# Patient Record
Sex: FEMALE | Race: WHITE | NOT HISPANIC OR LATINO | Employment: OTHER | URBAN - METROPOLITAN AREA
[De-identification: names, ages, dates, MRNs, and addresses within clinical notes are randomized per-mention and may not be internally consistent; named-entity substitution may affect disease eponyms.]

---

## 2017-01-20 ENCOUNTER — TELEPHONE (OUTPATIENT)
Dept: FAMILY MEDICINE | Facility: CLINIC | Age: 72
End: 2017-01-20

## 2017-01-20 NOTE — TELEPHONE ENCOUNTER
----- Message from Alma Swain sent at 1/20/2017  2:00 PM CST -----  Contact: Self  Pt requesting to speak to nurse regarding referral. Please call [phone].

## 2017-02-08 ENCOUNTER — TELEPHONE (OUTPATIENT)
Dept: FAMILY MEDICINE | Facility: CLINIC | Age: 72
End: 2017-02-08

## 2017-02-08 RX ORDER — NYSTATIN 100000 [USP'U]/ML
6 SUSPENSION ORAL 4 TIMES DAILY
Qty: 473 ML | Refills: 12 | Status: SHIPPED | OUTPATIENT
Start: 2017-02-08 | End: 2017-02-18

## 2017-02-08 NOTE — TELEPHONE ENCOUNTER
Spoke with pt she states she has developed thrush in her mouth due to her advair inhaler and would like for you to send her in a script for this to [facility]. Please advise

## 2017-02-08 NOTE — TELEPHONE ENCOUNTER
----- Message from Lorena Cook sent at 2/8/2017  8:36 AM CST -----  Contact: self  Pt would like to speak to the nurse regarding about her medication. Please contact the pt at [phone]. Thanks!

## 2017-02-15 DIAGNOSIS — M81.0 OSTEOPOROSIS: Primary | ICD-10-CM

## 2017-02-22 ENCOUNTER — INFUSION (OUTPATIENT)
Dept: INFUSION THERAPY | Facility: HOSPITAL | Age: 72
End: 2017-02-22
Attending: INTERNAL MEDICINE
Payer: MEDICARE

## 2017-02-22 DIAGNOSIS — M81.0 OSTEOPOROSIS: Primary | ICD-10-CM

## 2017-02-22 PROCEDURE — 63600175 PHARM REV CODE 636 W HCPCS: Performed by: INTERNAL MEDICINE

## 2017-02-22 PROCEDURE — 96401 CHEMO ANTI-NEOPL SQ/IM: CPT

## 2017-02-22 RX ADMIN — DENOSUMAB 60 MG: 60 INJECTION SUBCUTANEOUS at 12:02

## 2017-02-23 ENCOUNTER — TELEPHONE (OUTPATIENT)
Dept: RHEUMATOLOGY | Facility: CLINIC | Age: 72
End: 2017-02-23

## 2017-02-23 NOTE — TELEPHONE ENCOUNTER
Patient called and told she will need to make appointment to see Dr. Rush before her next Prolia injection in 8/2017.  Offered to schedule appointment, but patient declined stating she will call to make appointment at a later date.

## 2017-03-20 ENCOUNTER — TELEPHONE (OUTPATIENT)
Dept: FAMILY MEDICINE | Facility: CLINIC | Age: 72
End: 2017-03-20

## 2017-03-20 DIAGNOSIS — E55.9 VITAMIN D DEFICIENCY DISEASE: ICD-10-CM

## 2017-03-20 DIAGNOSIS — E87.1 CHRONIC HYPONATREMIA: ICD-10-CM

## 2017-03-20 DIAGNOSIS — R73.9 HYPERGLYCEMIA: ICD-10-CM

## 2017-03-20 DIAGNOSIS — E78.5 HYPERLIPIDEMIA, UNSPECIFIED HYPERLIPIDEMIA TYPE: ICD-10-CM

## 2017-03-20 DIAGNOSIS — D72.829 LEUKOCYTOSIS, UNSPECIFIED TYPE: Primary | ICD-10-CM

## 2017-03-20 NOTE — TELEPHONE ENCOUNTER
----- Message from Leonor Jarvis sent at 3/20/2017  3:58 PM CDT -----  Contact: self  Patient is requesting an order for labs to be done prior to appt in April . Please call prior to scheduling .   [phone]    LL

## 2017-03-28 RX ORDER — PRAVASTATIN SODIUM 20 MG/1
20 TABLET ORAL NIGHTLY
Qty: 90 TABLET | Refills: 1 | Status: SHIPPED | OUTPATIENT
Start: 2017-03-28 | End: 2017-08-18 | Stop reason: SDUPTHER

## 2017-03-29 DIAGNOSIS — J44.9 CHRONIC OBSTRUCTIVE PULMONARY DISEASE, UNSPECIFIED COPD TYPE: ICD-10-CM

## 2017-03-29 RX ORDER — TIOTROPIUM BROMIDE 18 UG/1
18 CAPSULE ORAL; RESPIRATORY (INHALATION) DAILY
Qty: 30 CAPSULE | Refills: 3 | OUTPATIENT
Start: 2017-03-29 | End: 2017-03-31 | Stop reason: SDUPTHER

## 2017-03-29 NOTE — TELEPHONE ENCOUNTER
----- Message from Mendez Franco sent at 3/27/2017 12:53 PM CDT -----  Contact: Patient  Patient need a Rx refill for Rx tiotropium (SPIRIVA WITH HANDIHALER) 18 mcg inhalation capsule With refills     Please send Rx to [facility] [phone] (Phone)  [phone] (Fax)    Please call patient at [phone]

## 2017-03-29 NOTE — TELEPHONE ENCOUNTER
----- Message from Debbie Mathew sent at 3/29/2017  2:38 PM CDT -----  Contact: pt  2nd request   OUT OF MED      SEVERAL REQUESTS      FROM [facility]   LAST WEEK      PT CALLED Monday        No response           30 DAY  SUPPLY     tiotropium (SPIRIVA WITH HANDIHALER) 18 mcg inhalation capsule ()    DIFFERENT PHARMACY           [facility]    Fide De La Cruz      Thanks

## 2017-03-31 ENCOUNTER — TELEPHONE (OUTPATIENT)
Dept: SLEEP MEDICINE | Facility: OTHER | Age: 72
End: 2017-03-31

## 2017-03-31 DIAGNOSIS — J44.9 CHRONIC OBSTRUCTIVE PULMONARY DISEASE, UNSPECIFIED COPD TYPE: ICD-10-CM

## 2017-03-31 RX ORDER — TIOTROPIUM BROMIDE 18 UG/1
18 CAPSULE ORAL; RESPIRATORY (INHALATION) DAILY
Qty: 30 CAPSULE | Refills: 6 | Status: SHIPPED | OUTPATIENT
Start: 2017-03-31 | End: 2017-03-31 | Stop reason: SDUPTHER

## 2017-03-31 RX ORDER — TIOTROPIUM BROMIDE 18 UG/1
18 CAPSULE ORAL; RESPIRATORY (INHALATION) DAILY
Qty: 30 CAPSULE | Refills: 6 | Status: SHIPPED | OUTPATIENT
Start: 2017-03-31 | End: 2017-04-30

## 2017-03-31 NOTE — TELEPHONE ENCOUNTER
----- Message from Gerri Modi sent at 3/31/2017  3:28 PM CDT -----  Medication for  Patient need a Rx refill for Rx tiotropium (SPIRIVA WITH HANDIHALER) 18 mcg inhalation capsule With refills      Please send Rx to [facility] [phone] (Phone)  [phone] (Fax)     Please call patient at [phone]  Medication was sent to the wrong Pharmacy

## 2017-04-03 ENCOUNTER — TELEPHONE (OUTPATIENT)
Dept: PULMONOLOGY | Facility: CLINIC | Age: 72
End: 2017-04-03

## 2017-04-03 NOTE — TELEPHONE ENCOUNTER
----- Message from Elena Mike sent at 4/3/2017 10:44 AM CDT -----  Contact: patient   [phone]  hall   -  Patient calling to speak with the nurse in regards to getting her medication refilled   Thanks,

## 2017-04-05 ENCOUNTER — LAB VISIT (OUTPATIENT)
Dept: LAB | Facility: HOSPITAL | Age: 72
End: 2017-04-05
Attending: INTERNAL MEDICINE
Payer: MEDICARE

## 2017-04-05 DIAGNOSIS — E55.9 VITAMIN D DEFICIENCY DISEASE: ICD-10-CM

## 2017-04-05 DIAGNOSIS — R73.9 HYPERGLYCEMIA: ICD-10-CM

## 2017-04-05 DIAGNOSIS — E78.5 HYPERLIPIDEMIA, UNSPECIFIED HYPERLIPIDEMIA TYPE: ICD-10-CM

## 2017-04-05 DIAGNOSIS — E87.1 CHRONIC HYPONATREMIA: ICD-10-CM

## 2017-04-05 DIAGNOSIS — D72.829 LEUKOCYTOSIS, UNSPECIFIED TYPE: ICD-10-CM

## 2017-04-05 LAB
25(OH)D3+25(OH)D2 SERPL-MCNC: 54 NG/ML
ALBUMIN SERPL BCP-MCNC: 3.9 G/DL
ALP SERPL-CCNC: 50 U/L
ALT SERPL W/O P-5'-P-CCNC: 13 U/L
ANION GAP SERPL CALC-SCNC: 8 MMOL/L
AST SERPL-CCNC: 26 U/L
BASOPHILS # BLD AUTO: 0.01 K/UL
BASOPHILS NFR BLD: 0.2 %
BILIRUB SERPL-MCNC: 0.6 MG/DL
BUN SERPL-MCNC: 10 MG/DL
CALCIUM SERPL-MCNC: 9.6 MG/DL
CHLORIDE SERPL-SCNC: 96 MMOL/L
CHOLEST/HDLC SERPL: 2.3 {RATIO}
CO2 SERPL-SCNC: 26 MMOL/L
CREAT SERPL-MCNC: 0.7 MG/DL
DIFFERENTIAL METHOD: ABNORMAL
EOSINOPHIL # BLD AUTO: 0.2 K/UL
EOSINOPHIL NFR BLD: 3.9 %
ERYTHROCYTE [DISTWIDTH] IN BLOOD BY AUTOMATED COUNT: 13 %
EST. GFR  (AFRICAN AMERICAN): >60 ML/MIN/1.73 M^2
EST. GFR  (NON AFRICAN AMERICAN): >60 ML/MIN/1.73 M^2
GLUCOSE SERPL-MCNC: 90 MG/DL
HCT VFR BLD AUTO: 38.8 %
HDL/CHOLESTEROL RATIO: 43.6 %
HDLC SERPL-MCNC: 165 MG/DL
HDLC SERPL-MCNC: 72 MG/DL
HGB BLD-MCNC: 13.9 G/DL
LDLC SERPL CALC-MCNC: 81.8 MG/DL
LYMPHOCYTES # BLD AUTO: 1.7 K/UL
LYMPHOCYTES NFR BLD: 27.4 %
MCH RBC QN AUTO: 32.4 PG
MCHC RBC AUTO-ENTMCNC: 35.8 %
MCV RBC AUTO: 90 FL
MONOCYTES # BLD AUTO: 0.7 K/UL
MONOCYTES NFR BLD: 10.9 %
NEUTROPHILS # BLD AUTO: 3.6 K/UL
NEUTROPHILS NFR BLD: 57.4 %
NONHDLC SERPL-MCNC: 93 MG/DL
PLATELET # BLD AUTO: 221 K/UL
PMV BLD AUTO: 9.5 FL
POTASSIUM SERPL-SCNC: 4.5 MMOL/L
PROT SERPL-MCNC: 7 G/DL
RBC # BLD AUTO: 4.29 M/UL
SODIUM SERPL-SCNC: 130 MMOL/L
TRIGL SERPL-MCNC: 56 MG/DL
TSH SERPL DL<=0.005 MIU/L-ACNC: 0.97 UIU/ML
WBC # BLD AUTO: 6.17 K/UL

## 2017-04-05 PROCEDURE — 83036 HEMOGLOBIN GLYCOSYLATED A1C: CPT

## 2017-04-05 PROCEDURE — 84443 ASSAY THYROID STIM HORMONE: CPT

## 2017-04-05 PROCEDURE — 85025 COMPLETE CBC W/AUTO DIFF WBC: CPT

## 2017-04-05 PROCEDURE — 80061 LIPID PANEL: CPT

## 2017-04-05 PROCEDURE — 82306 VITAMIN D 25 HYDROXY: CPT

## 2017-04-05 PROCEDURE — 80053 COMPREHEN METABOLIC PANEL: CPT

## 2017-04-05 PROCEDURE — 36415 COLL VENOUS BLD VENIPUNCTURE: CPT | Mod: PO

## 2017-04-06 LAB
ESTIMATED AVG GLUCOSE: 126 MG/DL
HBA1C MFR BLD HPLC: 6 %

## 2017-04-13 ENCOUNTER — OFFICE VISIT (OUTPATIENT)
Dept: FAMILY MEDICINE | Facility: CLINIC | Age: 72
End: 2017-04-13
Payer: MEDICARE

## 2017-04-13 VITALS
BODY MASS INDEX: 16.93 KG/M2 | WEIGHT: 101.63 LBS | TEMPERATURE: 98 F | HEIGHT: 65 IN | SYSTOLIC BLOOD PRESSURE: 134 MMHG | DIASTOLIC BLOOD PRESSURE: 60 MMHG | HEART RATE: 84 BPM

## 2017-04-13 DIAGNOSIS — M70.22 OLECRANON BURSITIS OF LEFT ELBOW: ICD-10-CM

## 2017-04-13 DIAGNOSIS — Z00.00 ROUTINE MEDICAL EXAM: Primary | ICD-10-CM

## 2017-04-13 DIAGNOSIS — I65.29 STENOSIS OF CAROTID ARTERY, UNSPECIFIED LATERALITY: ICD-10-CM

## 2017-04-13 DIAGNOSIS — I77.9 CAROTID DISEASE, BILATERAL: ICD-10-CM

## 2017-04-13 DIAGNOSIS — J43.9 PULMONARY EMPHYSEMA, UNSPECIFIED EMPHYSEMA TYPE: ICD-10-CM

## 2017-04-13 DIAGNOSIS — I10 ESSENTIAL HYPERTENSION: ICD-10-CM

## 2017-04-13 DIAGNOSIS — M81.0 OSTEOPOROSIS, UNSPECIFIED OSTEOPOROSIS TYPE, UNSPECIFIED PATHOLOGICAL FRACTURE PRESENCE: ICD-10-CM

## 2017-04-13 DIAGNOSIS — E78.5 HYPERLIPIDEMIA, UNSPECIFIED HYPERLIPIDEMIA TYPE: ICD-10-CM

## 2017-04-13 DIAGNOSIS — C44.90 SKIN CANCER: ICD-10-CM

## 2017-04-13 DIAGNOSIS — Z12.31 ENCOUNTER FOR SCREENING MAMMOGRAM FOR BREAST CANCER: ICD-10-CM

## 2017-04-13 DIAGNOSIS — E87.1 CHRONIC HYPONATREMIA: ICD-10-CM

## 2017-04-13 DIAGNOSIS — I77.71 CAROTID DISSECTION, BILATERAL: ICD-10-CM

## 2017-04-13 PROCEDURE — 3075F SYST BP GE 130 - 139MM HG: CPT | Mod: S$GLB,,, | Performed by: INTERNAL MEDICINE

## 2017-04-13 PROCEDURE — 99214 OFFICE O/P EST MOD 30 MIN: CPT | Mod: 25,S$GLB,, | Performed by: INTERNAL MEDICINE

## 2017-04-13 PROCEDURE — 99397 PER PM REEVAL EST PAT 65+ YR: CPT | Mod: 25,S$GLB,, | Performed by: INTERNAL MEDICINE

## 2017-04-13 PROCEDURE — 1159F MED LIST DOCD IN RCRD: CPT | Mod: S$GLB,,, | Performed by: INTERNAL MEDICINE

## 2017-04-13 PROCEDURE — 99999 PR PBB SHADOW E&M-EST. PATIENT-LVL III: CPT | Mod: PBBFAC,,, | Performed by: INTERNAL MEDICINE

## 2017-04-13 PROCEDURE — 3078F DIAST BP <80 MM HG: CPT | Mod: S$GLB,,, | Performed by: INTERNAL MEDICINE

## 2017-04-13 PROCEDURE — 99499 UNLISTED E&M SERVICE: CPT | Mod: S$GLB,,, | Performed by: INTERNAL MEDICINE

## 2017-04-13 PROCEDURE — 1160F RVW MEDS BY RX/DR IN RCRD: CPT | Mod: S$GLB,,, | Performed by: INTERNAL MEDICINE

## 2017-04-13 RX ORDER — CLINDAMYCIN HYDROCHLORIDE 150 MG/1
150 CAPSULE ORAL 3 TIMES DAILY
Qty: 30 CAPSULE | Refills: 0 | Status: SHIPPED | OUTPATIENT
Start: 2017-04-13 | End: 2017-12-04 | Stop reason: ALTCHOICE

## 2017-04-13 NOTE — PROGRESS NOTES
complaint: Physical exam    71-year-old white female still very active.  She is not yet ready for a  colonoscopy.  We discussed all the benefits and she will at least do stool cards which were given today.  Her mammogram will be due in May.  She is still active and exercising although less so given her orthopedic injury.  She was given stool cards before and still has them every discussed obtaining a new kit later this year    ROS:   CONST: weight stable. EYES: no vision change. ENT: no sore throat. CV: no chest pain w/ exertion. RESP: no shortness of breath. GI: no nausea, vomiting, diarrhea. No dysphagia. : no urinary issues. MUSCULOSKELETAL: no new myalgias or arthralgias. SKIN: no new changes. NEURO: no focal deficits. PSYCH: no new issues. ENDOCRINE: no polyuria. HEME: no lymph nodes. ALLERGY: no general pruritis.     MH:  1.  HTN.                                                                     2.  Osteoporosis.  Worsening BMD 4/12. On fosfamax [id number] and restarted 2012.   Now on Prolia per Endocrine                                                       3.  H/o smoking for 30 years and questionable emphysema on CXR.              4.  Cervical spondylosis, s/p surgery by Dr. Luciano.                      5.  mammogram yearly                                                  6.  Colon cancer screening, pt cancelled C-scope but plans to reschedule.                                                                  7.  FMH (mother) with colon polyps. Father unknown.                                         8.  H/o early menopause at 43 with no HRT.   9.  Bilateral carotid disease.  50-70% on left 2015                                                   10.  Chronic hyponatremia.   11. Left shoulder scope/impingement.    12. Migraines ?        13.   Hoarseness - saw Ricky in ENT- vocal cord reduced flexibility   14.  Allergies- SOB relieved w/ Zyrtec   15.  Tibia fracture surgery 2014    Social history: Former  smoker for 30 years,  lives with her , rare alcohol    Gen: no distress  EYES: conjunctiva clear, non-icteric, PERRL  ENT: nose clear, nasal mucosa normal, oropharynx clear, teeth good  NECK:supple, thyroid non-palpable  RESP: effort is good, lungs clear  CV: heart RRR w/o murmur, gallops or rubs; no carotid bruits, no edema  GI: abdomen soft, non-distended, non-tender, no hepatosplenomegaly  MS: gait normal, no clubbing or cyanosis of the digits, left knee with inward deviation but no pain, patient is a one by one slightly red and and her olecranon bursitis on the left and overlying it there is a small whitish collection but no break in the skin and no surrounding cellulitis  SKIN: no rashes, warm to touch     Latasha TALLEY was seen today for annual exam.    Diagnoses and all orders for this visit:    Routine medical exam, we'll update patient's mammogram and she will consider doing stool cards later this year.  Labs all reviewed with patient    Encounter for screening mammogram for breast cancer  -     Mammo Digital Screening Bilat with CAD; Future                                                              Additional evaluation and management issues:    Additionally, patient has numerous other medical issues to address.  She has her chronic hyponatremia which is essentially the same and she will increase her salt intake.  She is on Pravachol due to history of carotid disease which is due to update a yearly ultrasound which we reviewed.  Her lipids are acceptable and HDL is excellent on statin.  We'll long conversation regarding her osteoporosis.  She has been on injections for the past 2 years and would like to get a bone density done prior to deciding on continuing which I would recommend.  She has a history of emphysema but has no pulmonary symptoms referable to that at this time.  Her hypertension is under good control.  We discussed a lot of her problems getting referrals.  She apparently saw  "dermatology without a referral or something but may not need a referral but anyway she got a cleared up but she would like now to have referrals put in for any specialist she sees.  She's had some treated skin cancers on her arms.  Also for several weeks she had a swelling of the left elbow.  It was tender in the little bit more red but has improved.  She thought she might need to see dermatology but actually I would have her see orthopedics.  It does possibly have a pustule on its I will give her clindamycin in case it worsens or ruptures but not sure she actually needs it at this time.  All these issues reviewed and patient counseled an evaluation and management we based upon time counseling Total time over 25 minutes with over 50% counseling.          Assessment and plan:    Chronic hyponatremia, continue salt intake    Hyperlipidemia, unspecified hyperlipidemia type, continue statin    Osteoporosis, unspecified osteoporosis type, unspecified pathological fracture presence, reassess on therapy  -     DXA Bone Density Spine And Hip; Future    Pulmonary emphysema, unspecified emphysema type, chronic and stable    Carotid disease, bilateral, reassess    Essential hypertension, chronic and stable    Skin cancer  -     Ambulatory referral to Dermatology    Olecranon bursitis of left elbow, clindamycin in case he gets infected  -     Ambulatory referral to Orthopedics    Other orders  -     clindamycin (CLEOCIN) 150 MG capsule; Take 1 capsule (150 mg total) by mouth 3 (three) times daily.       Patient expresses significant anxiety regarding results and tests over scrutinizing get herself worked up.  Access to the note would not be therapeutic"This note will not be shared with the patient."   "

## 2017-05-10 RX ORDER — FLUTICASONE PROPIONATE AND SALMETEROL 250; 50 UG/1; UG/1
POWDER RESPIRATORY (INHALATION)
Qty: 3 EACH | Refills: 2 | Status: SHIPPED | OUTPATIENT
Start: 2017-05-10 | End: 2017-10-16 | Stop reason: SDUPTHER

## 2017-05-11 ENCOUNTER — HOSPITAL ENCOUNTER (OUTPATIENT)
Dept: RADIOLOGY | Facility: HOSPITAL | Age: 72
Discharge: HOME | End: 2017-05-11
Attending: INTERNAL MEDICINE
Payer: MEDICARE

## 2017-05-11 ENCOUNTER — HOSPITAL ENCOUNTER (OUTPATIENT)
Dept: RADIOLOGY | Facility: CLINIC | Age: 72
Discharge: HOME | End: 2017-05-11
Attending: INTERNAL MEDICINE
Payer: MEDICARE

## 2017-05-11 DIAGNOSIS — Z12.31 ENCOUNTER FOR SCREENING MAMMOGRAM FOR BREAST CANCER: ICD-10-CM

## 2017-05-11 DIAGNOSIS — M81.0 OSTEOPOROSIS, UNSPECIFIED OSTEOPOROSIS TYPE, UNSPECIFIED PATHOLOGICAL FRACTURE PRESENCE: ICD-10-CM

## 2017-05-11 PROCEDURE — 77080 DXA BONE DENSITY AXIAL: CPT | Mod: 26,,, | Performed by: INTERNAL MEDICINE

## 2017-05-11 PROCEDURE — 77067 SCR MAMMO BI INCL CAD: CPT | Mod: 26,,, | Performed by: RADIOLOGY

## 2017-05-11 PROCEDURE — 77063 BREAST TOMOSYNTHESIS BI: CPT | Mod: 26,,, | Performed by: RADIOLOGY

## 2017-05-11 PROCEDURE — 77067 SCR MAMMO BI INCL CAD: CPT | Mod: TC

## 2017-06-05 ENCOUNTER — HOSPITAL ENCOUNTER (OUTPATIENT)
Dept: RADIOLOGY | Facility: HOSPITAL | Age: 72
Discharge: HOME | End: 2017-06-05
Attending: INTERNAL MEDICINE
Payer: MEDICARE

## 2017-06-05 DIAGNOSIS — I77.9 CAROTID DISEASE, BILATERAL: ICD-10-CM

## 2017-06-05 DIAGNOSIS — I65.29 STENOSIS OF CAROTID ARTERY, UNSPECIFIED LATERALITY: ICD-10-CM

## 2017-06-05 DIAGNOSIS — I77.71 CAROTID DISSECTION, BILATERAL: ICD-10-CM

## 2017-06-05 PROCEDURE — 93880 EXTRACRANIAL BILAT STUDY: CPT | Mod: TC

## 2017-06-05 PROCEDURE — 93880 EXTRACRANIAL BILAT STUDY: CPT | Mod: 26,,, | Performed by: RADIOLOGY

## 2017-06-14 ENCOUNTER — TELEPHONE (OUTPATIENT)
Dept: FAMILY MEDICINE | Facility: CLINIC | Age: 72
End: 2017-06-14

## 2017-06-14 DIAGNOSIS — M79.673 PAIN OF FOOT, UNSPECIFIED LATERALITY: Primary | ICD-10-CM

## 2017-06-14 NOTE — TELEPHONE ENCOUNTER
----- Message from Shayy Granados sent at 6/14/2017  2:37 PM CDT -----  Contact: [phone]  Pt wants to get a referral for the podiatrist for upcoming appointment  Please call pt at your earliest convenience.  Thanks !

## 2017-06-16 RX ORDER — AMLODIPINE BESYLATE 5 MG/1
TABLET ORAL
Qty: 90 TABLET | Refills: 1 | Status: SHIPPED | OUTPATIENT
Start: 2017-06-16 | End: 2017-08-18 | Stop reason: SDUPTHER

## 2017-06-26 RX ORDER — ALBUTEROL SULFATE 90 UG/1
2 AEROSOL, METERED RESPIRATORY (INHALATION) EVERY 6 HOURS PRN
Qty: 1 INHALER | Refills: 6 | Status: SHIPPED | OUTPATIENT
Start: 2017-06-26 | End: 2017-10-16 | Stop reason: SDUPTHER

## 2017-06-26 NOTE — TELEPHONE ENCOUNTER
As per patient she states that the Advair is $1,000 and would like something cheaper. She has a inhaler (ventolin).

## 2017-06-27 ENCOUNTER — TELEPHONE (OUTPATIENT)
Dept: FAMILY MEDICINE | Facility: CLINIC | Age: 72
End: 2017-06-27

## 2017-06-27 NOTE — TELEPHONE ENCOUNTER
----- Message from Sabra Martinez sent at 6/27/2017 10:49 AM CDT -----  Contact: self  Pt returned call. Please call [phone].

## 2017-06-27 NOTE — TELEPHONE ENCOUNTER
----- Message from Sabra Martinez sent at 6/27/2017 10:49 AM CDT -----  Contact: self  Pt returned call. Please call [phone].

## 2017-06-28 ENCOUNTER — OFFICE VISIT (OUTPATIENT)
Dept: PODIATRY | Facility: CLINIC | Age: 72
End: 2017-06-28
Payer: MEDICARE

## 2017-06-28 VITALS
DIASTOLIC BLOOD PRESSURE: 80 MMHG | SYSTOLIC BLOOD PRESSURE: 140 MMHG | HEIGHT: 65 IN | WEIGHT: 101 LBS | BODY MASS INDEX: 16.83 KG/M2

## 2017-06-28 DIAGNOSIS — L60.0 INGROWN TOENAIL WITHOUT INFECTION: Primary | ICD-10-CM

## 2017-06-28 DIAGNOSIS — M79.674 PAIN AROUND TOENAIL, RIGHT FOOT: ICD-10-CM

## 2017-06-28 DIAGNOSIS — L60.0 INGROWN TOENAIL WITHOUT INFECTION: ICD-10-CM

## 2017-06-28 DIAGNOSIS — M79.675 PAIN AROUND TOENAIL, LEFT FOOT: ICD-10-CM

## 2017-06-28 PROCEDURE — 99999 PR PBB SHADOW E&M-EST. PATIENT-LVL III: CPT | Mod: PBBFAC,,, | Performed by: PODIATRIST

## 2017-06-28 PROCEDURE — 1159F MED LIST DOCD IN RCRD: CPT | Mod: S$GLB,,, | Performed by: PODIATRIST

## 2017-06-28 PROCEDURE — 99203 OFFICE O/P NEW LOW 30 MIN: CPT | Mod: S$GLB,,, | Performed by: PODIATRIST

## 2017-06-28 PROCEDURE — 1126F AMNT PAIN NOTED NONE PRSNT: CPT | Mod: S$GLB,,, | Performed by: PODIATRIST

## 2017-06-28 RX ORDER — TIOTROPIUM BROMIDE 18 UG/1
CAPSULE ORAL; RESPIRATORY (INHALATION)
Refills: 6 | COMMUNITY
Start: 2017-05-17 | End: 2017-12-04

## 2017-06-29 ENCOUNTER — TELEPHONE (OUTPATIENT)
Dept: FAMILY MEDICINE | Facility: CLINIC | Age: 72
End: 2017-06-29

## 2017-06-29 NOTE — PROGRESS NOTES
Subjective:      Patient ID: Latasha Perez is a 71 y.o. female.    Chief Complaint: Ingrown Toenail (pcp gold renee / 4-13-17/ )    Latasha TALLEY is a 71 y.o. female who presents to the clinic complaining of painful ingrown toenail on both feet great toes. Has previously tried to self trim but this is becoming more and more difficult for her to do so. She is here to inquire about treatment options as they are becoming increasingly painful daily. Not having any other major concerns with the feet.     Past Medical History:   Diagnosis Date    Carotid disease, bilateral     Chronic hyponatremia     HLD (hyperlipidemia)     HTN (hypertension)        Past Surgical History:   Procedure Laterality Date    APPENDECTOMY      CERVICAL SPINE SURGERY      DILATION AND CURETTAGE OF UTERUS      x 2    metatarsal repair      SHOULDER ARTHROSCOPY         Family History   Problem Relation Age of Onset    Heart disease Mother     Cancer Father     Heart disease Maternal Grandfather        Social History     Social History    Marital status:      Spouse name: N/A    Number of children: N/A    Years of education: N/A     Occupational History    teacher      Social History Main Topics    Smoking status: Former Smoker     Packs/day: 1.00     Years: 45.00     Types: Cigarettes     Quit date: 1/12/2002    Smokeless tobacco: None    Alcohol use No    Drug use: No    Sexual activity: Yes     Partners: Male     Other Topics Concern    None     Social History Narrative    None       Current Outpatient Prescriptions   Medication Sig Dispense Refill    albuterol 90 mcg/actuation inhaler Inhale 2 puffs into the lungs every 6 (six) hours as needed for Wheezing. Rescue 1 Inhaler 6    amlodipine (NORVASC) 5 MG tablet TAKE 1 TABLET (5 MG TOTAL) BY MOUTH ONCE DAILY. 90 tablet 1    aspirin (ECOTRIN) 81 MG EC tablet Take 81 mg by mouth once daily.      azelastine (ASTELIN) 137 mcg nasal spray 1 spray by Nasal route  daily as needed.       cetirizine (ZYRTEC) 10 MG tablet Take 10 mg by mouth every morning.        clindamycin (CLEOCIN) 150 MG capsule Take 1 capsule (150 mg total) by mouth 3 (three) times daily. 30 capsule 0    denosumab (PROLIA) 60 mg/mL Syrg Inject 60 mg into the skin every 6 (six) months.      fish oil-omega-3 fatty acids 300-1,000 mg capsule Take 1 g by mouth once daily.      fluticasone (FLONASE) 50 mcg/actuation nasal spray 1 spray by Each Nare route once daily. 1 Bottle 0    fluticasone (FLONASE) 50 mcg/actuation nasal spray 2 sprays by Each Nare route once daily. 16 g 0    fluticasone-salmeterol 250-50 mcg/dose (ADVAIR DISKUS) 250-50 mcg/dose diskus inhaler INHALE 1 PUFF TWICE DAILY 3 each 2    levocetirizine (XYZAL) 5 MG tablet Take 1 tablet (5 mg total) by mouth every evening. 30 tablet 1    losartan (COZAAR) 100 MG tablet TAKE 1 TABLET EVERY DAY 90 tablet 1    MULTIVITAMIN W-MINERALS/LUTEIN (CENTRUM SILVER ORAL) Take by mouth once daily.      pravastatin (PRAVACHOL) 20 MG tablet Take 1 tablet (20 mg total) by mouth every evening. 90 tablet 1    predniSONE (DELTASONE) 10 MG tablet 3 po bid x 3 d, then 2 po bid x 3 d, then 1 po bid x 3 d, then one daily. 40 tablet 0    PREVNAR 13, PF, 0.5 mL Syrg   0    SPIRIVA WITH HANDIHALER 18 mcg inhalation capsule INHALE 1 C USING THE HANDIHALER INTO LUNGS ONCE D  6     No current facility-administered medications for this visit.        Review of patient's allergies indicates:   Allergen Reactions    Pcn [penicillins] Other (See Comments)     Fever flushing skin swelling     Biaxin [clarithromycin] Rash    Codeine Rash    Doxycycline Rash    Levaquin [levofloxacin] Rash         Review of Systems   Constitution: Negative for chills and fever.   Cardiovascular: Positive for leg swelling. Negative for chest pain and claudication.   Respiratory: Negative for cough and shortness of breath.    Skin: Positive for dry skin and nail changes.    Musculoskeletal:        Toenail pain   Gastrointestinal: Negative for nausea and vomiting.   Neurological: Negative for numbness and paresthesias.   Psychiatric/Behavioral: Negative for altered mental status.           Objective:      Physical Exam   Constitutional: She is oriented to person, place, and time. She appears well-developed and well-nourished.   HENT:   Head: Normocephalic.   Cardiovascular: Intact distal pulses.    Pulses:       Dorsalis pedis pulses are 2+ on the right side, and 2+ on the left side.        Posterior tibial pulses are 1+ on the right side, and 1+ on the left side.   CRT < 3 sec to tips of toes. Mild edema noted to b/l LE. Moderate spider veins and vericosities noted to b/l LEs.      Pulmonary/Chest: No respiratory distress.   Musculoskeletal:   Gastrocnemius equinus noted to b/l ankles with decreased DF noted on exam. MMT 5/5 in DF/PF/Inv/Ev resistance with no reproduction of pain in any direction. Passive range of motion of ankle and pedal joints is painless. Adequate pedal joint ROM.     + pain with palpation of b/l hallux toenails, b/l borders.     Semi-reducible hammertoe contractures noted to toes 2-4 b/l-asymptomatic. HAV, mild, non trackbound noted b/l with mild medial bony prominence at 1st met head--asymptomatic.     Hypermobility noted to 1st ray b/l with near complete collapse of medial longitudinal arch b/l with loading.   Pes planus foot type b/l.    Neurological: She is alert and oriented to person, place, and time. She has normal strength. No sensory deficit.   Light touch, proprioception, and sharp/dull sensation are all intact bilaterally. Protective threshold with the [place]Wienstein monofilament is intact bilaterally.      Skin: Skin is warm, dry and intact. No erythema.   No open lesions, lacerations or wounds noted. Nails are thickened, elongated, discolored yellow/brown with subungual debris and brittleness to R 1-5 and L 1-5. Interdigital spaces clean, dry and  intact b/l. No erythema noted to b/l foot. Skin texture thin, atrophic, dry. Pedal hair absent. Toes are cool to touch b/l.     B/l hallux b/l borders moderately incurvated with mild erythema to b/l borders b/l hallux without drainage or open wound. No signs of infection at this time.      Psychiatric: She has a normal mood and affect. Her behavior is normal. Judgment and thought content normal.   Vitals reviewed.            Assessment:       Encounter Diagnoses   Name Primary?    Ingrown toenail without infection - Right Foot Yes    Ingrown toenail without infection - Left Foot     Pain around toenail, right foot - Right Foot     Pain around toenail, left foot - Left Foot          Plan:       Latasha TALLEY was seen today for ingrown toenail.    Diagnoses and all orders for this visit:    Ingrown toenail without infection - Right Foot    Ingrown toenail without infection - Left Foot    Pain around toenail, right foot - Right Foot    Pain around toenail, left foot - Left Foot      I counseled the patient on her conditions, their implications and medical management.        - Shoe inspection. Patient instructed on proper foot hygeine. We discussed wearing proper shoe gear, daily foot inspections, never walking without protective shoe gear, never putting sharp instruments to feet, routine podiatric nail visits every 2-3 months.      - With patient's permission, nails were aggressively reduced and debrided x 10 to their soft tissue attachment mechanically and with electric , removing all offending nail and debris. Patient relates relief following the procedure. She will continue to monitor the areas daily, inspect her feet, wear protective shoe gear when ambulatory, moisturizer to maintain skin integrity and follow in this office in approximately 2-3 months, sooner p.r.n.    - Patient is aware that routine trimming of nails/calluses will not be covered service per insurance and he/she will fall under Proc B if  this service is desired. Patient verbalized understanding of this. He will RTC as needed for Proc B or any other pedal complaint.     Continue routine trimming for now. Will monitor for worsening and consider more aggressive/permanent procedure if necessary in the future.     RTC every 2-3 months for Proc B or as needed.

## 2017-06-29 NOTE — TELEPHONE ENCOUNTER
----- Message from Sabra Martinez sent at 6/29/2017  9:11 AM CDT -----  Contact: self  Pt returned call. Please call [phone].

## 2017-06-30 NOTE — TELEPHONE ENCOUNTER
Patient cam by to sign cologuard order form. Wants to check with her insurance company to see if they will pay for it. If she does decide to do the Cologuard she will call us and let us know when she is coming by to sign form.

## 2017-07-20 ENCOUNTER — TELEPHONE (OUTPATIENT)
Dept: RHEUMATOLOGY | Facility: CLINIC | Age: 72
End: 2017-07-20

## 2017-07-20 NOTE — TELEPHONE ENCOUNTER
----- Message from Arianna Willingham sent at 7/20/2017 11:17 AM CDT -----  Contact: self@home, [phone]  Pt called in asking for a call back. Pt stated that she is not going to be able to take the Prolia in August because she has reached the [place]. Please call and advise what she can do alternatively.    Thank you

## 2017-07-21 ENCOUNTER — TELEPHONE (OUTPATIENT)
Dept: ADMINISTRATIVE | Facility: HOSPITAL | Age: 72
End: 2017-07-21

## 2017-07-21 NOTE — TELEPHONE ENCOUNTER
Received from [facility], [facility]tag Cologuard test results.  Updated health maintenance and sent to scanning.

## 2017-07-31 ENCOUNTER — TELEPHONE (OUTPATIENT)
Dept: RHEUMATOLOGY | Facility: CLINIC | Age: 72
End: 2017-07-31

## 2017-07-31 NOTE — TELEPHONE ENCOUNTER
----- Message from Griselda Hankins sent at 7/31/2017 11:30 AM CDT -----  Contact: self   Patient ask for a call in regards to cancelling polio shot and need to discuss her prescription Patient can be reached on home phone

## 2017-08-02 ENCOUNTER — TELEPHONE (OUTPATIENT)
Dept: RHEUMATOLOGY | Facility: CLINIC | Age: 72
End: 2017-08-02

## 2017-08-04 ENCOUNTER — TELEPHONE (OUTPATIENT)
Dept: FAMILY MEDICINE | Facility: CLINIC | Age: 72
End: 2017-08-04

## 2017-08-04 NOTE — TELEPHONE ENCOUNTER
----- Message from Joan Marrufo sent at 8/4/2017  1:04 PM CDT -----  Contact: self  Patient called regarding claim being denied per wrong diagnosis code. Patient states insurance company sent information to . Please contact her at [phone].    Thanks!

## 2017-08-07 NOTE — TELEPHONE ENCOUNTER
Informed that that form was filled out correctly by provider. Advised to contact insurance to see why procedure is not covered after being told by insurance it was indeed covered by them.

## 2017-08-15 ENCOUNTER — OFFICE VISIT (OUTPATIENT)
Dept: FAMILY MEDICINE | Facility: CLINIC | Age: 72
End: 2017-08-15
Payer: MEDICARE

## 2017-08-15 VITALS
BODY MASS INDEX: 16.79 KG/M2 | HEART RATE: 76 BPM | TEMPERATURE: 98 F | OXYGEN SATURATION: 98 % | HEIGHT: 65 IN | SYSTOLIC BLOOD PRESSURE: 135 MMHG | DIASTOLIC BLOOD PRESSURE: 88 MMHG | WEIGHT: 100.75 LBS

## 2017-08-15 DIAGNOSIS — M77.8 THUMB TENDONITIS: ICD-10-CM

## 2017-08-15 DIAGNOSIS — J43.9 PULMONARY EMPHYSEMA, UNSPECIFIED EMPHYSEMA TYPE: ICD-10-CM

## 2017-08-15 DIAGNOSIS — M81.0 OSTEOPOROSIS, UNSPECIFIED OSTEOPOROSIS TYPE, UNSPECIFIED PATHOLOGICAL FRACTURE PRESENCE: ICD-10-CM

## 2017-08-15 DIAGNOSIS — J44.9 CHRONIC OBSTRUCTIVE PULMONARY DISEASE, UNSPECIFIED COPD TYPE: Primary | ICD-10-CM

## 2017-08-15 DIAGNOSIS — F43.9 SITUATIONAL STRESS: ICD-10-CM

## 2017-08-15 DIAGNOSIS — I10 ESSENTIAL HYPERTENSION: ICD-10-CM

## 2017-08-15 DIAGNOSIS — L57.0 AK (ACTINIC KERATOSIS): ICD-10-CM

## 2017-08-15 PROCEDURE — 3079F DIAST BP 80-89 MM HG: CPT | Mod: S$GLB,,, | Performed by: INTERNAL MEDICINE

## 2017-08-15 PROCEDURE — 1126F AMNT PAIN NOTED NONE PRSNT: CPT | Mod: S$GLB,,, | Performed by: INTERNAL MEDICINE

## 2017-08-15 PROCEDURE — 3008F BODY MASS INDEX DOCD: CPT | Mod: S$GLB,,, | Performed by: INTERNAL MEDICINE

## 2017-08-15 PROCEDURE — 99499 UNLISTED E&M SERVICE: CPT | Mod: S$GLB,,, | Performed by: INTERNAL MEDICINE

## 2017-08-15 PROCEDURE — 3075F SYST BP GE 130 - 139MM HG: CPT | Mod: S$GLB,,, | Performed by: INTERNAL MEDICINE

## 2017-08-15 PROCEDURE — 1159F MED LIST DOCD IN RCRD: CPT | Mod: S$GLB,,, | Performed by: INTERNAL MEDICINE

## 2017-08-15 PROCEDURE — 99215 OFFICE O/P EST HI 40 MIN: CPT | Mod: S$GLB,,, | Performed by: INTERNAL MEDICINE

## 2017-08-15 PROCEDURE — 99999 PR PBB SHADOW E&M-EST. PATIENT-LVL III: CPT | Mod: PBBFAC,,, | Performed by: INTERNAL MEDICINE

## 2017-08-15 RX ORDER — ALENDRONATE SODIUM 70 MG/1
70 TABLET ORAL
Qty: 12 TABLET | Refills: 11 | Status: SHIPPED | OUTPATIENT
Start: 2017-08-15 | End: 2018-11-02 | Stop reason: SDUPTHER

## 2017-08-15 NOTE — PROGRESS NOTES
Chief complaint: Right hand pain, discussed COPD medications, spot on skin and discussed issues with     72-year-old white female still very active.  For 2 weeks he's had some pain at the base of the right thumb.  Worse when she extends the thumb.  No injury.  We also discussed a great length her coverage limitations which are new to her having had better coverage in the past.  She takes a Spiriva once a week and it does help.  She has albuterol.  She also has Advair but is concerned about not being able to afford them soon to be going into the coverage.  She does have an advertisement for another COPD medication which may offer a free one-month supply and then possibly benefits thereafter.  Is not on her insurance it does appear to be a long-acting beta agonist and anticholinergic combination.  I will printer prescription and she can assess if she gets it and if so this will be her maintenance medication and not the others    She does have a spot on her left arm which appears to be a quarter-sized actinic keratosis near where she had cancer removed.  Her longtime dermatologist is Dr. Napier and she would like a referral having had some financial problems not having a referral previously.  Next    Another issue we discussed a great length today is her osteoporosis.  She cannot afford the injections and so was to go back on the Fosamax.  We reviewed ways in which she get their medications off of her insurance to save money for her more expensive medications.  We discussed the good Rx plan and we also reviewed available discount formularies on the Internet together.    Another issue is her  who is 89 years old.    Description is very depressed and will not leave the house.  He sleeps all day.  We discussed messaging with his current medications as I believe his symptoms are very suspicious for severe depression and perhaps we can adjust treatment and eventually get him motivated enough to come to the  office.  We discussed all these issues at great length today.Total time over 45 minutes with over 50% counseling.        ROS:   CONST: weight stable.  Eating well, no depression or anxiety symptoms.  No other myalgias arthralgias or neurological symptoms        MH:  1.  HTN.                                                                     2.  Osteoporosis.  Worsening BMD 4/12. On fosfamax [id number] and restarted 2012.   Now on Prolia per Endocrine                                                       3.  H/o smoking for 30 years and questionable emphysema on CXR.              4.  Cervical spondylosis, s/p surgery by Dr. Luciano.                      5.  mammogram yearly                                                  6.  Colon cancer screening, pt cancelled C-scope but plans to reschedule.                                                                  7.  FMH (mother) with colon polyps. Father unknown.                                         8.  H/o early menopause at 43 with no HRT.   9.  Bilateral carotid disease.  50-70% on left 2015                                                   10.  Chronic hyponatremia.   11. Left shoulder scope/impingement.    12. Migraines ?        13.   Hoarseness - saw García in ENT- vocal cord reduced flexibility   14.  Allergies- SOB relieved w/ Zyrtec   15.  Tibia fracture surgery 2014    Social history: Former smoker for 30 years,  lives with her , rare alcohol    Gen: no distress musculoskeletal: She has a positive Finkelstein's test on the right and with movement of the wrist and other joints of the thumb and fingers she doesn't have reproducible pains I don't suspect this is arthritis.  Skin examined as above  Latasha TALLEY was seen today for arthritis.    Diagnoses and all orders for this visit:    Chronic obstructive pulmonary disease, unspecified COPD type, discussed medications as above    AK (actinic keratosis) discussed the premalignant status  -      "Ambulatory referral to Dermatology    Pulmonary emphysema, unspecified emphysema type, medication adjustments    Osteoporosis, unspecified osteoporosis type, unspecified pathological fracture presence, patient wishes to return back on Fosamax temporarily and I reviewed the rheumatology telephone notes recommending this as well    Thumb tendonitis, discussed a thumb brace and allowing it to resolve with rest    Essential hypertension, fair control    Situational stress, discussed at length issues with her  and how it is affecting her own stress levels and ways to manage.    Other orders  -     glycopyrrolate-formoterol (BEVESPI AEROSPHERE) 9-4.8 mcg HFAA; Inhale 2 Inhalers into the lungs 2 (two) times daily. Controller  -     alendronate (FOSAMAX) 70 MG tablet; Take 1 tablet (70 mg total) by mouth every 7 days.         Patient expresses significant anxiety regarding results and tests over scrutinizing get herself worked up.  Access to the note would not be therapeutic"This note will not be shared with the patient."  "

## 2017-08-17 DIAGNOSIS — I10 ESSENTIAL HYPERTENSION: Primary | ICD-10-CM

## 2017-08-17 DIAGNOSIS — E78.5 HYPERLIPIDEMIA, UNSPECIFIED HYPERLIPIDEMIA TYPE: ICD-10-CM

## 2017-08-17 NOTE — TELEPHONE ENCOUNTER
"----- Message from Amelie Seals sent at 8/17/2017 10:16 AM CDT -----  Patient is requesting a return call about "alot of things" please call at [phone] Thank you!  "

## 2017-08-18 ENCOUNTER — TELEPHONE (OUTPATIENT)
Dept: FAMILY MEDICINE | Facility: CLINIC | Age: 72
End: 2017-08-18

## 2017-08-18 RX ORDER — FLUTICASONE FUROATE AND VILANTEROL 200; 25 UG/1; UG/1
1 POWDER RESPIRATORY (INHALATION) DAILY
Qty: 1 EACH | Refills: 12 | Status: SHIPPED | OUTPATIENT
Start: 2017-08-18 | End: 2017-12-04

## 2017-08-18 RX ORDER — FLUTICASONE FUROATE AND VILANTEROL 200; 25 UG/1; UG/1
1 POWDER RESPIRATORY (INHALATION) DAILY
Qty: 1 EACH | Refills: 12 | Status: SHIPPED | OUTPATIENT
Start: 2017-08-18 | End: 2017-08-18 | Stop reason: SDUPTHER

## 2017-08-18 RX ORDER — AMLODIPINE BESYLATE 5 MG/1
TABLET ORAL
Qty: 90 TABLET | Refills: 1 | Status: SHIPPED | OUTPATIENT
Start: 2017-08-18 | End: 2018-02-05 | Stop reason: SDUPTHER

## 2017-08-18 RX ORDER — LOSARTAN POTASSIUM 100 MG/1
100 TABLET ORAL DAILY
Qty: 90 TABLET | Refills: 1 | Status: SHIPPED | OUTPATIENT
Start: 2017-08-18 | End: 2018-02-05 | Stop reason: SDUPTHER

## 2017-08-18 RX ORDER — PRAVASTATIN SODIUM 20 MG/1
20 TABLET ORAL NIGHTLY
Qty: 90 TABLET | Refills: 1 | Status: SHIPPED | OUTPATIENT
Start: 2017-08-18 | End: 2018-05-10 | Stop reason: SDUPTHER

## 2017-08-18 NOTE — TELEPHONE ENCOUNTER
----- Message from Joy Robin sent at 8/18/2017  1:25 PM CDT -----  Contact: Self/[phone]  Patient returned staff's call. Thank you.

## 2017-08-18 NOTE — TELEPHONE ENCOUNTER
----- Message from Joy Robin sent at 8/18/2017 12:54 PM CDT -----  Contact: Self/[phone]  Patient would like to speak to the staff regarding a personal matter. Thank you.

## 2017-08-18 NOTE — TELEPHONE ENCOUNTER
For the ColoGuard test, it is negative and will fax the results again, but the CLIA # is [id number].

## 2017-10-06 DIAGNOSIS — J44.1 COPD EXACERBATION: ICD-10-CM

## 2017-10-06 RX ORDER — PREDNISONE 10 MG/1
TABLET ORAL
Qty: 40 TABLET | Refills: 0 | Status: SHIPPED | OUTPATIENT
Start: 2017-10-06 | End: 2017-11-13 | Stop reason: SDUPTHER

## 2017-10-06 NOTE — TELEPHONE ENCOUNTER
----- Message from Ortega Germain sent at 10/6/2017 11:29 AM CDT -----  Contact: PT   Calling to get refill on isabel Haider /Jigna

## 2017-10-06 NOTE — TELEPHONE ENCOUNTER
----- Message from Leonor Jarvis sent at 10/6/2017  3:59 PM CDT -----  Contact: SELF  Refill request for . predniSONE (DELTASONE) 10 MG tablet   [facility] .    pts #  [phone]       LL

## 2017-10-15 NOTE — PROGRESS NOTES
Chief complaint: Right hand pain, discussed COPD medications,  discuss issues with     72-year-old white female still very active.  Continues with pain in the right wrist but actually it appears to be the thumb tendon.  She is very active caring for her .  She also has some pain and swelling in the MCP joints of both hands that is been ongoing for many many months.  She has less pain in the MCPs that she doesn't the right wrist.  She also did recently restart a prednisone taper initially starting with 3 days of 60 mg of prednisone for some increased respiratory symptoms cough and shortness of breath consistent with a COPD exacerbation.  She has responding.  The prednisone however did not noticeably improve the tendinitis of the thumb.  She is using a wrist brace but it is unclear and does not sound like it involves and isolates the thumb.  We discussed how this is important and how the tendinitis may well need a better brace and possibly an orthopedic evaluation and possible injection.  We discussed how her MCPs to appear to be somewhat inflamed and it would be important to assess for rheumatoid arthritis before damage occurs.      We also discussed her COPD medications.  She is currently having to purchase them herself.  Advair in the past did work well.  Currently she is using breo without problems although can't say it is working.  She has looked into Symbicort and I'll provide her with a prescription of Symbicort and Advair to pursue the best cost since they would be clinically equivalent    Another issue is her  who is 89 years old.  By her Description he is very depressed and will not leave the house.  He sleeps all day.  We discussed messaging with his current medications as I believe his symptoms are very suspicious for severe depression and perhaps we can adjust treatment and eventually get him motivated enough to come to the office.  We discussed all these issues at great length  today.Total time over 45 minutes with over 50% counseling.        ROS:   CONST: weight stable.  Eating well, no depression or anxiety symptoms.  No other myalgias arthralgias or neurological symptoms        MH:  1.  HTN.                                                                     2.  Osteoporosis.  Worsening BMD 4/12. On fosfamax [id number] and restarted 2012.   Now on Prolia per Endocrine                                                       3.  H/o smoking for 30 years and questionable emphysema on CXR.              4.  Cervical spondylosis, s/p surgery by Dr. Luciano.                      5.  mammogram yearly                                                  6.  Colon cancer screening, pt cancelled C-scope but plans to reschedule.                                                                  7.  FMH (mother) with colon polyps. Father unknown.                                         8.  H/o early menopause at 43 with no HRT.   9.  Bilateral carotid disease.  50-70% on left 2015                                                   10.  Chronic hyponatremia.   11. Left shoulder scope/impingement.    12. Migraines ?        13.   Hoarseness - saw García in ENT- vocal cord reduced flexibility   14.  Allergies- SOB relieved w/ Zyrtec   15.  Tibia fracture surgery 2014   16.  Right thumb tendinitis    Social history: Former smoker for 30 years,  lives with her , rare alcohol    Gen: no distress musculoskeletal: She has a positive Finkelstein's test on the right and with movement of the wrist and other joints of the thumb and fingers she doesn't have reproducible pains I don't suspect this is arthritis.  She does have visible swelling of the majority of the MCPs of both hands and they are somewhat red but not warm and they are somewhat boggy consistent with some synovitis.  Minimal pain on range of motion skin no other rashes, warm to touch.  Respiratory efforts good and lungs are clear today.  Heart  "is regular rate and rhythm without murmurs rubs.  Gait is normal.    Latasha TALLEY was seen today for discuss meds, wrist pain and sinus problem.    Diagnoses and all orders for this visit:    Hand arthritis, appears to have some possible synovitis in the MCPs, consider rheumatoid arthritis, send initial labs, does not appear to have significantly improved with recent prednisone but patient may not have been taking attention to that.  -     Sedimentation rate, manual; Future  -     Rheumatoid factor; Future  -     FERNANDO; Future  -     C-reactive protein; Future    Thumb tendonitis, again little response to recent prednisone taper, she may well need better bracing, orthopedic evaluation, cortisone injection    Essential hypertension, continue to monitor at home when pain is less    COPD exacerbation, appears to be responding to prednisone taper, patient does admit that she has smoked a few cigarettes when under stress and we discussed other means of stress management    Situational stress, discussed at length    Stress due to illness of family member, provided counseling and other ways in which we can attempt to get patient to come to the clinic    Other orders  -     fluticasone-salmeterol 250-50 mcg/dose (ADVAIR DISKUS) 250-50 mcg/dose diskus inhaler; INHALE 1 PUFF TWICE DAILY  -     budesonide-formoterol 160-4.5 mcg (SYMBICORT) 160-4.5 mcg/actuation HFAA; Inhale 2 puffs into the lungs every 12 (twelve) hours. Controller  -     albuterol 90 mcg/actuation inhaler; Inhale 2 puffs into the lungs every 6 (six) hours as needed for Wheezing. Rescue         Patient expresses significant anxiety regarding results and tests over scrutinizing get herself worked up.  Access to the note would not be therapeutic""This note will not be shared with the patient."  "

## 2017-10-16 ENCOUNTER — LAB VISIT (OUTPATIENT)
Dept: LAB | Facility: HOSPITAL | Age: 72
End: 2017-10-16
Attending: INTERNAL MEDICINE
Payer: MEDICARE

## 2017-10-16 ENCOUNTER — OFFICE VISIT (OUTPATIENT)
Dept: FAMILY MEDICINE | Facility: CLINIC | Age: 72
End: 2017-10-16
Payer: MEDICARE

## 2017-10-16 VITALS
WEIGHT: 101.63 LBS | DIASTOLIC BLOOD PRESSURE: 80 MMHG | HEIGHT: 65 IN | HEART RATE: 99 BPM | BODY MASS INDEX: 16.93 KG/M2 | SYSTOLIC BLOOD PRESSURE: 130 MMHG | OXYGEN SATURATION: 97 % | TEMPERATURE: 98 F

## 2017-10-16 DIAGNOSIS — F43.9 SITUATIONAL STRESS: ICD-10-CM

## 2017-10-16 DIAGNOSIS — M77.8 THUMB TENDONITIS: ICD-10-CM

## 2017-10-16 DIAGNOSIS — I10 ESSENTIAL HYPERTENSION: ICD-10-CM

## 2017-10-16 DIAGNOSIS — J44.1 COPD EXACERBATION: ICD-10-CM

## 2017-10-16 DIAGNOSIS — M19.049 HAND ARTHRITIS: Primary | ICD-10-CM

## 2017-10-16 DIAGNOSIS — M19.049 HAND ARTHRITIS: ICD-10-CM

## 2017-10-16 DIAGNOSIS — Z63.79 STRESS DUE TO ILLNESS OF FAMILY MEMBER: ICD-10-CM

## 2017-10-16 PROCEDURE — 36415 COLL VENOUS BLD VENIPUNCTURE: CPT | Mod: PO

## 2017-10-16 PROCEDURE — 99215 OFFICE O/P EST HI 40 MIN: CPT | Mod: S$GLB,,, | Performed by: INTERNAL MEDICINE

## 2017-10-16 PROCEDURE — 99499 UNLISTED E&M SERVICE: CPT | Mod: S$GLB,,, | Performed by: INTERNAL MEDICINE

## 2017-10-16 PROCEDURE — 99999 PR PBB SHADOW E&M-EST. PATIENT-LVL III: CPT | Mod: PBBFAC,,, | Performed by: INTERNAL MEDICINE

## 2017-10-16 PROCEDURE — 86140 C-REACTIVE PROTEIN: CPT

## 2017-10-16 PROCEDURE — 86431 RHEUMATOID FACTOR QUANT: CPT

## 2017-10-16 PROCEDURE — 86038 ANTINUCLEAR ANTIBODIES: CPT

## 2017-10-16 RX ORDER — FLUTICASONE PROPIONATE AND SALMETEROL 250; 50 UG/1; UG/1
POWDER RESPIRATORY (INHALATION)
Qty: 60 EACH | Refills: 12 | Status: SHIPPED | OUTPATIENT
Start: 2017-10-16 | End: 2017-12-04

## 2017-10-16 RX ORDER — BUDESONIDE AND FORMOTEROL FUMARATE DIHYDRATE 160; 4.5 UG/1; UG/1
2 AEROSOL RESPIRATORY (INHALATION) EVERY 12 HOURS
Qty: 1 INHALER | Refills: 12 | Status: SHIPPED | OUTPATIENT
Start: 2017-10-16 | End: 2018-10-11 | Stop reason: ALTCHOICE

## 2017-10-16 RX ORDER — ALBUTEROL SULFATE 90 UG/1
2 AEROSOL, METERED RESPIRATORY (INHALATION) EVERY 6 HOURS PRN
Qty: 1 INHALER | Refills: 6 | Status: SHIPPED | OUTPATIENT
Start: 2017-10-16 | End: 2018-09-11 | Stop reason: SDUPTHER

## 2017-10-17 LAB
ANA SER QL IF: NORMAL
CRP SERPL-MCNC: 0.3 MG/L

## 2017-10-18 LAB — RHEUMATOID FACT SERPL-ACNC: <10 IU/ML

## 2017-10-31 ENCOUNTER — TELEPHONE (OUTPATIENT)
Dept: FAMILY MEDICINE | Facility: CLINIC | Age: 72
End: 2017-10-31

## 2017-10-31 DIAGNOSIS — M25.539 PAIN IN WRIST, UNSPECIFIED LATERALITY: Primary | ICD-10-CM

## 2017-10-31 NOTE — TELEPHONE ENCOUNTER
----- Message from Alma Swain sent at 10/26/2017 11:46 AM CDT -----  Contact: Self  Pt requesting results. Please call [phone].

## 2017-11-01 NOTE — TELEPHONE ENCOUNTER
Referral put in the system.  Perhaps patient may want to call that office directly and schedule an appointment

## 2017-11-01 NOTE — TELEPHONE ENCOUNTER
Patient is having right wrist pain and would like to see ortho for an cortisone injection, Dr. Pereira.

## 2017-11-02 ENCOUNTER — TELEPHONE (OUTPATIENT)
Dept: FAMILY MEDICINE | Facility: CLINIC | Age: 72
End: 2017-11-02

## 2017-11-02 ENCOUNTER — PATIENT MESSAGE (OUTPATIENT)
Dept: FAMILY MEDICINE | Facility: CLINIC | Age: 72
End: 2017-11-02

## 2017-11-02 NOTE — TELEPHONE ENCOUNTER
----- Message from Sabra Martinez sent at 11/1/2017  9:25 AM CDT -----  Contact: self  Pt returned call. Please call [phone].

## 2017-11-13 DIAGNOSIS — J44.1 COPD EXACERBATION: ICD-10-CM

## 2017-11-13 NOTE — TELEPHONE ENCOUNTER
Spoke with patient and she states that she has the same bronchitis as before but that this time is it worse. She attempted to contact you for an appointment later this week but she stated that no one answered the phone.

## 2017-11-13 NOTE — TELEPHONE ENCOUNTER
----- Message from Leonor Jarvis sent at 11/13/2017  8:46 AM CST -----  Contact: SELF  Requesting a call from a nurse . She states she has a raspatory infection again & it is worse. An appt was offered ,she would like to speak to someone first .  [phone]     LL

## 2017-11-14 RX ORDER — PREDNISONE 10 MG/1
TABLET ORAL
Qty: 40 TABLET | Refills: 0 | Status: SHIPPED | OUTPATIENT
Start: 2017-11-14 | End: 2017-12-04 | Stop reason: ALTCHOICE

## 2017-11-14 NOTE — TELEPHONE ENCOUNTER
----- Message from Shayy Granados sent at 11/14/2017 10:12 AM CST -----  Contact: [phone]  Pt is following up on her request for refill for the predniSONE (DELTASONE) 10 MG tablet please send to [facility] on [facility] [address]

## 2017-12-04 ENCOUNTER — TELEPHONE (OUTPATIENT)
Dept: FAMILY MEDICINE | Facility: CLINIC | Age: 72
End: 2017-12-04

## 2017-12-04 ENCOUNTER — OFFICE VISIT (OUTPATIENT)
Dept: FAMILY MEDICINE | Facility: CLINIC | Age: 72
End: 2017-12-04
Payer: MEDICARE

## 2017-12-04 ENCOUNTER — HOSPITAL ENCOUNTER (EMERGENCY)
Facility: HOSPITAL | Age: 72
Discharge: HOME | End: 2017-12-04
Attending: EMERGENCY MEDICINE
Payer: MEDICARE

## 2017-12-04 VITALS
SYSTOLIC BLOOD PRESSURE: 146 MMHG | TEMPERATURE: 98 F | DIASTOLIC BLOOD PRESSURE: 80 MMHG | HEIGHT: 65 IN | OXYGEN SATURATION: 94 % | RESPIRATION RATE: 28 BRPM | HEART RATE: 103 BPM

## 2017-12-04 VITALS
TEMPERATURE: 98 F | RESPIRATION RATE: 18 BRPM | WEIGHT: 100 LBS | HEART RATE: 98 BPM | DIASTOLIC BLOOD PRESSURE: 65 MMHG | OXYGEN SATURATION: 88 % | HEIGHT: 65 IN | BODY MASS INDEX: 16.66 KG/M2 | SYSTOLIC BLOOD PRESSURE: 150 MMHG

## 2017-12-04 DIAGNOSIS — R09.02 HYPOXIA: ICD-10-CM

## 2017-12-04 DIAGNOSIS — J43.9 PULMONARY EMPHYSEMA, UNSPECIFIED EMPHYSEMA TYPE: ICD-10-CM

## 2017-12-04 DIAGNOSIS — J44.1 COPD EXACERBATION: Primary | ICD-10-CM

## 2017-12-04 DIAGNOSIS — J96.01 ACUTE RESPIRATORY FAILURE WITH HYPOXIA: ICD-10-CM

## 2017-12-04 DIAGNOSIS — E87.1 HYPONATREMIA: ICD-10-CM

## 2017-12-04 DIAGNOSIS — R06.2 WHEEZING: ICD-10-CM

## 2017-12-04 LAB
ALBUMIN SERPL BCP-MCNC: 3.3 G/DL
ALP SERPL-CCNC: 76 U/L
ALT SERPL W/O P-5'-P-CCNC: 23 U/L
ANION GAP SERPL CALC-SCNC: 10 MMOL/L
AST SERPL-CCNC: 27 U/L
BASOPHILS # BLD AUTO: 0.01 K/UL
BASOPHILS NFR BLD: 0.1 %
BILIRUB SERPL-MCNC: 0.6 MG/DL
BNP SERPL-MCNC: 44 PG/ML
BUN SERPL-MCNC: 8 MG/DL
CALCIUM SERPL-MCNC: 9.3 MG/DL
CHLORIDE SERPL-SCNC: 88 MMOL/L
CO2 SERPL-SCNC: 25 MMOL/L
CREAT SERPL-MCNC: 0.6 MG/DL
DIFFERENTIAL METHOD: ABNORMAL
EOSINOPHIL # BLD AUTO: 0 K/UL
EOSINOPHIL NFR BLD: 0.1 %
ERYTHROCYTE [DISTWIDTH] IN BLOOD BY AUTOMATED COUNT: 12.5 %
EST. GFR  (AFRICAN AMERICAN): >60 ML/MIN/1.73 M^2
EST. GFR  (NON AFRICAN AMERICAN): >60 ML/MIN/1.73 M^2
GLUCOSE SERPL-MCNC: 133 MG/DL
HCT VFR BLD AUTO: 36.2 %
HGB BLD-MCNC: 13.2 G/DL
INR PPP: 1
LYMPHOCYTES # BLD AUTO: 0.5 K/UL
LYMPHOCYTES NFR BLD: 3.4 %
MCH RBC QN AUTO: 32.1 PG
MCHC RBC AUTO-ENTMCNC: 36.5 G/DL
MCV RBC AUTO: 88 FL
MONOCYTES # BLD AUTO: 0.4 K/UL
MONOCYTES NFR BLD: 2.7 %
NEUTROPHILS # BLD AUTO: 12.7 K/UL
NEUTROPHILS NFR BLD: 93.5 %
PLATELET # BLD AUTO: 284 K/UL
PMV BLD AUTO: 8 FL
POTASSIUM SERPL-SCNC: 3.6 MMOL/L
PROT SERPL-MCNC: 7.1 G/DL
PROTHROMBIN TIME: 11 SEC
RBC # BLD AUTO: 4.11 M/UL
SODIUM SERPL-SCNC: 123 MMOL/L
TROPONIN I SERPL DL<=0.01 NG/ML-MCNC: <0.006 NG/ML
WBC # BLD AUTO: 13.59 K/UL

## 2017-12-04 PROCEDURE — 99215 OFFICE O/P EST HI 40 MIN: CPT | Mod: 25,S$GLB,, | Performed by: NURSE PRACTITIONER

## 2017-12-04 PROCEDURE — 99999 PR PBB SHADOW E&M-EST. PATIENT-LVL V: CPT | Mod: PBBFAC,,, | Performed by: NURSE PRACTITIONER

## 2017-12-04 PROCEDURE — 94761 N-INVAS EAR/PLS OXIMETRY MLT: CPT

## 2017-12-04 PROCEDURE — 94640 AIRWAY INHALATION TREATMENT: CPT | Mod: 59,S$GLB,, | Performed by: INTERNAL MEDICINE

## 2017-12-04 PROCEDURE — 25000242 PHARM REV CODE 250 ALT 637 W/ HCPCS: Performed by: EMERGENCY MEDICINE

## 2017-12-04 PROCEDURE — 94644 CONT INHLJ TX 1ST HOUR: CPT

## 2017-12-04 PROCEDURE — 85025 COMPLETE CBC W/AUTO DIFF WBC: CPT

## 2017-12-04 PROCEDURE — 96372 THER/PROPH/DIAG INJ SC/IM: CPT | Mod: S$GLB,,, | Performed by: INTERNAL MEDICINE

## 2017-12-04 PROCEDURE — 63600175 PHARM REV CODE 636 W HCPCS: Performed by: EMERGENCY MEDICINE

## 2017-12-04 PROCEDURE — 85610 PROTHROMBIN TIME: CPT

## 2017-12-04 PROCEDURE — 84484 ASSAY OF TROPONIN QUANT: CPT

## 2017-12-04 PROCEDURE — 99499 UNLISTED E&M SERVICE: CPT | Mod: S$GLB,,, | Performed by: NURSE PRACTITIONER

## 2017-12-04 PROCEDURE — 93005 ELECTROCARDIOGRAM TRACING: CPT

## 2017-12-04 PROCEDURE — 27000221 HC OXYGEN, UP TO 24 HOURS

## 2017-12-04 PROCEDURE — 80053 COMPREHEN METABOLIC PANEL: CPT

## 2017-12-04 PROCEDURE — 83880 ASSAY OF NATRIURETIC PEPTIDE: CPT

## 2017-12-04 PROCEDURE — 99285 EMERGENCY DEPT VISIT HI MDM: CPT | Mod: 25

## 2017-12-04 PROCEDURE — 93010 ELECTROCARDIOGRAM REPORT: CPT | Mod: ,,, | Performed by: INTERNAL MEDICINE

## 2017-12-04 RX ORDER — ALBUTEROL SULFATE 2.5 MG/.5ML
10 SOLUTION RESPIRATORY (INHALATION)
Status: COMPLETED | OUTPATIENT
Start: 2017-12-04 | End: 2017-12-04

## 2017-12-04 RX ORDER — PREDNISONE 50 MG/1
50 TABLET ORAL DAILY
Qty: 5 TABLET | Refills: 0 | Status: SHIPPED | OUTPATIENT
Start: 2017-12-04 | End: 2017-12-09

## 2017-12-04 RX ORDER — IPRATROPIUM BROMIDE AND ALBUTEROL SULFATE 2.5; .5 MG/3ML; MG/3ML
3 SOLUTION RESPIRATORY (INHALATION)
Status: DISCONTINUED | OUTPATIENT
Start: 2017-12-04 | End: 2018-10-11 | Stop reason: ALTCHOICE

## 2017-12-04 RX ORDER — IPRATROPIUM BROMIDE 0.5 MG/2.5ML
500 SOLUTION RESPIRATORY (INHALATION)
Status: COMPLETED | OUTPATIENT
Start: 2017-12-04 | End: 2017-12-04

## 2017-12-04 RX ORDER — PREDNISONE 20 MG/1
60 TABLET ORAL
Status: COMPLETED | OUTPATIENT
Start: 2017-12-04 | End: 2017-12-04

## 2017-12-04 RX ORDER — PREDNISONE 20 MG/1
TABLET ORAL
Qty: 30 TABLET | Refills: 0 | Status: SHIPPED | OUTPATIENT
Start: 2017-12-04 | End: 2017-12-04

## 2017-12-04 RX ORDER — IPRATROPIUM BROMIDE AND ALBUTEROL SULFATE 2.5; .5 MG/3ML; MG/3ML
3 SOLUTION RESPIRATORY (INHALATION)
Status: COMPLETED | OUTPATIENT
Start: 2017-12-04 | End: 2017-12-04

## 2017-12-04 RX ADMIN — ALBUTEROL SULFATE 10 MG: 2.5 SOLUTION RESPIRATORY (INHALATION) at 03:12

## 2017-12-04 RX ADMIN — IPRATROPIUM BROMIDE 500 MCG: 0.5 SOLUTION RESPIRATORY (INHALATION) at 03:12

## 2017-12-04 RX ADMIN — IPRATROPIUM BROMIDE AND ALBUTEROL SULFATE 3 ML: 2.5; .5 SOLUTION RESPIRATORY (INHALATION) at 01:12

## 2017-12-04 RX ADMIN — PREDNISONE 60 MG: 20 TABLET ORAL at 07:12

## 2017-12-04 NOTE — PROGRESS NOTES
(12:45 PM) - Pt came into office this afternoon with c/o SOB. O2 sats at this time was 65% and pt was very pale. She was in a w/c and brought to the exam room. NP - Fadumo Jean-Baptiste was notified.Oxygen was started at 2 LPM per N/C, and at 12:50 PM O2 sats were 88%. Pt had no other complaints at this time, and continued to talk to me and nurse practitioner . Pt denied using oxygen at home, and stated that she felt so much better after oxygen was started. Stated that she thought that she was going to die before the oxygen was started. Feels so much better with the oxygen on. Asking for oxygen for home use  .

## 2017-12-04 NOTE — TELEPHONE ENCOUNTER
Patient called stating that she is having difficulty breathing and short of breath. She is requesting an appointment to see any provider. I offered appointment with PCP but patient states that she does not have a ride to get to clinic for 10 am and that she does not feel comfortable driving in her condition. Offered ER to patient and she refused stating that she needs to take care of her  and cannot afford to be hospitalized at this time. Patient requested appointment at [place] location. Booked with ORLIN Jean-Baptiste NP for 12:40 pm in urgent slot. Patient aggreeable to this.

## 2017-12-04 NOTE — ED TRIAGE NOTES
Pt arrived to ED via EMS for c/o resp distress. RA sats at Dr. Banks office was 60%. Given Duo Neb and 125 of Solumedrol IM in clinic. EMS gave albuterol tx. Pt is sitting up in bed. RA sats 88%. Place on 3 liters NC, O2 sats 96% at this time. Denies chest pain. Reports coughing up clear/gray sputum for 3 weeks. Denies N/V/D/F. REU. AAO x 4. Will continue to monitor.

## 2017-12-04 NOTE — ED PROVIDER NOTES
Encounter Date: 12/4/2017    SCRIBE #1 NOTE: I, Alyssa Lobo, am scribing for, and in the presence of,  Ray Peraza III, MD . I have scribed the following portions of the note - Other sections scribed: HPI and ROS .       History     Chief Complaint   Patient presents with    Respiratory Distress     SOB from Dr. Jean-Baptiste's office. 60% RA at clinic. Crackles and Rhonchi heard. Given Duo Neb and 125 Solumedrol IM in clinic. EMS gave albuterol tx. Tripoding. Sats 88-90% on tx.      CC: Respiratory Distress.  History obtained from patient.  Pt arrived via EMS transportation.     HPI: This 72 y.o. Female, who has CAD, Chronic Hyponatremia, COPD, Hyperlipidemia, and HTN, presents to the ED for evaluation of SOB x 3 days with associated chest tightness and cough productive of grey and white sputum. The patient reports SOB is consistent with typical COPD exacerbation, but is more severe than she has ever experienced. The patient was evaluated this morning at PCP, Dr. Jean-Baptiste's office where she was found to have oxygen saturation of 60% on RA. She was treated by PCP with DuoNeb and 125 Mg Solumedrol IM. EMS administered Albuterol through Nebulizer en route to ED. The patient is now oxygenating at 96% on 3L O2 via NC. The patient has no home nebulizer, inhaler, or oxygen therapy. She denies fever, chills, nausea, vomiting, or diarrhea. She reports no further symptoms. She denies any cardiac history.       The history is provided by the patient. No  was used.     Review of patient's allergies indicates:   Allergen Reactions    Pcn [penicillins] Other (See Comments)     Fever flushing skin swelling     Biaxin [clarithromycin] Rash    Codeine Rash    Doxycycline Rash    Levaquin [levofloxacin] Rash     Past Medical History:   Diagnosis Date    Carotid disease, bilateral     Chronic hyponatremia     COPD (chronic obstructive pulmonary disease)     HLD (hyperlipidemia)     HTN (hypertension)      Past  Surgical History:   Procedure Laterality Date    APPENDECTOMY      CERVICAL SPINE SURGERY      DILATION AND CURETTAGE OF UTERUS      x 2    metatarsal repair      SHOULDER ARTHROSCOPY       Family History   Problem Relation Age of Onset    Heart disease Mother     Cancer Father     Heart disease Maternal Grandfather      Social History   Substance Use Topics    Smoking status: Former Smoker     Packs/day: 1.00     Years: 45.00     Types: Cigarettes     Quit date: 1/12/2002    Smokeless tobacco: Never Used    Alcohol use No     Review of Systems   Constitutional: Negative for chills and fever.   HENT: Negative for congestion, rhinorrhea and sore throat.    Eyes: Negative for redness.   Respiratory: Positive for cough, chest tightness and shortness of breath.    Cardiovascular: Negative for chest pain.   Gastrointestinal: Negative for abdominal pain, diarrhea, nausea and vomiting.   Genitourinary: Negative for difficulty urinating and dysuria.   Musculoskeletal: Negative for myalgias.   Skin: Negative for rash.   Neurological: Negative for headaches.       Physical Exam     Initial Vitals [12/04/17 1448]   BP Pulse Resp Temp SpO2   (!) 146/90 106 (!) 30 98.3 °F (36.8 °C) 99 %      MAP       108.67         Physical Exam    Nursing note and vitals reviewed.  Constitutional: She appears well-developed and well-nourished. She is not diaphoretic. No distress.   HENT:   Head: Normocephalic and atraumatic.   Nose: Nose normal.   Mouth/Throat: Oropharynx is clear and moist. No oropharyngeal exudate.   Eyes: Conjunctivae and EOM are normal. Pupils are equal, round, and reactive to light. No scleral icterus.   Neck: Normal range of motion. Neck supple. No thyromegaly present. No tracheal deviation present.   Cardiovascular: Regular rhythm and normal heart sounds. Exam reveals no gallop and no friction rub.    No murmur heard.  Tachycardia 110s to 120s   Pulmonary/Chest: She is in respiratory distress (moderate). She  has wheezes (exp). She has no rhonchi. She has no rales.   Abdominal: Soft. Bowel sounds are normal. She exhibits no distension and no mass. There is no tenderness. There is no rebound and no guarding.   Musculoskeletal: Normal range of motion. She exhibits no edema or tenderness.   Lymphadenopathy:     She has no cervical adenopathy.   Neurological: She is alert and oriented to person, place, and time. She has normal strength. No cranial nerve deficit or sensory deficit.   Skin: Skin is warm and dry. No rash noted. No erythema. No pallor.   Psychiatric: She has a normal mood and affect. Her behavior is normal. Thought content normal.         ED Course   Critical Care  Date/Time: 12/4/2017 9:30 PM  Performed by: SHAUNA VELÁSQUEZ III  Authorized by: SHAUNA VELÁSQUEZ III   Direct patient critical care time: 25 minutes  Additional history critical care time: 3 minutes  Ordering / reviewing critical care time: 5 minutes  Documentation critical care time: 8 minutes  Total critical care time (exclusive of procedural time) : 41 minutes  Critical care time was exclusive of teaching time and separately billable procedures and treating other patients.  Critical care was necessary to treat or prevent imminent or life-threatening deterioration of the following conditions: respiratory failure.        Labs Reviewed   CBC W/ AUTO DIFFERENTIAL - Abnormal; Notable for the following:        Result Value    WBC 13.59 (*)     Hematocrit 36.2 (*)     MCH 32.1 (*)     MCHC 36.5 (*)     MPV 8.0 (*)     Gran # 12.7 (*)     Lymph # 0.5 (*)     Gran% 93.5 (*)     Lymph% 3.4 (*)     Mono% 2.7 (*)     All other components within normal limits   COMPREHENSIVE METABOLIC PANEL - Abnormal; Notable for the following:     Sodium 123 (*)     Chloride 88 (*)     Glucose 133 (*)     Albumin 3.3 (*)     All other components within normal limits   TROPONIN I   PROTIME-INR   B-TYPE NATRIURETIC PEPTIDE             Medical Decision Making:   Initial  Assessment:   72-year-old female with a history of COPD presents for severe hypoxia and respiratory distress from the physician's office.  On exam she has findings consistent with COPD exacerbation, as above.  Differential Diagnosis:   COPD exacerbation, CHF exacerbation, noncardiogenic pulmonary edema, pneumonia, pleural effusions, pneumothorax, anemia, metabolic acidosis  Independently Interpreted Test(s):   I have ordered and independently interpreted X-rays - see summary below.       <> Summary of X-Ray Reading(s): Chest x-ray: No acute abnormality  ED Management:  Patient's workup reveals hyponatremia and hypochloremia.  There is leukocytosis.  Patient received intramuscular steroid prior to arrival.  No other significant abnormalities.  Patient's oxygen saturation has improved only to 86-89% on room air and she remains tachypneic, though is significantly improved work of breathing.  Patient is insistent that she cannot be admitted to the hospital because she must go home to take care of her .  I have counseled her at length regarding the danger inherent in this decision, with her neighbor at the bedside.  Patient understands these risks and states that she will call an ambulance if she feels worse in any way.  I have provided detailed return precautions and recommendations for supportive care at home, including recommendation regular albuterol use and compliance with steroid regimen.            Scribe Attestation:   Scribe #1: I performed the above scribed service and the documentation accurately describes the services I performed. I attest to the accuracy of the note.    Attending Attestation:           Physician Attestation for Scribe:  Physician Attestation Statement for Scribe #1: I, Ray Peraza III, MD, reviewed documentation, as scribed by Alyssa Lobo  in my presence, and it is both accurate and complete.                 ED Course      Clinical Impression:   The primary encounter diagnosis was  COPD exacerbation. Diagnoses of Acute respiratory failure with hypoxia and Hyponatremia were also pertinent to this visit.                           Ray Peraza III, MD  12/04/17 [id number]

## 2017-12-04 NOTE — PROGRESS NOTES
Subjective:       Patient ID: Latasha Perez is a 72 y.o. female.    Chief Complaint: Shortness of Breath    HPI Ms Perez is here for a 4 day h/o increasing SOB.  O2 sat on arrival was 65%, O2 placed, duoneb given, 125 solumedrol given.  Pt RA sat down to 89%.  Looks distressed and pale.  Review of Systems   Constitutional: Negative for fever.   Respiratory: Positive for chest tightness and shortness of breath.    Cardiovascular: Negative.  Negative for leg swelling.       Objective:      Physical Exam   Constitutional: She is oriented to person, place, and time. She appears well-developed and well-nourished. She does not appear ill. No distress.   HENT:   Head: Normocephalic and atraumatic.   Cardiovascular: Regular rhythm and normal heart sounds.    No murmur heard.  Pulmonary/Chest: She is in respiratory distress. She has no decreased breath sounds. She has wheezes in the right upper field and the right lower field. She has no rhonchi. She has rales in the right upper field and the right lower field.   Musculoskeletal: Normal range of motion.   Neurological: She is alert and oriented to person, place, and time.   Skin: Skin is warm and dry.   Vitals reviewed.      Assessment:       1. COPD exacerbation    2. Hypoxia    3. Wheezing    4. Pulmonary emphysema, unspecified emphysema type        Plan:       COPD exacerbation  -     albuterol-ipratropium 2.5mg-0.5mg/3mL nebulizer solution 3 mL; Take 3 mLs by nebulization one time.  -     methylPREDNISolone sod suc(PF) injection 125 mg; Inject 125 mg into the muscle once.  -     X-Ray Chest PA And Lateral; Future; Expected date: 12/04/2017  -     OXYGEN FOR HOME USE  -     NEBULIZER FOR HOME USE  -     NEBULIZER KIT (SUPPLIES) FOR HOME USE  -     albuterol-ipratropium 2.5mg-0.5mg/3mL nebulizer solution 3 mL; Take 3 mLs by nebulization one time.  -     predniSONE (DELTASONE) 20 MG tablet; Take 3 tablets by mouth for 3 days, then 2 tablets by mouth for 3 days then 1  tablet by mouth for 3 days  Dispense: 30 tablet; Refill: 0    Hypoxia  -     albuterol-ipratropium 2.5mg-0.5mg/3mL nebulizer solution 3 mL; Take 3 mLs by nebulization one time.  -     methylPREDNISolone sod suc(PF) injection 125 mg; Inject 125 mg into the muscle once.  -     X-Ray Chest PA And Lateral; Future; Expected date: 12/04/2017  -     OXYGEN FOR HOME USE    Wheezing  -     albuterol-ipratropium 2.5mg-0.5mg/3mL nebulizer solution 3 mL; Take 3 mLs by nebulization one time.  -     methylPREDNISolone sod suc(PF) injection 125 mg; Inject 125 mg into the muscle once.  -     OXYGEN FOR HOME USE  -     NEBULIZER FOR HOME USE  -     NEBULIZER KIT (SUPPLIES) FOR HOME USE  -     albuterol-ipratropium 2.5mg-0.5mg/3mL nebulizer solution 3 mL; Take 3 mLs by nebulization one time.  -     predniSONE (DELTASONE) 20 MG tablet; Take 3 tablets by mouth for 3 days, then 2 tablets by mouth for 3 days then 1 tablet by mouth for 3 days  Dispense: 30 tablet; Refill: 0    Pulmonary emphysema, unspecified emphysema type  -     OXYGEN FOR HOME USE  -     NEBULIZER FOR HOME USE  -     NEBULIZER KIT (SUPPLIES) FOR HOME USE    After much discussion, it was decided to send pt to ER via EMS d/t hypoxia.  She is concerned that she takes care of her  and he will worry.  Her neighbor who brought her here has agreed to keep an eye on him.  She was transported via EMS with O2 and monitor.  Report called to [facility]

## 2017-12-04 NOTE — PATIENT INSTRUCTIONS
Take spiriva and Symbicort every day  Follow up with Dr Oneal as needed    COPD Flare    You have had a flare-up of your COPD.  COPD, or chronic obstructive pulmonary disease, is a common lung disease. It causes your airways to become irritated and narrower. This makes it harder for you to breathe. Emphysema and chronic bronchitis are both types of COPD. This is a chronic condition, which means you always have it. Sometimes it gets worse. When this happens, it is called a flare-up.  Symptoms of COPD  People with COPD may have symptoms most of the time. In a flare-up, your symptoms get worse. These symptoms may mean you are having a flare-up:  · Shortness of breath, shallow or rapid breathing, or wheezing that gets worse  · Lung infection  · Cough that gets worse  · More mucus, thicker mucus or mucus of a different color  · Tiredness, decreased energy, or trouble doing your usual activities  · Fever  · Chest tightness  · Your symptoms dont get better even when you use your usual medicines, inhalers, and nebulizer  · Trouble talking  · You feel confused  Causes of flare-ups  Unfortunately, a flare-up can happen even though you did everything right, and you followed your doctors instructions. Some causes of flare-ups are:  · Smoking or secondhand smoke  · Colds, the flu, or respiratory infections  · Air pollution  · Sudden change in the weather  · Dust, irritating chemicals, or strong fumes  · Not taking your medicines as prescribed  Home care  Here are some things you can do at home to treat a flare-up:  · Try not to panic. This makes it harder to breathe, and keeps you from doing the right things.  · Dont smoke or be around others who are smoking.  · Try to drink more fluids than usual during a flare-up, unless your doctor has told you not to because of heart and kidney problems. More fluids can help loosen the mucus.  · Use your inhalers and nebulizer, if you have one, as you have been told to.  · If you were  given antibiotics, take them until they are used up or your doctor tells you to stop. Its important to finish the antibiotics, even though you feel better. This will make sure the infection has cleared.  · If you were given prednisone or another steroid, finish it even if you feel better.  Preventing a flare-up  Even though flare-ups happen, the best way to treat one is to prevent it before it starts. Here are some pointers:  · Dont smoke or be around others who are smoking.  · Take your medicines as you have been told.  · Talk with your doctor about getting a flu shot every year. Also find out if you need a pneumonia shot.  · If there is a weather advisory warning to stay indoors, try to stay inside when possible.  · Try to eat healthy and get plenty of sleep.  · Try to avoid things that usually set you off, like dust, chemical fumes, hairsprays, or strong perfumes.  Follow-up care  Follow up with your healthcare provider, or as advised.  If a culture was done, you will be told if your treatment needs to be changed. You can call as directed for the results.  If X-rays were done, you will be notified of any new findings that may affect your care.  Call 911  Call 911 if any of these occur:  · You have trouble breathing  · You feel confused or its difficult to wake you up  · You faint or lose consciousness  · You have a rapid heart rate  · You have new pain in your chest, arm, shoulder, neck or upper back  When to seek medical advice  Call your healthcare provider right away if any of these occur:  · Wheezing or shortness of breath gets worse  · You need to use your inhalers more often than usual without relief  · Fever of 100.4°F (38ºC) or higher, or as directed by your healthcare provider  · Coughing up lots of dark-colored or bloody mucus (sputum)  · Chest pain with each breath  · You do not start to get better within 24 hours  · Swelling of your ankles gets worse  · Dizziness or weakness  Date Last Reviewed:  9/1/2016  © 8144-4852 [employer], [employer]. [address] [place] [address], [place] [address]. All rights reserved. This information is not intended as a substitute for professional medical care. Always follow your healthcare professional's instructions.

## 2017-12-05 ENCOUNTER — TELEPHONE (OUTPATIENT)
Dept: FAMILY MEDICINE | Facility: CLINIC | Age: 72
End: 2017-12-05

## 2017-12-05 DIAGNOSIS — J44.1 COPD EXACERBATION: Primary | ICD-10-CM

## 2017-12-05 DIAGNOSIS — R09.02 HYPOXIA: ICD-10-CM

## 2017-12-05 NOTE — ED NOTES
Report received from SALMA Cannon; pt resting in bed awake; friend at bedside; will continue to monitor.

## 2017-12-05 NOTE — TELEPHONE ENCOUNTER
Pt stated that she is still having SOB and does not want to go back to the ER pt needs order for Oxygen sent to [facility]

## 2017-12-05 NOTE — ED NOTES
Discharge instructions explained to pt by 2 RNs and MD; pt verbalized understanding; pt again encouraged to stay and being admitted but pt still refused; pt verbalized understanding to pick and take prescriptions, follow up with PCP as soon as possible, or return to the ED or call 911 if symptoms worsen; pt being discharged with friend/neighbor as ride home.

## 2017-12-05 NOTE — DISCHARGE INSTRUCTIONS
Return to the emergency department if you develop worsening shortness of breath, lightheadedness, fainting, chest pain, confusion, difficulty concentrating, severe weakness, or for any new or worsening medical concerns.

## 2017-12-05 NOTE — TELEPHONE ENCOUNTER
Reassure patient that the drop in sodium is clearly due to the breathing problems and it should take in more salt, avoid drinking plain water    Sports drinks might be helpful

## 2017-12-05 NOTE — TELEPHONE ENCOUNTER
Patient called did come through at after 4 PM.  She did sign out of the emergency room and so therefore her oxygen was not likely set up last night through the emergency room.  She does not have a pulse oximeter but will go get one.  We will attempt to set her up with oxygen ASAP tonight.  I put a prescription in, signed and printed the last clinic note and emergency room visit documenting her significant hypoxia and we will attempt to set up oxygen tonight although logistically limited.  We will call the DME company and ask if they can get things moving ASAP

## 2017-12-05 NOTE — TELEPHONE ENCOUNTER
----- Message from Danette Davila sent at 12/5/2017 12:39 PM CST -----  Contact: self  Pt is asking for a call back in regards to orders for oxygen. She states she went to the E.R. yesterday and really needs some oxygen. Please call pt  [phone].

## 2017-12-05 NOTE — TELEPHONE ENCOUNTER
----- Message from Danette Davila sent at 12/5/2017  4:32 PM CST -----  Contact: self  Pt states she forgot to tell Ana that her Sodium dropped 10 points. Pt call back [phone].

## 2017-12-07 ENCOUNTER — TELEPHONE (OUTPATIENT)
Dept: FAMILY MEDICINE | Facility: CLINIC | Age: 72
End: 2017-12-07

## 2017-12-07 NOTE — TELEPHONE ENCOUNTER
Patient niece return call and requested that the patient get in sooner than next week for ER follow-up because she she's in from [place] to help out. Appt scheduled 12/08/2017 with AYESHA Diggs.

## 2017-12-07 NOTE — TELEPHONE ENCOUNTER
Patient advised, patient verbalized understanding. Patient was advised that an ER follow-up is needed as well since she was d/c on oxygen therapy. Patient states that she will give the office a call on Monday because she not ready to be w/o the oxygen just yet.

## 2017-12-07 NOTE — TELEPHONE ENCOUNTER
Okay to take anything over-the-counter as needed for her cold.    Delsym is an excellent 12 hour cough suppressant next    Tylenol is okay

## 2017-12-07 NOTE — TELEPHONE ENCOUNTER
----- Message from Shayy Granados sent at 12/6/2017  3:41 PM CST -----  Contact: [phone]  Pt wants to know if she can take the tylenol or cough syrup for the cold Please call pt at your earliest convenience.  Thanks !

## 2017-12-14 ENCOUNTER — TELEPHONE (OUTPATIENT)
Dept: FAMILY MEDICINE | Facility: CLINIC | Age: 72
End: 2017-12-14

## 2017-12-14 NOTE — TELEPHONE ENCOUNTER
Returned rosio's phone call. Patient states she was just feeling tired with no energy and asked was there something she could take. I explained to the patient that with her O2 being low it would make her tired, her 02 was 90 on r/a. Patient did not voice any other complaints stating she has an appointment on Wed. 12/20 and will voice any other concerns at that time. I instructed the patient to call the clinic if she started feeling bad or had any other questions or concerns.

## 2017-12-14 NOTE — TELEPHONE ENCOUNTER
----- Message from Joy Robin sent at 12/14/2017 11:14 AM CST -----  Contact: Self/[phone]  Patient states that she's not feeling any better. She would like to speak to the staff regarding matter. Thank you.

## 2017-12-20 ENCOUNTER — OFFICE VISIT (OUTPATIENT)
Dept: FAMILY MEDICINE | Facility: CLINIC | Age: 72
End: 2017-12-20
Payer: MEDICARE

## 2017-12-20 ENCOUNTER — TELEPHONE (OUTPATIENT)
Dept: ADMINISTRATIVE | Facility: HOSPITAL | Age: 72
End: 2017-12-20

## 2017-12-20 VITALS
BODY MASS INDEX: 15.92 KG/M2 | DIASTOLIC BLOOD PRESSURE: 90 MMHG | WEIGHT: 95.56 LBS | OXYGEN SATURATION: 97 % | HEART RATE: 108 BPM | TEMPERATURE: 98 F | SYSTOLIC BLOOD PRESSURE: 170 MMHG | HEIGHT: 65 IN

## 2017-12-20 DIAGNOSIS — J44.9 CHRONIC OBSTRUCTIVE PULMONARY DISEASE, UNSPECIFIED COPD TYPE: ICD-10-CM

## 2017-12-20 DIAGNOSIS — E87.1 HYPONATREMIA: Primary | ICD-10-CM

## 2017-12-20 DIAGNOSIS — E86.0 DEHYDRATION: ICD-10-CM

## 2017-12-20 PROCEDURE — 99214 OFFICE O/P EST MOD 30 MIN: CPT | Mod: S$GLB,,, | Performed by: FAMILY MEDICINE

## 2017-12-20 PROCEDURE — 99499 UNLISTED E&M SERVICE: CPT | Mod: S$GLB,,, | Performed by: FAMILY MEDICINE

## 2017-12-20 PROCEDURE — 99999 PR PBB SHADOW E&M-EST. PATIENT-LVL III: CPT | Mod: PBBFAC,,, | Performed by: FAMILY MEDICINE

## 2017-12-20 NOTE — PROGRESS NOTES
"[facility]  Progress Note    SUBJECTIVE:     Chief Complaint   Patient presents with    Hospital Follow Up       HPI   Latasha Perez  is a 72 y.o. female here for hospital follow-up. She says feels weak for over the past week. She admits doesn't drink much water, mainly diet coke and tea. She did not receive fluids in the ER as she left AMA because she had "responsibilities to take care of her ". No chest pain, SOB.     Review of patient's allergies indicates:   Allergen Reactions    Pcn [penicillins] Other (See Comments)     Fever flushing skin swelling     Biaxin [clarithromycin] Rash    Codeine Rash    Doxycycline Rash    Levaquin [levofloxacin] Rash       Past Medical History:   Diagnosis Date    Carotid disease, bilateral     Chronic hyponatremia     COPD (chronic obstructive pulmonary disease)     HLD (hyperlipidemia)     HTN (hypertension)      Past Surgical History:   Procedure Laterality Date    APPENDECTOMY      CERVICAL SPINE SURGERY      DILATION AND CURETTAGE OF UTERUS      x 2    metatarsal repair      SHOULDER ARTHROSCOPY       Family History   Problem Relation Age of Onset    Heart disease Mother     Cancer Father     Heart disease Maternal Grandfather      Social History   Substance Use Topics    Smoking status: Former Smoker     Packs/day: 1.00     Years: 45.00     Types: Cigarettes     Quit date: 1/12/2002    Smokeless tobacco: Never Used    Alcohol use No        Review of Systems   Constitutional: Positive for malaise/fatigue. Negative for chills and fever.   HENT: Negative.    Respiratory: Negative.  Negative for cough and shortness of breath.    Cardiovascular: Negative.  Negative for chest pain.   Gastrointestinal: Negative.  Negative for abdominal pain, nausea and vomiting.   Genitourinary: Negative.    Neurological: Positive for weakness. Negative for headaches.   All other systems reviewed and are negative.    OBJECTIVE:     Vitals:    12/20/17 " 1346   BP: (!) 170/90   Pulse: 108   Temp: 98.1 °F (36.7 °C)     Body mass index is 15.9 kg/m².    Physical Exam   Constitutional: She is oriented to person, place, and time and well-developed, well-nourished, and in no distress. No distress.   HENT:   Head: Normocephalic and atraumatic.   Nose: Nose normal.   Eyes: Conjunctivae and EOM are normal.   Cardiovascular: Regular rhythm and normal heart sounds.  Tachycardia present.  Exam reveals no gallop and no friction rub.    No murmur heard.  Pulmonary/Chest: Effort normal and breath sounds normal. No respiratory distress. She has no wheezes. She has no rales. She exhibits no tenderness.   Abdominal: Soft. Bowel sounds are normal. She exhibits no distension. There is no tenderness. There is no rebound.   Neurological: She is alert and oriented to person, place, and time.   Skin: Skin is warm. She is not diaphoretic.   + skin turgor       Old records were reviewed. Labs and/or images were independently reviewed.    ASSESSMENT     1. Hyponatremia    2. Dehydration    3. Chronic obstructive pulmonary disease, unspecified COPD type        PLAN:     DehydrationHyponatremia  -     Comprehensive metabolic panel; Future  -     Patient would like to hold off on IVF right now, and doesn't want to go to ER. She promises she will go home and drink at least 2-3 bottles of poweraid/gatorade.   -     We will recheck electrolytes tomorrow, and depending on results, will make treatment changes.    Chronic obstructive pulmonary disease, unspecified COPD type   -     Stable. Continue current regimen.      RTC PRN    Stanley Olea MD  12/20/2017 2:13 PM

## 2017-12-20 NOTE — TELEPHONE ENCOUNTER
Patient does not want to schedule Orthopedics referral at this time, will call back when she is ready.

## 2017-12-21 ENCOUNTER — LAB VISIT (OUTPATIENT)
Dept: LAB | Facility: HOSPITAL | Age: 72
End: 2017-12-21
Attending: FAMILY MEDICINE
Payer: MEDICARE

## 2017-12-21 ENCOUNTER — TELEPHONE (OUTPATIENT)
Dept: FAMILY MEDICINE | Facility: CLINIC | Age: 72
End: 2017-12-21

## 2017-12-21 DIAGNOSIS — E87.1 HYPONATREMIA: ICD-10-CM

## 2017-12-21 LAB
ALBUMIN SERPL BCP-MCNC: 3 G/DL
ALP SERPL-CCNC: 57 U/L
ALT SERPL W/O P-5'-P-CCNC: 16 U/L
ANION GAP SERPL CALC-SCNC: 8 MMOL/L
AST SERPL-CCNC: 23 U/L
BILIRUB SERPL-MCNC: 0.3 MG/DL
BUN SERPL-MCNC: 10 MG/DL
CALCIUM SERPL-MCNC: 10.2 MG/DL
CHLORIDE SERPL-SCNC: 96 MMOL/L
CO2 SERPL-SCNC: 31 MMOL/L
CREAT SERPL-MCNC: 0.8 MG/DL
EST. GFR  (AFRICAN AMERICAN): >60 ML/MIN/1.73 M^2
EST. GFR  (NON AFRICAN AMERICAN): >60 ML/MIN/1.73 M^2
GLUCOSE SERPL-MCNC: 105 MG/DL
POTASSIUM SERPL-SCNC: 3.3 MMOL/L
PROT SERPL-MCNC: 6.6 G/DL
SODIUM SERPL-SCNC: 135 MMOL/L

## 2017-12-21 PROCEDURE — 36415 COLL VENOUS BLD VENIPUNCTURE: CPT | Mod: PO

## 2017-12-21 PROCEDURE — 80053 COMPREHEN METABOLIC PANEL: CPT

## 2017-12-21 NOTE — TELEPHONE ENCOUNTER
She was supposed to have some labs drawn today, I am waiting on the results to determine next course of action. In the meantime, since no SOB, and no leg pain. I recommend conservative measures, elevating legs for them to drain.

## 2017-12-21 NOTE — TELEPHONE ENCOUNTER
Spoke w/patient, states she is having right ankle/foot swelling x3/days. States she is not having any shortness of breath but want to know if  wants her to come in for an OV since she was dehydrated at 12/21 OV. Please advise.

## 2017-12-21 NOTE — TELEPHONE ENCOUNTER
----- Message from Daniel Walter sent at 12/20/2017  4:47 PM CST -----  Contact: Latasha [phone]  Pt wants a call back. She was seen today. She wants a nurse or doctor to call her back regarding swelling in her right foot when she was dehydrated. Swelling continued for 3 days. Please call at your earliest convenience.

## 2018-01-02 ENCOUNTER — TELEPHONE (OUTPATIENT)
Dept: FAMILY MEDICINE | Facility: CLINIC | Age: 73
End: 2018-01-02

## 2018-01-02 NOTE — TELEPHONE ENCOUNTER
Patient states she needs a referral for her ambulance ride on  12/4/17. Patient's ins company will not pay for the ride without a referral. Please advise.

## 2018-01-02 NOTE — TELEPHONE ENCOUNTER
----- Message from Renea Benavides sent at 1/2/2018  1:36 PM CST -----  Contact: self  Patient would like a referral for an ambulance. Patient can be reached at [phone].        Thanks,

## 2018-01-03 NOTE — TELEPHONE ENCOUNTER
Write on script that patient was advised to take ambulance on .....    That is all we can do but ask referrals if there is such a referral etc

## 2018-01-04 ENCOUNTER — TELEPHONE (OUTPATIENT)
Dept: FAMILY MEDICINE | Facility: CLINIC | Age: 73
End: 2018-01-04

## 2018-01-10 NOTE — TELEPHONE ENCOUNTER
Spoke w/patient, informed of response and lab result. Improved sodium levels, continue with plenty of fluids, ok with poweraid.  Patient states she is feeling much better and has appt with  on 1/17/18.

## 2018-01-11 ENCOUNTER — TELEPHONE (OUTPATIENT)
Dept: FAMILY MEDICINE | Facility: CLINIC | Age: 73
End: 2018-01-11

## 2018-01-11 NOTE — TELEPHONE ENCOUNTER
Spoke with patient. She states that she was seen at walk in 12-4-17 and was taken to ED via ambulance by provider.     She states that PCP needs to submit referral to [facility]  for ambulance ride. Cost $1200. copay $200    She also needs order sent to [facility] to discontinue oxygen to have them pick it up      Patient  Has follow up 1-17-18    Please advise

## 2018-01-11 NOTE — TELEPHONE ENCOUNTER
----- Message from Joan Marrufo sent at 1/8/2018 10:50 AM CST -----  Contact: self  Patient requesting a call back regarding Oxygen and referral to [facility] for Ambulance. Please contact her at [phone].    Thanks!

## 2018-01-12 NOTE — TELEPHONE ENCOUNTER
Discuss with referrals how a referral can be put in for an ambulance ride.  I'm not sure about that but I'm sure it's been done in the past.    At a minimum, we can write on a prescription the patient was referred by ambulance to the emergency room on that date    Send her written prescription to discontinue oxygen to whoever needed

## 2018-01-18 ENCOUNTER — OFFICE VISIT (OUTPATIENT)
Dept: FAMILY MEDICINE | Facility: CLINIC | Age: 73
End: 2018-01-18
Payer: MEDICARE

## 2018-01-18 ENCOUNTER — TELEPHONE (OUTPATIENT)
Dept: FAMILY MEDICINE | Facility: CLINIC | Age: 73
End: 2018-01-18

## 2018-01-18 VITALS
BODY MASS INDEX: 15.83 KG/M2 | HEART RATE: 79 BPM | TEMPERATURE: 98 F | SYSTOLIC BLOOD PRESSURE: 138 MMHG | HEIGHT: 65 IN | OXYGEN SATURATION: 88 % | WEIGHT: 95 LBS | DIASTOLIC BLOOD PRESSURE: 78 MMHG

## 2018-01-18 DIAGNOSIS — J43.9 PULMONARY EMPHYSEMA, UNSPECIFIED EMPHYSEMA TYPE: Primary | ICD-10-CM

## 2018-01-18 DIAGNOSIS — I10 ESSENTIAL HYPERTENSION: ICD-10-CM

## 2018-01-18 DIAGNOSIS — E87.1 CHRONIC HYPONATREMIA: ICD-10-CM

## 2018-01-18 DIAGNOSIS — I77.9 CAROTID DISEASE, BILATERAL: ICD-10-CM

## 2018-01-18 DIAGNOSIS — R09.02 HYPOXIA: ICD-10-CM

## 2018-01-18 DIAGNOSIS — G56.22 ULNAR NEUROPATHY OF LEFT UPPER EXTREMITY: ICD-10-CM

## 2018-01-18 PROCEDURE — 99999 PR PBB SHADOW E&M-EST. PATIENT-LVL III: CPT | Mod: PBBFAC,,, | Performed by: INTERNAL MEDICINE

## 2018-01-18 PROCEDURE — 99499 UNLISTED E&M SERVICE: CPT | Mod: S$GLB,,, | Performed by: INTERNAL MEDICINE

## 2018-01-18 PROCEDURE — 99215 OFFICE O/P EST HI 40 MIN: CPT | Mod: S$GLB,,, | Performed by: INTERNAL MEDICINE

## 2018-01-18 RX ORDER — FLUTICASONE FUROATE AND VILANTEROL 200; 25 UG/1; UG/1
1 POWDER RESPIRATORY (INHALATION) DAILY
Qty: 30 EACH | Refills: 12 | Status: SHIPPED | OUTPATIENT
Start: 2018-01-18 | End: 2018-10-11 | Stop reason: ALTCHOICE

## 2018-01-18 NOTE — TELEPHONE ENCOUNTER
----- Message from Renea Benavides sent at 1/17/2018  1:55 PM CST -----  Contact: deshawn  Patient would like a refill on Breo. Also  Patient has a question about ambulances service? Patient can be reached at [phone].      Thanks,

## 2018-01-18 NOTE — TELEPHONE ENCOUNTER
Spoke with referrals dept again and referrals states there is no such referral for ambulance rides, patient will have to get a form from ins to submit to PCP. Patient has office visit scheduled for today and will further discuss this matter at this time. No further questions or concerns.

## 2018-01-18 NOTE — PROGRESS NOTES
Chief complaint: Follow-up on illness, ambulance ride, discuss oxygen, numbness in the left fingers    72-year-old white female with COPD.  She presented with a respiratory illness and hypoxia and was referred from the clinic to the emergency room.  She was treated appropriately but was not able to be admitted as she had to go home and care for her elderly .  She did have oxygen ordered and they delivered it.  She now does have a pulse oximeter has not been watching her a closely but does not feel she needs the oxygen.  Today in the office she was 88% but then improved.  She apparently may have used an ambulance that was out of network and is now getting a bill for $1200 due to the diagnoses codes admitted.  The paperwork she has does not specify those codes but we will call and get some kind of form which apparently we can use to put in a referral.  She clearly was recommended to go to the emergency room for a very emergent problem being early respiratory failure, COPD exacerbation and hypoxia.  It clearly was unsafe for her to travel by her own means without oxygen to the emergency room.    Patient found with worsening of her hyponatremia.  She is increasing salt and sports drinks and did have some improvement in her salt levels.  Otherwise currently asymptomatic regarding the hyponatremia    Also having some numbness in the fourth and fifth fingers of the left hand with some irritation at the left elbow and she feels the numbness when she touches the left elbow over the ulnar nerve.  He discussed a referral to orthopedics.    Another issue is her  who is 89 years old.  By her Description he is very depressed and will not leave the house.  He sleeps all day.  We discussed messaging with his current medications as I believe his symptoms are very suspicious for severe depression and perhaps we can adjust treatment and eventually get him motivated enough to come to the office.  We discussed all these  issues at great length today.Total time over 45 minutes with over 50% counseling.        ROS:   CONST: weight stable.  Eating well, no depression or anxiety symptoms.  No other myalgias arthralgias or neurological symptoms        MH:  1.  HTN.                                                                     2.  Osteoporosis.  Worsening BMD 4/12. On fosfamax [id number] and restarted 2012.   Now on Prolia per Endocrine                                                       3.  H/o smoking for 30 years and questionable emphysema on CXR.              4.  Cervical spondylosis, s/p surgery by Dr. Luciano.                      5.  mammogram yearly                                                  6.  Colon cancer screening, pt cancelled C-scope but plans to reschedule.                                                                  7.  FMH (mother) with colon polyps. Father unknown.                                         8.  H/o early menopause at 43 with no HRT.   9.  Bilateral carotid disease.  50-70% on left 2015                                                   10.  Chronic hyponatremia.   11. Left shoulder scope/impingement.    12. Migraines ?        13.   Hoarseness - saw García in ENT- vocal cord reduced flexibility   14.  Allergies- SOB relieved w/ Zyrtec   15.  Tibia fracture surgery 2014   16.  Right thumb tendinitis    Social history: Former smoker for 30 years,  lives with her , rare alcohol    Gen: no distress  EYES: conjunctiva clear, non-icteric, PERRL  ENT: nose clear, nasal mucosa normal, oropharynx clear and moist, teeth good  NECK:supple, thyroid non-palpable  RESP: effort is good, lungs clear  CV: heart RRR w/o murmur, gallops or rubs; no carotid bruits, no edema  GI: abdomen soft, non-distended, non-tender, no hepatosplenomegaly  MS: gait normal, no clubbing or cyanosis of the digits  SKIN: no rashes, warm to touch    Latasha TALLEY was seen today for sinus problem, advice only and chest  "congestion.    Diagnoses and all orders for this visit:    Pulmonary emphysema, unspecified emphysema type, recent COPD exacerbation resolving.  As above, patient sent emergently from the office via ambulance for appropriate reasons    Hypoxia, given her significant lung disease and her tendency towards hypoxemia with any illness, do recommend that she keep her oxygen at home and although she initially wanted to return it.  I gave her a prescription stating to continue oxygen    Chronic hyponatremia, continue treatment    Essential hypertension, usually good at home, restart monitoring    Ulnar neuropathy of left upper extremity, new problem, refer to orthopedics as she may have ulnar entrapment at the elbow by exam  -     Ambulatory consult to Orthopedics    Carotid disease, bilateral, stable by review of ultrasound 6/17    Other orders  -     Cancel: Influenza - High Dose (65+) (PF) (IM)---patient prefers to get her flu shot at the pharmacy for cost reasons  -     fluticasone-vilanterol (BREO ELLIPTA) 200-25 mcg/dose DsDv diskus inhaler; Inhale 1 puff into the lungs once daily. Controller         Patient expresses significant anxiety regarding results and tests over scrutinizing get herself worked up.  Access to the note would not be therapeutic""This note will not be shared with the patient."  "

## 2018-02-05 DIAGNOSIS — I10 ESSENTIAL HYPERTENSION: ICD-10-CM

## 2018-02-05 RX ORDER — LOSARTAN POTASSIUM 100 MG/1
100 TABLET ORAL DAILY
Qty: 90 TABLET | Refills: 1 | Status: SHIPPED | OUTPATIENT
Start: 2018-02-05 | End: 2018-08-16 | Stop reason: SDUPTHER

## 2018-02-05 RX ORDER — AMLODIPINE BESYLATE 5 MG/1
TABLET ORAL
Qty: 90 TABLET | Refills: 1 | Status: SHIPPED | OUTPATIENT
Start: 2018-02-05 | End: 2018-08-16 | Stop reason: SDUPTHER

## 2018-02-05 NOTE — TELEPHONE ENCOUNTER
----- Message from Gentry Ann sent at 2/5/2018  9:21 AM CST -----  Contact: self  Refill: losartan (COZAAR) 100 MG tablet            amlodipine (NORVASC) 5 MG tablet    Send to [place] [address]  [facility] [address] [phone]    Contact her at [phone].    Thanks-

## 2018-02-21 ENCOUNTER — TELEPHONE (OUTPATIENT)
Dept: FAMILY MEDICINE | Facility: CLINIC | Age: 73
End: 2018-02-21

## 2018-02-21 DIAGNOSIS — R73.9 HYPERGLYCEMIA: ICD-10-CM

## 2018-02-21 DIAGNOSIS — E87.1 HYPONATREMIA: ICD-10-CM

## 2018-02-21 DIAGNOSIS — D72.829 LEUKOCYTOSIS, UNSPECIFIED TYPE: ICD-10-CM

## 2018-02-21 DIAGNOSIS — I10 ESSENTIAL HYPERTENSION: Primary | ICD-10-CM

## 2018-02-21 RX ORDER — FLUTICASONE PROPIONATE AND SALMETEROL 250; 50 UG/1; UG/1
1 POWDER RESPIRATORY (INHALATION) 2 TIMES DAILY
Qty: 180 EACH | Refills: 12 | Status: SHIPPED | OUTPATIENT
Start: 2018-02-21 | End: 2019-01-31 | Stop reason: SDUPTHER

## 2018-02-21 NOTE — TELEPHONE ENCOUNTER
Patient would like to switch back to Advair, although its costly its the only med that works. She has a few days left of the Breo which hasn't been as effective as the Advair. Please send Advair script to [facility] pharmacy, script is pended

## 2018-02-21 NOTE — TELEPHONE ENCOUNTER
Patient says she takes her pulse ox regularly. Typically she places the sensor on her right index finger and it runs in the mid to high 90s, but when she puts the sensor on her left index finger its in the high 80s to low 90s.  She wants to know if the nerve damage she has in her left arm is causing the variation and if not is there reason for concern. Please advise  In addition, patient has an annual lab appt on 4/18, orders needed.

## 2018-02-21 NOTE — TELEPHONE ENCOUNTER
Reassure that she can go with the best reading.  The low readings on the other finger is very common and has to deal with the thickness of the nails and so forth.  Not vascular      Labs ordered

## 2018-02-21 NOTE — TELEPHONE ENCOUNTER
----- Message from Sabra Martinez sent at 2/20/2018 10:53 AM CST -----  Contact: self  Pt calling to see about changing to Advair again. Please call pt to discuss to [phone].

## 2018-03-19 DIAGNOSIS — G56.20 LESION OF ULNAR NERVE, UNSPECIFIED LATERALITY: Primary | ICD-10-CM

## 2018-03-20 DIAGNOSIS — J44.1 COPD EXACERBATION: ICD-10-CM

## 2018-03-20 RX ORDER — PREDNISONE 20 MG/1
40 TABLET ORAL DAILY
Qty: 10 TABLET | Refills: 0 | Status: SHIPPED | OUTPATIENT
Start: 2018-03-20 | End: 2018-05-05 | Stop reason: SDUPTHER

## 2018-03-20 NOTE — TELEPHONE ENCOUNTER
Stated, she is wheezing a little. O2 (93). Taking inhaler. Have medication Prednisone 50 mg left from last visit. Requesting a lower dose. Declined appointment with PCP. Will go to walk-in if instructed to do so. No other symptoms reported. Not sure if it is her emphysema or COPD exacerbation. Medication attached if approved.

## 2018-03-20 NOTE — TELEPHONE ENCOUNTER
----- Message from Gentry Ann sent at 3/20/2018 10:41 AM CDT -----  Contact: self  Pt is requesting predniSONE tablet 60 mg. States upper respiratory symptoms  has returned. Send to [facility] [id number]     Contact pt at [phone].    Thanks-

## 2018-03-27 ENCOUNTER — PES CALL (OUTPATIENT)
Dept: ADMINISTRATIVE | Facility: CLINIC | Age: 73
End: 2018-03-27

## 2018-04-06 ENCOUNTER — PROCEDURE VISIT (OUTPATIENT)
Dept: NEUROLOGY | Facility: CLINIC | Age: 73
End: 2018-04-06
Payer: MEDICARE

## 2018-04-06 DIAGNOSIS — G56.20 LESION OF ULNAR NERVE, UNSPECIFIED LATERALITY: ICD-10-CM

## 2018-04-06 PROCEDURE — 95909 NRV CNDJ TST 5-6 STUDIES: CPT | Mod: S$GLB,,, | Performed by: NEUROLOGICAL SURGERY

## 2018-04-06 PROCEDURE — 95886 MUSC TEST DONE W/N TEST COMP: CPT | Mod: S$GLB,,, | Performed by: NEUROLOGICAL SURGERY

## 2018-04-06 PROCEDURE — 99204 OFFICE O/P NEW MOD 45 MIN: CPT | Mod: 25,S$GLB,, | Performed by: NEUROLOGICAL SURGERY

## 2018-04-18 ENCOUNTER — LAB VISIT (OUTPATIENT)
Dept: LAB | Facility: HOSPITAL | Age: 73
End: 2018-04-18
Attending: INTERNAL MEDICINE
Payer: MEDICARE

## 2018-04-18 DIAGNOSIS — I10 ESSENTIAL HYPERTENSION: ICD-10-CM

## 2018-04-18 DIAGNOSIS — E87.1 HYPONATREMIA: ICD-10-CM

## 2018-04-18 DIAGNOSIS — R73.9 HYPERGLYCEMIA: ICD-10-CM

## 2018-04-18 DIAGNOSIS — D72.829 LEUKOCYTOSIS, UNSPECIFIED TYPE: ICD-10-CM

## 2018-04-18 LAB
ALBUMIN SERPL BCP-MCNC: 3.9 G/DL
ALP SERPL-CCNC: 56 U/L
ALT SERPL W/O P-5'-P-CCNC: 16 U/L
ANION GAP SERPL CALC-SCNC: 10 MMOL/L
AST SERPL-CCNC: 22 U/L
BASOPHILS # BLD AUTO: 0.04 K/UL
BASOPHILS NFR BLD: 0.4 %
BILIRUB SERPL-MCNC: 0.6 MG/DL
BUN SERPL-MCNC: 12 MG/DL
CALCIUM SERPL-MCNC: 9.9 MG/DL
CHLORIDE SERPL-SCNC: 96 MMOL/L
CHOLEST SERPL-MCNC: 198 MG/DL
CHOLEST/HDLC SERPL: 2.2 {RATIO}
CO2 SERPL-SCNC: 27 MMOL/L
CREAT SERPL-MCNC: 0.8 MG/DL
DIFFERENTIAL METHOD: ABNORMAL
EOSINOPHIL # BLD AUTO: 0.4 K/UL
EOSINOPHIL NFR BLD: 3.9 %
ERYTHROCYTE [DISTWIDTH] IN BLOOD BY AUTOMATED COUNT: 12.5 %
EST. GFR  (AFRICAN AMERICAN): >60 ML/MIN/1.73 M^2
EST. GFR  (NON AFRICAN AMERICAN): >60 ML/MIN/1.73 M^2
ESTIMATED AVG GLUCOSE: 111 MG/DL
GLUCOSE SERPL-MCNC: 92 MG/DL
HBA1C MFR BLD HPLC: 5.5 %
HCT VFR BLD AUTO: 41.3 %
HDLC SERPL-MCNC: 91 MG/DL
HDLC SERPL: 46 %
HGB BLD-MCNC: 14.1 G/DL
IMM GRANULOCYTES # BLD AUTO: 0.03 K/UL
IMM GRANULOCYTES NFR BLD AUTO: 0.3 %
LDLC SERPL CALC-MCNC: 90 MG/DL
LYMPHOCYTES # BLD AUTO: 3 K/UL
LYMPHOCYTES NFR BLD: 30.9 %
MCH RBC QN AUTO: 31.8 PG
MCHC RBC AUTO-ENTMCNC: 34.1 G/DL
MCV RBC AUTO: 93 FL
MONOCYTES # BLD AUTO: 1 K/UL
MONOCYTES NFR BLD: 10.2 %
NEUTROPHILS # BLD AUTO: 5.3 K/UL
NEUTROPHILS NFR BLD: 54.3 %
NONHDLC SERPL-MCNC: 107 MG/DL
NRBC BLD-RTO: 0 /100 WBC
PLATELET # BLD AUTO: 309 K/UL
PMV BLD AUTO: 8.9 FL
POTASSIUM SERPL-SCNC: 4.1 MMOL/L
PROT SERPL-MCNC: 7 G/DL
RBC # BLD AUTO: 4.44 M/UL
SODIUM SERPL-SCNC: 133 MMOL/L
TRIGL SERPL-MCNC: 85 MG/DL
TSH SERPL DL<=0.005 MIU/L-ACNC: 1.15 UIU/ML
WBC # BLD AUTO: 9.79 K/UL

## 2018-04-18 PROCEDURE — 80061 LIPID PANEL: CPT

## 2018-04-18 PROCEDURE — 36415 COLL VENOUS BLD VENIPUNCTURE: CPT | Mod: PO

## 2018-04-18 PROCEDURE — 80053 COMPREHEN METABOLIC PANEL: CPT

## 2018-04-18 PROCEDURE — 83036 HEMOGLOBIN GLYCOSYLATED A1C: CPT

## 2018-04-18 PROCEDURE — 84443 ASSAY THYROID STIM HORMONE: CPT

## 2018-04-18 PROCEDURE — 85025 COMPLETE CBC W/AUTO DIFF WBC: CPT

## 2018-04-23 VITALS — WEIGHT: 95 LBS | BODY MASS INDEX: 15.83 KG/M2 | HEIGHT: 65 IN | TEMPERATURE: 87 F

## 2018-04-24 NOTE — PROCEDURES
Chief Complaint   Patient presents with    other     EMG        Latasha Perez is a 72 y.o. female with a history of multiple medical diagnoses as listed below that presents for evaluation of numbness and tingling in the hands and arms.  She was previously seen by orthopedics who recommended she come today for EMG/nerve conduction studies to further evaluate her symptoms.  She has numbness and tingling along the fourth and fifth digits on the left hand.  She said that the symptoms extend from the elbow down to the tip of the fingers at times.  She feels that she has had a subsequent persistently throughout the day and there is nothing that seems to make them any better or any worse.  She has had a slightly decreased  strength overall since she noticed onset of her symptoms.     PAST MEDICAL HISTORY:  Past Medical History:   Diagnosis Date    Carotid disease, bilateral     Chronic hyponatremia     COPD (chronic obstructive pulmonary disease)     HLD (hyperlipidemia)     HTN (hypertension)        PAST SURGICAL HISTORY:  Past Surgical History:   Procedure Laterality Date    APPENDECTOMY      CERVICAL SPINE SURGERY      DILATION AND CURETTAGE OF UTERUS      x 2    metatarsal repair      SHOULDER ARTHROSCOPY         SOCIAL HISTORY:  Social History     Social History    Marital status:      Spouse name: N/A    Number of children: N/A    Years of education: N/A     Occupational History    teacher      Social History Main Topics    Smoking status: Former Smoker     Packs/day: 1.00     Years: 45.00     Types: Cigarettes     Quit date: 1/12/2002    Smokeless tobacco: Never Used    Alcohol use No    Drug use: No    Sexual activity: Yes     Partners: Male     Other Topics Concern    Not on file     Social History Narrative    No narrative on file       FAMILY HISTORY:  Family History   Problem Relation Age of Onset    Heart disease Mother     Cancer Father     Heart disease Maternal  Grandfather        ALLERGIES AND MEDICATIONS: updated and reviewed.  Review of patient's allergies indicates:   Allergen Reactions    Pcn [penicillins] Other (See Comments)     Fever flushing skin swelling     Biaxin [clarithromycin] Rash    Codeine Rash    Doxycycline Rash    Levaquin [levofloxacin] Rash     Current Outpatient Prescriptions   Medication Sig Dispense Refill    albuterol 90 mcg/actuation inhaler Inhale 2 puffs into the lungs every 6 (six) hours as needed for Wheezing. Rescue 1 Inhaler 6    alendronate (FOSAMAX) 70 MG tablet Take 1 tablet (70 mg total) by mouth every 7 days. 12 tablet 11    amLODIPine (NORVASC) 5 MG tablet TAKE 1 TABLET (5 MG TOTAL) BY MOUTH ONCE DAILY. 90 tablet 1    aspirin (ECOTRIN) 81 MG EC tablet Take 81 mg by mouth once daily.      budesonide-formoterol 160-4.5 mcg (SYMBICORT) 160-4.5 mcg/actuation HFAA Inhale 2 puffs into the lungs every 12 (twelve) hours. Controller 1 Inhaler 12    fluticasone-salmeterol 250-50 mcg/dose (ADVAIR) 250-50 mcg/dose diskus inhaler Inhale 1 puff into the lungs 2 (two) times daily. Controller 180 each 12    fluticasone-vilanterol (BREO ELLIPTA) 200-25 mcg/dose DsDv diskus inhaler Inhale 1 puff into the lungs once daily. Controller 30 each 12    levocetirizine (XYZAL) 5 MG tablet Take 1 tablet (5 mg total) by mouth every evening. 30 tablet 1    losartan (COZAAR) 100 MG tablet Take 1 tablet (100 mg total) by mouth once daily. 90 tablet 1    MULTIVITAMIN W-MINERALS/LUTEIN (CENTRUM SILVER ORAL) Take by mouth once daily.      pravastatin (PRAVACHOL) 20 MG tablet Take 1 tablet (20 mg total) by mouth every evening. 90 tablet 1     Current Facility-Administered Medications   Medication Dose Route Frequency Provider Last Rate Last Dose    albuterol-ipratropium 2.5mg-0.5mg/3mL nebulizer solution 3 mL  3 mL Nebulization 1 time in Clinic/[facility] Fadumo Jean-Baptiste, NP-C           Review of Systems   Constitutional: Negative for activity change,  appetite change, fever and unexpected weight change.   HENT: Negative for trouble swallowing and voice change.    Eyes: Negative for photophobia and visual disturbance.   Respiratory: Negative for apnea and shortness of breath.    Cardiovascular: Negative for chest pain and leg swelling.   Gastrointestinal: Negative for constipation and nausea.   Genitourinary: Negative for difficulty urinating.   Musculoskeletal: Positive for arthralgias. Negative for back pain, gait problem and neck pain.   Skin: Negative for color change and pallor.   Neurological: Positive for weakness and numbness. Negative for dizziness, seizures and syncope.   Hematological: Negative for adenopathy.   Psychiatric/Behavioral: Negative for agitation, confusion and decreased concentration.       Neurologic Exam     Mental Status   Oriented to person, place, and time.   Registration: recalls 3 of 3 objects.   Attention: normal. Concentration: normal.   Speech: speech is normal   Level of consciousness: alert  Knowledge: good.     Cranial Nerves     CN II   Visual fields full to confrontation.   Right visual field deficit: none  Left visual field deficit: none     CN III, IV, VI   Pupils are equal, round, and reactive to light.  Extraocular motions are normal.   Right pupil: Size: 3 mm. Shape: regular. Accommodation: intact.   Left pupil: Size: 3 mm. Shape: regular. Accommodation: intact.   CN III: no CN III palsy  CN VI: no CN VI palsy  Nystagmus: none   Diplopia: none  Ophthalmoparesis: none  Upgaze: normal  Downgaze: normal  Conjugate gaze: present    CN V   Facial sensation intact.   Right facial sensation deficit: none  Left facial sensation deficit: none    CN VII   Facial expression full, symmetric.   Right facial weakness: none  Left facial weakness: none    CN VIII   CN VIII normal.     CN IX, X   CN IX normal.   CN X normal.   Palate: symmetric    CN XI   CN XI normal.   Right sternocleidomastoid strength: normal  Left  sternocleidomastoid strength: normal  Right trapezius strength: normal  Left trapezius strength: normal    CN XII   CN XII normal.   Tongue deviation: none    Motor Exam   Muscle bulk: normal  Overall muscle tone: normal  Right arm tone: normal  Left arm tone: normal  Right leg tone: normal  Left leg tone: normal    Strength   Strength 5/5 throughout.     Sensory Exam   Right arm light touch: normal  Left arm light touch: decreased from elbow  Right leg light touch: normal  Left leg light touch: normal  Right arm vibration: normal  Left arm vibration: normal  Right leg vibration: normal  Left leg vibration: normal  Right arm proprioception: normal  Left arm proprioception: normal  Right leg proprioception: normal  Left leg proprioception: normal  Right arm pinprick: normal  Left arm pinprick: decreased from elbow  Right leg pinprick: normal  Left leg pinprick: normal  Sensory deficit distribution on left: ulnar    Gait, Coordination, and Reflexes     Gait  Gait: normal    Coordination   Romberg: negative  Finger to nose coordination: normal  Heel to shin coordination: normal  Tandem walking coordination: normal    Tremor   Resting tremor: absent    Reflexes   Right brachioradialis: 2+  Left brachioradialis: 2+  Right biceps: 2+  Left biceps: 2+  Right triceps: 2+  Left triceps: 2+  Right patellar: 2+  Left patellar: 2+  Right achilles: 2+  Left achilles: 2+  Right plantar: normal  Left plantar: normal      Physical Exam   Constitutional: She is oriented to person, place, and time. She appears well-developed and well-nourished.   HENT:   Head: Normocephalic and atraumatic.   Eyes: EOM are normal. Pupils are equal, round, and reactive to light.   Neck: Normal range of motion.   Cardiovascular: Normal rate and intact distal pulses.    Pulmonary/Chest: Effort normal. No apnea. No respiratory distress.   Musculoskeletal: Normal range of motion.   Neurological: She is alert and oriented to person, place, and time. She has  "normal strength. She has a normal Finger-Nose-Finger Test, a normal Heel to Shin Test, a normal Romberg Test and a normal Tandem Gait Test. Gait normal.   Reflex Scores:       Tricep reflexes are 2+ on the right side and 2+ on the left side.       Bicep reflexes are 2+ on the right side and 2+ on the left side.       Brachioradialis reflexes are 2+ on the right side and 2+ on the left side.       Patellar reflexes are 2+ on the right side and 2+ on the left side.       Achilles reflexes are 2+ on the right side and 2+ on the left side.  Skin: Skin is warm and dry.   Psychiatric: She has a normal mood and affect. Her speech is normal and behavior is normal. Thought content normal.   Vitals reviewed.      Vitals:    04/06/18 1115   Temp: (!) 87.4 °F (30.8 °C)   Weight: 43.1 kg (95 lb 0.3 oz)   Height: 5' 5" (1.651 m)       Assessment & Plan:    Problem List Items Addressed This Visit     None      Visit Diagnoses     Lesion of ulnar nerve, unspecified laterality              Follow-up: Follow-up if symptoms worsen or fail to improve.      "

## 2018-04-25 ENCOUNTER — OFFICE VISIT (OUTPATIENT)
Dept: FAMILY MEDICINE | Facility: CLINIC | Age: 73
End: 2018-04-25
Payer: MEDICARE

## 2018-04-25 VITALS
TEMPERATURE: 98 F | DIASTOLIC BLOOD PRESSURE: 80 MMHG | SYSTOLIC BLOOD PRESSURE: 132 MMHG | OXYGEN SATURATION: 93 % | HEIGHT: 65 IN | WEIGHT: 96.13 LBS | BODY MASS INDEX: 16.01 KG/M2 | HEART RATE: 96 BPM

## 2018-04-25 DIAGNOSIS — I10 ESSENTIAL HYPERTENSION: ICD-10-CM

## 2018-04-25 DIAGNOSIS — J44.9 CHRONIC OBSTRUCTIVE PULMONARY DISEASE, UNSPECIFIED COPD TYPE: ICD-10-CM

## 2018-04-25 DIAGNOSIS — E87.1 HYPONATREMIA: ICD-10-CM

## 2018-04-25 DIAGNOSIS — I77.71 CAROTID DISSECTION, BILATERAL: ICD-10-CM

## 2018-04-25 DIAGNOSIS — M81.0 OSTEOPOROSIS, UNSPECIFIED OSTEOPOROSIS TYPE, UNSPECIFIED PATHOLOGICAL FRACTURE PRESENCE: ICD-10-CM

## 2018-04-25 DIAGNOSIS — F43.9 SITUATIONAL STRESS: ICD-10-CM

## 2018-04-25 DIAGNOSIS — E78.5 HYPERLIPIDEMIA, UNSPECIFIED HYPERLIPIDEMIA TYPE: ICD-10-CM

## 2018-04-25 DIAGNOSIS — Z00.00 ROUTINE MEDICAL EXAM: Primary | ICD-10-CM

## 2018-04-25 DIAGNOSIS — J43.9 PULMONARY EMPHYSEMA, UNSPECIFIED EMPHYSEMA TYPE: ICD-10-CM

## 2018-04-25 DIAGNOSIS — Z12.31 ENCOUNTER FOR SCREENING MAMMOGRAM FOR BREAST CANCER: ICD-10-CM

## 2018-04-25 DIAGNOSIS — I77.9 CAROTID DISEASE, BILATERAL: ICD-10-CM

## 2018-04-25 PROCEDURE — 99499 UNLISTED E&M SERVICE: CPT | Mod: S$GLB,,, | Performed by: INTERNAL MEDICINE

## 2018-04-25 PROCEDURE — 99999 PR PBB SHADOW E&M-EST. PATIENT-LVL III: CPT | Mod: PBBFAC,,, | Performed by: INTERNAL MEDICINE

## 2018-04-25 PROCEDURE — 3079F DIAST BP 80-89 MM HG: CPT | Mod: CPTII,S$GLB,, | Performed by: INTERNAL MEDICINE

## 2018-04-25 PROCEDURE — 99397 PER PM REEVAL EST PAT 65+ YR: CPT | Mod: 25,S$GLB,, | Performed by: INTERNAL MEDICINE

## 2018-04-25 PROCEDURE — 3075F SYST BP GE 130 - 139MM HG: CPT | Mod: CPTII,S$GLB,, | Performed by: INTERNAL MEDICINE

## 2018-04-25 PROCEDURE — 99214 OFFICE O/P EST MOD 30 MIN: CPT | Mod: 25,S$GLB,, | Performed by: INTERNAL MEDICINE

## 2018-04-25 NOTE — PROGRESS NOTES
complaint: Physical exam    72-year-old white female still very active.  She is not yet ready for a  colonoscopy.  cologuard done 2017.  Her mammogram will be due in May.  She is still active and exercising although less so given her orthopedic injury.     ROS:   CONST: weight stable. EYES: no vision change. ENT: no sore throat. CV: no chest pain w/ exertion. RESP: no shortness of breath. GI: no nausea, vomiting, diarrhea. No dysphagia. : no urinary issues. MUSCULOSKELETAL: no new myalgias or arthralgias. SKIN: no new changes. NEURO: no focal deficits. PSYCH: no new issues. ENDOCRINE: no polyuria. HEME: no lymph nodes. ALLERGY: no general pruritis.     MH:  1.  HTN.                                                                     2.  Osteoporosis.  Worsening BMD 4/12. On fosfamax 2806-0035 and restarted 2012.   Now on Prolia per Endocrine                                                       3.  H/o smoking for 30 years and questionable emphysema on CXR.              4.  Cervical spondylosis, s/p surgery by Dr. Luciano.                      5.  mammogram yearly                                                  6.  Colon cancer screening, pt cancelled C-scope but plans to reschedule.                                                                  7.  [facility] (mother) with colon polyps. Father unknown.                                         8.  H/o early menopause at 43 with no HRT.   9.  Bilateral carotid disease.  50-70% on left 2015                                                   10.  Chronic hyponatremia.   11. Left shoulder scope/impingement.    12. Migraines ?        13.   Hoarseness - saw García in ENT- vocal cord reduced flexibility   14.  Allergies- SOB relieved w/ Zyrtec   15.  Tibia fracture surgery 2014    Social history: Former smoker for 30 years,  lives with her , rare alcohol    Gen: no distress  EYES: conjunctiva clear, non-icteric, PERRL  ENT: nose clear, nasal mucosa normal,  oropharynx clear, teeth good  NECK:supple, thyroid non-palpable  RESP: effort is good, lungs clear  CV: heart RRR w/o murmur, gallops or rubs; no carotid bruits, no edema  GI: abdomen soft, non-distended, non-tender, no hepatosplenomegaly  MS: gait normal, no clubbing or cyanosis of the digits, left knee with inward deviation but no pain, patient is a one by one slightly red and and her olecranon bursitis on the left   SKIN: no rashes, warm to touch     Latasha TALLEY was seen today for annual exam.    Diagnoses and all orders for this visit:    Routine medical exam,  Mammogram In May 5/11 Labs all reviewed with patient                                                                   Additional evaluation and management issues:    Additionally, patient has numerous other medical issues to address.  She has her chronic hyponatremia which is essentially the same and she will increase her salt intake.  She is on Pravachol due to history of carotid disease which is due to update a yearly ultrasound which we reviewed from last june.  Her lipids are acceptable and HDL is excellent on statin.  We'll long conversation regarding her osteoporosis.  She has been on injections for the past 2 years and her bone density -Looking stable in the spine might be getting better.  Continue the injections  .  She has a history of emphysema with stable shortness of breath and dyspnea.  She does report that when under stress she will go smoke a cigarette.  We discussed that that can clearly worsen her shortness of breath and we discussed alternatives.  She will try using candy instead.  Her hypertension is under good control..  She's had some treated skin cancers on her arms.   .  Also had a long conversation regarding her  is a patient of mine but sounds like his very depressed and does not like to leave the house and has not come here for 2 years.  We have attempted to set up home health for various reasons but also to trying get  "some blood work done on him.  We discussed that if indeed he has severe depression with worsening physical and functional status related to it, she may well need to bring up to the emergency room for a psychiatric evaluation, discussed possible ECT if appropriate etc.  All these issues reviewed and patient counseled an evaluation and management we based upon time counseling Total time over 25 minutes with over 50% counseling.    She does also want to reassess her carotid disease on an annual basis      Assessment and plan:        Hyponatremia, chronic and stable and continues to use sports drinks    Essential hypertension, chronic and stable    Pulmonary emphysema, unspecified emphysema type, discussed that it may worsen over time especially with smoking, continue Advair    Carotid disease, bilateral, arrange ultrasound    Chronic obstructive pulmonary disease, unspecified COPD type    Situational stress, discussed at great length    Osteoporosis, unspecified osteoporosis type, unspecified pathological fracture presence, continue injections    Hyperlipidemia, unspecified hyperlipidemia type, chronic and stable, excellent control           Patient expresses significant anxiety regarding results and tests over scrutinizing get herself worked up.  Access to the note would not be therapeutic"..."This note will not be shared with the patient."  "

## 2018-05-04 ENCOUNTER — TELEPHONE (OUTPATIENT)
Dept: FAMILY MEDICINE | Facility: CLINIC | Age: 73
End: 2018-05-04

## 2018-05-04 NOTE — TELEPHONE ENCOUNTER
----- Message from Sabra Martinez sent at 5/4/2018 12:55 PM CDT -----  Contact: self  Please call pt to discuss getting prednisone. Please call [phone].

## 2018-05-05 DIAGNOSIS — J44.1 COPD EXACERBATION: ICD-10-CM

## 2018-05-05 RX ORDER — PREDNISONE 20 MG/1
TABLET ORAL
Qty: 10 TABLET | Refills: 0 | Status: SHIPPED | OUTPATIENT
Start: 2018-05-05 | End: 2018-05-29 | Stop reason: SDUPTHER

## 2018-05-10 DIAGNOSIS — E78.5 HYPERLIPIDEMIA, UNSPECIFIED HYPERLIPIDEMIA TYPE: ICD-10-CM

## 2018-05-14 RX ORDER — PRAVASTATIN SODIUM 20 MG/1
20 TABLET ORAL NIGHTLY
Qty: 90 TABLET | Refills: 1 | Status: SHIPPED | OUTPATIENT
Start: 2018-05-14 | End: 2018-10-18 | Stop reason: SDUPTHER

## 2018-05-24 ENCOUNTER — TELEPHONE (OUTPATIENT)
Dept: FAMILY MEDICINE | Facility: CLINIC | Age: 73
End: 2018-05-24

## 2018-05-24 DIAGNOSIS — J44.1 COPD EXACERBATION: ICD-10-CM

## 2018-05-24 NOTE — TELEPHONE ENCOUNTER
----- Message from Julianna Howell sent at 5/24/2018  1:16 PM CDT -----  Contact: Self  Pt states she's been taking predniSONE (DELTASONE) 20 MG tablet and is now experincing being SOB again. Pt wants to know if she can take more of the prednisone or how she should handle. Pt can be reached  [phone].

## 2018-05-24 NOTE — TELEPHONE ENCOUNTER
Patient states  She has  been sob for 4 days and wants to know if she should start prednisone again.please advise

## 2018-05-25 NOTE — TELEPHONE ENCOUNTER
Stated, she completed medication Prednisone about 5 days ago. Still bring up phlegm. Can she take something else. Advise that she needs time give medication time to work. Instructed not to start old medication she has at home and do not take any medication OTC. Hx of COPD. Sat 96% with out O2. No other symptoms reported. Please advise.

## 2018-05-28 NOTE — TELEPHONE ENCOUNTER
Patient states that she has been using O2 off and on stating she is fine when seated but SOB with exertion.  She reports exacerbated symptoms with increased heat and humidity.  Inhalers temporarily relieve her symptoms.  Patient states her O2 sat is usually in the 90s without oxygen but she puts it on anyway to help with chest tightness.  Please advise.

## 2018-05-28 NOTE — TELEPHONE ENCOUNTER
Returned patient call.  Informed that her previous message has been sent to Dr. Oneal.  Verbalized understanding and states to please also ask him if a change in her inhaler or other medications would help her breathing or if she should wait for advice from pulmonary.  Patient instructed to go to the ER if her condition worsens.  Verbalized understanding.

## 2018-05-28 NOTE — TELEPHONE ENCOUNTER
Patient notified of information below and verbalized understanding but states that it is difficult to do anything without feeling SOB.  She states that she will attempt to schedule an OV with pulmonary but would like to know if Dr. Oneal will refill her prednisone prescription to help relieve her symptoms until her appointment date.  Patient adds that she would   Patient states that her most recent prescription for prednisone was not quite as effective as it usually is.  Please advise.

## 2018-05-28 NOTE — TELEPHONE ENCOUNTER
Reassure patient she sounds okay.  Intermittently needing the inhalers and intermittently needing the oxygen is okay as long she monitors her oxygen levels

## 2018-05-29 RX ORDER — PREDNISONE 20 MG/1
TABLET ORAL
Qty: 10 TABLET | Refills: 3 | Status: SHIPPED | OUTPATIENT
Start: 2018-05-29 | End: 2018-08-21 | Stop reason: SDUPTHER

## 2018-05-29 NOTE — TELEPHONE ENCOUNTER
Called patient advised RX has been sent to your pharmacy. Called patient advised of physician's recommendation. Patient verbalized an understanding.

## 2018-05-29 NOTE — TELEPHONE ENCOUNTER
Patient states she is using Advair & Ventolin. She is requesting for you to order Prednisone for her. I offered her an appointment which she declined.I also re-instructed her on going to the ED if symptoms worsens. Patient verbalized an understanding of instructions.

## 2018-05-29 NOTE — TELEPHONE ENCOUNTER
Sent -let know and ask if she can use the portal which may be more direct communication with provider

## 2018-06-13 ENCOUNTER — HOSPITAL ENCOUNTER (OUTPATIENT)
Dept: RADIOLOGY | Facility: HOSPITAL | Age: 73
Discharge: HOME | End: 2018-06-13
Attending: INTERNAL MEDICINE
Payer: MEDICARE

## 2018-06-13 DIAGNOSIS — I77.71 CAROTID DISSECTION, BILATERAL: ICD-10-CM

## 2018-06-13 DIAGNOSIS — I10 ESSENTIAL HYPERTENSION: ICD-10-CM

## 2018-06-13 DIAGNOSIS — I77.9 CAROTID DISEASE, BILATERAL: ICD-10-CM

## 2018-06-13 PROCEDURE — 93880 EXTRACRANIAL BILAT STUDY: CPT | Mod: 26,,, | Performed by: RADIOLOGY

## 2018-06-13 PROCEDURE — 93880 EXTRACRANIAL BILAT STUDY: CPT | Mod: TC

## 2018-06-14 DIAGNOSIS — Z11.59 NEED FOR HEPATITIS C SCREENING TEST: ICD-10-CM

## 2018-06-20 ENCOUNTER — PATIENT MESSAGE (OUTPATIENT)
Dept: FAMILY MEDICINE | Facility: CLINIC | Age: 73
End: 2018-06-20

## 2018-06-21 ENCOUNTER — PATIENT MESSAGE (OUTPATIENT)
Dept: FAMILY MEDICINE | Facility: CLINIC | Age: 73
End: 2018-06-21

## 2018-07-03 ENCOUNTER — OFFICE VISIT (OUTPATIENT)
Dept: SLEEP MEDICINE | Facility: CLINIC | Age: 73
End: 2018-07-03
Payer: MEDICARE

## 2018-07-03 ENCOUNTER — TELEPHONE (OUTPATIENT)
Dept: PULMONOLOGY | Facility: CLINIC | Age: 73
End: 2018-07-03

## 2018-07-03 VITALS
BODY MASS INDEX: 15.75 KG/M2 | HEART RATE: 77 BPM | OXYGEN SATURATION: 92 % | SYSTOLIC BLOOD PRESSURE: 140 MMHG | HEIGHT: 65 IN | WEIGHT: 94.56 LBS | DIASTOLIC BLOOD PRESSURE: 86 MMHG

## 2018-07-03 DIAGNOSIS — J44.9 CHRONIC OBSTRUCTIVE PULMONARY DISEASE, UNSPECIFIED COPD TYPE: Primary | ICD-10-CM

## 2018-07-03 DIAGNOSIS — R09.81 NASAL CONGESTION: ICD-10-CM

## 2018-07-03 DIAGNOSIS — B37.0 ORAL THRUSH: Primary | ICD-10-CM

## 2018-07-03 PROCEDURE — 99499 UNLISTED E&M SERVICE: CPT | Mod: S$GLB,,, | Performed by: INTERNAL MEDICINE

## 2018-07-03 PROCEDURE — 99999 PR PBB SHADOW E&M-EST. PATIENT-LVL III: CPT | Mod: PBBFAC,,, | Performed by: INTERNAL MEDICINE

## 2018-07-03 PROCEDURE — 99214 OFFICE O/P EST MOD 30 MIN: CPT | Mod: S$GLB,,, | Performed by: INTERNAL MEDICINE

## 2018-07-03 PROCEDURE — 3079F DIAST BP 80-89 MM HG: CPT | Mod: CPTII,S$GLB,, | Performed by: INTERNAL MEDICINE

## 2018-07-03 PROCEDURE — 3077F SYST BP >= 140 MM HG: CPT | Mod: CPTII,S$GLB,, | Performed by: INTERNAL MEDICINE

## 2018-07-03 RX ORDER — TIOTROPIUM BROMIDE 18 UG/1
18 CAPSULE ORAL; RESPIRATORY (INHALATION) DAILY
Qty: 30 CAPSULE | Refills: 3 | Status: SHIPPED | OUTPATIENT
Start: 2018-07-03 | End: 2018-08-02

## 2018-07-03 RX ORDER — FLUCONAZOLE 100 MG/1
100 TABLET ORAL DAILY
Qty: 3 TABLET | Refills: 0 | Status: SHIPPED | OUTPATIENT
Start: 2018-07-03 | End: 2018-07-06

## 2018-07-03 NOTE — TELEPHONE ENCOUNTER
----- Message from Mikel Etienne sent at 7/3/2018 11:18 AM CDT -----  Contact: MANSI CASTRO [[id number]]      Name of Who is Calling: MANSI CASTRO [[id number]]      What is the request in detail: Patient would like to speak with staff in regards to a prescription for trush called into the pharmacy. Please advise      Can the clinic reply by [facility]: no      What Number to Call Back if not in [facility]: [phone]

## 2018-07-03 NOTE — PROGRESS NOTES
Latasha Perez  was seen as a follow up.    CHIEF COMPLAINT:  Shortness of Breath      HISTORY OF PRESENT ILLNESS: Latasha Perez is a 72 y.o. female with pmh of tobacco abuse (quit 2002), htn, dyslipidemia, chronic hyponatremia, copd, osteoarthritis is here pulmonary evaluation.  Our first encounter was 5/9/14.  +40 plus year of smoking.  Currently on advair and prn albuterol.  No sultana.  Still garden, cook and clean without issue.  No recent hospitalization for copd.      Last appointment was 10/15.  No new issue since last visit.  Currently on advair and albuterol.  Patient restarted smoking at 3-5 cigarette per day since last visit.  No fever/chills.  No chest pain.  Still very active.  Stop aerobic after fall in 2014.  Still garden, clean.      PAST MEDICAL HISTORY:    Active Ambulatory Problems     Diagnosis Date Noted    HTN (hypertension)     HLD (hyperlipidemia)     Carotid disease, bilateral     Chronic hyponatremia     Osteoporosis     Impingement syndrome of shoulder 12/04/2012    Hyperlipidemia 04/17/2013    Leukocytosis 04/25/2013    COPD (chronic obstructive pulmonary disease) 05/04/2013    Low weight 12/04/2015    Essential hypertension 04/13/2017    Pulmonary emphysema 04/13/2017     Resolved Ambulatory Problems     Diagnosis Date Noted    Elbow pain 12/04/2012     Past Medical History:   Diagnosis Date    Carotid disease, bilateral     Chronic hyponatremia     COPD (chronic obstructive pulmonary disease)     HLD (hyperlipidemia)     HTN (hypertension)                 PAST SURGICAL HISTORY:    Past Surgical History:   Procedure Laterality Date    APPENDECTOMY      CERVICAL SPINE SURGERY      DILATION AND CURETTAGE OF UTERUS      x 2    metatarsal repair      SHOULDER ARTHROSCOPY           FAMILY HISTORY:                Family History   Problem Relation Age of Onset    Heart disease Mother     Cancer Father     Heart disease Maternal Grandfather        SOCIAL HISTORY:           Tobacco:   History   Smoking Status    Former Smoker    Packs/day: 1.00    Years: 45.00    Types: Cigarettes    Quit date: 1/12/2002   Smokeless Tobacco    Never Used       alcohol use:    History   Alcohol Use No                 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 Rescue 1 Inhaler 6    alendronate (FOSAMAX) 70 MG tablet Take 1 tablet (70 mg total) by mouth every 7 days. 12 tablet 11    amLODIPine (NORVASC) 5 MG tablet TAKE 1 TABLET (5 MG TOTAL) BY MOUTH ONCE DAILY. 90 tablet 1    aspirin (ECOTRIN) 81 MG EC tablet Take 81 mg by mouth once daily.      budesonide-formoterol 160-4.5 mcg (SYMBICORT) 160-4.5 mcg/actuation HFAA Inhale 2 puffs into the lungs every 12 (twelve) hours. Controller 1 Inhaler 12    fluticasone-salmeterol 250-50 mcg/dose (ADVAIR) 250-50 mcg/dose diskus inhaler Inhale 1 puff into the lungs 2 (two) times daily. Controller 180 each 12    fluticasone-vilanterol (BREO ELLIPTA) 200-25 mcg/dose DsDv diskus inhaler Inhale 1 puff into the lungs once daily. Controller 30 each 12    losartan (COZAAR) 100 MG tablet Take 1 tablet (100 mg total) by mouth once daily. 90 tablet 1    MULTIVITAMIN W-MINERALS/LUTEIN (CENTRUM SILVER ORAL) Take by mouth once daily.      pravastatin (PRAVACHOL) 20 MG tablet TAKE 1 TABLET (20 MG TOTAL) BY MOUTH EVERY EVENING. 90 tablet 1    predniSONE (DELTASONE) 20 MG tablet TAKE 2 TABLETS(40 MG) BY MOUTH EVERY DAY x 5 days 10 tablet 3    levocetirizine (XYZAL) 5 MG tablet Take 1 tablet (5 mg  "total) by mouth every evening. 30 tablet 1    tiotropium (SPIRIVA WITH HANDIHALER) 18 mcg inhalation capsule Inhale 1 capsule (18 mcg total) into the lungs once daily. 30 capsule 3     Current Facility-Administered Medications   Medication Dose Route Frequency Provider Last Rate Last Dose    albuterol-ipratropium 2.5mg-0.5mg/3mL nebulizer solution 3 mL  3 mL Nebulization 1 time in Clinic/HOD Fadumo Jean-Baptiste, NP-C                      REVIEW OF SYSTEMS:     Pulmonary related symptoms as per HPI.  Gen:  no weight loss, no fever, no night sweat  HEENT:  no visual disturbance, no sore throat, normal hearing acuity; nasal congestion  CV:  No chest pain, no orthopnea, no PND  GI:  no melena, no hematochezia, no diarhea, no constipation.  :  no dysuria, no hematuria, no hesistancy, no dribbling  Neuro:  no syncope, no vertigo, no tinitus  Psych:  No homocide or suicide ideation; no depression.  Endocrine:  No heat or cold intolerance.  Sleep:  No snoring; no witnessed apnea.  Otherwise, a balance of systems reviewed is negative.          PHYSICAL EXAM:  Vitals:    07/03/18 0957   BP: (!) 140/86   Pulse: 77   SpO2: (!) 92%   Weight: 42.9 kg (94 lb 9.2 oz)   Height: 5' 5" (1.651 m)   PainSc: 0-No pain     Body mass index is 15.74 kg/m².     GENERAL:  well develop; no apparent distress  HEENT:  no nasal congestion; no discharge noted; class 4 modified mallampatti.   NECK:  supple; no palpable masses.  CARDIO: regular rate and rhythm  PULM:  clear to auscultation bilaterally; no intercostals retractions; no accessory muscle usage   ABDOMEN:  soft nontender/nondistended.  +bowel sound  EXTREMITIES no cce  NEURO:  CN II-XII intact.  5/5 motor in all extremities.  sensation grossly intact   to light touch.  PSYCH:  normal affect.  Alert and oriented x 4    LABS  Pulmonary Functions Testing Results: 10/28/15 ratio 51%; fev1 51%; fvc 70%   ABG none  CXR:  4/18/14 no effusion or consolidation.  +hyperinflation  12/4/17 no acute " changes.    CT CHEST:  none  PPD none        Echo 4/24/15  CONCLUSIONS     1 - Normal left ventricular systolic function (EF 55-60%).     2 - Eccentric hypertrophy.     3 - Mild left atrial enlargement.     4 - Pulmonary hypertension. pasp of 47 mm Hg.      5 - Mild mitral regurgitation.     6 - Moderate tricuspid regurgitation.     ASSESSMENT  1. Chronic obstructive pulmonary disease, unspecified COPD type  tiotropium (SPIRIVA WITH HANDIHALER) 18 mcg inhalation capsule   2. Nasal congestion         PLAN:  Copd -  Will set up for pft.  Cont with advair and albuterol.  Did not get spiriva due to cost.  Willing to try again.  Will renew.  Quit smoking ~3 years.  Patient declined ldct screening.  Patient has home oxygen but have not been using it.  If cost is an issue, would recommend spiriva in place of advair.      Nasal congestion - resume sinus irrigation.  H/o nasal trauma.  Declined ent referral.      Anxiety -  is ill and demanding    Patient will Follow-up in about 6 months (around 1/3/2019).      This is 25 minutes visit, over 50% of time spent in direct consultation with patient.

## 2018-08-16 DIAGNOSIS — I10 ESSENTIAL HYPERTENSION: ICD-10-CM

## 2018-08-16 RX ORDER — AMLODIPINE BESYLATE 5 MG/1
TABLET ORAL
Qty: 90 TABLET | Refills: 1 | Status: SHIPPED | OUTPATIENT
Start: 2018-08-16 | End: 2018-12-18 | Stop reason: SDUPTHER

## 2018-08-16 RX ORDER — LOSARTAN POTASSIUM 100 MG/1
TABLET ORAL
Qty: 90 TABLET | Refills: 1 | Status: SHIPPED | OUTPATIENT
Start: 2018-08-16 | End: 2018-08-17 | Stop reason: SDUPTHER

## 2018-08-17 DIAGNOSIS — I10 ESSENTIAL HYPERTENSION: ICD-10-CM

## 2018-08-17 NOTE — TELEPHONE ENCOUNTER
----- Message from Tamara Flores sent at 8/17/2018 11:34 AM CDT -----  Contact: Self  Pt is calling to ask if she can be called in a 7 day supply that will hol dher over till her refills come from [facility] mail order. She needs Losartan. Please call pt at [phone].

## 2018-08-18 DIAGNOSIS — I10 ESSENTIAL HYPERTENSION: ICD-10-CM

## 2018-08-19 RX ORDER — LOSARTAN POTASSIUM 100 MG/1
TABLET ORAL
Qty: 90 TABLET | Refills: 0 | Status: SHIPPED | OUTPATIENT
Start: 2018-08-19 | End: 2018-10-11 | Stop reason: SDUPTHER

## 2018-08-19 RX ORDER — LOSARTAN POTASSIUM 100 MG/1
TABLET ORAL
Qty: 15 TABLET | Refills: 0 | Status: ON HOLD | OUTPATIENT
Start: 2018-08-19 | End: 2019-05-03 | Stop reason: SDUPTHER

## 2018-08-21 DIAGNOSIS — J44.1 COPD EXACERBATION: ICD-10-CM

## 2018-08-21 RX ORDER — PREDNISONE 20 MG/1
TABLET ORAL
Qty: 10 TABLET | Refills: 3 | Status: SHIPPED | OUTPATIENT
Start: 2018-08-21 | End: 2018-11-08 | Stop reason: SDUPTHER

## 2018-08-23 ENCOUNTER — PES CALL (OUTPATIENT)
Dept: ADMINISTRATIVE | Facility: CLINIC | Age: 73
End: 2018-08-23

## 2018-08-27 ENCOUNTER — TELEPHONE (OUTPATIENT)
Dept: FAMILY MEDICINE | Facility: CLINIC | Age: 73
End: 2018-08-27

## 2018-08-27 NOTE — TELEPHONE ENCOUNTER
----- Message from Joy Patellidiasam sent at 8/27/2018 12:06 PM CDT -----  Contact: Self/[phone]  Patient would like to know if she need to continue to take the medication:  predniSONE (DELTASONE) 20 MG tablet. She stated that she's feeling much better but she's still experiencing a little tightness. Thank you.

## 2018-08-27 NOTE — TELEPHONE ENCOUNTER
Stated, she feels a hold lot better. Just completed 5 day therapy. Instructed to hold starting another 5 day regiment to see if the first round of medication helped. Verbalized understanding.

## 2018-09-11 RX ORDER — ALBUTEROL SULFATE 90 UG/1
AEROSOL, METERED RESPIRATORY (INHALATION)
Qty: 18 EACH | Refills: 6 | Status: ON HOLD | OUTPATIENT
Start: 2018-09-11 | End: 2019-05-19 | Stop reason: HOSPADM

## 2018-10-11 ENCOUNTER — OFFICE VISIT (OUTPATIENT)
Dept: OPHTHALMOLOGY | Facility: CLINIC | Age: 73
End: 2018-10-11
Payer: MEDICARE

## 2018-10-11 DIAGNOSIS — H25.13 NUCLEAR SCLEROSIS OF BOTH EYES: Primary | ICD-10-CM

## 2018-10-11 DIAGNOSIS — I10 ESSENTIAL HYPERTENSION: ICD-10-CM

## 2018-10-11 DIAGNOSIS — H04.123 DRY EYE SYNDROME OF BOTH EYES: ICD-10-CM

## 2018-10-11 DIAGNOSIS — H52.7 REFRACTIVE ERROR: ICD-10-CM

## 2018-10-11 PROCEDURE — 92136 OPHTHALMIC BIOMETRY: CPT | Mod: PBBFAC,PO | Performed by: OPHTHALMOLOGY

## 2018-10-11 PROCEDURE — 92004 COMPRE OPH EXAM NEW PT 1/>: CPT | Mod: S$PBB,,, | Performed by: OPHTHALMOLOGY

## 2018-10-11 PROCEDURE — 99212 OFFICE O/P EST SF 10 MIN: CPT | Mod: PBBFAC,PO,25 | Performed by: OPHTHALMOLOGY

## 2018-10-11 PROCEDURE — 99999 PR PBB SHADOW E&M-EST. PATIENT-LVL II: CPT | Mod: PBBFAC,,, | Performed by: OPHTHALMOLOGY

## 2018-10-11 RX ORDER — DIFLUPREDNATE OPHTHALMIC 0.5 MG/ML
1 EMULSION OPHTHALMIC 4 TIMES DAILY
Qty: 5 ML | Refills: 1 | Status: SHIPPED | OUTPATIENT
Start: 2018-12-06 | End: 2019-01-05

## 2018-10-11 RX ORDER — OFLOXACIN 3 MG/ML
1 SOLUTION/ DROPS OPHTHALMIC 4 TIMES DAILY
Qty: 5 ML | Refills: 1 | Status: SHIPPED | OUTPATIENT
Start: 2018-12-03 | End: 2018-12-13

## 2018-10-11 RX ORDER — CETIRIZINE HYDROCHLORIDE 10 MG/1
10 TABLET ORAL DAILY
Status: ON HOLD | COMMUNITY
End: 2019-04-10 | Stop reason: HOSPADM

## 2018-10-11 RX ORDER — NEPAFENAC 3 MG/ML
1 SUSPENSION/ DROPS OPHTHALMIC DAILY
Qty: 3 ML | Refills: 1 | Status: SHIPPED | OUTPATIENT
Start: 2018-12-03 | End: 2019-01-02

## 2018-10-11 RX ORDER — TIOTROPIUM BROMIDE 18 UG/1
18 CAPSULE ORAL; RESPIRATORY (INHALATION) DAILY
Refills: 3 | COMMUNITY
Start: 2018-09-28 | End: 2019-05-24 | Stop reason: SDUPTHER

## 2018-10-11 NOTE — PROGRESS NOTES
Subjective:       Patient ID: Latasha Perez is a 73 y.o. female.    Chief Complaint: Cataract    HPI     73 y.o. Female is here for Cataract Eval self referred. Denies eye pain   and f/f. No itching, burning or tearing. Dry Eyes bilateral.Blurred vision   at distance with and w/o correction. Do have trouble with glare. Notice   star burst around lights and halos.     Eye Meds: Refresh Optive prn OU     Last edited by ALEKSANDR Rdz on 10/11/2018  1:39 PM. (History)             Assessment:       1. Nuclear sclerosis of both eyes    2. Dry eye syndrome of both eyes    3. Essential hypertension    4. Refractive error        Plan:       Visually significant cataract OU -Pt. Wants Sx.     ANNA-Needs more AT's.  HTN-No retinopathy OU.  RE-Pt does not want Toric IOL's.        Cataract Surgery Consent: Patient with a visually significant cataract with difficulties of ADLs, reading, driving, night vision, glare (any and all).  Discussed with Patient/Family/Caregiver: options, risks and benefits, expectations of cataract surgery, utilized an eye model with questions and answers to facilitate discussion.  Discussed lens options and patient understands that glasses may be required for optimal vision for distance and/or near vision after cataract surgery.  The Patient/Family/Caregiver  voice good understanding and patient wishes to proceed with surgery.  The patient will likely benefit from surgery and patient signed consent for Right Eye.  CE OD 12/6/18 SN60WF 20.0,        OS 12/20/18 SN60WF 20.5.  AT's.  Control HTN.

## 2018-10-18 DIAGNOSIS — E78.5 HYPERLIPIDEMIA, UNSPECIFIED HYPERLIPIDEMIA TYPE: ICD-10-CM

## 2018-10-20 RX ORDER — PRAVASTATIN SODIUM 20 MG/1
TABLET ORAL
Qty: 90 TABLET | Refills: 1 | Status: SHIPPED | OUTPATIENT
Start: 2018-10-20 | End: 2019-02-21 | Stop reason: SDUPTHER

## 2018-10-25 ENCOUNTER — TELEPHONE (OUTPATIENT)
Dept: OPHTHALMOLOGY | Facility: CLINIC | Age: 73
End: 2018-10-25

## 2018-10-25 DIAGNOSIS — H25.11 NS (NUCLEAR SCLEROSIS), RIGHT: Primary | ICD-10-CM

## 2018-10-30 DIAGNOSIS — J44.9 CHRONIC OBSTRUCTIVE PULMONARY DISEASE, UNSPECIFIED COPD TYPE: ICD-10-CM

## 2018-10-30 NOTE — TELEPHONE ENCOUNTER
----- Message from Alma Swain sent at 10/29/2018 11:46 AM CDT -----  Contact: self  Pt calling to speak to a nurse regarding health. [phone]

## 2018-10-31 RX ORDER — TIOTROPIUM BROMIDE 18 UG/1
18 CAPSULE ORAL; RESPIRATORY (INHALATION) DAILY
Qty: 30 CAPSULE | Refills: 6 | Status: SHIPPED | OUTPATIENT
Start: 2018-10-31 | End: 2018-11-30

## 2018-10-31 NOTE — TELEPHONE ENCOUNTER
----- Message from Rosalinda Seals sent at 10/31/2018 11:04 AM CDT -----  Contact: Self   Patient called to check the status of her refill request for Spiriva. Please call at [phone]

## 2018-11-02 RX ORDER — ALENDRONATE SODIUM 70 MG/1
TABLET ORAL
Qty: 12 TABLET | Refills: 0 | Status: SHIPPED | OUTPATIENT
Start: 2018-11-02

## 2018-11-06 ENCOUNTER — TELEPHONE (OUTPATIENT)
Dept: OPTOMETRY | Facility: CLINIC | Age: 73
End: 2018-11-06

## 2018-11-06 DIAGNOSIS — H25.12 NS (NUCLEAR SCLEROSIS), LEFT: Primary | ICD-10-CM

## 2018-11-08 DIAGNOSIS — J44.1 COPD EXACERBATION: ICD-10-CM

## 2018-11-09 RX ORDER — PREDNISONE 20 MG/1
TABLET ORAL
Qty: 10 TABLET | Refills: 0 | Status: SHIPPED | OUTPATIENT
Start: 2018-11-09 | End: 2018-12-09 | Stop reason: SDUPTHER

## 2018-11-14 ENCOUNTER — TELEPHONE (OUTPATIENT)
Dept: OPHTHALMOLOGY | Facility: CLINIC | Age: 73
End: 2018-11-14

## 2018-11-14 ENCOUNTER — PATIENT MESSAGE (OUTPATIENT)
Dept: SURGERY | Facility: HOSPITAL | Age: 73
End: 2018-11-14

## 2018-11-22 NOTE — H&P
[facility]  History & Physical    Subjective:      Chief Complaint/Reason for Admission:     Latasha Perez is a 73 y.o. female.    Past Medical History:   Diagnosis Date    Carotid disease, bilateral     Cataract     Chronic hyponatremia     COPD (chronic obstructive pulmonary disease)     HLD (hyperlipidemia)     HTN (hypertension)      Past Surgical History:   Procedure Laterality Date    APPENDECTOMY      CERVICAL SPINE SURGERY      DILATION AND CURETTAGE OF UTERUS      x 2    metatarsal repair      OPEN REDUCTION INTERNAL FIXATION-TIBIAL PLATEAU Left 2014    [facility]formed [facility] OR    SHOULDER ARTHROSCOPY       Family History   Problem Relation Age of Onset    Heart disease Mother     Cancer Father     Heart disease Maternal Grandfather     Cataracts Sister     Amblyopia Neg Hx     Blindness Neg Hx     Glaucoma Neg Hx     Macular degeneration Neg Hx     Retinal detachment Neg Hx     Strabismus Neg Hx      Social History     Tobacco Use    Smoking status: Current Some Day Smoker     Packs/day: 1.00     Years: 45.00     Pack years: 45.00     Types: Cigarettes     Last attempt to quit: 2002     Years since quittin.8    Smokeless tobacco: Never Used   Substance Use Topics    Alcohol use: No    Drug use: No       No medications prior to admission.     Review of patient's allergies indicates:   Allergen Reactions    Pcn [penicillins] Other (See Comments)     Fever flushing skin swelling     Biaxin [clarithromycin] Rash    Codeine Rash    Doxycycline Rash    Levaquin [levofloxacin] Rash        Review of Systems   Eyes: Positive for blurred vision.   All other systems reviewed and are negative.      Objective:      Vital Signs (Most Recent)       Vital Signs Range (Last 24H):  BP: ()/()   Arterial Line BP: ()/()     Physical Exam   Constitutional: She is oriented to person, place, and time. She appears well-developed and  well-nourished.   HENT:   Head: Normocephalic.   Eyes: Conjunctivae and EOM are normal. Pupils are equal, round, and reactive to light.   Neck: Normal range of motion. Neck supple.   Cardiovascular: Normal rate, regular rhythm and normal heart sounds.   Pulmonary/Chest: Effort normal and breath sounds normal.   Abdominal: Soft. Bowel sounds are normal.   Musculoskeletal: Normal range of motion.   Neurological: She is alert and oriented to person, place, and time.   Skin: Skin is warm.   Psychiatric: She has a normal mood and affect.       Data Review:    ECG:     Assessment:      Cataract OD.    Plan:    CE OD.

## 2018-11-28 NOTE — PRE ADMISSION SCREENING
RN Phone Pre op.  Instructed to remain NPO after midnight prior to surgery, except to take Amlodipine, Losartan and allergy med along with inhalers.  Expressed understanding of instructions.  Arrival 08:00 am

## 2018-12-03 ENCOUNTER — TELEPHONE (OUTPATIENT)
Dept: OPHTHALMOLOGY | Facility: CLINIC | Age: 73
End: 2018-12-03

## 2018-12-06 ENCOUNTER — HOSPITAL ENCOUNTER (OUTPATIENT)
Facility: HOSPITAL | Age: 73
Discharge: HOME | End: 2018-12-06
Attending: OPHTHALMOLOGY | Admitting: OPHTHALMOLOGY
Payer: MEDICARE

## 2018-12-06 ENCOUNTER — ANESTHESIA EVENT (OUTPATIENT)
Dept: SURGERY | Facility: HOSPITAL | Age: 73
End: 2018-12-06
Payer: MEDICARE

## 2018-12-06 ENCOUNTER — ANESTHESIA (OUTPATIENT)
Dept: SURGERY | Facility: HOSPITAL | Age: 73
End: 2018-12-06
Payer: MEDICARE

## 2018-12-06 VITALS
SYSTOLIC BLOOD PRESSURE: 142 MMHG | BODY MASS INDEX: 15.66 KG/M2 | DIASTOLIC BLOOD PRESSURE: 94 MMHG | OXYGEN SATURATION: 96 % | HEIGHT: 65 IN | HEART RATE: 94 BPM | TEMPERATURE: 98 F | RESPIRATION RATE: 16 BRPM | WEIGHT: 94 LBS

## 2018-12-06 DIAGNOSIS — H25.10 SENILE NUCLEAR SCLEROSIS: ICD-10-CM

## 2018-12-06 PROCEDURE — V2632 POST CHMBR INTRAOCULAR LENS: HCPCS | Performed by: OPHTHALMOLOGY

## 2018-12-06 PROCEDURE — D9220A PRA ANESTHESIA: Mod: ,,, | Performed by: ANESTHESIOLOGY

## 2018-12-06 PROCEDURE — 36000707: Performed by: OPHTHALMOLOGY

## 2018-12-06 PROCEDURE — 71000015 HC POSTOP RECOV 1ST HR: Performed by: OPHTHALMOLOGY

## 2018-12-06 PROCEDURE — 66984 XCAPSL CTRC RMVL W/O ECP: CPT | Mod: LT,,, | Performed by: OPHTHALMOLOGY

## 2018-12-06 PROCEDURE — 37000008 HC ANESTHESIA 1ST 15 MINUTES: Performed by: OPHTHALMOLOGY

## 2018-12-06 PROCEDURE — 63600175 PHARM REV CODE 636 W HCPCS: Performed by: NURSE ANESTHETIST, CERTIFIED REGISTERED

## 2018-12-06 PROCEDURE — 25000003 PHARM REV CODE 250: Performed by: OPHTHALMOLOGY

## 2018-12-06 PROCEDURE — C9447 INJ, PHENYLEPHRINE KETOROLAC: HCPCS | Performed by: OPHTHALMOLOGY

## 2018-12-06 PROCEDURE — 36000706: Performed by: OPHTHALMOLOGY

## 2018-12-06 PROCEDURE — 37000009 HC ANESTHESIA EA ADD 15 MINS: Performed by: OPHTHALMOLOGY

## 2018-12-06 PROCEDURE — 63600175 PHARM REV CODE 636 W HCPCS: Performed by: OPHTHALMOLOGY

## 2018-12-06 DEVICE — LENS 20.0: Type: IMPLANTABLE DEVICE | Site: EYE | Status: FUNCTIONAL

## 2018-12-06 RX ORDER — PREDNISOLONE ACETATE 10 MG/ML
SUSPENSION/ DROPS OPHTHALMIC
Status: DISCONTINUED | OUTPATIENT
Start: 2018-12-06 | End: 2018-12-06 | Stop reason: HOSPADM

## 2018-12-06 RX ORDER — ACETAMINOPHEN 325 MG/1
650 TABLET ORAL EVERY 4 HOURS PRN
Status: CANCELLED | OUTPATIENT
Start: 2018-12-06

## 2018-12-06 RX ORDER — PHENYLEPHRINE HYDROCHLORIDE 25 MG/ML
1 SOLUTION/ DROPS OPHTHALMIC
Status: DISCONTINUED | OUTPATIENT
Start: 2018-12-06 | End: 2019-04-26

## 2018-12-06 RX ORDER — LIDOCAINE HYDROCHLORIDE 20 MG/ML
INJECTION, SOLUTION INFILTRATION; PERINEURAL
Status: DISCONTINUED | OUTPATIENT
Start: 2018-12-06 | End: 2018-12-06 | Stop reason: HOSPADM

## 2018-12-06 RX ORDER — SODIUM CHLORIDE 9 MG/ML
INJECTION, SOLUTION INTRAVENOUS CONTINUOUS
Status: DISCONTINUED | OUTPATIENT
Start: 2018-12-06 | End: 2019-04-26

## 2018-12-06 RX ORDER — POVIDONE-IODINE 5 %
SOLUTION, NON-ORAL OPHTHALMIC (EYE)
Status: DISCONTINUED | OUTPATIENT
Start: 2018-12-06 | End: 2018-12-06 | Stop reason: HOSPADM

## 2018-12-06 RX ORDER — HYDROCODONE BITARTRATE AND ACETAMINOPHEN 5; 325 MG/1; MG/1
1 TABLET ORAL EVERY 4 HOURS PRN
Status: CANCELLED | OUTPATIENT
Start: 2018-12-06

## 2018-12-06 RX ORDER — FENTANYL CITRATE 50 UG/ML
INJECTION, SOLUTION INTRAMUSCULAR; INTRAVENOUS
Status: DISCONTINUED | OUTPATIENT
Start: 2018-12-06 | End: 2018-12-06

## 2018-12-06 RX ORDER — CYCLOPENTOLATE HYDROCHLORIDE 10 MG/ML
1 SOLUTION/ DROPS OPHTHALMIC
Status: COMPLETED | OUTPATIENT
Start: 2018-12-06 | End: 2018-12-06

## 2018-12-06 RX ORDER — PROPARACAINE HYDROCHLORIDE 5 MG/ML
1 SOLUTION/ DROPS OPHTHALMIC
Status: DISCONTINUED | OUTPATIENT
Start: 2018-12-06 | End: 2019-04-26

## 2018-12-06 RX ORDER — OFLOXACIN 3 MG/ML
1 SOLUTION/ DROPS OPHTHALMIC
Status: COMPLETED | OUTPATIENT
Start: 2018-12-06 | End: 2018-12-06

## 2018-12-06 RX ORDER — TROPICAMIDE 10 MG/ML
1 SOLUTION/ DROPS OPHTHALMIC
Status: DISCONTINUED | OUTPATIENT
Start: 2018-12-06 | End: 2019-04-26

## 2018-12-06 RX ADMIN — PROPARACAINE HYDROCHLORIDE 1 DROP: 5 SOLUTION/ DROPS OPHTHALMIC at 07:12

## 2018-12-06 RX ADMIN — PHENYLEPHRINE HYDROCHLORIDE 1 DROP: 25 SOLUTION/ DROPS OPHTHALMIC at 08:12

## 2018-12-06 RX ADMIN — TROPICAMIDE 1 DROP: 10 SOLUTION/ DROPS OPHTHALMIC at 07:12

## 2018-12-06 RX ADMIN — OFLOXACIN 1 DROP: 3 SOLUTION OPHTHALMIC at 07:12

## 2018-12-06 RX ADMIN — TROPICAMIDE 1 DROP: 10 SOLUTION/ DROPS OPHTHALMIC at 08:12

## 2018-12-06 RX ADMIN — CYCLOPENTOLATE HYDROCHLORIDE 1 DROP: 10 SOLUTION/ DROPS OPHTHALMIC at 08:12

## 2018-12-06 RX ADMIN — OFLOXACIN 1 DROP: 3 SOLUTION OPHTHALMIC at 08:12

## 2018-12-06 RX ADMIN — SODIUM CHLORIDE 500 ML: 0.9 INJECTION, SOLUTION INTRAVENOUS at 08:12

## 2018-12-06 RX ADMIN — FENTANYL CITRATE 50 MCG: 50 INJECTION INTRAMUSCULAR; INTRAVENOUS at 09:12

## 2018-12-06 RX ADMIN — PHENYLEPHRINE HYDROCHLORIDE 1 DROP: 25 SOLUTION/ DROPS OPHTHALMIC at 07:12

## 2018-12-06 RX ADMIN — CYCLOPENTOLATE HYDROCHLORIDE 1 DROP: 10 SOLUTION/ DROPS OPHTHALMIC at 07:12

## 2018-12-06 NOTE — DISCHARGE INSTRUCTIONS
ACTIVITY LEVEL:  If you received sedation or an anesthetic, you may feel sleepy for several hours. Rest until you are more awake. Gradually resume your normal activities. Normal activity will cause no undue risk to your eye. The white part of your eye might be red - this is nothing to worry about. Wear your old glasses or sunglasses that were given to you for protection during the day.    RESTRICTIONS - for the next 7 days  · Do not lift anything over 10 pounds.  · When bending, bend at the knees not the waist.  · Do not rub the eye.  · Do not get water in the eye.  · Do not sleep on the side that had surgery.  · Protect your eye at bedtime with the shield provided.    DIET: At home, continue with liquids. If there is no nausea, you may eat a soft diet and gradually resume your normal diet. Limit alcohol intake for 24 hours.    BATHING: You may shower or bathe as normal. You may take a warm wash cloth, which has been wrung out to remove excess water, and gently remove crusting on the lashes.    MEDICATIONS: You may take Extra Strength Tylenol every 4 hours for pain/headache.     Use your drops     Today: Pink-1, 5, 9     Beige -1, 5, 9     Grey-1    Tomorrow:  Resume pink and beige cap drops four times a day.  Resume grey cap drops once a day.    Place one drop in the eye that had surgery from the first bottle. Wait 5 minutes and then use the second bottle. (It does not matter which bottle is used first.) CONTINUE all glaucoma drops.   No driving, alcoholic beverages or signing legal documents for next 24 hours or while taking pain medication      WHEN TO CALL THE DOCTOR:  · Redness that increases significantly  · Pain not relieved by Tylenol  RETURN APPOINTMENT:  You will need to see Dr. Escobar on Friday at the [facility] at 09:30. Bring your eye kit with you on your follow-up visit. Your kit contains sunglasses, eye shield, tape and your eye drops.  FOR EMERGENCIES:  If any unusual problems or  difficulties occur, contact Dr. Escobar at the Clinic office at [phone] during normal business hours. If after hours, call [phone].      Fall Prevention  Millions of people fall every year and injure themselves. You may have had anesthesia or sedation which may increase your risk of falling. You may have health issues that put you at an increased risk of falling.     Here are ways to reduce your risk of falling.  ·   · Make your home safe by keeping walkways clear of objects you may trip over.  · Use non-slip pads under rugs. Do not use area rugs or small throw rugs.  · Use non-slip mats in bathtubs and showers.  · Install handrails and lights on staircases.  · Do not walk in poorly lit areas.  · Do not stand on chairs or wobbly ladders.  · Use caution when reaching overhead or looking upward. This position can cause a loss of balance.  · Be sure your shoes fit properly, have non-slip bottoms and are in good condition.   · Wear shoes both inside and out. Avoid going barefoot or wearing slippers.  · Be cautious when going up and down stairs, curbs, and when walking on uneven sidewalks.  · If your balance is poor, consider using a cane or walker.  · If your fall was related to alcohol use, stop or limit alcohol intake.   · If your fall was related to use of sleeping medicines, talk to your doctor about this. You may need to reduce your dosage at bedtime if you awaken during the night to go to the bathroom.    · To reduce the need for nighttime bathroom trips:  ¨ Avoid drinking fluids for several hours before going to bed  ¨ Empty your bladder before going to bed  ¨ Men can keep a urinal at the bedside  · Stay as active as you can. Balance, flexibility, strength, and endurance all come from exercise. They all play a role in preventing falls. Ask your healthcare provider which types of activity are right for you.  · Get your vision checked on a regular basis.  · If you have pets, know where they are  before you stand up or walk so you don't trip over them.  · Use night lights.

## 2018-12-06 NOTE — BRIEF OP NOTE
Operative Note     SUMMARY     Surgery Date: 12/6/2018    Surgeon(s) and Role:      Broderick Escobar MD - Primary    Pre-op Diagnosis:  Nuclear sclerosis     Post-op/ Diagnosis:  Same    Final Diagnosis: Cataract    Procedure(s) (LRB):  PHACOEMULSIFICATION-ASPIRATION-CATARACT   INSERTION-INTRAOCULAR LENS (IOL)     Anesthesia: Monitored Anesthesia Care    Findings/Key Components:  Cataract    Outcome: Tolerated procedure well    Estimated Blood Loss: None         Specimens     None          Follow-up:  Tomorrow in clinic      Discharge Note      SUMMARY     Admit Date: 12/6/2018    Attending Physician: Broderick Escobar MD    Discharge Physician: Broderick Escobar MD    Discharge Date: 12/6/2018    Final Diagnosis: Cataract    Outcome: Tolerated procedure well    Disposition: Discharge to Home.    Medications:       Perez Loyda   [place] Medication Instructions [place]AST[place]:[id number]    Printed on:12/06/18 1019   Medication Information                      acetaminophen (TYLENOL EXTRA STRENGTH ORAL)  Take by mouth as needed.             alendronate (FOSAMAX) 70 MG tablet  TAKE 1 TABLET BY MOUTH ONCE A WEEK EVERY 7 DAYS, AS DIRECTED             amLODIPine (NORVASC) 5 MG tablet  TAKE 1 TABLET ONE TIME DAILY             aspirin (ECOTRIN) 81 MG EC tablet  Take 81 mg by mouth once daily.             calcium carbonate (CALCIUM 500 ORAL)  Take by mouth 2 (two) times daily.              cetirizine (ZYRTEC) 10 MG tablet  Take 10 mg by mouth once daily.             difluprednate (DUREZOL) 0.05 % Drop ophthalmic solution  Place 1 drop into the right eye 4 (four) times daily. For 30 days             fluticasone-salmeterol 250-50 mcg/dose (ADVAIR) 250-50 mcg/dose diskus inhaler  Inhale 1 puff into the lungs 2 (two) times daily. Controller             ILEVRO 0.3 % DrpS  Place 1 drop into both eyes once daily. For 30 days             losartan (COZAAR) 100 MG tablet  TAKE 1 TABLET ONE TIME DAILY             MULTIVITAMIN  W-MINERALS/LUTEIN (CENTRUM SILVER ORAL)  Take by mouth once daily.             ofloxacin (OCUFLOX) 0.3 % ophthalmic solution  Place 1 drop into the right eye 4 (four) times daily. for 10 days             pravastatin (PRAVACHOL) 20 MG tablet  TAKE 1 TABLET EVERY EVENING             predniSONE (DELTASONE) 20 MG tablet  TAKE 2 TABLETS(40 MG) BY MOUTH EVERY DAY FOR 5 DAYS             SPIRIVA WITH HANDIHALER 18 mcg inhalation capsule               VENTOLIN HFA 90 mcg/actuation inhaler  INHALE TWO PUFFS BY MOUTH EVERY 6 HOURS AS NEEDED FOR WHEEZING                   Patient Discharge Instructions:     Keep [place] Shield over operated eye when not using drops.     DIET:  Regular    Activity: No heavy lifting or bending X 1 week.    Follow-up:  Tomorrow in clinic

## 2018-12-06 NOTE — ANESTHESIA PREPROCEDURE EVALUATION
12/06/2018  Latasha Perez is a 73 y.o., female.    Pre-op Assessment    I have reviewed the Patient Summary Reports.      I have reviewed the Medications.     Review of Systems  Anesthesia Hx:  No problems with previous Anesthesia  History of prior surgery of interest to airway management or planning: Denies Family Hx of Anesthesia complications.   Denies Personal Hx of Anesthesia complications.   Social:  Smoker, No Alcohol Use    Cardiovascular:   Hypertension hyperlipidemia 10/2017  Ef 55%  Mod MR   Pulmonary:   COPD, severe    Neurological:   Headaches        Physical Exam  General:  Cachexia    Airway/Jaw/Neck:  Airway Findings: Mouth Opening: Normal Tongue: Normal  General Airway Assessment: Adult  Mallampati: II  Improves to II with phonation.  TM Distance: Normal, at least 6 cm  Jaw/Neck Findings:  Neck ROM: Normal ROM      Dental:  Dental Findings: In tact   Chest/Lungs:  Chest/Lungs Findings: Normal Respiratory Rate     Heart/Vascular:  Heart Findings: Rate: Normal        Mental Status:  Mental Status Findings:  Cooperative         Anesthesia Plan  Type of Anesthesia, risks & benefits discussed:  Anesthesia Type:  MAC  Patient's Preference:   Intra-op Monitoring Plan: standard ASA monitors  Intra-op Monitoring Plan Comments:   Post Op Pain Control Plan: per primary service following discharge from PACU, IV/PO Opioids PRN and multimodal analgesia  Post Op Pain Control Plan Comments:   Induction:   IV  Beta Blocker:  Patient is not currently on a Beta-Blocker (No further documentation required).       Informed Consent: Patient understands risks and agrees with Anesthesia plan.  Questions answered. Anesthesia consent signed with patient.  ASA Score: 3     Day of Surgery Review of History & Physical:    H&P update referred to the provider.         Ready For Surgery From Anesthesia Perspective.      Lab Results   Component Value Date    WBC 9.79 04/18/2018    HGB 14.1 04/18/2018    HCT 41.3 04/18/2018    MCV 93 04/18/2018     04/18/2018

## 2018-12-06 NOTE — TRANSFER OF CARE
"Anesthesia Transfer of Care Note    Patient: Latasha Perez    Procedure(s) Performed: Procedure(s) (LRB):  PHACOEMULSIFICATION, CATARACT (Right)  INSERTION, IOL PROSTHESIS (Right)    Patient location: [facility]    Anesthesia Type: MAC    Transport from OR: Transported from OR on room air with adequate spontaneous ventilation    Post pain: adequate analgesia    Post assessment: no apparent anesthetic complications and tolerated procedure well    Post vital signs: stable    Level of consciousness: awake, alert and oriented    Nausea/Vomiting: no nausea/vomiting    Complications: none    Transfer of care protocol was followed      Last vitals:   Visit Vitals  BP (!) 142/94   Pulse 94   Temp 36.8 °C (98.2 °F) (Oral)   Resp 16   Ht 5' 5" (1.651 m)   Wt 42.6 kg (94 lb)   SpO2 96%   Breastfeeding? No   BMI 15.64 kg/m²     "

## 2018-12-07 ENCOUNTER — OFFICE VISIT (OUTPATIENT)
Dept: OPHTHALMOLOGY | Facility: CLINIC | Age: 73
End: 2018-12-07
Payer: MEDICARE

## 2018-12-07 VITALS — DIASTOLIC BLOOD PRESSURE: 78 MMHG | HEART RATE: 87 BPM | SYSTOLIC BLOOD PRESSURE: 152 MMHG

## 2018-12-07 DIAGNOSIS — Z98.890 POST-OPERATIVE STATE: Primary | ICD-10-CM

## 2018-12-07 PROCEDURE — 99024 POSTOP FOLLOW-UP VISIT: CPT | Mod: S$GLB,,, | Performed by: OPHTHALMOLOGY

## 2018-12-07 PROCEDURE — 99999 PR PBB SHADOW E&M-EST. PATIENT-LVL II: CPT | Mod: PBBFAC,,, | Performed by: OPHTHALMOLOGY

## 2018-12-07 NOTE — PROGRESS NOTES
Subjective:       Patient ID: Latasha Perez is a 73 y.o. female.    Chief Complaint: Post-op Evaluation    HPI     Po 1 Day Phaco with IOL OD.     Pt states od doing well no pain no flashes no floaters.     Eye meds  Ofloxacin od last dose this am  durezol od last dose this am       Last edited by Gala Jarvis on 12/7/2018  9:20 AM. (History)             Assessment:       1. Post-operative state        Plan:       S/p CE OD- Doing well.           CPM OD.  RTC 1 wk.

## 2018-12-07 NOTE — ANESTHESIA POSTPROCEDURE EVALUATION
"Anesthesia Post Evaluation    Patient: Latasha Perez    Procedure(s) Performed: Procedure(s) (LRB):  PHACOEMULSIFICATION, CATARACT (Right)  INSERTION, IOL PROSTHESIS (Right)    Final Anesthesia Type: MAC  Patient location during evaluation: PACU  Patient participation: Yes- Able to Participate  Level of consciousness: awake and alert and oriented  Post-procedure vital signs: reviewed and stable  Pain management: adequate  Airway patency: patent  PONV status at discharge: No PONV  Anesthetic complications: no      Cardiovascular status: blood pressure returned to baseline and hemodynamically stable  Respiratory status: unassisted, spontaneous ventilation and room air  Hydration status: euvolemic  Follow-up not needed.        Visit Vitals  BP (!) 142/94   Pulse 94   Temp 36.8 °C (98.2 °F) (Oral)   Resp 16   Ht 5' 5" (1.651 m)   Wt 42.6 kg (94 lb)   SpO2 96%   Breastfeeding? No   BMI 15.64 kg/m²       Pain/Arron Score: Pain Assessment Performed: Yes (12/6/2018 10:20 AM)  Presence of Pain: complains of pain/discomfort (12/6/2018 10:20 AM)        "

## 2018-12-07 NOTE — OP NOTE
DATE OF PROCEDURE:  12/06/2018    SURGEON:  Broderick Escobar M.D.    PREOPERATIVE DIAGNOSIS:  Nuclear sclerotic cataract, right eye.    POSTOPERATIVE DIAGNOSIS:  Nuclear sclerotic cataract, right eye.    ANESTHESIA:  Monitored anesthesia care with 4% lidocaine topically.    PROCEDURE:  Clear corneal phacoemulsification with posterior chamber intraocular   lens implant, right eye.      PROCEDURE IN DETAIL:  After the appropriate preoperative consent was obtained,   the patient had the 2% Xylocaine jelly applied to the  cornea.  The patient was   then brought to the operating room and placed into the supine position.  The   right periorbit was then prepped and draped in the usual sterile ophthalmic   fashion.  A lid speculum was then inserted into the right eye.  Several drops of   the 4% lidocaine were placed onto the right cornea.  The 4% lidocaine was also   applied to the perilimbal region with the Weck-ranjana sponge.  Using the 0.12-mm   forceps and the Super Sharp blade, a paracentesis site was made at the 12   o'clock position.  Approximately 0.5 mL of the 4% lidocaine was injected into   the anterior chamber.  Next, Viscoat was injected into the anterior chamber   through the paracentesis site.  The 2.75-mm keratome and the cyclodialysis   spatula were used to create a 2.75-mm clear corneal temporal groove.  The   cystitomy needle and Utrata forceps were then used to create a continuous tear   360-degree capsulorrhexis.  BSS in a cannula was then used for hydrodissection.    Phacoemulsification then proceeded in a stop-and-chop fashion without any   complications.  Another 0.5 mL of the 4% lidocaine was injected into the   anterior chamber.  The curved tip irrigation aspiration handpiece was then used   to remove the residual cortical material from the capsular bag.  Again, the 4%   lidocaine was applied to the perilimbal region with the Weck-ranjana sponge.  Healon   GV was then injected into the anterior chamber  and capsular bag.  An [employer] intraocular lens model SN60WF, 20.0 diopters in power, and serial   #[id number] was injected into the capsular bag with the lens injector.  The   Sinskey hook was used to position the lens into its proper place.  The residual   viscoelastic material was removed from the anterior chamber and capsular bag   with the curved tip irrigation aspiration handpiece.  Both wounds were hydrated   with BSS on a cannula.  Both wounds were checked and found to be watertight.    The lid speculum was then removed from the right eye.  The patient then had 1   drop of Vigamox ophthalmic drop and 1 drop of Econopred +1% ophthalmic drop   instilled onto the right cornea.  The eye was then shielded.  The patient   tolerated the procedure well and was then brought to the recovery room in good   condition.      LORE  dd: 12/06/2018 17:29:47 (CST)  td: 12/06/2018 23:36:54 (CST)  Doc ID   #[id number]  Job ID #[id number]    CC:

## 2018-12-09 DIAGNOSIS — J44.1 COPD EXACERBATION: ICD-10-CM

## 2018-12-10 ENCOUNTER — TELEPHONE (OUTPATIENT)
Dept: OPHTHALMOLOGY | Facility: CLINIC | Age: 73
End: 2018-12-10

## 2018-12-10 ENCOUNTER — TELEPHONE (OUTPATIENT)
Dept: FAMILY MEDICINE | Facility: CLINIC | Age: 73
End: 2018-12-10

## 2018-12-10 RX ORDER — PREDNISONE 20 MG/1
TABLET ORAL
Qty: 10 TABLET | Refills: 0 | Status: SHIPPED | OUTPATIENT
Start: 2018-12-10 | End: 2018-12-16 | Stop reason: SDUPTHER

## 2018-12-10 NOTE — TELEPHONE ENCOUNTER
----- Message from Kassandra Moseley sent at 12/10/2018 10:14 AM CST -----  Contact: Self/ [phone]  Pt requesting call back from staff in regards to refilling prednisone. Says she has some questions. Thank you.  .  [facility] [id number]  [place] [address] AT [address]  [place] [address]  Phone: [phone] Fax: [phone]

## 2018-12-10 NOTE — TELEPHONE ENCOUNTER
----- Message from Dannielle Sheppard sent at 12/10/2018  8:51 AM CST -----  Contact: Latasha  Needs Advice    Reason for call:Pt called to discuss Rx usage.         Communication Preference:[phone]    Additional Information:

## 2018-12-13 ENCOUNTER — OFFICE VISIT (OUTPATIENT)
Dept: OPHTHALMOLOGY | Facility: CLINIC | Age: 73
End: 2018-12-13
Payer: MEDICARE

## 2018-12-13 DIAGNOSIS — H25.12 NUCLEAR SCLEROSIS OF LEFT EYE: ICD-10-CM

## 2018-12-13 DIAGNOSIS — Z98.890 POST-OPERATIVE STATE: Primary | ICD-10-CM

## 2018-12-13 PROCEDURE — 99999 PR PBB SHADOW E&M-EST. PATIENT-LVL II: CPT | Mod: PBBFAC,,, | Performed by: OPHTHALMOLOGY

## 2018-12-13 PROCEDURE — 92136 OPHTHALMIC BIOMETRY: CPT | Mod: 26,LT,S$GLB, | Performed by: OPHTHALMOLOGY

## 2018-12-13 PROCEDURE — 99024 POSTOP FOLLOW-UP VISIT: CPT | Mod: S$GLB,,, | Performed by: OPHTHALMOLOGY

## 2018-12-13 NOTE — PROGRESS NOTES
Subjective:       Patient ID: Latasha Perez is a 73 y.o. female.    Chief Complaint: Post-op Evaluation (1 week po od)    HPI     Post-op Evaluation      Additional comments: 1 week po od              Comments     1 week po od    Pt states Va OD is well. Pt denies pain and discomfort.     Eyemeds  Durezol BID OD  Ilevro QD OD          Last edited by Annalise Huerta on 12/13/2018 11:03 AM. (History)             Assessment:       1. Post-operative state    2. Nuclear sclerosis of left eye        Plan:       S/p CE OD- Doing well.     Visually significant cataract OS -Pt. Wants Sx.        Taper gtts OD.  Cataract Surgery Consent: Patient with a visually significant cataract with difficulties of ADLs, reading, driving, night vision, glare (any and all).  Discussed with Patient/Family/Caregiver: options, risks and benefits, expectations of cataract surgery, utilized an eye model with questions and answers to facilitate discussion.  Discussed lens options and patient understands that glasses may be required for optimal vision for distance and/or near vision after cataract surgery.  The Patient/Family/Caregiver  voice good understanding and patient wishes to proceed with surgery.  The patient will likely benefit from surgery and patient signed consent for Left Eye.  CE OS 12/20/18.

## 2018-12-16 DIAGNOSIS — J44.1 COPD EXACERBATION: ICD-10-CM

## 2018-12-16 NOTE — H&P
[facility]  History & Physical    Subjective:      Chief Complaint/Reason for Admission:     Latasha Perez is a 73 y.o. female.    Past Medical History:   Diagnosis Date    Arthritis     Carotid disease, bilateral     Cataract     Chronic hyponatremia     COPD (chronic obstructive pulmonary disease)     HLD (hyperlipidemia)     HTN (hypertension)      Past Surgical History:   Procedure Laterality Date    APPENDECTOMY      CATARACT EXTRACTION Right 12/06/2018    Dr. Escobar    CERVICAL SPINE SURGERY      DILATION AND CURETTAGE OF UTERUS      x 2    FRACTURE SURGERY      Lt leg fracture with hardware    INSERTION, IOL PROSTHESIS Right 12/6/2018    Performed by Broderick Escobar MD at [facility] OR    INTRAOCULAR PROSTHESES INSERTION Right 12/6/2018    Procedure: INSERTION, IOL PROSTHESIS;  Surgeon: Broderick Escobar MD;  Location: [facility] OR;  Service: Ophthalmology;  Laterality: Right;    metatarsal repair      OPEN REDUCTION INTERNAL FIXATION-TIBIAL PLATEAU Left 6/27/2014    Performed by Isak Pereira MD at [facility] OR    PHACOEMULSIFICATION OF CATARACT Right 12/6/2018    Procedure: PHACOEMULSIFICATION, CATARACT;  Surgeon: Broderick Escobar MD;  Location: [facility] OR;  Service: Ophthalmology;  Laterality: Right;  RN PHONE PRE OP 11-28-18.  Arrival 08:00 AM    PHACOEMULSIFICATION, CATARACT Right 12/6/2018    Performed by Broderick Escobar MD at [facility] OR    SHOULDER ARTHROSCOPY       Family History   Problem Relation Age of Onset    Heart disease Mother     Cancer Father     Heart disease Maternal Grandfather     Cataracts Sister     No Known Problems Brother     No Known Problems Maternal Aunt     No Known Problems Maternal Uncle     No Known Problems Paternal Aunt     No Known Problems Paternal Uncle     No Known Problems Maternal Grandmother     No Known Problems Paternal Grandmother     No Known Problems Paternal Grandfather     Amblyopia Neg Hx      Blindness Neg Hx     Glaucoma Neg Hx     Macular degeneration Neg Hx     Retinal detachment Neg Hx     Strabismus Neg Hx     Diabetes Neg Hx     Hypertension Neg Hx     Stroke Neg Hx     Thyroid disease Neg Hx      Social History     Tobacco Use    Smoking status: Current Some Day Smoker     Packs/day: 1.00     Years: 45.00     Pack years: 45.00     Types: Cigarettes     Last attempt to quit: 2002     Years since quittin.9    Smokeless tobacco: Never Used   Substance Use Topics    Alcohol use: No    Drug use: No       No medications prior to admission.     Review of patient's allergies indicates:   Allergen Reactions    Pcn [penicillins] Other (See Comments)     Fever flushing skin swelling     Biaxin [clarithromycin] Rash    Codeine Rash    Doxycycline Rash    Levaquin [levofloxacin] Rash        Review of Systems   Eyes: Positive for blurred vision.   All other systems reviewed and are negative.      Objective:      Vital Signs (Most Recent)       Vital Signs Range (Last 24H):  BP: ()/()   Arterial Line BP: ()/()     Physical Exam   Constitutional: She is oriented to person, place, and time. She appears well-developed and well-nourished.   HENT:   Head: Normocephalic.   Eyes: Conjunctivae and EOM are normal. Pupils are equal, round, and reactive to light.   Neck: Normal range of motion. Neck supple.   Cardiovascular: Normal rate, regular rhythm and normal heart sounds.   Pulmonary/Chest: Effort normal and breath sounds normal.   Abdominal: Soft. Bowel sounds are normal.   Musculoskeletal: Normal range of motion.   Neurological: She is alert and oriented to person, place, and time.   Skin: Skin is warm.   Psychiatric: She has a normal mood and affect.       Data Review:   ECG:     Assessment:      Cataract OS.    Plan:    CE OS.

## 2018-12-17 RX ORDER — PREDNISONE 20 MG/1
TABLET ORAL
Qty: 10 TABLET | Refills: 12 | Status: ON HOLD | OUTPATIENT
Start: 2018-12-17 | End: 2019-04-10 | Stop reason: HOSPADM

## 2018-12-17 NOTE — PRE ADMISSION SCREENING
RN Phone Pre op.  Patient instructed to remain NPO after midnight prior to surgery, except to taken Amlodipine as directed.  Expressed understanding of instructions.  Arrival 09:30 AM.

## 2018-12-18 DIAGNOSIS — I10 ESSENTIAL HYPERTENSION: ICD-10-CM

## 2018-12-19 ENCOUNTER — ANESTHESIA EVENT (OUTPATIENT)
Dept: SURGERY | Facility: HOSPITAL | Age: 73
End: 2018-12-19
Payer: MEDICARE

## 2018-12-19 ENCOUNTER — TELEPHONE (OUTPATIENT)
Dept: OPHTHALMOLOGY | Facility: CLINIC | Age: 73
End: 2018-12-19

## 2018-12-19 RX ORDER — SODIUM CHLORIDE, SODIUM LACTATE, POTASSIUM CHLORIDE, CALCIUM CHLORIDE 600; 310; 30; 20 MG/100ML; MG/100ML; MG/100ML; MG/100ML
1000 INJECTION, SOLUTION INTRAVENOUS ONCE
Status: CANCELLED | OUTPATIENT
Start: 2018-12-19 | End: 2018-12-19

## 2018-12-19 RX ORDER — AMLODIPINE BESYLATE 5 MG/1
TABLET ORAL
Qty: 90 TABLET | Refills: 0 | Status: SHIPPED | OUTPATIENT
Start: 2018-12-19 | End: 2019-02-21 | Stop reason: SDUPTHER

## 2018-12-19 NOTE — TELEPHONE ENCOUNTER
----- Message from Dannielle Sheppard sent at 12/19/2018 12:30 PM CST -----  Contact: Latasha  Needs Advice    Reason for call:Pt called to get further instructions on her Rx.        Communication Preference:[phone]    Additional Information:

## 2018-12-20 ENCOUNTER — HOSPITAL ENCOUNTER (OUTPATIENT)
Facility: HOSPITAL | Age: 73
Discharge: HOME | End: 2018-12-20
Attending: OPHTHALMOLOGY | Admitting: OPHTHALMOLOGY
Payer: MEDICARE

## 2018-12-20 ENCOUNTER — ANESTHESIA (OUTPATIENT)
Dept: SURGERY | Facility: HOSPITAL | Age: 73
End: 2018-12-20
Payer: MEDICARE

## 2018-12-20 VITALS
OXYGEN SATURATION: 98 % | TEMPERATURE: 98 F | WEIGHT: 92.38 LBS | HEART RATE: 100 BPM | DIASTOLIC BLOOD PRESSURE: 76 MMHG | SYSTOLIC BLOOD PRESSURE: 136 MMHG | HEIGHT: 65 IN | RESPIRATION RATE: 16 BRPM | BODY MASS INDEX: 15.39 KG/M2

## 2018-12-20 DIAGNOSIS — H25.10 SENILE NUCLEAR SCLEROSIS: ICD-10-CM

## 2018-12-20 PROCEDURE — 71000015 HC POSTOP RECOV 1ST HR: Performed by: OPHTHALMOLOGY

## 2018-12-20 PROCEDURE — 63600175 PHARM REV CODE 636 W HCPCS: Performed by: NURSE ANESTHETIST, CERTIFIED REGISTERED

## 2018-12-20 PROCEDURE — 36000706: Performed by: OPHTHALMOLOGY

## 2018-12-20 PROCEDURE — 36000707: Performed by: OPHTHALMOLOGY

## 2018-12-20 PROCEDURE — 25000003 PHARM REV CODE 250: Performed by: ANESTHESIOLOGY

## 2018-12-20 PROCEDURE — V2632 POST CHMBR INTRAOCULAR LENS: HCPCS | Performed by: OPHTHALMOLOGY

## 2018-12-20 PROCEDURE — 37000009 HC ANESTHESIA EA ADD 15 MINS: Performed by: OPHTHALMOLOGY

## 2018-12-20 PROCEDURE — D9220A PRA ANESTHESIA: Mod: ,,, | Performed by: ANESTHESIOLOGY

## 2018-12-20 PROCEDURE — 63600175 PHARM REV CODE 636 W HCPCS: Performed by: OPHTHALMOLOGY

## 2018-12-20 PROCEDURE — 66984 XCAPSL CTRC RMVL W/O ECP: CPT | Mod: 79,LT,, | Performed by: OPHTHALMOLOGY

## 2018-12-20 PROCEDURE — C9447 INJ, PHENYLEPHRINE KETOROLAC: HCPCS | Performed by: OPHTHALMOLOGY

## 2018-12-20 PROCEDURE — 37000008 HC ANESTHESIA 1ST 15 MINUTES: Performed by: OPHTHALMOLOGY

## 2018-12-20 PROCEDURE — 25000003 PHARM REV CODE 250: Performed by: OPHTHALMOLOGY

## 2018-12-20 DEVICE — LENS 20.5 ACRYSOF: Type: IMPLANTABLE DEVICE | Site: EYE | Status: FUNCTIONAL

## 2018-12-20 RX ORDER — FENTANYL CITRATE 50 UG/ML
INJECTION, SOLUTION INTRAMUSCULAR; INTRAVENOUS
Status: DISCONTINUED | OUTPATIENT
Start: 2018-12-20 | End: 2018-12-20

## 2018-12-20 RX ORDER — TROPICAMIDE 10 MG/ML
1 SOLUTION/ DROPS OPHTHALMIC
Status: DISCONTINUED | OUTPATIENT
Start: 2018-12-20 | End: 2019-04-26

## 2018-12-20 RX ORDER — SODIUM CHLORIDE 9 MG/ML
INJECTION, SOLUTION INTRAVENOUS CONTINUOUS
Status: DISCONTINUED | OUTPATIENT
Start: 2018-12-20 | End: 2019-04-26

## 2018-12-20 RX ORDER — ACETAMINOPHEN 325 MG/1
650 TABLET ORAL EVERY 4 HOURS PRN
Status: DISCONTINUED | OUTPATIENT
Start: 2018-12-20 | End: 2018-12-20 | Stop reason: HOSPADM

## 2018-12-20 RX ORDER — POVIDONE-IODINE 5 %
SOLUTION, NON-ORAL OPHTHALMIC (EYE)
Status: DISCONTINUED | OUTPATIENT
Start: 2018-12-20 | End: 2018-12-20 | Stop reason: HOSPADM

## 2018-12-20 RX ORDER — PHENYLEPHRINE HYDROCHLORIDE 25 MG/ML
1 SOLUTION/ DROPS OPHTHALMIC
Status: DISCONTINUED | OUTPATIENT
Start: 2018-12-20 | End: 2019-04-26

## 2018-12-20 RX ORDER — PREDNISOLONE ACETATE 10 MG/ML
SUSPENSION/ DROPS OPHTHALMIC
Status: DISCONTINUED | OUTPATIENT
Start: 2018-12-20 | End: 2018-12-20 | Stop reason: HOSPADM

## 2018-12-20 RX ORDER — PROPARACAINE HYDROCHLORIDE 5 MG/ML
1 SOLUTION/ DROPS OPHTHALMIC
Status: DISCONTINUED | OUTPATIENT
Start: 2018-12-20 | End: 2019-04-26

## 2018-12-20 RX ORDER — CYCLOPENTOLATE HYDROCHLORIDE 10 MG/ML
1 SOLUTION/ DROPS OPHTHALMIC
Status: DISCONTINUED | OUTPATIENT
Start: 2018-12-20 | End: 2019-04-26

## 2018-12-20 RX ORDER — LIDOCAINE HYDROCHLORIDE 20 MG/ML
INJECTION, SOLUTION INFILTRATION; PERINEURAL
Status: DISCONTINUED | OUTPATIENT
Start: 2018-12-20 | End: 2018-12-20 | Stop reason: HOSPADM

## 2018-12-20 RX ORDER — SODIUM CHLORIDE, SODIUM LACTATE, POTASSIUM CHLORIDE, CALCIUM CHLORIDE 600; 310; 30; 20 MG/100ML; MG/100ML; MG/100ML; MG/100ML
INJECTION, SOLUTION INTRAVENOUS CONTINUOUS
Status: DISCONTINUED | OUTPATIENT
Start: 2018-12-20 | End: 2018-12-20 | Stop reason: HOSPADM

## 2018-12-20 RX ORDER — OFLOXACIN 3 MG/ML
1 SOLUTION/ DROPS OPHTHALMIC
Status: COMPLETED | OUTPATIENT
Start: 2018-12-20 | End: 2018-12-20

## 2018-12-20 RX ORDER — HYDROCODONE BITARTRATE AND ACETAMINOPHEN 5; 325 MG/1; MG/1
1 TABLET ORAL EVERY 4 HOURS PRN
Status: DISCONTINUED | OUTPATIENT
Start: 2018-12-20 | End: 2018-12-20 | Stop reason: HOSPADM

## 2018-12-20 RX ORDER — LIDOCAINE HYDROCHLORIDE 10 MG/ML
1 INJECTION, SOLUTION EPIDURAL; INFILTRATION; INTRACAUDAL; PERINEURAL ONCE
Status: DISCONTINUED | OUTPATIENT
Start: 2018-12-20 | End: 2018-12-20 | Stop reason: HOSPADM

## 2018-12-20 RX ADMIN — TROPICAMIDE 1 DROP: 10 SOLUTION/ DROPS OPHTHALMIC at 09:12

## 2018-12-20 RX ADMIN — SODIUM CHLORIDE, SODIUM LACTATE, POTASSIUM CHLORIDE, AND CALCIUM CHLORIDE: .6; .31; .03; .02 INJECTION, SOLUTION INTRAVENOUS at 10:12

## 2018-12-20 RX ADMIN — CYCLOPENTOLATE HYDROCHLORIDE 1 DROP: 10 SOLUTION/ DROPS OPHTHALMIC at 09:12

## 2018-12-20 RX ADMIN — OFLOXACIN 1 DROP: 3 SOLUTION OPHTHALMIC at 09:12

## 2018-12-20 RX ADMIN — FENTANYL CITRATE 50 MCG: 50 INJECTION INTRAMUSCULAR; INTRAVENOUS at 11:12

## 2018-12-20 RX ADMIN — PHENYLEPHRINE HYDROCHLORIDE 1 DROP: 25 SOLUTION/ DROPS OPHTHALMIC at 09:12

## 2018-12-20 RX ADMIN — PROPARACAINE HYDROCHLORIDE 1 DROP: 5 SOLUTION/ DROPS OPHTHALMIC at 09:12

## 2018-12-20 RX ADMIN — ACETAMINOPHEN 650 MG: 325 TABLET ORAL at 12:12

## 2018-12-20 NOTE — TRANSFER OF CARE
"Anesthesia Transfer of Care Note    Patient: Latasha Perez    Procedure(s) Performed: Procedure(s) (LRB):  PHACOEMULSIFICATION, CATARACT (Left)  INSERTION, IOL PROSTHESIS (Left)    Patient location: [facility]    Anesthesia Type: MAC    Transport from OR: Transported from OR on room air with adequate spontaneous ventilation    Post pain: adequate analgesia    Post assessment: no apparent anesthetic complications and tolerated procedure well    Post vital signs: stable    Level of consciousness: awake, alert and oriented    Nausea/Vomiting: no nausea/vomiting    Complications: none    Transfer of care protocol was followed      Last vitals:   Visit Vitals  /76   Pulse 100   Temp 36.9 °C (98.4 °F) (Oral)   Resp 16   Ht 5' 5" (1.651 m)   Wt 41.9 kg (92 lb 6.4 oz)   SpO2 98%   Breastfeeding? No   BMI 15.38 kg/m²     "

## 2018-12-20 NOTE — DISCHARGE INSTRUCTIONS
ACTIVITY LEVEL:  If you received sedation or an anesthetic, you may feel sleepy for several hours. Rest until you are more awake. Gradually resume your normal activities. Normal activity will cause no undue risk to your eye. The white part of your eye might be red - this is nothing to worry about. Wear your old glasses or sunglasses that were given to you for protection during the day.    RESTRICTIONS - for the next 7 days  · Do not lift anything over 10 pounds.  · When bending, bend at the knees not the waist.  · Do not rub the eye.  · Do not get water in the eye.  · Do not sleep on the side that had surgery.  · Protect your eye at bedtime with the shield provided.    DIET: At home, continue with liquids. If there is no nausea, you may eat a soft diet and gradually resume your normal diet. Limit alcohol intake for 24 hours.    BATHING: You may shower or bathe as normal. You may take a warm wash cloth, which has been wrung out to remove excess water, and gently remove crusting on the lashes.    MEDICATIONS: You may take Extra Strength Tylenol every 4 hours for pain/headache.     Use your drops     Today: Pink-    3p    6p     9p     Beige -     3p     6p     9p     Grey-     3p    Tomorrow:  Resume pink and beige cap drops four times a day.  Resume grey cap drops once a day.    Place one drop in the eye that had surgery from the first bottle. Wait 5 minutes and then use the second bottle. (It does not matter which bottle is used first.) CONTINUE all glaucoma drops.   No driving, alcoholic beverages or signing legal documents for next 24 hours or while taking pain medication      WHEN TO CALL THE DOCTOR:  · Redness that increases significantly  · Pain not relieved by Tylenol  RETURN APPOINTMENT:  You will need to see Dr. Escobar on Friday at the [facility] at___10:30__. Bring your eye kit with you on your follow-up visit. Your kit contains sunglasses, eye shield, tape and your eye drops.  FOR EMERGENCIES:  If  any unusual problems or difficulties occur, contact Dr. Escobar at the Clinic office at [phone] during normal business hours. If after hours, call [phone].      Fall Prevention  Millions of people fall every year and injure themselves. You may have had anesthesia or sedation which may increase your risk of falling. You may have health issues that put you at an increased risk of falling.     Here are ways to reduce your risk of falling.  ·   · Make your home safe by keeping walkways clear of objects you may trip over.  · Use non-slip pads under rugs. Do not use area rugs or small throw rugs.  · Use non-slip mats in bathtubs and showers.  · Install handrails and lights on staircases.  · Do not walk in poorly lit areas.  · Do not stand on chairs or wobbly ladders.  · Use caution when reaching overhead or looking upward. This position can cause a loss of balance.  · Be sure your shoes fit properly, have non-slip bottoms and are in good condition.   · Wear shoes both inside and out. Avoid going barefoot or wearing slippers.  · Be cautious when going up and down stairs, curbs, and when walking on uneven sidewalks.  · If your balance is poor, consider using a cane or walker.  · If your fall was related to alcohol use, stop or limit alcohol intake.   · If your fall was related to use of sleeping medicines, talk to your doctor about this. You may need to reduce your dosage at bedtime if you awaken during the night to go to the bathroom.    · To reduce the need for nighttime bathroom trips:  ¨ Avoid drinking fluids for several hours before going to bed  ¨ Empty your bladder before going to bed  ¨ Men can keep a urinal at the bedside  · Stay as active as you can. Balance, flexibility, strength, and endurance all come from exercise. They all play a role in preventing falls. Ask your healthcare provider which types of activity are right for you.  · Get your vision checked on a regular basis.  · If you have pets,  know where they are before you stand up or walk so you don't trip over them.  · Use night lights.

## 2018-12-20 NOTE — OP NOTE
DATE OF PROCEDURE:  12/20/2018.    SURGEON:  Broderick Escobar M.D.    PREOPERATIVE DIAGNOSIS:  Nuclear sclerotic cataract, left eye.    POSTOPERATIVE DIAGNOSIS:  Nuclear sclerotic cataract, left eye.    PROCEDURE:  Clear corneal phacoemulsification with posterior chamber intraocular   lens implant, left eye.    ANESTHESIA:  Monitored anesthesia care with 4% lidocaine topically.    PROCEDURE IN DETAIL:  After the appropriate preoperative consent was obtained,   the patient had the 2% Xylocaine jelly applied to the cornea.  The patient was   then brought to the operating room and placed into the supine position.  The   left eye periorbit was then prepped and draped in the usual sterile ophthalmic   fashion.  A lid speculum was then inserted into the left eye.  Several drops of   the 1% lidocaine were placed onto the left eye cornea.  The 1% lidocaine was   also applied to the perilimbal region with the Weck-ranjana sponge.  Using the   0.12-mm forceps and the Super Sharp blade, a paracentesis site was made at the 6   o'clock position.  Approximately 0.5 mL of the 1% lidocaine was injected into   the anterior chamber.  Next, Viscoat was injected into the anterior chamber   through the paracentesis site.  The 2.75-mm keratome and the cyclodialysis   spatula were used to create a 2.75-mm clear corneal temporal groove.  The   cystitomy needle and Utrata forceps were then used to create a continuous tear   360-degree capsulorrhexis.  BSS in a cannula was then used for hydrodissection.    Phacoemulsification then proceeded in a stop-and-chop fashion without any   complications.  Another 0.5 mL of the 1% lidocaine was injected into the   anterior chamber.  The curved tip irrigation aspiration handpiece was then used   to remove the residual cortical material from the capsular bag.  Again, the 1%   lidocaine was applied to the perilimbal region with the Weck-ranjana sponge.  Healon   GV was then injected into the anterior chamber  and capsular bag.  An [employer] intraocular lens model SN60WF, 20.5 diopters in power and serial #   [id number] was injected into the capsular bag with the lens injector.  The   Sinskey hook was used to position the lens into its proper place.  The residual   viscoelastic material was removed from the anterior chamber and capsular bag   with the curved tip irrigation aspiration handpiece.  Both wounds were hydrated   with BSS on a cannula.  Both wounds were checked and found to be watertight.    The lid speculum was then removed from the left eye.  The patient then had 1   drop of Vigamox ophthalmic drop and 1 drop of Econopred +1% ophthalmic drop   instilled onto the left eye cornea.  The eye was then shielded.  The patient   tolerated the procedure well and was then brought to the recovery room in good   condition.      LORE  dd: 12/20/2018 12:48:22 (CST)  td: 12/20/2018 13:22:12 (CST)  Doc ID   #[id number]  Job ID #[id number]    CC:

## 2018-12-20 NOTE — BRIEF OP NOTE
Operative Note     SUMMARY     Surgery Date: 12/20/2018    Surgeon(s) and Role:      Broderick Escobar MD - Primary    Pre-op Diagnosis:  Nuclear sclerosis     Post-op/ Diagnosis:  Same    Final Diagnosis: Cataract    Procedure(s) (LRB):  PHACOEMULSIFICATION-ASPIRATION-CATARACT   INSERTION-INTRAOCULAR LENS (IOL)     Anesthesia: Monitored Anesthesia Care    Findings/Key Components:  Cataract    Outcome: Tolerated procedure well    Estimated Blood Loss: None         Specimens     None          Follow-up:  Tomorrow in clinic      Discharge Note      SUMMARY     Admit Date: 12/20/2018    Attending Physician: Broderick Escobar MD    Discharge Physician: Broderick Escobar MD    Discharge Date: 12/20/2018    Final Diagnosis: Cataract    Outcome: Tolerated procedure well    Disposition: Discharge to Home.    Medications:       Perez Loyda   Home Medication Instructions EASTON:[id number]    Printed on:12/20/18 1150   Medication Information                      acetaminophen (TYLENOL EXTRA STRENGTH ORAL)  Take by mouth as needed.             alendronate (FOSAMAX) 70 MG tablet  TAKE 1 TABLET BY MOUTH ONCE A WEEK EVERY 7 DAYS, AS DIRECTED             amLODIPine (NORVASC) 5 MG tablet  TAKE 1 TABLET EVERY DAY             aspirin (ECOTRIN) 81 MG EC tablet  Take 81 mg by mouth once daily.             calcium carbonate (CALCIUM 500 ORAL)  Take by mouth 2 (two) times daily.              cetirizine (ZYRTEC) 10 MG tablet  Take 10 mg by mouth once daily.             difluprednate (DUREZOL) 0.05 % Drop ophthalmic solution  Place 1 drop into the right eye 4 (four) times daily. For 30 days             fluticasone-salmeterol 250-50 mcg/dose (ADVAIR) 250-50 mcg/dose diskus inhaler  Inhale 1 puff into the lungs 2 (two) times daily. Controller             ILEVRO 0.3 % DrpS  Place 1 drop into both eyes once daily. For 30 days             losartan (COZAAR) 100 MG tablet  TAKE 1 TABLET ONE TIME DAILY             MULTIVITAMIN  W-MINERALS/LUTEIN (CENTRUM SILVER ORAL)  Take by mouth once daily.             pravastatin (PRAVACHOL) 20 MG tablet  TAKE 1 TABLET EVERY EVENING             predniSONE (DELTASONE) 20 MG tablet  TAKE 2 TABLETS(40 MG) BY MOUTH EVERY DAY FOR 5 DAYS             SPIRIVA WITH HANDIHALER 18 mcg inhalation capsule               VENTOLIN HFA 90 mcg/actuation inhaler  INHALE TWO PUFFS BY MOUTH EVERY 6 HOURS AS NEEDED FOR WHEEZING                   Patient Discharge Instructions:     Keep [place] Shield over operated eye when not using drops.     DIET:  Regular    Activity: No heavy lifting or bending X 1 week.    Follow-up:  Tomorrow in clinic

## 2018-12-21 ENCOUNTER — OFFICE VISIT (OUTPATIENT)
Dept: OPHTHALMOLOGY | Facility: CLINIC | Age: 73
End: 2018-12-21
Payer: MEDICARE

## 2018-12-21 ENCOUNTER — TELEPHONE (OUTPATIENT)
Dept: OPHTHALMOLOGY | Facility: CLINIC | Age: 73
End: 2018-12-21

## 2018-12-21 DIAGNOSIS — H04.123 DRY EYE SYNDROME OF BOTH EYES: ICD-10-CM

## 2018-12-21 DIAGNOSIS — Z98.890 POST-OPERATIVE STATE: Primary | ICD-10-CM

## 2018-12-21 PROCEDURE — 99024 POSTOP FOLLOW-UP VISIT: CPT | Mod: S$GLB,,, | Performed by: OPHTHALMOLOGY

## 2018-12-21 PROCEDURE — 99999 PR PBB SHADOW E&M-EST. PATIENT-LVL II: CPT | Mod: PBBFAC,,, | Performed by: OPHTHALMOLOGY

## 2018-12-21 NOTE — TELEPHONE ENCOUNTER
----- Message from Heather Soliman sent at 12/21/2018  1:52 PM CST -----  Contact: Latasha Perez   Pt would like to speak with  nurse about the eye drops, prescription,she can be reached at [phone] please thank you.

## 2018-12-21 NOTE — PROGRESS NOTES
Subjective:       Patient ID: Latasha Perez is a 73 y.o. female.    Chief Complaint: Post-op Evaluation (1 day po os)    HPI     Post-op Evaluation      Additional comments: 1 day po os              Comments     S/p Phaco w/IOL OS- 12/20/18    1 day po os    Pt states sx went well. Pt denies pain and discomfort.     Eyemeds  Ofloxacin QID OS  Ilevro QD OS  Durezol QID OS           Last edited by Annalise Huerta on 12/21/2018 10:56 AM. (History)             Assessment:       1. Post-operative state    2. Dry eye syndrome of both eyes        Plan:       S/p CE OU- Doing well.  ANNA-Needs AT's.        CP OS.  Taper gtts OD.  AT's.  RTC 1 wk.

## 2018-12-23 NOTE — ANESTHESIA PREPROCEDURE EVALUATION
12/23/2018  Latasha Perez is a 73 y.o., female.    Anesthesia Evaluation     I have reviewed the Nursing Notes.      Review of Systems  Anesthesia Hx:  No problems with previous Anesthesia   Social:  Smoker    Cardiovascular:   Exercise tolerance: good Denies Pacemaker. Hypertension  Denies Valvular problems/Murmurs.  Denies MI.  Denies CAD.    Denies CABG/stent.  Denies Dysrhythmias.   Denies Angina.             denies PVD hyperlipidemia        Pulmonary:   COPD Denies Asthma.  Denies Shortness of breath.  Denies Recent URI.  Denies Sleep Apnea.    Renal/:  Renal/ Normal     Hepatic/GI:  Hepatic/GI Normal    Neurological:  Neurology Normal    Endocrine:  Endocrine Normal        Physical Exam  General:  Cachexia    Airway/Jaw/Neck:  AIRWAY FINDINGS: Normal      Chest/Lungs:  Chest/Lungs Clear    Heart/Vascular:  Heart Findings: Normal       Mental Status:  Mental Status Findings: Normal        Anesthesia Plan  Type of Anesthesia, risks & benefits discussed:  Anesthesia Type:  MAC  Patient's Preference:   Intra-op Monitoring Plan: standard ASA monitors  Intra-op Monitoring Plan Comments:   Post Op Pain Control Plan:   Post Op Pain Control Plan Comments:   Induction:   IV  Beta Blocker:  Patient is not currently on a Beta-Blocker (No further documentation required).       Informed Consent: Patient understands risks and agrees with Anesthesia plan.  Questions answered. Anesthesia consent signed with patient.  ASA Score: 2     Day of Surgery Review of History & Physical:  There are no significant changes.  H&P update referred to the provider.         Ready For Surgery From Anesthesia Perspective.

## 2018-12-23 NOTE — ANESTHESIA POSTPROCEDURE EVALUATION
"Anesthesia Post Evaluation    Patient: Latasha Perez    Procedure(s) Performed: Procedure(s) (LRB):  PHACOEMULSIFICATION, CATARACT (Left)  INSERTION, IOL PROSTHESIS (Left)    Final Anesthesia Type: MAC  Patient location during evaluation: [facility]  Patient participation: Yes- Able to Participate  Level of consciousness: awake and alert  Post-procedure vital signs: reviewed and stable  Pain management: adequate  Airway patency: patent  PONV status at discharge: No PONV  Anesthetic complications: no      Cardiovascular status: hemodynamically stable and blood pressure returned to baseline  Respiratory status: unassisted and spontaneous ventilation  Hydration status: euvolemic  Follow-up not needed.        Visit Vitals  /76   Pulse 100   Temp 36.9 °C (98.4 °F) (Oral)   Resp 16   Ht 5' 5" (1.651 m)   Wt 41.9 kg (92 lb 6.4 oz)   SpO2 98%   Breastfeeding? No   BMI 15.38 kg/m²       Pain/Arron Score: No Data Recorded      "

## 2018-12-27 ENCOUNTER — OFFICE VISIT (OUTPATIENT)
Dept: OPTOMETRY | Facility: CLINIC | Age: 73
End: 2018-12-27
Payer: MEDICARE

## 2018-12-27 DIAGNOSIS — Z98.890 POST-OPERATIVE STATE: Primary | ICD-10-CM

## 2018-12-27 PROBLEM — H25.12 NUCLEAR SCLEROSIS OF LEFT EYE: Status: RESOLVED | Noted: 2018-10-11 | Resolved: 2018-12-27

## 2018-12-27 PROBLEM — H25.10 SENILE NUCLEAR SCLEROSIS: Status: RESOLVED | Noted: 2018-12-06 | Resolved: 2018-12-27

## 2018-12-27 PROCEDURE — 99024 POSTOP FOLLOW-UP VISIT: CPT | Mod: S$GLB,,, | Performed by: OPTOMETRIST

## 2018-12-27 NOTE — PROGRESS NOTES
Subjective:       Patient ID: Latasha Perez is a 73 y.o. female      Chief Complaint   Patient presents with    Post-op Evaluation     1 week po os     History of Present Illness   1 week po os    Pt states doing well. No pain or discomfort. Just some dryness. Pt using   systane    Eyemeds  Durezol BID OS  Ilevro QD OS       Assessment/Plan:     1. Post-operative state  S/p CE OU - Doing well.     IOP 27 OS today but pt was 24 pre-op. Monitor. Pt advised to call if any pain or discomfort OS.     CPM OD. Taper gtts OS.  RTC 3 weeks        Follow-up in about 3 weeks (around 1/18/2019) for post op OU with Dr. Escobar.

## 2019-01-18 ENCOUNTER — OFFICE VISIT (OUTPATIENT)
Dept: OPHTHALMOLOGY | Facility: CLINIC | Age: 74
End: 2019-01-18
Payer: MEDICARE

## 2019-01-18 DIAGNOSIS — H04.123 DRY EYE SYNDROME OF BOTH EYES: ICD-10-CM

## 2019-01-18 DIAGNOSIS — H52.7 REFRACTIVE ERROR: ICD-10-CM

## 2019-01-18 DIAGNOSIS — Z98.890 POST-OPERATIVE STATE: Primary | ICD-10-CM

## 2019-01-18 PROCEDURE — 99024 PR POST-OP FOLLOW-UP VISIT: ICD-10-PCS | Mod: S$GLB,,, | Performed by: OPHTHALMOLOGY

## 2019-01-18 PROCEDURE — 99999 PR PBB SHADOW E&M-EST. PATIENT-LVL II: CPT | Mod: PBBFAC,,, | Performed by: OPHTHALMOLOGY

## 2019-01-18 PROCEDURE — 99024 POSTOP FOLLOW-UP VISIT: CPT | Mod: S$GLB,,, | Performed by: OPHTHALMOLOGY

## 2019-01-18 PROCEDURE — 99999 PR PBB SHADOW E&M-EST. PATIENT-LVL II: ICD-10-PCS | Mod: PBBFAC,,, | Performed by: OPHTHALMOLOGY

## 2019-01-18 NOTE — PROGRESS NOTES
Subjective:       Patient ID: Latasha Perez is a 73 y.o. female.    Chief Complaint: Post-op Evaluation (3 week PO OU )    HPI     Post-op Evaluation      Additional comments: 3 week PO OU               Comments     73 y.o. Female is here for 3 week PO OU. Denies eye pain and f/f. No   Blurred vision or light sensitivity.    Eye Meds: no gtt           Last edited by ALEKSANDR Rdz on 1/18/2019 10:05 AM. (History)             Assessment:       1. Post-operative state    2. Dry eye syndrome of both eyes    3. Refractive error        Plan:       S/p CE OU- Doing well but with elevated IOP OS. IOP was mildly elevated OS prior to CE OS.      ANNA-Stable OU.  RE-Will hold off on MRx until IOP comes down.        Start Xalatan qhs OS.  RTC 4-5 wks for IOP's & repeat MRx.

## 2019-01-25 ENCOUNTER — TELEPHONE (OUTPATIENT)
Dept: OPHTHALMOLOGY | Facility: CLINIC | Age: 74
End: 2019-01-25

## 2019-01-25 NOTE — TELEPHONE ENCOUNTER
----- Message from Celestina Mike sent at 1/25/2019  4:15 PM CST -----  Contact: Latasha Weaver states she feels like something is in her eye. She states that it usually happens when she uses her night drops but today it was during the day.  She would just like to discuss it with someone.  She can be reached at [phone]

## 2019-01-31 RX ORDER — FLUTICASONE PROPIONATE AND SALMETEROL 250; 50 UG/1; UG/1
1 POWDER RESPIRATORY (INHALATION) 2 TIMES DAILY
Qty: 180 EACH | Refills: 12 | Status: SHIPPED | OUTPATIENT
Start: 2019-01-31 | End: 2019-05-22 | Stop reason: SDUPTHER

## 2019-02-13 ENCOUNTER — TELEPHONE (OUTPATIENT)
Dept: OPHTHALMOLOGY | Facility: CLINIC | Age: 74
End: 2019-02-13

## 2019-02-13 RX ORDER — LATANOPROST 50 UG/ML
1 SOLUTION/ DROPS OPHTHALMIC NIGHTLY
Qty: 2.5 ML | Refills: 6 | Status: SHIPPED | OUTPATIENT
Start: 2019-02-13 | End: 2019-05-15

## 2019-02-13 NOTE — TELEPHONE ENCOUNTER
----- Message from Celestina Mike sent at 2/13/2019 10:39 AM CST -----  Contact: Latasha Weaver calling states she was supposed to be on Latanoprost until Feb 20th and she thinks that sh will run out before then.  She would like to know what to do.  Does she just need to stop it when she is finished or will she need a new prescription.  She can be reached at [phone]

## 2019-02-21 DIAGNOSIS — I10 ESSENTIAL HYPERTENSION: ICD-10-CM

## 2019-02-21 DIAGNOSIS — E78.5 HYPERLIPIDEMIA, UNSPECIFIED HYPERLIPIDEMIA TYPE: ICD-10-CM

## 2019-02-21 RX ORDER — LOSARTAN POTASSIUM 100 MG/1
TABLET ORAL
Qty: 90 TABLET | Refills: 0 | Status: ON HOLD | OUTPATIENT
Start: 2019-02-21 | End: 2019-04-10 | Stop reason: HOSPADM

## 2019-02-21 RX ORDER — AMLODIPINE BESYLATE 5 MG/1
TABLET ORAL
Qty: 90 TABLET | Refills: 0 | Status: ON HOLD | OUTPATIENT
Start: 2019-02-21 | End: 2019-04-10 | Stop reason: HOSPADM

## 2019-02-21 RX ORDER — PRAVASTATIN SODIUM 20 MG/1
TABLET ORAL
Qty: 90 TABLET | Refills: 12 | Status: SHIPPED | OUTPATIENT
Start: 2019-02-21

## 2019-02-22 ENCOUNTER — OFFICE VISIT (OUTPATIENT)
Dept: OPHTHALMOLOGY | Facility: CLINIC | Age: 74
End: 2019-02-22
Payer: MEDICARE

## 2019-02-22 DIAGNOSIS — H52.7 REFRACTIVE ERROR: ICD-10-CM

## 2019-02-22 DIAGNOSIS — Z98.890 POST-OPERATIVE STATE: Primary | ICD-10-CM

## 2019-02-22 DIAGNOSIS — H04.123 DRY EYE SYNDROME OF BOTH EYES: ICD-10-CM

## 2019-02-22 PROCEDURE — 99024 PR POST-OP FOLLOW-UP VISIT: ICD-10-PCS | Mod: HCNC,S$GLB,, | Performed by: OPHTHALMOLOGY

## 2019-02-22 PROCEDURE — 99999 PR PBB SHADOW E&M-EST. PATIENT-LVL II: ICD-10-PCS | Mod: PBBFAC,HCNC,, | Performed by: OPHTHALMOLOGY

## 2019-02-22 PROCEDURE — 99024 POSTOP FOLLOW-UP VISIT: CPT | Mod: HCNC,S$GLB,, | Performed by: OPHTHALMOLOGY

## 2019-02-22 PROCEDURE — 99999 PR PBB SHADOW E&M-EST. PATIENT-LVL II: CPT | Mod: PBBFAC,HCNC,, | Performed by: OPHTHALMOLOGY

## 2019-02-22 NOTE — PROGRESS NOTES
Subjective:       Patient ID: Latasha Perez is a 73 y.o. female.    Chief Complaint: IOP    HPI     DSL- 1/18/19     72 y/o female is here for IOP check. Pt states she has been doing   Latanoprost as directed but did not put any dosage last night.     Eyemeds  Latanoprost QHS OS    Last edited by Annalise Huerta on 2/22/2019  9:36 AM. (History)             Assessment:       1. Post-operative state    2. Dry eye syndrome of both eyes    3. Refractive error        Plan:       S/p CE OU- Doing well with nl IOP OS off Xalatan for 1 day.     ANNA-Doing well OU.  RE-Pt wants MRx.      Stay off Xalatan OS.  AT's.  Give MRx.  RTC 3-4 wks for IOP's.

## 2019-02-25 ENCOUNTER — OFFICE VISIT (OUTPATIENT)
Dept: PODIATRY | Facility: CLINIC | Age: 74
End: 2019-02-25
Payer: MEDICARE

## 2019-02-25 VITALS — HEIGHT: 65 IN | WEIGHT: 92 LBS | BODY MASS INDEX: 15.33 KG/M2

## 2019-02-25 DIAGNOSIS — M20.40 HAMMER TOE, UNSPECIFIED LATERALITY: ICD-10-CM

## 2019-02-25 DIAGNOSIS — L60.0 INGROWN NAIL: ICD-10-CM

## 2019-02-25 DIAGNOSIS — M79.671 RIGHT FOOT PAIN: Primary | ICD-10-CM

## 2019-02-25 PROCEDURE — 1101F PR PT FALLS ASSESS DOC 0-1 FALLS W/OUT INJ PAST YR: ICD-10-PCS | Mod: HCNC,CPTII,S$GLB, | Performed by: PODIATRIST

## 2019-02-25 PROCEDURE — 99999 PR PBB SHADOW E&M-EST. PATIENT-LVL III: CPT | Mod: PBBFAC,HCNC,, | Performed by: PODIATRIST

## 2019-02-25 PROCEDURE — 1101F PT FALLS ASSESS-DOCD LE1/YR: CPT | Mod: HCNC,CPTII,S$GLB, | Performed by: PODIATRIST

## 2019-02-25 PROCEDURE — 99999 PR PBB SHADOW E&M-EST. PATIENT-LVL III: ICD-10-PCS | Mod: PBBFAC,HCNC,, | Performed by: PODIATRIST

## 2019-02-25 PROCEDURE — 99213 PR OFFICE/OUTPT VISIT, EST, LEVL III, 20-29 MIN: ICD-10-PCS | Mod: HCNC,S$GLB,, | Performed by: PODIATRIST

## 2019-02-25 PROCEDURE — 99213 OFFICE O/P EST LOW 20 MIN: CPT | Mod: HCNC,S$GLB,, | Performed by: PODIATRIST

## 2019-02-25 NOTE — PROGRESS NOTES
Subjective:      Patient ID: Latasha Perez is a 73 y.o. female.    Chief Complaint: Foot Pain (right foot 2nd toe and right great toenail pain (PCP Dr Oneal)); Foot Problem; and Nail Problem    Latasha TALLEY is a 73 y.o. female who presents to the clinic complaining of painful ingrown toenail on both feet great toes. Has previously tried to self trim but this is becoming more and more difficult for her to do so. She is here to inquire about treatment options as they are becoming increasingly painful daily. Not having any other major concerns with the feet.     Past Medical History:   Diagnosis Date    Arthritis     Carotid disease, bilateral     Cataract     Chronic hyponatremia     COPD (chronic obstructive pulmonary disease)     HLD (hyperlipidemia)     HTN (hypertension)        Past Surgical History:   Procedure Laterality Date    APPENDECTOMY      CATARACT EXTRACTION W/  INTRAOCULAR LENS IMPLANT Right 12/06/2018    Dr. Escobar    CATARACT EXTRACTION W/  INTRAOCULAR LENS IMPLANT Left 12/20/2018    DR. Escobar    CERVICAL SPINE SURGERY      DILATION AND CURETTAGE OF UTERUS      x 2    EYE SURGERY      cataract Rt    FRACTURE SURGERY      Lt leg fracture with hardware    INSERTION, IOL PROSTHESIS Left 12/20/2018    [facility]    INSERTION, IOL PROSTHESIS Right 12/6/2018    [facility]    metatarsal repair      OPEN REDUCTION INTERNAL FIXATION-TIBIAL PLATEAU Left 6/27/2014    [facility]    PHACOEMULSIFICATION, CATARACT Left 12/20/2018    [facility]    PHACOEMULSIFICATION, CATARACT Right 12/6/2018    [facility]    SHOULDER ARTHROSCOPY         Family History   Problem Relation Age of Onset    Heart disease Mother     Cancer Father     Heart disease Maternal Grandfather     Cataracts Sister      No Known Problems Brother     No Known Problems Maternal Aunt     No Known Problems Maternal Uncle     No Known Problems Paternal Aunt     No Known Problems Paternal Uncle     No Known Problems Maternal Grandmother     No Known Problems Paternal Grandmother     No Known Problems Paternal Grandfather     Amblyopia Neg Hx     Blindness Neg Hx     Glaucoma Neg Hx     Macular degeneration Neg Hx     Retinal detachment Neg Hx     Strabismus Neg Hx     Diabetes Neg Hx     Hypertension Neg Hx     Stroke Neg Hx     Thyroid disease Neg Hx        Social History     Socioeconomic History    Marital status:      Spouse name: None    Number of children: None    Years of education: None    Highest education level: None   Social Needs    Financial resource strain: None    Food insecurity - worry: None    Food insecurity - inability: None    Transportation needs - medical: None    Transportation needs - non-medical: None   Occupational History    Occupation: teacher   Tobacco Use    Smoking status: Current Some Day Smoker     Packs/day: 1.00     Years: 45.00     Pack years: 45.00     Types: Cigarettes     Last attempt to quit: 2002     Years since quittin.1    Smokeless tobacco: Never Used   Substance and Sexual Activity    Alcohol use: No    Drug use: No    Sexual activity: Yes     Partners: Male   Other Topics Concern    None   Social History Narrative    None       Current Outpatient Medications   Medication Sig Dispense Refill    acetaminophen (TYLENOL EXTRA STRENGTH ORAL) Take by mouth as needed.      alendronate (FOSAMAX) 70 MG tablet TAKE 1 TABLET BY MOUTH ONCE A WEEK EVERY 7 DAYS, AS DIRECTED 12 tablet 0    amLODIPine (NORVASC) 5 MG tablet TAKE 1 TABLET EVERY DAY 90 tablet 0    aspirin (ECOTRIN) 81 MG EC tablet Take 81 mg by mouth once daily.      calcium carbonate (CALCIUM 500 ORAL) Take by mouth 2 (two) times daily.       cetirizine (ZYRTEC) 10 MG tablet Take 10  mg by mouth once daily.      fluticasone-salmeterol 250-50 mcg/dose (ADVAIR) 250-50 mcg/dose diskus inhaler Inhale 1 puff into the lungs 2 (two) times daily. Controller 180 each 12    latanoprost 0.005 % ophthalmic solution Place 1 drop into the left eye nightly. 2.5 mL 6    losartan (COZAAR) 100 MG tablet TAKE 1 TABLET ONE TIME DAILY 15 tablet 0    losartan (COZAAR) 100 MG tablet TAKE 1 TABLET EVERY DAY 90 tablet 0    MULTIVITAMIN W-MINERALS/LUTEIN (CENTRUM SILVER ORAL) Take by mouth once daily.      pravastatin (PRAVACHOL) 20 MG tablet TAKE 1 TABLET EVERY EVENING 90 tablet 12    predniSONE (DELTASONE) 20 MG tablet TAKE 2 TABLETS(40 MG) BY MOUTH EVERY DAY FOR 5 DAYS 10 tablet 12    SPIRIVA WITH HANDIHALER 18 mcg inhalation capsule   3    VENTOLIN HFA 90 mcg/actuation inhaler INHALE TWO PUFFS BY MOUTH EVERY 6 HOURS AS NEEDED FOR WHEEZING 18 each 6     No current facility-administered medications for this visit.      Facility-Administered Medications Ordered in Other Visits   Medication Dose Route Frequency Provider Last Rate Last Dose    0.9%  NaCl infusion   Intravenous Continuous Broderick Escobar MD   500 mL at 12/06/18 0805    0.9%  NaCl infusion   Intravenous Continuous Broderick Escobar MD        cyclopentolate 1% ophthalmic solution 1 drop  1 drop Left Eye On Call Procedure Broderick Escobar MD   1 drop at 12/20/18 0934    phenylephrine HCL 2.5% ophthalmic solution 1 drop  1 drop Right Eye On Call Procedure Broderick Escobar MD   1 drop at 12/06/18 0805    phenylephrine HCL 2.5% ophthalmic solution 1 drop  1 drop Left Eye On Call Procedure Broderick Escobar MD   1 drop at 12/20/18 0932    proparacaine 0.5 % ophthalmic solution 1 drop  1 drop Right Eye On Call Procedure Broderick Escobar MD   1 drop at 12/06/18 0755    proparacaine 0.5 % ophthalmic solution 1 drop  1 drop Left Eye On Call Procedure Broderick Escobar MD   1 drop at 12/20/18 0921    tropicamide  1% ophthalmic solution 1 drop  1 drop Right Eye On Call Procedure Broderick Escobar MD   1 drop at 12/06/18 0805    tropicamide 1% ophthalmic solution 1 drop  1 drop Left Eye On Call Procedure Broderick Escobar MD   1 drop at 12/20/18 0933       Review of patient's allergies indicates:   Allergen Reactions    Pcn [penicillins] Other (See Comments)     Fever flushing skin swelling     Biaxin [clarithromycin] Rash    Codeine Rash    Doxycycline Rash    Levaquin [levofloxacin] Rash         Review of Systems   Constitution: Negative for chills and fever.   Cardiovascular: Positive for leg swelling. Negative for chest pain and claudication.   Respiratory: Negative for cough and shortness of breath.    Skin: Positive for dry skin and nail changes.   Musculoskeletal:        Toenail pain   Gastrointestinal: Negative for nausea and vomiting.   Neurological: Negative for numbness and paresthesias.   Psychiatric/Behavioral: Negative for altered mental status.           Objective:      Physical Exam   Constitutional: She is oriented to person, place, and time. She appears well-developed and well-nourished.   HENT:   Head: Normocephalic.   Cardiovascular: Intact distal pulses.   Pulses:       Dorsalis pedis pulses are 2+ on the right side, and 2+ on the left side.        Posterior tibial pulses are 1+ on the right side, and 1+ on the left side.   CRT < 3 sec to tips of toes. Mild edema noted to b/l LE. Moderate spider veins and vericosities noted to b/l LEs.      Pulmonary/Chest: No respiratory distress.   Musculoskeletal:   Gastrocnemius equinus noted to b/l ankles with decreased DF noted on exam. MMT 5/5 in DF/PF/Inv/Ev resistance with no reproduction of pain in any direction. Passive range of motion of ankle and pedal joints is painless. Adequate pedal joint ROM.     + pain with palpation of b/l hallux toenails, b/l borders.     Semi-reducible hammertoe contractures noted to toes 2-4 b/l-asymptomatic. HAV,  mild, non trackbound noted b/l with mild medial bony prominence at 1st met head--asymptomatic.     Hypermobility noted to 1st ray b/l with near complete collapse of medial longitudinal arch b/l with loading.   Pes planus foot type b/l.    Neurological: She is alert and oriented to person, place, and time. She has normal strength. No sensory deficit.   Light touch, proprioception, and sharp/dull sensation are all intact bilaterally. Protective threshold with the [place]-Wienstein monofilament is intact bilaterally.      Skin: Skin is warm, dry and intact. No erythema.   No open lesions, lacerations or wounds noted. Nails are thickened, elongated, discolored yellow/brown with subungual debris and brittleness to R 1-5 and L 1-5. Interdigital spaces clean, dry and intact b/l. No erythema noted to b/l foot. Skin texture thin, atrophic, dry. Pedal hair absent. Toes are cool to touch b/l.     B/l hallux b/l borders moderately incurvated with mild erythema to b/l borders b/l hallux without drainage or open wound. No signs of infection at this time.      Psychiatric: She has a normal mood and affect. Her behavior is normal. Judgment and thought content normal.   Vitals reviewed.            Assessment:       Encounter Diagnoses   Name Primary?    Right foot pain Yes    Hammer toe, unspecified laterality     Ingrown nail          Plan:       Latasha TALLEY was seen today for foot pain, foot problem and nail problem.    Diagnoses and all orders for this visit:    Right foot pain  -     Ankle Brachial Indices W Stress W Toe Tracings (Cupid Only); Future  -     X-Ray Foot Complete Right; Future    Hammer toe, unspecified laterality    Ingrown nail      I counseled the patient on her conditions, their implications and medical management.        - Shoe inspection. Patient instructed on proper foot hygeine. We discussed wearing proper shoe gear, daily foot inspections, never walking without protective shoe gear, never putting sharp  instruments to feet, routine podiatric nail visits every 2-3 months.      Utilizing sterile toenail clippers I aggressively trimmed  the offending right great toenail lateral border approximately 3 mm from its edge and carried the nail plate incision down at an angle in order to wedge out the offending cryptotic portion of the nail plate. The offending border was then removed in total.    No blood was drawn. Patient tolerated the procedure well and related significant relief.    Callus trimmed to right second hammertoe.     Weightbearing right foot xray to assess underlying deformity and for underlying osseous pathology.       Diminished pulses PETRONA ordered.     F/u prn      Danielle Soriano DPM

## 2019-03-13 ENCOUNTER — TELEPHONE (OUTPATIENT)
Dept: FAMILY MEDICINE | Facility: CLINIC | Age: 74
End: 2019-03-13

## 2019-03-13 DIAGNOSIS — E87.1 HYPONATREMIA: Primary | ICD-10-CM

## 2019-03-13 DIAGNOSIS — I10 ESSENTIAL HYPERTENSION: ICD-10-CM

## 2019-03-13 DIAGNOSIS — R73.9 HYPERGLYCEMIA: ICD-10-CM

## 2019-03-13 NOTE — TELEPHONE ENCOUNTER
----- Message from Lor Card sent at 3/13/2019 12:47 PM CDT -----  Contact: self [phone]  .Type: Lab    Caller is requesting to schedule their Lab appointment prior to annual appointment.    Order is not listed in EPIC.  Please enter order and contact patient to schedule.    Name of Caller self    Preferred Date of Labs:    Date of EPP Appointment: 04/26    Where would they like the lab performed? [facility]    Would the patient rather a call back or a response via [facility]? [phone]

## 2019-03-15 ENCOUNTER — OFFICE VISIT (OUTPATIENT)
Dept: OPHTHALMOLOGY | Facility: CLINIC | Age: 74
End: 2019-03-15
Payer: MEDICARE

## 2019-03-15 DIAGNOSIS — H04.123 DRY EYE SYNDROME OF BOTH EYES: ICD-10-CM

## 2019-03-15 DIAGNOSIS — Z98.890 POST-OPERATIVE STATE: Primary | ICD-10-CM

## 2019-03-15 PROCEDURE — 99024 PR POST-OP FOLLOW-UP VISIT: ICD-10-PCS | Mod: S$GLB,,, | Performed by: OPHTHALMOLOGY

## 2019-03-15 PROCEDURE — 99999 PR PBB SHADOW E&M-EST. PATIENT-LVL II: ICD-10-PCS | Mod: PBBFAC,HCNC,, | Performed by: OPHTHALMOLOGY

## 2019-03-15 PROCEDURE — 99999 PR PBB SHADOW E&M-EST. PATIENT-LVL II: CPT | Mod: PBBFAC,HCNC,, | Performed by: OPHTHALMOLOGY

## 2019-03-15 PROCEDURE — 99024 POSTOP FOLLOW-UP VISIT: CPT | Mod: S$GLB,,, | Performed by: OPHTHALMOLOGY

## 2019-03-15 NOTE — PROGRESS NOTES
Subjective:       Patient ID: Latasha Perez is a 73 y.o. female.    Chief Complaint: Post-op Evaluation    HPI     Dls: 2/22/19 Dr. Escobar    74 y/o female presents today for iop ck.   Pt states ou doing well c/o red ou worse in am fbs ou off/on      Eye meds  systane ou prn     Last edited by Gala Jarvis on 3/15/2019  8:08 AM. (History)             Assessment:       1. Post-operative state    2. Dry eye syndrome of both eyes        Plan:       S/p CE OU- Doing well with nl IOP's off Xalatan OU.     ANNA-Needs more AT's.      AT's.  RTC 6 mos.

## 2019-03-25 ENCOUNTER — TELEPHONE (OUTPATIENT)
Dept: OTOLARYNGOLOGY | Facility: CLINIC | Age: 74
End: 2019-03-25

## 2019-03-25 RX ORDER — AZITHROMYCIN 250 MG/1
TABLET, FILM COATED ORAL
Qty: 6 TABLET | Refills: 0 | Status: ON HOLD | OUTPATIENT
Start: 2019-03-25 | End: 2019-04-10 | Stop reason: HOSPADM

## 2019-03-25 NOTE — TELEPHONE ENCOUNTER
Patient is active on my [facility] so double check that her system is active and so forth and she might be able to communicate in more detail that way if possible next prior otherwise, call patient back.  If indeed she has a lot of chest congestion she can try getting some over-the-counter Mucinex to loosen the mucus and use Delsym 12 hr cough medicine.      In the event it might be bronchitis, Dr. FARRAR will call in a Z-Isaak antibiotic to cover you

## 2019-03-25 NOTE — TELEPHONE ENCOUNTER
Patients calling to see if she can get some medication called in. Her  just got released from the hospital and she cant leave him alone. She feels like its an upper respiratory infection.    Symptoms are: chest congestion with cough, coughing up a little whitish color mucous, nasal congestion.    Please advise

## 2019-03-25 NOTE — TELEPHONE ENCOUNTER
----- Message from Fadumo Greenfield sent at 3/25/2019 10:13 AM CDT -----  Contact: Self/ [phone]  Patient would like staff to give her a call.  Thank you.

## 2019-03-26 ENCOUNTER — PATIENT MESSAGE (OUTPATIENT)
Dept: OTOLARYNGOLOGY | Facility: CLINIC | Age: 74
End: 2019-03-26

## 2019-03-28 ENCOUNTER — HOSPITAL ENCOUNTER (OUTPATIENT)
Facility: HOSPITAL | Age: 74
Discharge: HOME | End: 2019-03-29
Attending: EMERGENCY MEDICINE
Payer: MEDICARE

## 2019-03-28 DIAGNOSIS — J44.1 COPD EXACERBATION: Primary | ICD-10-CM

## 2019-03-28 DIAGNOSIS — I10 ESSENTIAL HYPERTENSION: ICD-10-CM

## 2019-03-28 DIAGNOSIS — R07.9 CHEST PAIN: ICD-10-CM

## 2019-03-28 DIAGNOSIS — E78.5 HYPERLIPIDEMIA, UNSPECIFIED HYPERLIPIDEMIA TYPE: ICD-10-CM

## 2019-03-28 DIAGNOSIS — J43.9 PULMONARY EMPHYSEMA, UNSPECIFIED EMPHYSEMA TYPE: ICD-10-CM

## 2019-03-28 LAB — TROPONIN I SERPL DL<=0.01 NG/ML-MCNC: <0.006 NG/ML (ref 0–0.03)

## 2019-03-28 PROCEDURE — 93005 ELECTROCARDIOGRAM TRACING: CPT | Mod: HCNC

## 2019-03-28 PROCEDURE — 25000242 PHARM REV CODE 250 ALT 637 W/ HCPCS: Mod: HCNC | Performed by: EMERGENCY MEDICINE

## 2019-03-28 PROCEDURE — 63600175 PHARM REV CODE 636 W HCPCS: Mod: HCNC | Performed by: EMERGENCY MEDICINE

## 2019-03-28 PROCEDURE — 27000221 HC OXYGEN, UP TO 24 HOURS: Mod: HCNC

## 2019-03-28 PROCEDURE — 93010 ELECTROCARDIOGRAM REPORT: CPT | Mod: HCNC,,, | Performed by: INTERNAL MEDICINE

## 2019-03-28 PROCEDURE — 94761 N-INVAS EAR/PLS OXIMETRY MLT: CPT | Mod: HCNC

## 2019-03-28 PROCEDURE — 84484 ASSAY OF TROPONIN QUANT: CPT | Mod: HCNC

## 2019-03-28 PROCEDURE — 93010 EKG 12-LEAD: ICD-10-PCS | Mod: HCNC,,, | Performed by: INTERNAL MEDICINE

## 2019-03-28 PROCEDURE — 25000003 PHARM REV CODE 250: Mod: HCNC | Performed by: HOSPITALIST

## 2019-03-28 PROCEDURE — 96374 THER/PROPH/DIAG INJ IV PUSH: CPT | Mod: HCNC

## 2019-03-28 PROCEDURE — G0378 HOSPITAL OBSERVATION PER HR: HCPCS | Mod: HCNC

## 2019-03-28 PROCEDURE — 99285 EMERGENCY DEPT VISIT HI MDM: CPT | Mod: 25,HCNC

## 2019-03-28 PROCEDURE — 94640 AIRWAY INHALATION TREATMENT: CPT | Mod: HCNC

## 2019-03-28 PROCEDURE — 96375 TX/PRO/DX INJ NEW DRUG ADDON: CPT

## 2019-03-28 PROCEDURE — 25000003 PHARM REV CODE 250: Mod: HCNC | Performed by: EMERGENCY MEDICINE

## 2019-03-28 RX ORDER — DEXAMETHASONE SODIUM PHOSPHATE 4 MG/ML
6 INJECTION, SOLUTION INTRA-ARTICULAR; INTRALESIONAL; INTRAMUSCULAR; INTRAVENOUS; SOFT TISSUE
Status: COMPLETED | OUTPATIENT
Start: 2019-03-28 | End: 2019-03-28

## 2019-03-28 RX ORDER — ALBUTEROL SULFATE 2.5 MG/.5ML
2.5 SOLUTION RESPIRATORY (INHALATION) EVERY 4 HOURS PRN
Qty: 1 EACH | Refills: 0 | Status: ON HOLD | OUTPATIENT
Start: 2019-03-28 | End: 2019-04-10 | Stop reason: SDUPTHER

## 2019-03-28 RX ORDER — SODIUM CHLORIDE 9 MG/ML
INJECTION, SOLUTION INTRAVENOUS CONTINUOUS
Status: CANCELLED | OUTPATIENT
Start: 2019-03-28

## 2019-03-28 RX ORDER — METHYLPREDNISOLONE SOD SUCC 125 MG
125 VIAL (EA) INJECTION EVERY 8 HOURS
Status: DISCONTINUED | OUTPATIENT
Start: 2019-03-28 | End: 2019-03-29

## 2019-03-28 RX ORDER — IPRATROPIUM BROMIDE AND ALBUTEROL SULFATE 2.5; .5 MG/3ML; MG/3ML
3 SOLUTION RESPIRATORY (INHALATION) EVERY 4 HOURS
Status: DISCONTINUED | OUTPATIENT
Start: 2019-03-29 | End: 2019-03-29

## 2019-03-28 RX ORDER — IPRATROPIUM BROMIDE AND ALBUTEROL SULFATE 2.5; .5 MG/3ML; MG/3ML
3 SOLUTION RESPIRATORY (INHALATION)
Status: COMPLETED | OUTPATIENT
Start: 2019-03-28 | End: 2019-03-28

## 2019-03-28 RX ORDER — AMLODIPINE BESYLATE 5 MG/1
5 TABLET ORAL DAILY
Status: DISCONTINUED | OUTPATIENT
Start: 2019-03-29 | End: 2019-03-29 | Stop reason: HOSPADM

## 2019-03-28 RX ORDER — LOSARTAN POTASSIUM 25 MG/1
100 TABLET ORAL DAILY
Status: DISCONTINUED | OUTPATIENT
Start: 2019-03-29 | End: 2019-03-29 | Stop reason: HOSPADM

## 2019-03-28 RX ORDER — PREDNISONE 20 MG/1
40 TABLET ORAL DAILY
Qty: 10 TABLET | Refills: 0 | Status: ON HOLD | OUTPATIENT
Start: 2019-03-28 | End: 2019-04-10 | Stop reason: HOSPADM

## 2019-03-28 RX ORDER — FLUTICASONE FUROATE AND VILANTEROL 100; 25 UG/1; UG/1
1 POWDER RESPIRATORY (INHALATION) DAILY
Status: CANCELLED | OUTPATIENT
Start: 2019-03-29

## 2019-03-28 RX ORDER — SODIUM CHLORIDE 0.9 % (FLUSH) 0.9 %
10 SYRINGE (ML) INJECTION
Status: DISCONTINUED | OUTPATIENT
Start: 2019-03-28 | End: 2019-03-29 | Stop reason: HOSPADM

## 2019-03-28 RX ORDER — ACETAMINOPHEN 325 MG/1
650 TABLET ORAL EVERY 6 HOURS PRN
Status: DISCONTINUED | OUTPATIENT
Start: 2019-03-28 | End: 2019-03-29 | Stop reason: HOSPADM

## 2019-03-28 RX ORDER — RAMELTEON 8 MG/1
8 TABLET ORAL NIGHTLY PRN
Status: DISCONTINUED | OUTPATIENT
Start: 2019-03-28 | End: 2019-03-29 | Stop reason: HOSPADM

## 2019-03-28 RX ORDER — PRAVASTATIN SODIUM 10 MG/1
20 TABLET ORAL NIGHTLY
Status: DISCONTINUED | OUTPATIENT
Start: 2019-03-28 | End: 2019-03-29 | Stop reason: HOSPADM

## 2019-03-28 RX ORDER — ASPIRIN 81 MG/1
81 TABLET ORAL DAILY
Status: DISCONTINUED | OUTPATIENT
Start: 2019-03-29 | End: 2019-03-29 | Stop reason: HOSPADM

## 2019-03-28 RX ORDER — HYDROXYZINE PAMOATE 25 MG/1
25 CAPSULE ORAL EVERY 8 HOURS PRN
Status: DISCONTINUED | OUTPATIENT
Start: 2019-03-28 | End: 2019-03-29 | Stop reason: HOSPADM

## 2019-03-28 RX ORDER — LOSARTAN POTASSIUM 25 MG/1
100 TABLET ORAL DAILY
Status: DISCONTINUED | OUTPATIENT
Start: 2019-03-29 | End: 2019-03-28 | Stop reason: SDUPTHER

## 2019-03-28 RX ORDER — GUAIFENESIN/DEXTROMETHORPHAN 100-10MG/5
10 SYRUP ORAL EVERY 4 HOURS PRN
Status: DISCONTINUED | OUTPATIENT
Start: 2019-03-28 | End: 2019-03-29 | Stop reason: HOSPADM

## 2019-03-28 RX ADMIN — IPRATROPIUM BROMIDE AND ALBUTEROL SULFATE 3 ML: .5; 3 SOLUTION RESPIRATORY (INHALATION) at 01:03

## 2019-03-28 RX ADMIN — IPRATROPIUM BROMIDE AND ALBUTEROL SULFATE 3 ML: .5; 3 SOLUTION RESPIRATORY (INHALATION) at 07:03

## 2019-03-28 RX ADMIN — IPRATROPIUM BROMIDE AND ALBUTEROL SULFATE 3 ML: .5; 3 SOLUTION RESPIRATORY (INHALATION) at 08:03

## 2019-03-28 RX ADMIN — SODIUM CHLORIDE 1000 ML: 0.9 INJECTION, SOLUTION INTRAVENOUS at 02:03

## 2019-03-28 RX ADMIN — PRAVASTATIN SODIUM 20 MG: 10 TABLET ORAL at 11:03

## 2019-03-28 RX ADMIN — DEXAMETHASONE SODIUM PHOSPHATE 6 MG: 4 INJECTION, SOLUTION INTRAMUSCULAR; INTRAVENOUS at 02:03

## 2019-03-28 RX ADMIN — METHYLPREDNISOLONE SODIUM SUCCINATE 125 MG: 125 INJECTION, POWDER, FOR SOLUTION INTRAMUSCULAR; INTRAVENOUS at 11:03

## 2019-03-28 RX ADMIN — IPRATROPIUM BROMIDE AND ALBUTEROL SULFATE 3 ML: .5; 3 SOLUTION RESPIRATORY (INHALATION) at 02:03

## 2019-03-28 NOTE — ED NOTES
[facility] contacted for transport home. Choco with [facility] informed that pt here with  and will send transport for them at the same time

## 2019-03-28 NOTE — PLAN OF CARE
[facility]    HOME HEALTH ORDERS  FACE TO FACE ENCOUNTER    Patient Name: Latasha Perez  YOB: 1945    PCP: Pollo Oneal MD   PCP Address: [address] / [id number]  PCP Phone Number: [phone]  PCP Fax: [phone]    Encounter Date: 03/28/2019    Admit to Home Health    Diagnoses:  There are no hospital problems to display for this patient.      Future Appointments   Date Time Provider Department Center   4/22/2019  8:00 AM LAB, [facility] [place]   4/26/2019 10:00 AM Pollo Oneal MD [facility] [place]     Follow-up Information     Pollo Oneal MD. Schedule an appointment as soon as possible for a visit in 1 week.    Specialty:  Internal Medicine  Why:  For Follow-up and Recheck  Contact information:  [address] [id number]  [phone]                     I have seen and examined this patient face to face today. My clinical findings that support the need for the home health skilled services and home bound status are the following:  Weakness/numbness causing balance and gait disturbance due to Weakness/Debility making it taxing to leave home.  Medical restrictions requiring assistance of another human to leave home due to  WEAKNESS.    Allergies:  Review of patient's allergies indicates:   Allergen Reactions    Pcn [penicillins] Other (See Comments)     Fever flushing skin swelling     Biaxin [clarithromycin] Rash    Codeine Rash    Doxycycline Rash    Levaquin [levofloxacin] Rash       Diet:     Activities: activity as tolerated    Nursing:   SN to complete comprehensive assessment including routine vital signs. Instruct on disease process and s/s of complications to report to MD. Review/verify medication list sent home with the patient at time of discharge  and instruct patient/caregiver as needed. Frequency may be adjusted depending on start of care date.    Notify MD if SBP > 160 or < 90; DBP > 90 or < 50; HR >  120 or < 50; Temp > 101; Other:        CONSULTS:        MISCELLANEOUS CARE:  N/A    WOUND CARE ORDERS  n/a      Medications: Review discharge medications with patient and family and provide education.      Current Discharge Medication List      START taking these medications    Details   albuterol sulfate 2.5 mg/0.5 mL Nebu Take 2.5 mg by nebulization every 4 (four) hours as needed. One Box  Qty: 1 each, Refills: 0      !! predniSONE (DELTASONE) 20 MG tablet Take 2 tablets (40 mg total) by mouth once daily. for 5 days  Qty: 10 tablet, Refills: 0       !! - Potential duplicate medications found. Please discuss with provider.      CONTINUE these medications which have NOT CHANGED    Details   alendronate (FOSAMAX) 70 MG tablet TAKE 1 TABLET BY MOUTH ONCE A WEEK EVERY 7 DAYS, AS DIRECTED  Qty: 12 tablet, Refills: 0      amLODIPine (NORVASC) 5 MG tablet TAKE 1 TABLET EVERY DAY  Qty: 90 tablet, Refills: 0    Associated Diagnoses: Essential hypertension      aspirin (ECOTRIN) 81 MG EC tablet Take 81 mg by mouth once daily.      calcium carbonate (CALCIUM 500 ORAL) Take by mouth 2 (two) times daily.       cetirizine (ZYRTEC) 10 MG tablet Take 10 mg by mouth once daily.      fluticasone-salmeterol 250-50 mcg/dose (ADVAIR) 250-50 mcg/dose diskus inhaler Inhale 1 puff into the lungs 2 (two) times daily. Controller  Qty: 180 each, Refills: 12      !! losartan (COZAAR) 100 MG tablet TAKE 1 TABLET ONE TIME DAILY  Qty: 15 tablet, Refills: 0    Associated Diagnoses: Essential hypertension      pravastatin (PRAVACHOL) 20 MG tablet TAKE 1 TABLET EVERY EVENING  Qty: 90 tablet, Refills: 12    Associated Diagnoses: Hyperlipidemia, unspecified hyperlipidemia type      !! predniSONE (DELTASONE) 20 MG tablet TAKE 2 TABLETS(40 MG) BY MOUTH EVERY DAY FOR 5 DAYS  Qty: 10 tablet, Refills: 12    Associated Diagnoses: COPD exacerbation      SPIRIVA WITH HANDIHALER 18 mcg inhalation capsule Refills: 3      VENTOLIN HFA 90 mcg/actuation inhaler  INHALE TWO PUFFS BY MOUTH EVERY 6 HOURS AS NEEDED FOR WHEEZING  Qty: 18 each, Refills: 6    Comments: Please consider 90 day supplies to promote better adherence      acetaminophen (TYLENOL EXTRA STRENGTH ORAL) Take by mouth as needed.      azithromycin (ZITHROMAX Z-IMELDA) 250 MG tablet 2 pills on day 1, then 1 pill a day  Qty: 6 tablet, Refills: 0      latanoprost 0.005 % ophthalmic solution Place 1 drop into the left eye nightly.  Qty: 2.5 mL, Refills: 6      !! losartan (COZAAR) 100 MG tablet TAKE 1 TABLET EVERY DAY  Qty: 90 tablet, Refills: 0    Associated Diagnoses: Essential hypertension      MULTIVITAMIN W-MINERALS/LUTEIN (CENTRUM SILVER ORAL) Take by mouth once daily.       !! - Potential duplicate medications found. Please discuss with provider.          I certify that this patient is confined to her home and needs intermittent skilled nursing care.    _______________________  Cathie Taveras MD  3/28/2019

## 2019-03-28 NOTE — ED PROVIDER NOTES
Encounter Date: 3/28/2019    SCRIBE #1 NOTE: I, Iliana Raymundo, am scribing for, and in the presence of,  Cathie Taveras MD. I have scribed the following portions of the note - Other sections scribed: ROS, HPI, and PE.       History     Chief Complaint   Patient presents with    Shortness of Breath     Increased SOB that began today.  O2 sats 92% on RA.  Increased to 96% after tx.  PMx COPD.       CC: Shortness of Breath    HPI: This 73 y.o. female with a past medical history of Arthritis, Carotid disease, bilateral, Cataract, Chronic hyponatremia, COPD, HLD, and HTN, presents to the ED complaining of increased SOB along with chest tightness for last 2 days. She felt like she was having a heart attack.  EMS noted initial O2 sats of 92% on RA which was improved to 96% after given breathing tx. Current occasional smoker. Denies nausea, emesis, fever, new leg pain or any other sx. Not on home oxygen. Uses Advair inhaler. She lives with and take care of her 94 yrs old .    The history is provided by the patient. No  was used.     Review of patient's allergies indicates:   Allergen Reactions    Pcn [penicillins] Other (See Comments)     Fever flushing skin swelling     Biaxin [clarithromycin] Rash    Codeine Rash    Doxycycline Rash    Levaquin [levofloxacin] Rash     Past Medical History:   Diagnosis Date    Arthritis     Carotid disease, bilateral     Cataract     Chronic hyponatremia     COPD (chronic obstructive pulmonary disease)     HLD (hyperlipidemia)     HTN (hypertension)      Past Surgical History:   Procedure Laterality Date    APPENDECTOMY      CATARACT EXTRACTION W/  INTRAOCULAR LENS IMPLANT Right 12/06/2018    Dr. Escobar    CATARACT EXTRACTION W/  INTRAOCULAR LENS IMPLANT Left 12/20/2018    DR. Escobar    CERVICAL SPINE SURGERY      DILATION AND CURETTAGE OF UTERUS      x 2    EYE SURGERY      cataract Rt    FRACTURE SURGERY      Lt leg fracture with  hardware    INSERTION, IOL PROSTHESIS Left 2018    [facility]    INSERTION, IOL PROSTHESIS Right 2018    [facility] OR    metatarsal repair      OPEN REDUCTION INTERNAL FIXATION-TIBIAL PLATEAU Left 2014    [facility] at [facility] OR    PHACOEMULSIFICATION, CATARACT Left 2018    [facility] OR    PHACOEMULSIFICATION, CATARACT Right 2018    [facility] OR    SHOULDER ARTHROSCOPY       Family History   Problem Relation Age of Onset    Heart disease Mother     Cancer Father     Heart disease Maternal Grandfather     Cataracts Sister     No Known Problems Brother     No Known Problems Maternal Aunt     No Known Problems Maternal Uncle     No Known Problems Paternal Aunt     No Known Problems Paternal Uncle     No Known Problems Maternal Grandmother     No Known Problems Paternal Grandmother     No Known Problems Paternal Grandfather     Amblyopia Neg Hx     Blindness Neg Hx     Glaucoma Neg Hx     Macular degeneration Neg Hx     Retinal detachment Neg Hx     Strabismus Neg Hx     Diabetes Neg Hx     Hypertension Neg Hx     Stroke Neg Hx     Thyroid disease Neg Hx      Social History     Tobacco Use    Smoking status: Former Smoker     Packs/day: 1.00     Years: 45.00     Pack years: 45.00     Types: Cigarettes     Last attempt to quit: 2002     Years since quittin.2    Smokeless tobacco: Never Used   Substance Use Topics    Alcohol use: No    Drug use: No     Review of Systems   Constitutional: Negative for chills and fever.   HENT: Negative for congestion, ear pain, rhinorrhea and sore throat.    Eyes: Negative for pain and visual disturbance.   Respiratory: Positive for shortness of breath. Negative for cough.    Cardiovascular: Positive for chest pain (tightness). Negative for leg  swelling.   Gastrointestinal: Negative for abdominal pain, diarrhea, nausea and vomiting.   Genitourinary: Negative for dysuria.   Musculoskeletal: Negative for back pain and neck pain.   Skin: Negative for rash.   Neurological: Negative for headaches.       Physical Exam     Initial Vitals [03/28/19 1310]   BP Pulse Resp Temp SpO2   128/82 72 (!) 28 98.9 °F (37.2 °C) 96 %      MAP       --         Physical Exam    Nursing note and vitals reviewed.  Constitutional: She appears well-developed and well-nourished.  Non-toxic appearance. She does not appear ill.   HENT:   Head: Normocephalic and atraumatic.   Eyes: EOM are normal.   Neck: Neck supple.   Cardiovascular: Normal rate and regular rhythm.   Pulmonary/Chest: No respiratory distress.   The patient has prolonged respiratory phase. Distant breath sounds.   Abdominal: Soft. Normal appearance and bowel sounds are normal. She exhibits no distension. There is no tenderness.   Musculoskeletal: Normal range of motion.   No peripheral edema.   Neurological: She is alert.   Skin: Skin is warm and dry.   Psychiatric: She has a normal mood and affect.         ED Course   Procedures  Labs Reviewed   TROPONIN I          Imaging Results          X-Ray Chest AP Portable (Final result)  Result time 03/28/19 14:49:23    Final result by Hadley Beasley MD (03/28/19 14:49:23)                 Impression:      Hyperexpansion of the lungs suggestive of underlying emphysematous changes.    No acute chest disease identified.      Electronically signed by: Hadley Beasley MD  Date:    03/28/2019  Time:    14:49             Narrative:    EXAMINATION:  XR CHEST AP PORTABLE    CLINICAL HISTORY:  Asthma;    TECHNIQUE:  Single frontal view of the chest was performed.    COMPARISON:  12/04/2017.    FINDINGS:  The heart is not enlarged.  Atherosclerotic calcification is present within the aortic arch.  Superior mediastinal structures are unremarkable.  Pulmonary vasculature is within normal  limits.  The lungs appear hyperexpanded suggestive of underlying emphysematous changes.  There is mild apical parenchymal scarring on the right.  The lungs are free of focal consolidations.  Bony structures are grossly intact.                                            Scribe Attestation:   Scribe #1: I performed the above scribed service and the documentation accurately describes the services I performed. I attest to the accuracy of the note.    Attending Attestation:           Physician Attestation for Scribe:  Physician Attestation Statement for Scribe #1: I, Cathie Taveras MD, reviewed documentation, as scribed by Iliana Raymundo in my presence, and it is both accurate and complete.                 ED Course as of Mar 28 1948   Thu Mar 28, 2019   1432 Some improvement after nebs.    [MH]   1519 Feeling better.    [MH]   1912 Patient had an episode of coughing followed by increased bronchospasm.  Provided her with an additional DuoNeb and will reassess    [MH]      ED Course User Index  [MH] Cathie Taveras MD     Clinical Impression:       ICD-10-CM ICD-9-CM   1. COPD exacerbation J44.1 491.21   2. Chest pain R07.9 786.50                    Cathie Taveras MD  03/28/19 1628       Cathie Taveras MD  03/28/19 1949      8:05 PM  Patient continues to be dyspneic and require supplementary oxygen.  Will discussed with the hospitalist, placed in observation for continued nebs.       Cathie Taveras MD  03/28/19 2005

## 2019-03-28 NOTE — PROGRESS NOTES
[facility]    WRITTEN HEALTHCARE AND DISCHARGE INFORMATION                        Help at Home           [phone]  After discharge for assistance [facility] On Call Nurse Care Line 24/7  Assistance    Things You are responsible For To Manage Your Care At Home:  1.    Getting your prescriptions filled   2.    Taking your medications as directed, DO NOT MISS ANY DOSES!  3.    Going to your follow-up doctor appointment. This is important because it  allow the doctor to monitor your progress and determine if  any changes need to made to your treatment plan.     Thank you for choosing [facility] for your care.  Please answer any calls you may receive from [facility] we want to continue to support you as you manage your healthcare needs. [facility] is happy to have the opportunity to serve you.     Sincerely,  Your [facility] Team,  ELICIA Balderrama    I  [phone]      Follow-up Information     Pollo Oneal MD On 4/18/2019.    Specialty:  Internal Medicine  Why:  For Follow-up and Recheck , Outpatient Services@1:40pm  Office will call pt if earlier appointment is available  Contact information:  [address] [place] [address]  [phone]             [facility] [place].    Specialty:  Home Health Services  Why:  Home Health  Contact information:  [address]  [address]  [place] [id number] [phone]

## 2019-03-28 NOTE — NURSING
@1857 - RRT notified of breathing tx orders.     @1952 - Pt says that she is willing to be admitted, but is concerned about her  going home alone with no

## 2019-03-28 NOTE — ED PROVIDER NOTES
Encounter Date: 3/28/2019       History     Chief Complaint   Patient presents with    Shortness of Breath     Increased SOB that began today.  O2 sats 92% on RA.  Increased to 96% after tx.  PMx COPD.       HPI  Review of patient's allergies indicates:   Allergen Reactions    Pcn [penicillins] Other (See Comments)     Fever flushing skin swelling     Biaxin [clarithromycin] Rash    Codeine Rash    Doxycycline Rash    Levaquin [levofloxacin] Rash     Past Medical History:   Diagnosis Date    Arthritis     Carotid disease, bilateral     Cataract     Chronic hyponatremia     COPD (chronic obstructive pulmonary disease)     HLD (hyperlipidemia)     HTN (hypertension)      Past Surgical History:   Procedure Laterality Date    APPENDECTOMY      CATARACT EXTRACTION W/  INTRAOCULAR LENS IMPLANT Right 12/06/2018    Dr. Escobar    CATARACT EXTRACTION W/  INTRAOCULAR LENS IMPLANT Left 12/20/2018    DR. Escobar    CERVICAL SPINE SURGERY      DILATION AND CURETTAGE OF UTERUS      x 2    EYE SURGERY      cataract Rt    FRACTURE SURGERY      Lt leg fracture with hardware    INSERTION, IOL PROSTHESIS Left 12/20/2018    [facility]    INSERTION, IOL PROSTHESIS Right 12/6/2018    [facility]    metatarsal repair      OPEN REDUCTION INTERNAL FIXATION-TIBIAL PLATEAU Left 6/27/2014    [facility]    PHACOEMULSIFICATION, CATARACT Left 12/20/2018    [facility]    PHACOEMULSIFICATION, CATARACT Right 12/6/2018    [facility]    SHOULDER ARTHROSCOPY       Family History   Problem Relation Age of Onset    Heart disease Mother     Cancer Father     Heart disease Maternal Grandfather     Cataracts Sister     No Known Problems Brother     No Known Problems Maternal Aunt     No Known Problems Maternal Uncle     No  Known Problems Paternal Aunt     No Known Problems Paternal Uncle     No Known Problems Maternal Grandmother     No Known Problems Paternal Grandmother     No Known Problems Paternal Grandfather     Amblyopia Neg Hx     Blindness Neg Hx     Glaucoma Neg Hx     Macular degeneration Neg Hx     Retinal detachment Neg Hx     Strabismus Neg Hx     Diabetes Neg Hx     Hypertension Neg Hx     Stroke Neg Hx     Thyroid disease Neg Hx      Social History     Tobacco Use    Smoking status: Former Smoker     Packs/day: 1.00     Years: 45.00     Pack years: 45.00     Types: Cigarettes     Last attempt to quit: 2002     Years since quittin.2    Smokeless tobacco: Never Used   Substance Use Topics    Alcohol use: No    Drug use: No     Review of Systems    Physical Exam     Initial Vitals [19 1310]   BP Pulse Resp Temp SpO2   128/82 72 (!) 28 98.9 °F (37.2 °C) 96 %      MAP       --         Physical Exam    ED Course   Procedures  Labs Reviewed   TROPONIN I     EKG Readings: (Independently Interpreted)   Rhythm: Sinus Tachycardia.   With LVH and occasional PACs.       Imaging Results          X-Ray Chest AP Portable (Final result)  Result time 19 14:49:23    Final result by Hadley Beasley MD (19 14:49:23)                 Impression:      Hyperexpansion of the lungs suggestive of underlying emphysematous changes.    No acute chest disease identified.      Electronically signed by: Hadley Beasley MD  Date:    2019  Time:    14:49             Narrative:    EXAMINATION:  XR CHEST AP PORTABLE    CLINICAL HISTORY:  Asthma;    TECHNIQUE:  Single frontal view of the chest was performed.    COMPARISON:  2017.    FINDINGS:  The heart is not enlarged.  Atherosclerotic calcification is present within the aortic arch.  Superior mediastinal structures are unremarkable.  Pulmonary vasculature is within normal limits.  The lungs appear hyperexpanded suggestive of underlying emphysematous  changes.  There is mild apical parenchymal scarring on the right.  The lungs are free of focal consolidations.  Bony structures are grossly intact.                                                   ED Course as of Mar 28 1634   Thu Mar 28, 2019   1432 Some improvement after nebs.    [MH]   1519 Feeling better.    []      ED Course User Index  [MH] Cathie Taveras MD     Clinical Impression:   {Add your Clinical Impression here. If you haven't documented one yet, please pend the note, finalize a Clinical Impression, and refresh your note before signing.:[id number]}

## 2019-03-29 VITALS
SYSTOLIC BLOOD PRESSURE: 138 MMHG | HEART RATE: 110 BPM | RESPIRATION RATE: 18 BRPM | WEIGHT: 88.38 LBS | TEMPERATURE: 98 F | DIASTOLIC BLOOD PRESSURE: 73 MMHG | OXYGEN SATURATION: 95 % | HEIGHT: 65 IN | BODY MASS INDEX: 14.73 KG/M2

## 2019-03-29 PROCEDURE — 25000003 PHARM REV CODE 250: Mod: HCNC | Performed by: NURSE PRACTITIONER

## 2019-03-29 PROCEDURE — 63600175 PHARM REV CODE 636 W HCPCS: Mod: HCNC | Performed by: EMERGENCY MEDICINE

## 2019-03-29 PROCEDURE — 27000221 HC OXYGEN, UP TO 24 HOURS: Mod: HCNC

## 2019-03-29 PROCEDURE — 96376 TX/PRO/DX INJ SAME DRUG ADON: CPT

## 2019-03-29 PROCEDURE — 25000242 PHARM REV CODE 250 ALT 637 W/ HCPCS: Mod: HCNC | Performed by: NURSE PRACTITIONER

## 2019-03-29 PROCEDURE — G0378 HOSPITAL OBSERVATION PER HR: HCPCS | Mod: HCNC

## 2019-03-29 PROCEDURE — 94761 N-INVAS EAR/PLS OXIMETRY MLT: CPT | Mod: HCNC

## 2019-03-29 PROCEDURE — 25000003 PHARM REV CODE 250: Mod: HCNC | Performed by: EMERGENCY MEDICINE

## 2019-03-29 PROCEDURE — 25000003 PHARM REV CODE 250: Mod: HCNC | Performed by: HOSPITALIST

## 2019-03-29 PROCEDURE — 25000242 PHARM REV CODE 250 ALT 637 W/ HCPCS: Mod: HCNC | Performed by: EMERGENCY MEDICINE

## 2019-03-29 PROCEDURE — 94640 AIRWAY INHALATION TREATMENT: CPT | Mod: HCNC

## 2019-03-29 RX ORDER — IPRATROPIUM BROMIDE 0.5 MG/2.5ML
0.5 SOLUTION RESPIRATORY (INHALATION) EVERY 4 HOURS
Status: DISCONTINUED | OUTPATIENT
Start: 2019-03-29 | End: 2019-03-29 | Stop reason: HOSPADM

## 2019-03-29 RX ORDER — LEVALBUTEROL INHALATION SOLUTION 0.63 MG/3ML
0.63 SOLUTION RESPIRATORY (INHALATION) EVERY 8 HOURS
Status: DISCONTINUED | OUTPATIENT
Start: 2019-03-29 | End: 2019-03-29 | Stop reason: HOSPADM

## 2019-03-29 RX ORDER — CETIRIZINE HYDROCHLORIDE 10 MG/1
10 TABLET ORAL DAILY
Status: DISCONTINUED | OUTPATIENT
Start: 2019-03-29 | End: 2019-03-29 | Stop reason: HOSPADM

## 2019-03-29 RX ADMIN — IPRATROPIUM BROMIDE AND ALBUTEROL SULFATE 3 ML: .5; 3 SOLUTION RESPIRATORY (INHALATION) at 12:03

## 2019-03-29 RX ADMIN — IPRATROPIUM BROMIDE 0.5 MG: 0.5 SOLUTION RESPIRATORY (INHALATION) at 12:03

## 2019-03-29 RX ADMIN — HYDROXYZINE PAMOATE 25 MG: 25 CAPSULE ORAL at 09:03

## 2019-03-29 RX ADMIN — ASPIRIN 81 MG: 81 TABLET, COATED ORAL at 09:03

## 2019-03-29 RX ADMIN — IPRATROPIUM BROMIDE AND ALBUTEROL SULFATE 3 ML: .5; 3 SOLUTION RESPIRATORY (INHALATION) at 08:03

## 2019-03-29 RX ADMIN — GUAIFENESIN AND DEXTROMETHORPHAN 10 ML: 100; 10 SYRUP ORAL at 09:03

## 2019-03-29 RX ADMIN — AMLODIPINE BESYLATE 5 MG: 5 TABLET ORAL at 09:03

## 2019-03-29 RX ADMIN — LOSARTAN POTASSIUM 100 MG: 25 TABLET, FILM COATED ORAL at 09:03

## 2019-03-29 RX ADMIN — CETIRIZINE HYDROCHLORIDE 10 MG: 10 TABLET, FILM COATED ORAL at 09:03

## 2019-03-29 RX ADMIN — IPRATROPIUM BROMIDE 0.5 MG: 0.5 SOLUTION RESPIRATORY (INHALATION) at 04:03

## 2019-03-29 RX ADMIN — LEVALBUTEROL HYDROCHLORIDE 0.63 MG: 0.63 SOLUTION RESPIRATORY (INHALATION) at 04:03

## 2019-03-29 RX ADMIN — METHYLPREDNISOLONE SODIUM SUCCINATE 125 MG: 125 INJECTION, POWDER, FOR SOLUTION INTRAMUSCULAR; INTRAVENOUS at 05:03

## 2019-03-29 RX ADMIN — IPRATROPIUM BROMIDE AND ALBUTEROL SULFATE 3 ML: .5; 3 SOLUTION RESPIRATORY (INHALATION) at 04:03

## 2019-03-29 NOTE — ED NOTES
Entered pts room to find pt coughing. She reports return of SOB, and work of breathing increased. Pt O2 sat 88% on room air, pt breathing 30 x per min. Dr. Taveras called to bedside to examine pt

## 2019-03-29 NOTE — SUBJECTIVE & OBJECTIVE
Past Medical History:   Diagnosis Date    Arthritis     Carotid disease, bilateral     Cataract     Chronic hyponatremia     COPD (chronic obstructive pulmonary disease)     HLD (hyperlipidemia)     HTN (hypertension)        Past Surgical History:   Procedure Laterality Date    APPENDECTOMY      CATARACT EXTRACTION W/  INTRAOCULAR LENS IMPLANT Right 12/06/2018    Dr. Escobar    CATARACT EXTRACTION W/  INTRAOCULAR LENS IMPLANT Left 12/20/2018    DR. Escobar    CERVICAL SPINE SURGERY      DILATION AND CURETTAGE OF UTERUS      x 2    EYE SURGERY      cataract Rt    FRACTURE SURGERY      Lt leg fracture with hardware    INSERTION, IOL PROSTHESIS Left 12/20/2018    [facility]    INSERTION, IOL PROSTHESIS Right 12/6/2018    [facility]    metatarsal repair      OPEN REDUCTION INTERNAL FIXATION-TIBIAL PLATEAU Left 6/27/2014    [facility]    PHACOEMULSIFICATION, CATARACT Left 12/20/2018    [facility]    PHACOEMULSIFICATION, CATARACT Right 12/6/2018    [facility]    SHOULDER ARTHROSCOPY         Review of patient's allergies indicates:   Allergen Reactions    Pcn [penicillins] Other (See Comments)     Fever flushing skin swelling     Biaxin [clarithromycin] Rash    Codeine Rash    Doxycycline Rash    Levaquin [levofloxacin] Rash       Current Facility-Administered Medications on File Prior to Encounter   Medication    0.9%  NaCl infusion    0.9%  NaCl infusion    cyclopentolate 1% ophthalmic solution 1 drop    phenylephrine HCL 2.5% ophthalmic solution 1 drop    phenylephrine HCL 2.5% ophthalmic solution 1 drop    proparacaine 0.5 % ophthalmic solution 1 drop    proparacaine 0.5 % ophthalmic solution 1 drop    tropicamide 1% ophthalmic solution 1 drop    tropicamide 1% ophthalmic solution 1 drop      Current Outpatient Medications on File Prior to Encounter   Medication Sig    alendronate (FOSAMAX) 70 MG tablet TAKE 1 TABLET BY MOUTH ONCE A WEEK EVERY 7 DAYS, AS DIRECTED    amLODIPine (NORVASC) 5 MG tablet TAKE 1 TABLET EVERY DAY    aspirin (ECOTRIN) 81 MG EC tablet Take 81 mg by mouth once daily.    calcium carbonate (CALCIUM 500 ORAL) Take by mouth 2 (two) times daily.     cetirizine (ZYRTEC) 10 MG tablet Take 10 mg by mouth once daily.    fluticasone-salmeterol 250-50 mcg/dose (ADVAIR) 250-50 mcg/dose diskus inhaler Inhale 1 puff into the lungs 2 (two) times daily. Controller    losartan (COZAAR) 100 MG tablet TAKE 1 TABLET ONE TIME DAILY    pravastatin (PRAVACHOL) 20 MG tablet TAKE 1 TABLET EVERY EVENING    predniSONE (DELTASONE) 20 MG tablet TAKE 2 TABLETS(40 MG) BY MOUTH EVERY DAY FOR 5 DAYS    SPIRIVA WITH HANDIHALER 18 mcg inhalation capsule     VENTOLIN HFA 90 mcg/actuation inhaler INHALE TWO PUFFS BY MOUTH EVERY 6 HOURS AS NEEDED FOR WHEEZING    acetaminophen (TYLENOL EXTRA STRENGTH ORAL) Take by mouth as needed.    azithromycin (ZITHROMAX Z-IMELDA) 250 MG tablet 2 pills on day 1, then 1 pill a day    latanoprost 0.005 % ophthalmic solution Place 1 drop into the left eye nightly.    losartan (COZAAR) 100 MG tablet TAKE 1 TABLET EVERY DAY    MULTIVITAMIN W-MINERALS/LUTEIN (CENTRUM SILVER ORAL) Take by mouth once daily.     Family History     Problem Relation (Age of Onset)    Cancer Father    Cataracts Sister    Heart disease Mother, Maternal Grandfather    No Known Problems Brother, Maternal Aunt, Maternal Uncle, Paternal Aunt, Paternal Uncle, Maternal Grandmother, Paternal Grandmother, Paternal Grandfather        Tobacco Use    Smoking status: Former Smoker     Packs/day: 1.00     Years: 45.00     Pack years: 45.00     Types: Cigarettes     Last attempt to quit: 2002     Years since quittin.2    Smokeless tobacco: Never Used   Substance and Sexual Activity    Alcohol use:  No    Drug use: No    Sexual activity: Yes     Partners: Male     Review of Systems   Constitutional: Negative for chills, fatigue and fever.   Eyes: Negative for photophobia and visual disturbance.   Respiratory: Positive for cough and shortness of breath.    Cardiovascular: Negative for chest pain, palpitations and leg swelling.   Gastrointestinal: Negative for abdominal pain, diarrhea, nausea and vomiting.   Genitourinary: Negative for dysuria, frequency and urgency.   Skin: Negative for pallor, rash and wound.   Neurological: Negative for light-headedness and headaches.   Psychiatric/Behavioral: Negative for confusion and decreased concentration.     Objective:     Vital Signs (Most Recent):  Temp: 97.7 °F (36.5 °C) (03/28/19 1935)  Pulse: (!) 128 (03/28/19 2103)  Resp: (!) 24 (03/28/19 2008)  BP: 133/62 (03/28/19 2103)  SpO2: 98 % (03/28/19 2103) Vital Signs (24h Range):  Temp:  [97.7 °F (36.5 °C)-98.9 °F (37.2 °C)] 97.7 °F (36.5 °C)  Pulse:  [] 128  Resp:  [21-38] 24  SpO2:  [88 %-100 %] 98 %  BP: (128-171)/() 133/62     Weight: 40.8 kg (90 lb)  Body mass index is 14.98 kg/m².    Physical Exam   Constitutional: She is oriented to person, place, and time. She appears well-developed. She has a sickly appearance. No distress.   HENT:   Head: Normocephalic and atraumatic.   Right Ear: External ear normal.   Left Ear: External ear normal.   Nose: Nose normal.   Mouth/Throat: Oropharynx is clear and moist.   Eyes: Pupils are equal, round, and reactive to light. Conjunctivae and EOM are normal.   Neck: Normal range of motion. Neck supple.   Cardiovascular: Regular rhythm and intact distal pulses. Tachycardia present.   Pulmonary/Chest: Accessory muscle usage present. Tachypnea noted. No respiratory distress. She has decreased breath sounds. She has wheezes.   Abdominal: Soft. Bowel sounds are normal. She exhibits no distension. There is no tenderness.   No palpable hepatomegaly or splenomegaly     Musculoskeletal: Normal range of motion. She exhibits no edema or tenderness.   Neurological: She is alert and oriented to person, place, and time.   Skin: Skin is warm and dry. There is pallor.   Psychiatric: Thought content normal. Her mood appears anxious.   Nursing note and vitals reviewed.        CRANIAL NERVES     CN III, IV, VI   Pupils are equal, round, and reactive to light.  Extraocular motions are normal.        Significant Labs: All pertinent labs within the past 24 hours have been reviewed.    Significant Imaging: I have reviewed all pertinent imaging results/findings within the past 24 hours.

## 2019-03-29 NOTE — PROGRESS NOTES
TN taught Symptoms and Problems for COPD home care with pt and with teach back:  1. INCREASED WHEEZING, 2. INCREASE USE OF FAST ACTING INHALER. TN placed education sheet in [employer]..     Help at home will be from , assisting in pt's recovery.     TN taught patient about things SHE is responsible for when discharged TO HELP WITH HER RECOVERY:  Particularly on how to Manage HER Care At Home:  1. Getting his prescriptions filled.  2. Taking his medications as directed. DO NOT MISS ANY DOSES!  3. Going to his follow-up doctor appointments.   .  Danette Ramirez RN, BSN, STN CCM

## 2019-03-29 NOTE — HOSPITAL COURSE
Treated with Iv steroids and oxygen supplementations with duo nebs with improvement. The patient reported not wearing her oxygen at home and was encouraged to do so. She has multiple allergies therefore most abx contraindicated. Will defer abx at discharge and continue oxygen contiguous at home as well as duo nebs. Vitals stable and oxygen 93% on 2 L. Discharge to home. Patient instructed to return to ed if problems arise. 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

## 2019-03-29 NOTE — ASSESSMENT & PLAN NOTE
Complaints of dyspnea, airway tightness, and cough.  Wheezes and markedly decreased air movement on exam.  COPD exacerbation: severity = severe  is not on home oxygen therapy.  -continue supplemental oxygen  -IV corticosteroids  -scheduled nebs  -no antibiotics for now due to allergies to most classes used in COPD exacerbation

## 2019-03-29 NOTE — ED TRIAGE NOTES
Pt arrived to ED via personal transport with c/o SOB, cough and weakness beginning yesterday. Pt denies fever or chills. She is AAO x 4

## 2019-03-29 NOTE — ED NOTES
Report by Kimberley ALEMAN RN. AAOx4. Patient currently doing her breathing treatments - -130s. Pt appears to still be working hard to breathe while on treatment and tachypneic (27). Pt does report that she still feels SOB and still feels some chest tightness. Faint wheezes noted to posterior RLL/LLL and diminished to upper lobes. MD made aware of patient's current condition.

## 2019-03-29 NOTE — PLAN OF CARE
"   03/29/19 0949   Discharge Assessment   Assessment Type Discharge Planning Assessment   Confirmed/corrected address and phone number on facesheet? Yes   Assessment information obtained from? Patient   Communicated expected length of stay with patient/caregiver no   Prior to hospitilization cognitive status: Alert/Oriented   Prior to hospitalization functional status: Independent   Current cognitive status: Alert/Oriented   Current Functional Status: Independent   Lives With spouse   Able to Return to Prior Arrangements yes   Is patient able to care for self after discharge? Yes   Patient's perception of discharge disposition home or selfcare   Readmission Within the Last 30 Days no previous admission in last 30 days   Patient currently being followed by outpatient case management? No   Patient currently receives any other outside agency services? No   Equipment Currently Used at Home none   Do you have any problems affording any of your prescribed medications? No   Is the patient taking medications as prescribed? yes   Does the patient have transportation home? Yes   Transportation Anticipated   (Pt states she may need to take a cab )   Does the patient receive services at the [facility]? No   Discharge Plan A Home with family   Discharge Plan B Home Health   DME Needed Upon Discharge  none   Patient/Family in Agreement with Plan yes     TN explained  role, "Help Manage Care at Home", blue folder at bedside "My Health Packet", pink and green stickers on folder reviewed with patient, voiced understanding    Prefers late morning appointments, preferably 10am      [facility] - [place]  [address] [place] [address] AT [address]  [place] [address]  Phone: [phone] Fax: [phone]    [facility] - [facility] [address]  [address]  [facility] [address]  Phone: [phone] Fax: [phone]    [facility] - [place] [address]  [address] [facility] [place] [address]  Phone: [phone] Fax: [phone]

## 2019-03-29 NOTE — HPI
73 y.o. female with hypertension, hyperlipidemia, carotid artery disease, COPD presents with a complaint of shortness of breath for the past 2 days.  Associated with cough, occasionally productive of sputum.  Uses Advair daily, but continues to smoke.  Denies fever, chills chest pain, palpitations, orthopnea, PND, lower extremity edema, dizziness, syncope, nausea, vomiting, diarrhea, abdominal pain, dysuria, frequency, or urgency.  In the ED, she was found to be wheezing with poor air movement without much improvement after breathing treatments and steroids.  Chest x-ray shows hyperexpansion of the lungs suggestive of underlying emphysematous changes but no acute abnormality.  Placed in observation for further evaluation treatment.

## 2019-03-29 NOTE — NURSING
Discharge instructions explained and given to patient.  Questions and concerns addressed.  Emotional support provided.    Patient waiting on friend to pick her up and bring her home.

## 2019-03-29 NOTE — NURSING
Discharge orders received.   IV discontinued without complaint, catheter intact.  Telemetry monitoring discontinued.      Patient AAO x 4.  Anxiety noted (chronic).  Currently denies pain or nausea.  She states she has chronic SOB d/t COPD.  Home oxygen 2L PRN (prior to admit).  No distress noted.    No falls or harm noted during stay.

## 2019-03-29 NOTE — PROGRESS NOTES
WRITTEN HEALTHCARE DISCHARGE INFORMATION      Things that YOU are responsible for to Manage Your Care At Home:  1. Getting your prescriptions filled.  2. Taking you medications as directed. DO NOT MISS ANY DOSES!  3. Going to your follow-up doctor appointments. This is important because it allows the doctor to monitor your progress and to determine if any changes need to be made to your treatment plan.     If you are unable to make your follow up appointments, please call the number listed and reschedule this appointment.      After discharge, if you need assistance, you can call [facility] On Call Nurse Care Line for 24/7 assistance at [phone]     If you are experience any signs or symptoms, Call your doctor or Call 911 and come to your nearest Emergency Room.     Thank you for choosing [facility] and allowing us to care for you.        You should receive a call from [facility] Discharge Department within 48-72 hours to help manage your care after discharge. Please try to make sure that you answer your phone for this important phone call.     Follow-up Information     Pollo Oneal MD On 4/18/2019.    Specialty:  Internal Medicine  Why:  For Follow-up and Recheck , Outpatient Services@1:40pm  Office will call pt if earlier appointment is available  Contact information:  [address] [facility]  [place] [id number] [phone]             [facility] [place].    Specialty:  Home Health Services  Why:  Home Health  Contact information:  [address] [facility].  [address]  [place] [address]  [phone]

## 2019-03-29 NOTE — DISCHARGE SUMMARY
[facility] Hospital Medicine  Discharge Summary      Patient Name: Latasha Perez  MRN: [id number]  Admission Date: 3/28/2019  Hospital Length of Stay: 0 days  Discharge Date and Time:  03/29/2019 2:59 PM  Attending Physician: Abbey Jeong MD   Discharging Provider: ARACELIS Quan  Primary Care Provider: Pollo Oneal MD      HPI:   73 y.o. female with hypertension, hyperlipidemia, carotid artery disease, COPD presents with a complaint of shortness of breath for the past 2 days.  Associated with cough, occasionally productive of sputum.  Uses Advair daily, but continues to smoke.  Denies fever, chills chest pain, palpitations, orthopnea, PND, lower extremity edema, dizziness, syncope, nausea, vomiting, diarrhea, abdominal pain, dysuria, frequency, or urgency.  In the ED, she was found to be wheezing with poor air movement without much improvement after breathing treatments and steroids.  Chest x-ray shows hyperexpansion of the lungs suggestive of underlying emphysematous changes but no acute abnormality.  Placed in observation for further evaluation treatment.    * No surgery found *      Hospital Course:   Treated with Iv steroids and oxygen supplementations with duo nebs with improvement. The patient reported not wearing her oxygen at home and was encouraged to do so. She has multiple allergies therefore most abx contraindicated. Will defer abx at discharge and continue oxygen contiguous at home as well as duo nebs. Vitals stable and oxygen 93% on 2 L. Discharge to home. Patient instructed to return to ed if problems arise. 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     Consults:   Consults (From admission, onward)        Status Ordering Provider     Inpatient consult to Registered Dietitian/Nutritionist  Once     Provider:  (Not yet assigned)    Acknowledged ALDO SOLANO     Inpatient consult to Social Work  Once    "  Provider:  (Not yet assigned)    Acknowledged BERNARD LANDRY            Final Active Diagnoses:    Diagnosis Date Noted POA    PRINCIPAL PROBLEM:  COPD exacerbation [J44.1] 03/28/2019 Yes    Essential hypertension [I10] 04/13/2017 Yes    Low weight [R63.6] 12/04/2015 Yes    HLD (hyperlipidemia) [E78.5]  Yes    Carotid disease, bilateral [I77.9]  Yes      Problems Resolved During this Admission:       Discharged Condition: stable    Disposition: Home or Self Care    Follow Up:  Follow-up Information     Pollo Oneal MD On 4/18/2019.    Specialty:  Internal Medicine  Why:  For Follow-up and Recheck , Outpatient Services@1:40pm  Office will call pt if earlier appointment is available  Contact information:  [address] [facility] [address] [phone]             [facility] [place].    Specialty:  Home Health Services  Why:  Home Health  Contact information:  [address] [facility].  [address]  [place] [id number] [phone]                 Patient Instructions:      NEBULIZER FOR HOME USE     Order Specific Question Answer Comments   Height: 5' 5" (1.651 m)    Weight: 40.8 kg (90 lb)    Does patient have medical equipment at home? none    Length of need (1-99 months): 12      NEBULIZER KIT (SUPPLIES) FOR HOME USE     Order Specific Question Answer Comments   Height: 5' 5" (1.651 m)    Weight: 40.8 kg (90 lb)    Does patient have medical equipment at home? none    Length of need (1-99 months): 12    Mask or Mouthpiece? Mouthpiece      Diet Cardiac     Activity as tolerated       Significant Diagnostic Studies: Labs:   CMP No results for input(s): NA, K, CL, CO2, GLU, BUN, CREATININE, CALCIUM, PROT, ALBUMIN, BILITOT, ALKPHOS, AST, ALT, ANIONGAP, ESTGFRAFRICA, EGFRNONAA in the last 48 hours., CBC No results for input(s): WBC, HGB, HCT, PLT in the last 48 hours., INR   Lab Results   Component Value Date    INR 1.0 12/04/2017   , Lipid Panel   Lab Results   Component Value Date    CHOL 198 " 04/18/2018    HDL 91 (H) 04/18/2018    LDLCALC 90.0 04/18/2018    TRIG 85 04/18/2018    CHOLHDL 46.0 04/18/2018   , Troponin   Recent Labs   Lab 03/28/19  1400   TROPONINI <0.006    and A1C: No results for input(s): HGBA1C in the last 4320 hours.    Pending Diagnostic Studies:     None         Medications:  Reconciled Home Medications:      Medication List      CHANGE how you take these medications    * predniSONE 20 MG tablet  Commonly known as:  DELTASONE  TAKE 2 TABLETS(40 MG) BY MOUTH EVERY DAY FOR 5 DAYS  What changed:  Another medication with the same name was added. Make sure you understand how and when to take each.     * predniSONE 20 MG tablet  Commonly known as:  DELTASONE  Take 2 tablets (40 mg total) by mouth once daily. for 5 days  What changed:  You were already taking a medication with the same name, and this prescription was added. Make sure you understand how and when to take each.     * VENTOLIN HFA 90 mcg/actuation inhaler  Generic drug:  albuterol  INHALE TWO PUFFS BY MOUTH EVERY 6 HOURS AS NEEDED FOR WHEEZING  What changed:  Another medication with the same name was added. Make sure you understand how and when to take each.     * albuterol sulfate 2.5 mg/0.5 mL Nebu  Take 2.5 mg by nebulization every 4 (four) hours as needed. One Box  What changed:  You were already taking a medication with the same name, and this prescription was added. Make sure you understand how and when to take each.         * This list has 4 medication(s) that are the same as other medications prescribed for you. Read the directions carefully, and ask your doctor or other care provider to review them with you.            CONTINUE taking these medications    alendronate 70 MG tablet  Commonly known as:  FOSAMAX  TAKE 1 TABLET BY MOUTH ONCE A WEEK EVERY 7 DAYS, AS DIRECTED     amLODIPine 5 MG tablet  Commonly known as:  NORVASC  TAKE 1 TABLET EVERY DAY     aspirin 81 MG EC tablet  Commonly known as:  ECOTRIN  Take 81 mg by  mouth once daily.     azithromycin 250 MG tablet  Commonly known as:  ZITHROMAX Z-IMELDA  2 pills on day 1, then 1 pill a day     CALCIUM 500 ORAL  Take by mouth 2 (two) times daily.     CENTRUM SILVER ORAL  Take by mouth once daily.     cetirizine 10 MG tablet  Commonly known as:  ZYRTEC  Take 10 mg by mouth once daily.     fluticasone-salmeterol 250-50 mcg/dose 250-50 mcg/dose diskus inhaler  Commonly known as:  ADVAIR  Inhale 1 puff into the lungs 2 (two) times daily. Controller     latanoprost 0.005 % ophthalmic solution  Place 1 drop into the left eye nightly.     * losartan 100 MG tablet  Commonly known as:  COZAAR  TAKE 1 TABLET ONE TIME DAILY     * losartan 100 MG tablet  Commonly known as:  COZAAR  TAKE 1 TABLET EVERY DAY     pravastatin 20 MG tablet  Commonly known as:  PRAVACHOL  TAKE 1 TABLET EVERY EVENING     SPIRIVA WITH HANDIHALER 18 mcg inhalation capsule  Generic drug:  tiotropium     TYLENOL EXTRA STRENGTH ORAL  Take by mouth as needed.         * This list has 2 medication(s) that are the same as other medications prescribed for you. Read the directions carefully, and ask your doctor or other care provider to review them with you.                Indwelling Lines/Drains at time of discharge:   Lines/Drains/Airways          None          Time spent on the discharge of patient: 35 minutes  Patient was seen and examined on the date of discharge and determined to be suitable for discharge.         THELMA Kim, FNP-C  Hospitalist - Department of Hospital Medicine  [address] [place] [address]  Office [phone]; Pager [phone]

## 2019-03-29 NOTE — PLAN OF CARE
"   03/29/19 1532   Final Note   Assessment Type Final Discharge Note   Anticipated Discharge Disposition [facility]   Hospital Follow Up  Appt(s) scheduled? Yes   Discharge plans and expectations educations in teach back method with documentation complete? Yes   Right Care Referral Info   Post Acute Recommendation Home-care   Referral Type home health    Facility Name [facility]    pts nurse Lubna advised that all CM arrangements have been completed and can d/c from CM standpoint    EDUCATION:  provided with educational information on COPD.  Information reviewed and placed in :My Healthcare Packet" to be brought home  to use as resource after discharge.  Information included:  signs and symptoms to look for and call the doctor if experiencing, and symptoms that may indicate a medical emergency: CALL 911.      All questions answered.  Teach back method used.    Patient stated, "will take medications as prescribed and go to follow up appointments as scheduled  ".        "

## 2019-03-29 NOTE — H&P
[facility] Hospital Medicine  History & Physical    Patient Name: Latasha Perez  MRN: [id number]  Admission Date: 3/28/2019  Attending Physician: No att. providers found   Primary Care Provider: Pollo Oneal MD         Patient information was obtained from patient, past medical records and ER records.     Subjective:     Principal Problem:COPD exacerbation    Chief Complaint:   Chief Complaint   Patient presents with    Shortness of Breath     Increased SOB that began today.  O2 sats 92% on RA.  Increased to 96% after tx.  PMx COPD.          HPI: 73 y.o. female with hypertension, hyperlipidemia, carotid artery disease, COPD presents with a complaint of shortness of breath for the past 2 days.  Associated with cough, occasionally productive of sputum.  Uses Advair daily, but continues to smoke.  Denies fever, chills chest pain, palpitations, orthopnea, PND, lower extremity edema, dizziness, syncope, nausea, vomiting, diarrhea, abdominal pain, dysuria, frequency, or urgency.  In the ED, she was found to be wheezing with poor air movement without much improvement after breathing treatments and steroids.  Chest x-ray shows hyperexpansion of the lungs suggestive of underlying emphysematous changes but no acute abnormality.  Placed in observation for further evaluation treatment.    Past Medical History:   Diagnosis Date    Arthritis     Carotid disease, bilateral     Cataract     Chronic hyponatremia     COPD (chronic obstructive pulmonary disease)     HLD (hyperlipidemia)     HTN (hypertension)        Past Surgical History:   Procedure Laterality Date    APPENDECTOMY      CATARACT EXTRACTION W/  INTRAOCULAR LENS IMPLANT Right 12/06/2018    Dr. Escobar    CATARACT EXTRACTION W/  INTRAOCULAR LENS IMPLANT Left 12/20/2018    DR. Escobar    CERVICAL SPINE SURGERY      DILATION AND CURETTAGE OF UTERUS      x 2    EYE SURGERY      cataract Rt    FRACTURE SURGERY      Lt leg  fracture with hardware    INSERTION, IOL PROSTHESIS Left 12/20/2018    [facility]    INSERTION, IOL PROSTHESIS Right 12/6/2018    [facility]    metatarsal repair      OPEN REDUCTION INTERNAL FIXATION-TIBIAL PLATEAU Left 6/27/2014    [facility]    PHACOEMULSIFICATION, CATARACT Left 12/20/2018    [facility]    PHACOEMULSIFICATION, CATARACT Right 12/6/2018    [facility]    SHOULDER ARTHROSCOPY         Review of patient's allergies indicates:   Allergen Reactions    Pcn [penicillins] Other (See Comments)     Fever flushing skin swelling     Biaxin [clarithromycin] Rash    Codeine Rash    Doxycycline Rash    Levaquin [levofloxacin] Rash       Current Facility-Administered Medications on File Prior to Encounter   Medication    0.9%  NaCl infusion    0.9%  NaCl infusion    cyclopentolate 1% ophthalmic solution 1 drop    phenylephrine HCL 2.5% ophthalmic solution 1 drop    phenylephrine HCL 2.5% ophthalmic solution 1 drop    proparacaine 0.5 % ophthalmic solution 1 drop    proparacaine 0.5 % ophthalmic solution 1 drop    tropicamide 1% ophthalmic solution 1 drop    tropicamide 1% ophthalmic solution 1 drop     Current Outpatient Medications on File Prior to Encounter   Medication Sig    alendronate (FOSAMAX) 70 MG tablet TAKE 1 TABLET BY MOUTH ONCE A WEEK EVERY 7 DAYS, AS DIRECTED    amLODIPine (NORVASC) 5 MG tablet TAKE 1 TABLET EVERY DAY    aspirin (ECOTRIN) 81 MG EC tablet Take 81 mg by mouth once daily.    calcium carbonate (CALCIUM 500 ORAL) Take by mouth 2 (two) times daily.     cetirizine (ZYRTEC) 10 MG tablet Take 10 mg by mouth once daily.    fluticasone-salmeterol 250-50 mcg/dose (ADVAIR) 250-50 mcg/dose diskus inhaler Inhale 1 puff into the lungs 2 (two) times daily. Controller    losartan  (COZAAR) 100 MG tablet TAKE 1 TABLET ONE TIME DAILY    pravastatin (PRAVACHOL) 20 MG tablet TAKE 1 TABLET EVERY EVENING    predniSONE (DELTASONE) 20 MG tablet TAKE 2 TABLETS(40 MG) BY MOUTH EVERY DAY FOR 5 DAYS    SPIRIVA WITH HANDIHALER 18 mcg inhalation capsule     VENTOLIN HFA 90 mcg/actuation inhaler INHALE TWO PUFFS BY MOUTH EVERY 6 HOURS AS NEEDED FOR WHEEZING    acetaminophen (TYLENOL EXTRA STRENGTH ORAL) Take by mouth as needed.    azithromycin (ZITHROMAX Z-IMELDA) 250 MG tablet 2 pills on day 1, then 1 pill a day    latanoprost 0.005 % ophthalmic solution Place 1 drop into the left eye nightly.    losartan (COZAAR) 100 MG tablet TAKE 1 TABLET EVERY DAY    MULTIVITAMIN W-MINERALS/LUTEIN (CENTRUM SILVER ORAL) Take by mouth once daily.     Family History     Problem Relation (Age of Onset)    Cancer Father    Cataracts Sister    Heart disease Mother, Maternal Grandfather    No Known Problems Brother, Maternal Aunt, Maternal Uncle, Paternal Aunt, Paternal Uncle, Maternal Grandmother, Paternal Grandmother, Paternal Grandfather        Tobacco Use    Smoking status: Former Smoker     Packs/day: 1.00     Years: 45.00     Pack years: 45.00     Types: Cigarettes     Last attempt to quit: 2002     Years since quittin.2    Smokeless tobacco: Never Used   Substance and Sexual Activity    Alcohol use: No    Drug use: No    Sexual activity: Yes     Partners: Male     Review of Systems   Constitutional: Negative for chills, fatigue and fever.   Eyes: Negative for photophobia and visual disturbance.   Respiratory: Positive for cough and shortness of breath.    Cardiovascular: Negative for chest pain, palpitations and leg swelling.   Gastrointestinal: Negative for abdominal pain, diarrhea, nausea and vomiting.   Genitourinary: Negative for dysuria, frequency and urgency.   Skin: Negative for pallor, rash and wound.   Neurological: Negative for light-headedness and headaches.   Psychiatric/Behavioral:  Negative for confusion and decreased concentration.     Objective:     Vital Signs (Most Recent):  Temp: 97.7 °F (36.5 °C) (03/28/19 1935)  Pulse: (!) 128 (03/28/19 2103)  Resp: (!) 24 (03/28/19 2008)  BP: 133/62 (03/28/19 2103)  SpO2: 98 % (03/28/19 2103) Vital Signs (24h Range):  Temp:  [97.7 °F (36.5 °C)-98.9 °F (37.2 °C)] 97.7 °F (36.5 °C)  Pulse:  [] 128  Resp:  [21-38] 24  SpO2:  [88 %-100 %] 98 %  BP: (128-171)/() 133/62     Weight: 40.8 kg (90 lb)  Body mass index is 14.98 kg/m².    Physical Exam   Constitutional: She is oriented to person, place, and time. She appears well-developed. She has a sickly appearance. No distress.   HENT:   Head: Normocephalic and atraumatic.   Right Ear: External ear normal.   Left Ear: External ear normal.   Nose: Nose normal.   Mouth/Throat: Oropharynx is clear and moist.   Eyes: Pupils are equal, round, and reactive to light. Conjunctivae and EOM are normal.   Neck: Normal range of motion. Neck supple.   Cardiovascular: Regular rhythm and intact distal pulses. Tachycardia present.   Pulmonary/Chest: Accessory muscle usage present. Tachypnea noted. No respiratory distress. She has decreased breath sounds. She has wheezes.   Abdominal: Soft. Bowel sounds are normal. She exhibits no distension. There is no tenderness.   No palpable hepatomegaly or splenomegaly    Musculoskeletal: Normal range of motion. She exhibits no edema or tenderness.   Neurological: She is alert and oriented to person, place, and time.   Skin: Skin is warm and dry. There is pallor.   Psychiatric: Thought content normal. Her mood appears anxious.   Nursing note and vitals reviewed.        CRANIAL NERVES     CN III, IV, VI   Pupils are equal, round, and reactive to light.  Extraocular motions are normal.        Significant Labs: All pertinent labs within the past 24 hours have been reviewed.    Significant Imaging: I have reviewed all pertinent imaging results/findings within the past 24  hours.    Assessment/Plan:     * COPD exacerbation  Complaints of dyspnea, airway tightness, and cough.  Wheezes and markedly decreased air movement on exam.  COPD exacerbation: severity = severe  is not on home oxygen therapy.  -continue supplemental oxygen  -IV corticosteroids  -scheduled nebs  -no antibiotics for now due to allergies to most classes used in COPD exacerbation    Essential hypertension  Well controlled, continue home medications and monitor blood pressure, adjust as needed.     Low weight  Encourage proper nutrition and p.o. Intake, Body mass index is 14.98 kg/m².     Carotid disease, bilateral  Continue ASA/statin    HLD (hyperlipidemia)  Continue statin      VTE Risk Mitigation (From admission, onward)    None        Remy Rosario Jr., APRN, AGACNP-BC  Hospitalist - Department of Hospital Medicine  [facility] [address] [place] [address]  Office #: [phone]; Pager #: [phone]

## 2019-04-01 ENCOUNTER — TELEPHONE (OUTPATIENT)
Dept: FAMILY MEDICINE | Facility: CLINIC | Age: 74
End: 2019-04-01

## 2019-04-01 NOTE — TELEPHONE ENCOUNTER
----- Message from Rosalinda Seals sent at 4/1/2019 11:49 AM CDT -----  Contact: Natalia Sevilla    Patient's niece is requesting she  ( weaker and barely able to get around ) and her  be placed in a skills or nursing rehab facility because they don't have family to take care of them and [place] of [place]. Please call Natalia at [phone].

## 2019-04-01 NOTE — TELEPHONE ENCOUNTER
Informed patient's HHN and family member of information below. Verbalized understanding. A  is scheduled to see patient 4/2/19.

## 2019-04-01 NOTE — TELEPHONE ENCOUNTER
please explain to them it is difficult if not impossible for us to place them in a skilled nursing facility from the outpatient setting.  We can try to get outpatient case management to evaluate.  The process of getting in a skilled unit can take quite sometime and typically patients need to go research a facility, choose a facility they want and so forth      Referral for case management put in as well      Looks like home health called, have them get a  to come out ASAP and so forth

## 2019-04-02 ENCOUNTER — HOSPITAL ENCOUNTER (INPATIENT)
Facility: HOSPITAL | Age: 74
LOS: 9 days | Discharge: NURSING, REHAB OR LONG-TERM CARE | DRG: 189 | End: 2019-04-11
Attending: EMERGENCY MEDICINE | Admitting: HOSPITALIST
Payer: MEDICARE

## 2019-04-02 DIAGNOSIS — Z51.5 PALLIATIVE CARE ENCOUNTER: ICD-10-CM

## 2019-04-02 DIAGNOSIS — I48.91 A-FIB: ICD-10-CM

## 2019-04-02 DIAGNOSIS — I48.91 ATRIAL FIBRILLATION WITH RVR: Primary | ICD-10-CM

## 2019-04-02 DIAGNOSIS — J44.1 COPD EXACERBATION: ICD-10-CM

## 2019-04-02 DIAGNOSIS — R07.9 CHEST PAIN: ICD-10-CM

## 2019-04-02 DIAGNOSIS — R06.02 SHORTNESS OF BREATH: ICD-10-CM

## 2019-04-02 PROBLEM — J96.22 ACUTE ON CHRONIC RESPIRATORY FAILURE WITH HYPERCAPNIA: Status: ACTIVE | Noted: 2019-04-02

## 2019-04-02 PROBLEM — J18.9 PNEUMONIA: Status: ACTIVE | Noted: 2019-04-02

## 2019-04-02 PROBLEM — R64 CACHEXIA: Status: ACTIVE | Noted: 2019-04-02

## 2019-04-02 LAB
ALBUMIN SERPL BCP-MCNC: 2.6 G/DL (ref 3.5–5.2)
ALLENS TEST: ABNORMAL
ALP SERPL-CCNC: 86 U/L (ref 55–135)
ALT SERPL W/O P-5'-P-CCNC: 29 U/L (ref 10–44)
ANION GAP SERPL CALC-SCNC: 8 MMOL/L (ref 8–16)
AORTIC ROOT ANNULUS: 1.28 CM
AORTIC VALVE CUSP SEPERATION: 1.17 CM
AST SERPL-CCNC: 18 U/L (ref 10–40)
AV INDEX (PROSTH): 1.26
AV MEAN GRADIENT: 3.85 MMHG
AV PEAK GRADIENT: 8.41 MMHG
AV VALVE AREA: 2.63 CM2
AV VELOCITY RATIO: 0.85
BASOPHILS # BLD AUTO: 0.05 K/UL (ref 0–0.2)
BASOPHILS NFR BLD: 0.1 % (ref 0–1.9)
BILIRUB SERPL-MCNC: 0.5 MG/DL (ref 0.1–1)
BNP SERPL-MCNC: 371 PG/ML (ref 0–99)
BUN SERPL-MCNC: 34 MG/DL (ref 8–23)
CALCIUM SERPL-MCNC: 10.1 MG/DL (ref 8.7–10.5)
CHLORIDE SERPL-SCNC: 95 MMOL/L (ref 95–110)
CO2 SERPL-SCNC: 34 MMOL/L (ref 23–29)
CREAT SERPL-MCNC: 0.6 MG/DL (ref 0.5–1.4)
CV ECHO LV RWT: 1.11 CM
DELSYS: ABNORMAL
DIFFERENTIAL METHOD: ABNORMAL
DOP CALC AO PEAK VEL: 1.45 M/S
DOP CALC AO VTI: 11.96 CM
DOP CALC LVOT AREA: 2.09 CM2
DOP CALC LVOT DIAMETER: 1.63 CM
DOP CALC LVOT PEAK VEL: 1.24 M/S
DOP CALC LVOT STROKE VOLUME: 31.51 CM3
DOP CALCLVOT PEAK VEL VTI: 15.11 CM
ECHO LV POSTERIOR WALL: 1.38 CM (ref 0.6–1.1)
EOSINOPHIL # BLD AUTO: 0 K/UL (ref 0–0.5)
EOSINOPHIL NFR BLD: 0 % (ref 0–8)
ERYTHROCYTE [DISTWIDTH] IN BLOOD BY AUTOMATED COUNT: 13.1 % (ref 11.5–14.5)
EST. GFR  (AFRICAN AMERICAN): >60 ML/MIN/1.73 M^2
EST. GFR  (NON AFRICAN AMERICAN): >60 ML/MIN/1.73 M^2
FLOW: 6
FRACTIONAL SHORTENING: 25 % (ref 28–44)
GLUCOSE SERPL-MCNC: 129 MG/DL (ref 70–110)
HCO3 UR-SCNC: 36.3 MMOL/L (ref 24–28)
HCT VFR BLD AUTO: 40.6 % (ref 37–48.5)
HGB BLD-MCNC: 13.6 G/DL (ref 12–16)
INR PPP: 1.1 (ref 0.8–1.2)
INTERVENTRICULAR SEPTUM: 1.23 CM (ref 0.6–1.1)
IVRT: 0.05 MSEC
LA MAJOR: 4 CM
LA MINOR: 2.84 CM
LA WIDTH: 2.64 CM
LEFT ATRIUM SIZE: 2.97 CM
LEFT ATRIUM VOLUME INDEX: 16.2 ML/M2
LEFT ATRIUM VOLUME: 22.14 CM3
LEFT INTERNAL DIMENSION IN SYSTOLE: 1.86 CM (ref 2.1–4)
LEFT VENTRICLE DIASTOLIC VOLUME INDEX: 16.21 ML/M2
LEFT VENTRICLE DIASTOLIC VOLUME: 22.18 ML
LEFT VENTRICLE MASS INDEX: 71.7 G/M2
LEFT VENTRICLE SYSTOLIC VOLUME INDEX: 7.7 ML/M2
LEFT VENTRICLE SYSTOLIC VOLUME: 10.53 ML
LEFT VENTRICULAR INTERNAL DIMENSION IN DIASTOLE: 2.49 CM (ref 3.5–6)
LEFT VENTRICULAR MASS: 98.12 G
LYMPHOCYTES # BLD AUTO: 1.8 K/UL (ref 1–4.8)
LYMPHOCYTES NFR BLD: 5.2 % (ref 18–48)
MAGNESIUM SERPL-MCNC: 1.5 MG/DL (ref 1.6–2.6)
MCH RBC QN AUTO: 31.4 PG (ref 27–31)
MCHC RBC AUTO-ENTMCNC: 33.5 G/DL (ref 32–36)
MCV RBC AUTO: 94 FL (ref 82–98)
MODE: ABNORMAL
MONOCYTES # BLD AUTO: 1.8 K/UL (ref 0.3–1)
MONOCYTES NFR BLD: 5.3 % (ref 4–15)
MV PEAK E VEL: 1.24 M/S
NEUTROPHILS # BLD AUTO: 30.5 K/UL (ref 1.8–7.7)
NEUTROPHILS NFR BLD: 90 % (ref 38–73)
PCO2 BLDA: 61.6 MMHG (ref 35–45)
PH SMN: 7.38 [PH] (ref 7.35–7.45)
PISA TR MAX VEL: 3.47 M/S
PLATELET # BLD AUTO: 313 K/UL (ref 150–350)
PLATELET BLD QL SMEAR: ABNORMAL
PMV BLD AUTO: 8.8 FL (ref 9.2–12.9)
PO2 BLDA: 133 MMHG (ref 80–100)
POC BE: 9 MMOL/L
POC SATURATED O2: 99 % (ref 95–100)
POC TCO2: 38 MMOL/L (ref 23–27)
POTASSIUM SERPL-SCNC: 4.2 MMOL/L (ref 3.5–5.1)
PROT SERPL-MCNC: 6.5 G/DL (ref 6–8.4)
PROTHROMBIN TIME: 11.2 SEC (ref 9–12.5)
PV PEAK VELOCITY: 2.03 CM/S
RA MAJOR: 3.41 CM
RA PRESSURE: 3 MMHG
RA WIDTH: 2.1 CM
RBC # BLD AUTO: 4.33 M/UL (ref 4–5.4)
RIGHT VENTRICULAR END-DIASTOLIC DIMENSION: 2.41 CM
RV TISSUE DOPPLER FREE WALL SYSTOLIC VELOCITY 1 (APICAL 4 CHAMBER VIEW): 10.11 M/S
SAMPLE: ABNORMAL
SINUS: 2.88 CM
SITE: ABNORMAL
SODIUM SERPL-SCNC: 137 MMOL/L (ref 136–145)
SP02: 100
STJ: 1.91 CM
TR MAX PG: 48.16 MMHG
TRICUSPID ANNULAR PLANE SYSTOLIC EXCURSION: 1.27 CM
TROPONIN I SERPL DL<=0.01 NG/ML-MCNC: <0.006 NG/ML (ref 0–0.03)
TV REST PULMONARY ARTERY PRESSURE: 51 MMHG
WBC # BLD AUTO: 34.15 K/UL (ref 3.9–12.7)

## 2019-04-02 PROCEDURE — 99900035 HC TECH TIME PER 15 MIN (STAT): Mod: HCNC

## 2019-04-02 PROCEDURE — 94644 CONT INHLJ TX 1ST HOUR: CPT | Mod: HCNC

## 2019-04-02 PROCEDURE — 96375 TX/PRO/DX INJ NEW DRUG ADDON: CPT | Mod: HCNC

## 2019-04-02 PROCEDURE — 25000003 PHARM REV CODE 250: Mod: HCNC | Performed by: EMERGENCY MEDICINE

## 2019-04-02 PROCEDURE — 87077 CULTURE AEROBIC IDENTIFY: CPT | Mod: HCNC

## 2019-04-02 PROCEDURE — 99292 CRITICAL CARE ADDL 30 MIN: CPT | Mod: HCNC

## 2019-04-02 PROCEDURE — 25000003 PHARM REV CODE 250: Mod: HCNC

## 2019-04-02 PROCEDURE — 87186 SC STD MICRODIL/AGAR DIL: CPT | Mod: HCNC

## 2019-04-02 PROCEDURE — 83880 ASSAY OF NATRIURETIC PEPTIDE: CPT | Mod: HCNC

## 2019-04-02 PROCEDURE — 25000242 PHARM REV CODE 250 ALT 637 W/ HCPCS: Mod: HCNC | Performed by: EMERGENCY MEDICINE

## 2019-04-02 PROCEDURE — 94640 AIRWAY INHALATION TREATMENT: CPT | Mod: HCNC

## 2019-04-02 PROCEDURE — 99291 PR CRITICAL CARE, E/M 30-74 MINUTES: ICD-10-PCS | Mod: HCNC,,, | Performed by: NURSE PRACTITIONER

## 2019-04-02 PROCEDURE — 25000003 PHARM REV CODE 250: Mod: HCNC | Performed by: HOSPITALIST

## 2019-04-02 PROCEDURE — 27000221 HC OXYGEN, UP TO 24 HOURS: Mod: HCNC

## 2019-04-02 PROCEDURE — 99291 CRITICAL CARE FIRST HOUR: CPT | Mod: HCNC,,, | Performed by: NURSE PRACTITIONER

## 2019-04-02 PROCEDURE — 36600 WITHDRAWAL OF ARTERIAL BLOOD: CPT | Mod: HCNC

## 2019-04-02 PROCEDURE — 63600175 PHARM REV CODE 636 W HCPCS: Mod: HCNC | Performed by: HOSPITALIST

## 2019-04-02 PROCEDURE — 20000000 HC ICU ROOM: Mod: HCNC

## 2019-04-02 PROCEDURE — C9113 INJ PANTOPRAZOLE SODIUM, VIA: HCPCS | Mod: HCNC | Performed by: EMERGENCY MEDICINE

## 2019-04-02 PROCEDURE — 25000003 PHARM REV CODE 250: Mod: HCNC | Performed by: INTERNAL MEDICINE

## 2019-04-02 PROCEDURE — 27000190 HC CPAP FULL FACE MASK W/VALVE: Mod: HCNC

## 2019-04-02 PROCEDURE — 82803 BLOOD GASES ANY COMBINATION: CPT | Mod: HCNC

## 2019-04-02 PROCEDURE — 93005 ELECTROCARDIOGRAM TRACING: CPT | Mod: HCNC

## 2019-04-02 PROCEDURE — 96368 THER/DIAG CONCURRENT INF: CPT | Mod: HCNC

## 2019-04-02 PROCEDURE — 85025 COMPLETE CBC W/AUTO DIFF WBC: CPT | Mod: HCNC

## 2019-04-02 PROCEDURE — 99291 CRITICAL CARE FIRST HOUR: CPT | Mod: 25,HCNC

## 2019-04-02 PROCEDURE — 93010 ELECTROCARDIOGRAM REPORT: CPT | Mod: HCNC,,, | Performed by: INTERNAL MEDICINE

## 2019-04-02 PROCEDURE — 96376 TX/PRO/DX INJ SAME DRUG ADON: CPT | Mod: HCNC

## 2019-04-02 PROCEDURE — 93010 EKG 12-LEAD: ICD-10-PCS | Mod: HCNC,,, | Performed by: INTERNAL MEDICINE

## 2019-04-02 PROCEDURE — 80053 COMPREHEN METABOLIC PANEL: CPT | Mod: HCNC

## 2019-04-02 PROCEDURE — 99291 CRITICAL CARE FIRST HOUR: CPT | Mod: HCNC,25,, | Performed by: INTERNAL MEDICINE

## 2019-04-02 PROCEDURE — 94761 N-INVAS EAR/PLS OXIMETRY MLT: CPT | Mod: HCNC

## 2019-04-02 PROCEDURE — 99291 PR CRITICAL CARE, E/M 30-74 MINUTES: ICD-10-PCS | Mod: HCNC,25,, | Performed by: INTERNAL MEDICINE

## 2019-04-02 PROCEDURE — 83735 ASSAY OF MAGNESIUM: CPT | Mod: HCNC

## 2019-04-02 PROCEDURE — 63600175 PHARM REV CODE 636 W HCPCS: Mod: HCNC | Performed by: EMERGENCY MEDICINE

## 2019-04-02 PROCEDURE — 96365 THER/PROPH/DIAG IV INF INIT: CPT | Mod: HCNC

## 2019-04-02 PROCEDURE — 84484 ASSAY OF TROPONIN QUANT: CPT | Mod: HCNC

## 2019-04-02 PROCEDURE — 25000242 PHARM REV CODE 250 ALT 637 W/ HCPCS: Mod: HCNC | Performed by: NURSE PRACTITIONER

## 2019-04-02 PROCEDURE — 85610 PROTHROMBIN TIME: CPT | Mod: HCNC

## 2019-04-02 PROCEDURE — 87040 BLOOD CULTURE FOR BACTERIA: CPT | Mod: HCNC

## 2019-04-02 RX ORDER — PREDNISONE 20 MG/1
40 TABLET ORAL DAILY
Status: DISCONTINUED | OUTPATIENT
Start: 2019-04-03 | End: 2019-04-04

## 2019-04-02 RX ORDER — ACETAMINOPHEN 325 MG/1
650 TABLET ORAL EVERY 4 HOURS PRN
Status: DISCONTINUED | OUTPATIENT
Start: 2019-04-02 | End: 2019-04-02

## 2019-04-02 RX ORDER — DILTIAZEM HCL 1 MG/ML
5 INJECTION, SOLUTION INTRAVENOUS
Status: COMPLETED | OUTPATIENT
Start: 2019-04-02 | End: 2019-04-02

## 2019-04-02 RX ORDER — PRAVASTATIN SODIUM 10 MG/1
20 TABLET ORAL NIGHTLY
Status: DISCONTINUED | OUTPATIENT
Start: 2019-04-02 | End: 2019-04-11 | Stop reason: HOSPADM

## 2019-04-02 RX ORDER — ALBUTEROL SULFATE 2.5 MG/.5ML
15 SOLUTION RESPIRATORY (INHALATION)
Status: COMPLETED | OUTPATIENT
Start: 2019-04-02 | End: 2019-04-02

## 2019-04-02 RX ORDER — DILTIAZEM HCL/D5W 125 MG/125
5 PLASTIC BAG, INJECTION (ML) INTRAVENOUS CONTINUOUS
Status: DISCONTINUED | OUTPATIENT
Start: 2019-04-02 | End: 2019-04-03

## 2019-04-02 RX ORDER — PANTOPRAZOLE SODIUM 40 MG/10ML
40 INJECTION, POWDER, LYOPHILIZED, FOR SOLUTION INTRAVENOUS DAILY
Status: DISCONTINUED | OUTPATIENT
Start: 2019-04-02 | End: 2019-04-05

## 2019-04-02 RX ORDER — DILTIAZEM HCL/D5W 125 MG/125
5 PLASTIC BAG, INJECTION (ML) INTRAVENOUS CONTINUOUS
Status: DISCONTINUED | OUTPATIENT
Start: 2019-04-02 | End: 2019-04-02

## 2019-04-02 RX ORDER — MAGNESIUM SULFATE 1 G/100ML
1 INJECTION INTRAVENOUS ONCE
Status: DISCONTINUED | OUTPATIENT
Start: 2019-04-02 | End: 2019-04-02

## 2019-04-02 RX ORDER — AZITHROMYCIN 250 MG/1
250 TABLET, FILM COATED ORAL DAILY
Status: DISCONTINUED | OUTPATIENT
Start: 2019-04-02 | End: 2019-04-05

## 2019-04-02 RX ORDER — DILTIAZEM HYDROCHLORIDE 5 MG/ML
15 INJECTION INTRAVENOUS
Status: COMPLETED | OUTPATIENT
Start: 2019-04-02 | End: 2019-04-02

## 2019-04-02 RX ORDER — MAGNESIUM SULFATE HEPTAHYDRATE 40 MG/ML
2 INJECTION, SOLUTION INTRAVENOUS ONCE
Status: COMPLETED | OUTPATIENT
Start: 2019-04-02 | End: 2019-04-02

## 2019-04-02 RX ORDER — DILTIAZEM HYDROCHLORIDE 5 MG/ML
INJECTION INTRAVENOUS
Status: COMPLETED
Start: 2019-04-02 | End: 2019-04-02

## 2019-04-02 RX ORDER — IPRATROPIUM BROMIDE AND ALBUTEROL SULFATE 2.5; .5 MG/3ML; MG/3ML
3 SOLUTION RESPIRATORY (INHALATION) EVERY 4 HOURS
Status: DISCONTINUED | OUTPATIENT
Start: 2019-04-02 | End: 2019-04-02

## 2019-04-02 RX ORDER — DILTIAZEM HCL 1 MG/ML
INJECTION, SOLUTION INTRAVENOUS
Status: COMPLETED
Start: 2019-04-02 | End: 2019-04-02

## 2019-04-02 RX ORDER — METHYLPREDNISOLONE SOD SUCC 125 MG
125 VIAL (EA) INJECTION
Status: COMPLETED | OUTPATIENT
Start: 2019-04-02 | End: 2019-04-02

## 2019-04-02 RX ORDER — IPRATROPIUM BROMIDE 0.5 MG/2.5ML
1 SOLUTION RESPIRATORY (INHALATION)
Status: COMPLETED | OUTPATIENT
Start: 2019-04-02 | End: 2019-04-02

## 2019-04-02 RX ORDER — ASPIRIN 81 MG/1
81 TABLET ORAL DAILY
Status: DISCONTINUED | OUTPATIENT
Start: 2019-04-03 | End: 2019-04-11 | Stop reason: HOSPADM

## 2019-04-02 RX ORDER — SODIUM CHLORIDE 0.9 % (FLUSH) 0.9 %
10 SYRINGE (ML) INJECTION
Status: DISCONTINUED | OUTPATIENT
Start: 2019-04-02 | End: 2019-04-02

## 2019-04-02 RX ORDER — ACETAMINOPHEN 500 MG
500 TABLET ORAL EVERY 4 HOURS PRN
Status: DISCONTINUED | OUTPATIENT
Start: 2019-04-02 | End: 2019-04-11 | Stop reason: HOSPADM

## 2019-04-02 RX ORDER — SODIUM CHLORIDE 9 MG/ML
INJECTION, SOLUTION INTRAVENOUS CONTINUOUS
Status: DISCONTINUED | OUTPATIENT
Start: 2019-04-02 | End: 2019-04-02

## 2019-04-02 RX ORDER — CEFTRIAXONE 1 G/50ML
1 INJECTION, SOLUTION INTRAVENOUS
Status: DISCONTINUED | OUTPATIENT
Start: 2019-04-02 | End: 2019-04-04

## 2019-04-02 RX ORDER — SODIUM CHLORIDE 9 MG/ML
500 INJECTION, SOLUTION INTRAVENOUS
Status: COMPLETED | OUTPATIENT
Start: 2019-04-02 | End: 2019-04-02

## 2019-04-02 RX ORDER — ENOXAPARIN SODIUM 100 MG/ML
1 INJECTION SUBCUTANEOUS EVERY 12 HOURS
Status: DISCONTINUED | OUTPATIENT
Start: 2019-04-02 | End: 2019-04-04

## 2019-04-02 RX ORDER — LEVALBUTEROL 1.25 MG/.5ML
1.25 SOLUTION, CONCENTRATE RESPIRATORY (INHALATION) EVERY 8 HOURS
Status: DISCONTINUED | OUTPATIENT
Start: 2019-04-02 | End: 2019-04-11 | Stop reason: HOSPADM

## 2019-04-02 RX ORDER — DILTIAZEM HYDROCHLORIDE 5 MG/ML
10 INJECTION INTRAVENOUS
Status: COMPLETED | OUTPATIENT
Start: 2019-04-02 | End: 2019-04-02

## 2019-04-02 RX ADMIN — PANTOPRAZOLE SODIUM 40 MG: 40 INJECTION, POWDER, FOR SOLUTION INTRAVENOUS at 12:04

## 2019-04-02 RX ADMIN — IPRATROPIUM BROMIDE AND ALBUTEROL SULFATE 3 ML: .5; 3 SOLUTION RESPIRATORY (INHALATION) at 12:04

## 2019-04-02 RX ADMIN — SODIUM CHLORIDE 500 ML: 0.9 INJECTION, SOLUTION INTRAVENOUS at 09:04

## 2019-04-02 RX ADMIN — DILTIAZEM HYDROCHLORIDE 10 MG: 5 INJECTION INTRAVENOUS at 09:04

## 2019-04-02 RX ADMIN — AMIODARONE HYDROCHLORIDE 0.5 MG/MIN: 1.8 INJECTION, SOLUTION INTRAVENOUS at 07:04

## 2019-04-02 RX ADMIN — SODIUM CHLORIDE: 0.9 INJECTION, SOLUTION INTRAVENOUS at 12:04

## 2019-04-02 RX ADMIN — DILTIAZEM HCL 5 MG/HR: 1 INJECTION, SOLUTION INTRAVENOUS at 10:04

## 2019-04-02 RX ADMIN — DILTIAZEM HYDROCHLORIDE 15 MG: 5 INJECTION INTRAVENOUS at 10:04

## 2019-04-02 RX ADMIN — DILTIAZEM HYDROCHLORIDE 5 MG/HR: 5 INJECTION INTRAVENOUS at 07:04

## 2019-04-02 RX ADMIN — ENOXAPARIN SODIUM 40 MG: 100 INJECTION SUBCUTANEOUS at 12:04

## 2019-04-02 RX ADMIN — AZITHROMYCIN MONOHYDRATE 250 MG: 250 TABLET ORAL at 12:04

## 2019-04-02 RX ADMIN — LEVALBUTEROL HYDROCHLORIDE 1.25 MG: 1.25 SOLUTION, CONCENTRATE RESPIRATORY (INHALATION) at 04:04

## 2019-04-02 RX ADMIN — LEVALBUTEROL HYDROCHLORIDE 1.25 MG: 1.25 SOLUTION, CONCENTRATE RESPIRATORY (INHALATION) at 11:04

## 2019-04-02 RX ADMIN — CEFTRIAXONE 1 G: 1 INJECTION, SOLUTION INTRAVENOUS at 02:04

## 2019-04-02 RX ADMIN — METHYLPREDNISOLONE SODIUM SUCCINATE 125 MG: 125 INJECTION, POWDER, FOR SOLUTION INTRAMUSCULAR; INTRAVENOUS at 09:04

## 2019-04-02 RX ADMIN — MAGNESIUM SULFATE HEPTAHYDRATE 2 G: 40 INJECTION, SOLUTION INTRAVENOUS at 10:04

## 2019-04-02 RX ADMIN — ENOXAPARIN SODIUM 40 MG: 100 INJECTION SUBCUTANEOUS at 08:04

## 2019-04-02 RX ADMIN — AMIODARONE HYDROCHLORIDE 1 MG/MIN: 1.8 INJECTION, SOLUTION INTRAVENOUS at 01:04

## 2019-04-02 RX ADMIN — AMIODARONE HYDROCHLORIDE 150 MG: 1.5 INJECTION, SOLUTION INTRAVENOUS at 01:04

## 2019-04-02 RX ADMIN — PRAVASTATIN SODIUM 20 MG: 10 TABLET ORAL at 08:04

## 2019-04-02 RX ADMIN — ALBUTEROL SULFATE 15 MG: 2.5 SOLUTION RESPIRATORY (INHALATION) at 09:04

## 2019-04-02 RX ADMIN — DILTIAZEM HYDROCHLORIDE 5 MG/HR: 5 INJECTION INTRAVENOUS at 10:04

## 2019-04-02 RX ADMIN — IPRATROPIUM BROMIDE 1 MG: 0.5 SOLUTION RESPIRATORY (INHALATION) at 09:04

## 2019-04-02 NOTE — HPI
Mrs. Perez is a 74 yo female with history significant for severe COPD (PFT's 3 years ago; on advair and PRN albuterol), continued tobacco abuse, and HTN who presented to the ED on 4/2 with complaints of SOB, diffuse weakness that has progressively worsened over preceding 2 days. She was recently admitted to Observation 3/28 to 3/29 for a COPD exacerbation and was discharged home on Azithromycin and prednisone 40 mg daily x 5 days.     Upon ED arrival, patient was in Afib RVR, rate 140-150 and normotensive. CXR with new right upper lobe opacity, not noted on CXR from admission on 3/28. She was afebrile, with significant leukocytosis of 38,000. She was started on a diltiazem gtt for RVR. Pulmonary consulted for assistance in management of acute on chronic hypercapnic respiratory failure.

## 2019-04-02 NOTE — ASSESSMENT & PLAN NOTE
--as above.   --continue bronchodilators, steroids  --on Advair, PRN albuterol as outpatient. Denies home oxygen use

## 2019-04-02 NOTE — CONSULTS
[facility]  Cardiology  Consult Note    Patient Name: Latasha Perez  MRN: [id number]  Admission Date: 4/2/2019  Hospital Length of Stay: 0 days  Code Status: Full Code   Attending Provider: Susan Schuster MD   Consulting Provider: Manfred Figueroa MD  Primary Care Physician: Pollo Oneal MD  Principal Problem:Atrial fibrillation with RVR    Patient information was obtained from patient, past medical records and ER records.     Inpatient consult to Cardiology  Consult performed by: Manfred Figueroa MD  Consult ordered by: Judi Lewis MD  Reason for consult: AFib with RVR        Subjective:     Chief Complaint:  Shortness of breath     HPI:   73 y.o. Female with arthritis, COPD, hyperlipidemia and HTN presents to the ED via EMS for an evaluation of weakness, dizziness, cough and SOB for the past few days and worsened this morning.  EMS reports patient was tachycardic on arrival in EKG shows atrial fibrillation with RVR at 198. EMS gave 6mg of adenosine after which her BP dropped to approximately 100 systolic from 142/68 initially.  On arrival patient has an albuterol treatment in process.  She is tachypneic tripodding, and splinting.  Further history limited due to respiratory distress     She denies any previous cardiac history.  She is currently on BiPAP and stable.  She is found to be in AFib with RVR initially on diltiazem drip.  Amiodarone IV has been added as well in the intensive care unit.  She currently feels like her breathing is stable and she denies any chest pain.  She has experienced no PND, orthopnea or lower extremity edema.  She did experience dizziness but not to the point of presyncope or syncope.      Past Medical History:   Diagnosis Date    Arthritis     Carotid disease, bilateral     Cataract     Chronic hyponatremia     COPD (chronic obstructive pulmonary disease)     HLD (hyperlipidemia)     HTN (hypertension)        Past Surgical History:   Procedure  Laterality Date    APPENDECTOMY      CATARACT EXTRACTION W/  INTRAOCULAR LENS IMPLANT Right 12/06/2018    Dr. Escobar    CATARACT EXTRACTION W/  INTRAOCULAR LENS IMPLANT Left 12/20/2018    DR. Escobar    CERVICAL SPINE SURGERY      DILATION AND CURETTAGE OF UTERUS      x 2    EYE SURGERY      cataract Rt    FRACTURE SURGERY      Lt leg fracture with hardware    INSERTION, IOL PROSTHESIS Left 12/20/2018    [facility]formed [facility]    INSERTION, IOL PROSTHESIS Right 12/6/2018    [facility]    metatarsal repair      OPEN REDUCTION INTERNAL FIXATION-TIBIAL PLATEAU Left 6/27/2014    [facility]formed by [facility]reira [facility] at [facility]    PHACOEMULSIFICATION, CATARACT Left 12/20/2018    [facility]    PHACOEMULSIFICATION, CATARACT Right 12/6/2018    [facility]    SHOULDER ARTHROSCOPY         Review of patient's allergies indicates:   Allergen Reactions    Pcn [penicillins] Other (See Comments)     Fever flushing skin swelling     Biaxin [clarithromycin] Rash    Codeine Rash    Doxycycline Rash    Levaquin [levofloxacin] Rash       Current Facility-Administered Medications on File Prior to Encounter   Medication    0.9%  NaCl infusion    0.9%  NaCl infusion    cyclopentolate 1% ophthalmic solution 1 drop    phenylephrine HCL 2.5% ophthalmic solution 1 drop    phenylephrine HCL 2.5% ophthalmic solution 1 drop    proparacaine 0.5 % ophthalmic solution 1 drop    proparacaine 0.5 % ophthalmic solution 1 drop    tropicamide 1% ophthalmic solution 1 drop    tropicamide 1% ophthalmic solution 1 drop     Current Outpatient Medications on File Prior to Encounter   Medication Sig    acetaminophen (TYLENOL EXTRA STRENGTH ORAL) Take by mouth as needed.    alendronate (FOSAMAX) 70 MG tablet TAKE 1 TABLET BY MOUTH ONCE A WEEK EVERY 7 DAYS, AS DIRECTED    amLODIPine  (NORVASC) 5 MG tablet TAKE 1 TABLET EVERY DAY    aspirin (ECOTRIN) 81 MG EC tablet Take 81 mg by mouth once daily.    azithromycin (ZITHROMAX Z-IMELDA) 250 MG tablet 2 pills on day 1, then 1 pill a day    calcium carbonate (CALCIUM 500 ORAL) Take by mouth 2 (two) times daily.     cetirizine (ZYRTEC) 10 MG tablet Take 10 mg by mouth once daily.    fluticasone-salmeterol 250-50 mcg/dose (ADVAIR) 250-50 mcg/dose diskus inhaler Inhale 1 puff into the lungs 2 (two) times daily. Controller    losartan (COZAAR) 100 MG tablet TAKE 1 TABLET ONE TIME DAILY    MULTIVITAMIN W-MINERALS/LUTEIN (CENTRUM SILVER ORAL) Take by mouth once daily.    pravastatin (PRAVACHOL) 20 MG tablet TAKE 1 TABLET EVERY EVENING    predniSONE (DELTASONE) 20 MG tablet TAKE 2 TABLETS(40 MG) BY MOUTH EVERY DAY FOR 5 DAYS    SPIRIVA WITH HANDIHALER 18 mcg inhalation capsule     VENTOLIN HFA 90 mcg/actuation inhaler INHALE TWO PUFFS BY MOUTH EVERY 6 HOURS AS NEEDED FOR WHEEZING    albuterol sulfate 2.5 mg/0.5 mL Nebu Take 2.5 mg by nebulization every 4 (four) hours as needed. One Box    latanoprost 0.005 % ophthalmic solution Place 1 drop into the left eye nightly.    losartan (COZAAR) 100 MG tablet TAKE 1 TABLET EVERY DAY    predniSONE (DELTASONE) 20 MG tablet Take 2 tablets (40 mg total) by mouth once daily. for 5 days     Family History     Problem Relation (Age of Onset)    Cancer Father    Cataracts Sister    Heart disease Mother, Maternal Grandfather    No Known Problems Brother, Maternal Aunt, Maternal Uncle, Paternal Aunt, Paternal Uncle, Maternal Grandmother, Paternal Grandmother, Paternal Grandfather        Tobacco Use    Smoking status: Current Some Day Smoker     Packs/day: 1.00     Years: 45.00     Pack years: 45.00     Types: Cigarettes     Last attempt to quit: 2002     Years since quittin.2    Smokeless tobacco: Never Used   Substance and Sexual Activity    Alcohol use: No    Drug use: No    Sexual activity: Yes      Partners: Male     Review of Systems   Constitution: Negative.   HENT: Negative.    Eyes: Negative.    Cardiovascular: Positive for chest pain, dyspnea on exertion, irregular heartbeat and palpitations. Negative for leg swelling, near-syncope, orthopnea, paroxysmal nocturnal dyspnea and syncope.   Respiratory: Negative for shortness of breath.    Skin: Negative.    Musculoskeletal: Negative.    Gastrointestinal: Negative for abdominal pain, constipation and diarrhea.   Genitourinary: Negative for dysuria.   Neurological: Negative.    Psychiatric/Behavioral: Negative.      Objective:     Vital Signs (Most Recent):  Temp: 98.9 °F (37.2 °C) (04/02/19 1515)  Pulse: (!) 133 (04/02/19 1715)  Resp: 18 (04/02/19 1715)  BP: (!) 92/54 (04/02/19 1715)  SpO2: (!) 84 % (04/02/19 1715) Vital Signs (24h Range):  Temp:  [98.1 °F (36.7 °C)-98.9 °F (37.2 °C)] 98.9 °F (37.2 °C)  Pulse:  [] 133  Resp:  [16-39] 18  SpO2:  [76 %-97 %] 84 %  BP: ()/() 92/54     Weight: 38.1 kg (84 lb)  Body mass index is 13.98 kg/m².    SpO2: (!) 84 %  O2 Device (Oxygen Therapy): BiPAP      Intake/Output Summary (Last 24 hours) at 4/2/2019 1734  Last data filed at 4/2/2019 1123  Gross per 24 hour   Intake 550 ml   Output --   Net 550 ml       Lines/Drains/Airways     Peripheral Intravenous Line                 Peripheral IV - Single Lumen 04/02/19 1415 Left Hand less than 1 day         Peripheral IV - Single Lumen 04/02/19 Left Antecubital less than 1 day         Peripheral IV - Single Lumen 04/02/19 Right Antecubital less than 1 day                Physical Exam   Constitutional: She is oriented to person, place, and time. She appears well-developed and well-nourished.   HENT:   Head: Normocephalic and atraumatic.   Eyes: Pupils are equal, round, and reactive to light. Conjunctivae and EOM are normal.   Neck: Normal range of motion. Neck supple. No thyromegaly present.   Cardiovascular: An irregularly irregular rhythm present.  Tachycardia present.   No murmur heard.  Pulmonary/Chest: Effort normal and breath sounds normal. No respiratory distress.   Abdominal: Soft. Bowel sounds are normal.   Musculoskeletal: She exhibits no edema.   Neurological: She is alert and oriented to person, place, and time.   Skin: Skin is warm and dry.   Psychiatric: She has a normal mood and affect. Her behavior is normal.       Significant Labs:   CMP   Recent Labs   Lab 04/02/19  1000      K 4.2   CL 95   CO2 34*   *   BUN 34*   CREATININE 0.6   CALCIUM 10.1   PROT 6.5   ALBUMIN 2.6*   BILITOT 0.5   ALKPHOS 86   AST 18   ALT 29   ANIONGAP 8   ESTGFRAFRICA >60   EGFRNONAA >60   , CBC   Recent Labs   Lab 04/02/19  1000   WBC 34.15*   HGB 13.6   HCT 40.6      , INR   Recent Labs   Lab 04/02/19  1000   INR 1.1   , Lipid Panel No results for input(s): CHOL, HDL, LDLCALC, TRIG, CHOLHDL in the last 48 hours. and Troponin   Recent Labs   Lab 04/02/19  1000   TROPONINI <0.006       Significant Imaging: Echocardiogram:   Transthoracic echo (TTE) complete (Cupid Only):   Results for orders placed or performed during the hospital encounter of 04/02/19   Transthoracic echo (TTE) 2D with Color Flow   Result Value Ref Range    LA WIDTH 2.64 cm    AORTIC VALVE CUSP SEPERATION 1.17 cm    PV PEAK VELOCITY 2.03 cm/s    LVIDD 2.49 (A) 3.5 - 6.0 cm    IVS 1.23 (A) 0.6 - 1.1 cm    PW 1.38 (A) 0.6 - 1.1 cm    Ao root annulus 1.28 cm    LVIDS 1.86 (A) 2.1 - 4.0 cm    FS 25 28 - 44 %    LA volume 22.14 cm3    Sinus 2.88 cm    STJ 1.91 cm    LV mass 98.12 g    LA size 2.97 cm    RVDD 2.41 cm    TAPSE 1.27 cm    RV S' 10.11 m/s    Left Ventricle Relative Wall Thickness 1.11 cm    AV mean gradient 3.85 mmHg    AV valve area 2.63 cm2    AV Velocity Ratio 0.85     AV index (prosthetic) 1.26     IVRT 0.05 msec    LVOT diameter 1.63 cm    LVOT area 2.09 cm2    LVOT peak ama [id number] m/s    LVOT peak VTI 15.11 cm    Ao peak ama 1.45 m/s    Ao VTI 11.96 cm    LVOT  stroke volume 31.51 cm3    AV peak gradient 8.41 mmHg    MV Peak E Harshad 1.24 m/s    TR Max Harshad 3.47 m/s    LV Systolic Volume 10.53 mL    LV Systolic Volume Index 7.7 mL/m2    LV Diastolic Volume 22.18 mL    LV Diastolic Volume Index 16.21 mL/m2    LA Volume Index 16.2 mL/m2    LV Mass Index 71.7 g/m2    RA Major Axis 3.41 cm    Left Atrium Minor Axis 2.84 cm    Left Atrium Major Axis 4.00 cm    Triscuspid Valve Regurgitation Peak Gradient 48.16 mmHg    RA Width 2.10 cm    Right Atrial Pressure (from IVC) 3 mmHg    TV rest pulmonary artery pressure 51 mmHg     · TDS  · Normal left ventricular systolic function. The estimated ejection fraction is 55%  · Concentric left ventricular remodeling.  · Atrial fibrillation observed.  · Mild left atrial enlargement.  · The estimated PA systolic pressure is 51 mm Hg  · Pulmonary hypertension present.    * all labs reviewed and echocardiogram personally interpret    Assessment and Plan:     * Atrial fibrillation with RVR  Refractory to diltiazem drip  Add added amiodarone infusion per protocol  Full-dose Lovenox for OAC currently  If refractory consider JOHNNA/DC cardioversion a.m.        Acute on chronic respiratory failure with hypercapnia  Supportive care  Concurrently treat underlying cardiac and pulmonary issues    COPD (chronic obstructive pulmonary disease)  Treatment per primary and Pulmonary    Essential hypertension  Currently stable on home regimen    HLD (hyperlipidemia)  On statin        VTE Risk Mitigation (From admission, onward)        Ordered     enoxaparin injection 40 mg  Every 12 hours      04/02/19 1135     IP VTE HIGH RISK PATIENT  Once      04/02/19 1210     Place sequential compression device  Until discontinued      04/02/19 1210        * total ICU time spent on case 40 min    Thank you for your consult. I will follow-up with patient. Please contact us if you have any additional questions.    Manfred Figueroa MD  Cardiology   [facility]

## 2019-04-02 NOTE — H&P
[facility] Hospital Medicine  History & Physical    Patient Name: Latasha Perez  MRN: [id number]  Admission Date: 4/2/2019  Attending Physician: Susan Schuster MD   Primary Care Provider: Pollo Oneal MD         Patient information was obtained from patient, past medical records and ER records.     Subjective:     Principal Problem:Acute on chronic respiratory failure with hypercapnia    Chief Complaint:   Chief Complaint   Patient presents with    Dizziness     Weakness and dizziness.  RVR at 198 with EMS.  6 mg of adenosine administered.  Tachypenic.    Tachycardia        HPI: 73-year-old female with HTN, HLP, severe COPD and + tobacco abuse who presented with complaint of dizziness and weakness that is been progressive over the past 2 days.  She also reports increase in shortness of breath along with palpitations.  She was recently admitted to Observation from 3/28 to 3/29/19 for a COPD exacerbation and was discharged home on Azithromycin and prednisone 40 mg daily x 5 days.  She denies any fevers or chills.  In the ER, she was noted to be in Afib with RVR, rate initially in the 190s, status post IV diltiazem push followed by infusion with rate in the 140-150s. CXR with new right upper lobe opacity, leukocytosis of 38,000. Patient with reported wheezing on exam status post IV Solu-Medrol 125 mg IV push x1.  She was placed on BiPAP.    Past Medical History:   Diagnosis Date    Arthritis     Carotid disease, bilateral     Cataract     Chronic hyponatremia     COPD (chronic obstructive pulmonary disease)     HLD (hyperlipidemia)     HTN (hypertension)        Past Surgical History:   Procedure Laterality Date    APPENDECTOMY      CATARACT EXTRACTION W/  INTRAOCULAR LENS IMPLANT Right 12/06/2018    Dr. Escobar    CATARACT EXTRACTION W/  INTRAOCULAR LENS IMPLANT Left 12/20/2018    DR. Escobar    CERVICAL SPINE SURGERY      DILATION AND CURETTAGE OF UTERUS      x 2    EYE  SURGERY      cataract Rt    FRACTURE SURGERY      Lt leg fracture with hardware    INSERTION, IOL PROSTHESIS Left 12/20/2018    [facility]    INSERTION, IOL PROSTHESIS Right 12/6/2018    [facility]    metatarsal repair      OPEN REDUCTION INTERNAL FIXATION-TIBIAL PLATEAU Left 6/27/2014    [facility] at [facility]    PHACOEMULSIFICATION, CATARACT Left 12/20/2018    [facility]    PHACOEMULSIFICATION, CATARACT Right 12/6/2018    [facility]    SHOULDER ARTHROSCOPY         Review of patient's allergies indicates:   Allergen Reactions    Pcn [penicillins] Other (See Comments)     Fever flushing skin swelling     Biaxin [clarithromycin] Rash    Codeine Rash    Doxycycline Rash    Levaquin [levofloxacin] Rash       Current Facility-Administered Medications on File Prior to Encounter   Medication    0.9%  NaCl infusion    0.9%  NaCl infusion    cyclopentolate 1% ophthalmic solution 1 drop    phenylephrine HCL 2.5% ophthalmic solution 1 drop    phenylephrine HCL 2.5% ophthalmic solution 1 drop    proparacaine 0.5 % ophthalmic solution 1 drop    proparacaine 0.5 % ophthalmic solution 1 drop    tropicamide 1% ophthalmic solution 1 drop    tropicamide 1% ophthalmic solution 1 drop     Current Outpatient Medications on File Prior to Encounter   Medication Sig    acetaminophen (TYLENOL EXTRA STRENGTH ORAL) Take by mouth as needed.    alendronate (FOSAMAX) 70 MG tablet TAKE 1 TABLET BY MOUTH ONCE A WEEK EVERY 7 DAYS, AS DIRECTED    amLODIPine (NORVASC) 5 MG tablet TAKE 1 TABLET EVERY DAY    aspirin (ECOTRIN) 81 MG EC tablet Take 81 mg by mouth once daily.    azithromycin (ZITHROMAX Z-IMELDA) 250 MG tablet 2 pills on day 1, then 1 pill a day    calcium carbonate (CALCIUM 500 ORAL) Take by mouth 2 (two) times daily.     cetirizine  (ZYRTEC) 10 MG tablet Take 10 mg by mouth once daily.    fluticasone-salmeterol 250-50 mcg/dose (ADVAIR) 250-50 mcg/dose diskus inhaler Inhale 1 puff into the lungs 2 (two) times daily. Controller    losartan (COZAAR) 100 MG tablet TAKE 1 TABLET ONE TIME DAILY    MULTIVITAMIN W-MINERALS/LUTEIN (CENTRUM SILVER ORAL) Take by mouth once daily.    pravastatin (PRAVACHOL) 20 MG tablet TAKE 1 TABLET EVERY EVENING    predniSONE (DELTASONE) 20 MG tablet TAKE 2 TABLETS(40 MG) BY MOUTH EVERY DAY FOR 5 DAYS    SPIRIVA WITH HANDIHALER 18 mcg inhalation capsule     VENTOLIN HFA 90 mcg/actuation inhaler INHALE TWO PUFFS BY MOUTH EVERY 6 HOURS AS NEEDED FOR WHEEZING    albuterol sulfate 2.5 mg/0.5 mL Nebu Take 2.5 mg by nebulization every 4 (four) hours as needed. One Box    latanoprost 0.005 % ophthalmic solution Place 1 drop into the left eye nightly.    losartan (COZAAR) 100 MG tablet TAKE 1 TABLET EVERY DAY    predniSONE (DELTASONE) 20 MG tablet Take 2 tablets (40 mg total) by mouth once daily. for 5 days     Family History     Problem Relation (Age of Onset)    Cancer Father    Cataracts Sister    Heart disease Mother, Maternal Grandfather    No Known Problems Brother, Maternal Aunt, Maternal Uncle, Paternal Aunt, Paternal Uncle, Maternal Grandmother, Paternal Grandmother, Paternal Grandfather        Tobacco Use    Smoking status: Current Some Day Smoker     Packs/day: 1.00     Years: 45.00     Pack years: 45.00     Types: Cigarettes     Last attempt to quit: 2002     Years since quittin.2    Smokeless tobacco: Never Used   Substance and Sexual Activity    Alcohol use: No    Drug use: No    Sexual activity: Yes     Partners: Male     Review of Systems   Constitutional: Positive for activity change and appetite change. Negative for chills and fever.   HENT: Negative for sore throat.    Eyes: Negative for visual disturbance.   Respiratory: Positive for cough and shortness of breath.    Cardiovascular:  Positive for palpitations. Negative for chest pain.   Gastrointestinal: Negative for nausea and vomiting.   Endocrine: Negative for polyuria.   Genitourinary: Negative for dysuria.   Musculoskeletal: Negative for back pain.   Skin: Negative for rash.   Neurological: Positive for dizziness and weakness. Negative for syncope and speech difficulty.   Psychiatric/Behavioral: Negative for confusion.     Objective:     Vital Signs (Most Recent):  Temp: 98.1 °F (36.7 °C) (04/02/19 1315)  Pulse: (!) 144 (04/02/19 1400)  Resp: (!) 32 (04/02/19 1400)  BP: 121/64 (04/02/19 1400)  SpO2: (!) 81 % (04/02/19 1400) Vital Signs (24h Range):  Temp:  [98.1 °F (36.7 °C)-98.8 °F (37.1 °C)] 98.1 °F (36.7 °C)  Pulse:  [] 144  Resp:  [21-39] 32  SpO2:  [81 %-97 %] 81 %  BP: ()/() 121/64     Weight: 38.1 kg (84 lb)  Body mass index is 13.98 kg/m².    Physical Exam   Constitutional: She is oriented to person, place, and time. She appears well-developed. She appears distressed.   Cachectic and frail   HENT:   Head: Atraumatic.   + Temporal wasting, BiPAP mask in place   Eyes: Pupils are equal, round, and reactive to light. Conjunctivae are normal.   Neck: Normal range of motion.   Cardiovascular:   Irregular irregular and tachycardic   Pulmonary/Chest: She is in respiratory distress.   Diminished breath sounds throughout, faint expiratory wheeze noted with prolonged expiratory time; use of accessory muscles for respiration   Abdominal: Soft. Bowel sounds are normal. She exhibits no distension and no mass. There is no tenderness. There is no rebound and no guarding.   Musculoskeletal: Normal range of motion. She exhibits no edema.   Neurological: She is alert and oriented to person, place, and time.   Skin: Capillary refill takes less than 2 seconds.   Psychiatric: She has a normal mood and affect. Her behavior is normal. Thought content normal.         CRANIAL NERVES     CN III, IV, VI   Pupils are equal, round, and reactive  to light.       Significant Labs: All pertinent labs within the past 24 hours have been reviewed.    Significant Imaging: I have reviewed and interpreted all pertinent imaging results/findings within the past 24 hours.    Assessment/Plan:     * Acute on chronic respiratory failure with hypercapnia  Patient with respiratory distress due to multiple factors including AFib with RVR, pneumonia, COPD exacerbation which resulted in acute on chronic respiratory failure  Continue Rocephin, azithromycin and vancomycin for treatment of her pneumonia  Will follow up on blood and sputum come to cultures  Continue BiPAP and wean as tolerated to nasal cannula  Continued nebulizer treatments and prednisone  Pulmonary consulted and appreciate their input  Will treat AFib with RVR with amiodarone infusion which will be discussed below  Cardiology consulted    Atrial fibrillation with RVR  Patient was treated with diltiazem infusion with minimal response and heart rate  Will treat with IV amiodarone infusion  Anticoagulate with full-dose Lovenox  Cardiology consulted and echo ordered    Cachexia  Patient is underweight and with BMI of 13.98  Will try to maximize nutrition and supplement with Boost    Pneumonia  No infiltrate noted in the right upper lobe that was not present when she was mid just a few days ago for observation  Continue treatment with ceftriaxone, azithromycin and vancomycin  Will follow up on culture results  Will deescalate antibiotics when possible    COPD exacerbation  Status post pulse dose of IV steroids in the ER and continue on p.o. Prednisone  Continue nebulizer treatments and support with BiPAP  Wean oxygen as tolerated  Pulmonary consulted and appreciate their input    Essential hypertension  Currently normotensive without medications except for diltiazem drip which has now been stopped  Will resume medications for blood pressure control when warranted and BP can tolerate    Leukocytosis  Likely secondary  to pneumonia and possible steroid and/or leukemoid reaction  Antibiotics as discussed above   Will continue to monitor    HLD (hyperlipidemia)  Continue pravastatin      VTE Risk Mitigation (From admission, onward)        Ordered     enoxaparin injection 40 mg  Every 12 hours      04/02/19 1135     IP VTE HIGH RISK PATIENT  Once      04/02/19 1210     Place sequential compression device  Until discontinued      04/02/19 1210        Critical care time spent on the evaluation and treatment of severe organ dysfunction, review of pertinent labs and imaging studies, discussions with consulting providers and discussions with patient/family: 60 minutes.     Susan Schuster MD  Department of Hospital Medicine   [facility]

## 2019-04-02 NOTE — ASSESSMENT & PLAN NOTE
Status post pulse dose of IV steroids in the ER and continue on p.o. Prednisone  Continue nebulizer treatments and support with BiPAP  Wean oxygen as tolerated  Pulmonary consulted and appreciate their input

## 2019-04-02 NOTE — ASSESSMENT & PLAN NOTE
Patient was treated with diltiazem infusion with minimal response and heart rate  Will treat with IV amiodarone infusion  Anticoagulate with full-dose Lovenox  Cardiology consulted and echo ordered

## 2019-04-02 NOTE — ASSESSMENT & PLAN NOTE
Refractory to diltiazem drip  Add added amiodarone infusion per protocol  Full-dose Lovenox for OAC currently  If refractory consider JOHNNA/DC cardioversion a.m.

## 2019-04-02 NOTE — ASSESSMENT & PLAN NOTE
Patient is underweight and with BMI of 13.98  Will try to maximize nutrition and supplement with Boost

## 2019-04-02 NOTE — ASSESSMENT & PLAN NOTE
--PFT's 3 years ago noted moderate-severe COPD with FEV1  --right upper lobe pneumonia, continued smoking likely trigger for exacerbation  --recommend continuing inhaled bronchodilators, prednisone. Recommend vancomycin, rocephin, azithromycin for now given worsening on azithromycin at outpatient and allergies to fluoroquinolone/doxycycline. Sputum culture pending   --have started Bipap, 15/5, 30% FiO2 given high work of breathing upon transfer to ICU. Plan to continue Bipap, alternating with NC 2 L today and likely should wear Bipap tonight. Attempt to wean from Bipap tomorrow if improving.

## 2019-04-02 NOTE — HPI
73 y.o. Female with arthritis, COPD, hyperlipidemia and HTN presents to the ED via EMS for an evaluation of weakness, dizziness, cough and SOB for the past few days and worsened this morning.  EMS reports patient was tachycardic on arrival in EKG shows atrial fibrillation with RVR at 198. EMS gave 6mg of adenosine after which her BP dropped to approximately 100 systolic from 142/68 initially.  On arrival patient has an albuterol treatment in process.  She is tachypneic tripodding, and splinting.  Further history limited due to respiratory distress     She denies any previous cardiac history.  She is currently on BiPAP and stable.  She is found to be in AFib with RVR initially on diltiazem drip.  Amiodarone IV has been added as well in the intensive care unit.  She currently feels like her breathing is stable and she denies any chest pain.  She has experienced no PND, orthopnea or lower extremity edema.  She did experience dizziness but not to the point of presyncope or syncope.

## 2019-04-02 NOTE — ASSESSMENT & PLAN NOTE
--Afib appears to be new for patient, likely triggered by pneumonia, COPD exacerbation  --agree with diltiazem gtt, titrating to achieve HR ,110 as hemodynamics allow  --cardiology following. On weight based lovenox for therapeutic anticoagulation

## 2019-04-02 NOTE — CONSULTS
[facility]  Pulmonology  Consult Note    Patient Name: Latasha Perez  MRN: [id number]  Admission Date: 4/2/2019  Hospital Length of Stay: 0 days  Code Status: Full Code  Attending Physician: Susan Schuster MD  Primary Care Provider: Pollo Oneal MD   Principal Problem: <principal problem not specified>    Consult to Pulmonology  Consult performed by: Kiki Barrera NP  Consult ordered by: Judi Lewis MD    Inpatient consult to Pulmonology  Consult performed by: Kiki Barrera NP  Consult ordered by: Susan Schuster MD        Subjective:     HPI:  Mrs. Perez is a 72 yo female with history significant for severe COPD (PFT's 3 years ago; on advair and PRN albuterol), continued tobacco abuse, and HTN who presented to the ED on 4/2 with complaints of SOB, diffuse weakness that has progressively worsened over preceding 2 days. She was recently admitted to Observation 3/28 to 3/29 for a COPD exacerbation and was discharged home on Azithromycin and prednisone 40 mg daily x 5 days.     Upon ED arrival, patient was in Afib RVR, rate 140-150 and normotensive. CXR with new right upper lobe opacity, not noted on CXR from admission on 3/28. She was afebrile, with significant leukocytosis of 38,000. She was started on a diltiazem gtt for RVR. Pulmonary consulted for assistance in management of acute on chronic hypercapnic respiratory failure.     Past Medical History:   Diagnosis Date    Arthritis     Carotid disease, bilateral     Cataract     Chronic hyponatremia     COPD (chronic obstructive pulmonary disease)     HLD (hyperlipidemia)     HTN (hypertension)        Past Surgical History:   Procedure Laterality Date    APPENDECTOMY      CATARACT EXTRACTION W/  INTRAOCULAR LENS IMPLANT Right 12/06/2018    Dr. Escobar    CATARACT EXTRACTION W/  INTRAOCULAR LENS IMPLANT Left 12/20/2018    DR. Escobar    CERVICAL SPINE SURGERY      DILATION AND CURETTAGE OF UTERUS      x 2     EYE SURGERY      cataract Rt    FRACTURE SURGERY      Lt leg fracture with hardware    INSERTION, IOL PROSTHESIS Left 2018    [facility]    INSERTION, IOL PROSTHESIS Right 2018    [facility]    metatarsal repair      OPEN REDUCTION INTERNAL FIXATION-TIBIAL PLATEAU Left 2014    [facility] at [facility]    PHACOEMULSIFICATION, CATARACT Left 2018    [facility]    PHACOEMULSIFICATION, CATARACT Right 2018    [facility]    SHOULDER ARTHROSCOPY         Review of patient's allergies indicates:   Allergen Reactions    Pcn [penicillins] Other (See Comments)     Fever flushing skin swelling     Biaxin [clarithromycin] Rash    Codeine Rash    Doxycycline Rash    Levaquin [levofloxacin] Rash       Family History     Problem Relation (Age of Onset)    Cancer Father    Cataracts Sister    Heart disease Mother, Maternal Grandfather    No Known Problems Brother, Maternal Aunt, Maternal Uncle, Paternal Aunt, Paternal Uncle, Maternal Grandmother, Paternal Grandmother, Paternal Grandfather        Tobacco Use    Smoking status: Former Smoker     Packs/day: 1.00     Years: 45.00     Pack years: 45.00     Types: Cigarettes     Last attempt to quit: 2002     Years since quittin.2    Smokeless tobacco: Never Used   Substance and Sexual Activity    Alcohol use: No    Drug use: No    Sexual activity: Yes     Partners: Male         Review of Systems   Constitutional: Negative for chills and fever.   Respiratory: Positive for cough and shortness of breath. Negative for wheezing.    Cardiovascular: Positive for palpitations. Negative for chest pain.   Gastrointestinal: Negative for abdominal pain, nausea and vomiting.   Genitourinary: Negative for dysuria.   Neurological: Negative for syncope and headaches.      Objective:     Vital Signs (Most Recent):  Temp: 98.8 °F (37.1 °C) (04/02/19 1219)  Pulse: (!) 156 (04/02/19 1227)  Resp: (!) 25 (04/02/19 1227)  BP: (!) 135/59 (04/02/19 1219)  SpO2: 95 % (04/02/19 1219) Vital Signs (24h Range):  Temp:  [98.4 °F (36.9 °C)-98.8 °F (37.1 °C)] 98.8 °F (37.1 °C)  Pulse:  [] 156  Resp:  [21-36] 25  SpO2:  [89 %-97 %] 95 %  BP: (104-208)/() 135/59     Weight: 38.1 kg (84 lb)  Body mass index is 13.98 kg/m².      Intake/Output Summary (Last 24 hours) at 4/2/2019 1330  Last data filed at 4/2/2019 1123  Gross per 24 hour   Intake 550 ml   Output --   Net 550 ml       Physical Exam   Constitutional: She is oriented to person, place, and time. She appears well-developed. She has a sickly appearance. She appears ill. No distress.   HENT:   Head: Normocephalic and atraumatic.   Eyes: Conjunctivae and EOM are normal. Right eye exhibits no discharge. Left eye exhibits no discharge. No scleral icterus.   Neck: Neck supple. No thyromegaly present.   Cardiovascular: Normal rate, regular rhythm, S1 normal and S2 normal.   Pulses:       Radial pulses are 2+ on the right side, and 2+ on the left side.   Pulmonary/Chest: Accessory muscle usage (supraclavicular) present. Tachypnea noted. No respiratory distress. She has decreased breath sounds in the right middle field, the right lower field, the left middle field and the left lower field. She has wheezes in the right middle field and the left middle field. She has rales in the right upper field.   Abdominal: Soft. Bowel sounds are normal. She exhibits no distension. There is no tenderness. There is no guarding.   Neurological: She is alert and oriented to person, place, and time. GCS eye subscore is 4. GCS verbal subscore is 5. GCS motor subscore is 6.   Alert, oriented and conversant. Moves all extremities spontaneously   Skin: Skin is warm and dry. Capillary refill takes less than 2 seconds. She is not diaphoretic. There is pallor.        Vents:       Lines/Drains/Airways     Peripheral Intravenous Line                 Peripheral IV - Single Lumen 04/02/19 Left Antecubital less than 1 day         Peripheral IV - Single Lumen 04/02/19 Right Antecubital less than 1 day                Significant Labs:    CBC/Anemia Profile:  Recent Labs   Lab 04/02/19  1000   WBC 34.15*   HGB 13.6   HCT 40.6      MCV 94   RDW 13.1        Chemistries:  Recent Labs   Lab 04/02/19  1000      K 4.2   CL 95   CO2 34*   BUN 34*   CREATININE 0.6   CALCIUM 10.1   ALBUMIN 2.6*   PROT 6.5   BILITOT 0.5   ALKPHOS 86   ALT 29   AST 18   MG 1.5*         Significant Imaging:   I have reviewed all pertinent imaging results/findings within the past 24 hours.    Assessment/Plan:     Acute on chronic respiratory failure with hypercapnia  --PFT's 3 years ago noted moderate-severe COPD with FEV1  --right upper lobe pneumonia, continued smoking likely trigger for exacerbation  --recommend continuing inhaled bronchodilators, prednisone. Recommend vancomycin, rocephin, azithromycin for now given worsening on azithromycin at outpatient and allergies to fluoroquinolone/doxycycline. Sputum culture pending   --have started Bipap, 15/5, 30% FiO2 given high work of breathing upon transfer to ICU. Plan to continue Bipap, alternating with NC 2 L today and likely should wear Bipap tonight. Attempt to wean from Bipap tomorrow if improving.     COPD (chronic obstructive pulmonary disease)  --as above.   --continue bronchodilators, steroids  --on Advair, PRN albuterol as outpatient. Denies home oxygen use    Atrial fibrillation with RVR  --Afib appears to be new for patient, likely triggered by pneumonia, COPD exacerbation  --agree with diltiazem gtt, titrating to achieve HR ,110 as hemodynamics allow  --cardiology following. On weight based lovenox for therapeutic anticoagulation          Thank you for your consult. Patient seen and examined with .    Patient's niece,  Shayy, updated at bedside.     Critical care time: 50 minutes     Kiki Barrera, AYESHA  Pulmonology  [facility]

## 2019-04-02 NOTE — SUBJECTIVE & OBJECTIVE
Past Medical History:   Diagnosis Date    Arthritis     Carotid disease, bilateral     Cataract     Chronic hyponatremia     COPD (chronic obstructive pulmonary disease)     HLD (hyperlipidemia)     HTN (hypertension)        Past Surgical History:   Procedure Laterality Date    APPENDECTOMY      CATARACT EXTRACTION W/  INTRAOCULAR LENS IMPLANT Right 12/06/2018    Dr. Esocbar    CATARACT EXTRACTION W/  INTRAOCULAR LENS IMPLANT Left 12/20/2018    DR. Escobar    CERVICAL SPINE SURGERY      DILATION AND CURETTAGE OF UTERUS      x 2    EYE SURGERY      cataract Rt    FRACTURE SURGERY      Lt leg fracture with hardware    INSERTION, IOL PROSTHESIS Left 12/20/2018    Performed by [facility] at [facility]    INSERTION, IOL PROSTHESIS Right 12/6/2018    Performed by [facility] at [facility]    metatarsal repair      OPEN REDUCTION INTERNAL FIXATION-TIBIAL PLATEAU Left 6/27/2014    Performed by [facility] Pereira [facility] at [facility]    PHACOEMULSIFICATION, CATARACT Left 12/20/2018    Performed by [facility] at [facility]    PHACOEMULSIFICATION, CATARACT Right 12/6/2018    Performed by [facility] at [facility]    SHOULDER ARTHROSCOPY         Review of patient's allergies indicates:   Allergen Reactions    Pcn [penicillins] Other (See Comments)     Fever flushing skin swelling     Biaxin [clarithromycin] Rash    Codeine Rash    Doxycycline Rash    Levaquin [levofloxacin] Rash       Current Facility-Administered Medications on File Prior to Encounter   Medication    0.9%  NaCl infusion    0.9%  NaCl infusion    cyclopentolate 1% ophthalmic solution 1 drop    phenylephrine HCL 2.5% ophthalmic solution 1 drop    phenylephrine HCL 2.5% ophthalmic solution 1 drop    proparacaine 0.5 % ophthalmic solution 1 drop    proparacaine 0.5 % ophthalmic solution 1 drop    tropicamide 1% ophthalmic solution 1 drop    tropicamide 1% ophthalmic solution 1 drop      Current Outpatient Medications on File Prior to Encounter   Medication Sig    acetaminophen (TYLENOL EXTRA STRENGTH ORAL) Take by mouth as needed.    alendronate (FOSAMAX) 70 MG tablet TAKE 1 TABLET BY MOUTH ONCE A WEEK EVERY 7 DAYS, AS DIRECTED    amLODIPine (NORVASC) 5 MG tablet TAKE 1 TABLET EVERY DAY    aspirin (ECOTRIN) 81 MG EC tablet Take 81 mg by mouth once daily.    azithromycin (ZITHROMAX Z-IMELDA) 250 MG tablet 2 pills on day 1, then 1 pill a day    calcium carbonate (CALCIUM 500 ORAL) Take by mouth 2 (two) times daily.     cetirizine (ZYRTEC) 10 MG tablet Take 10 mg by mouth once daily.    fluticasone-salmeterol 250-50 mcg/dose (ADVAIR) 250-50 mcg/dose diskus inhaler Inhale 1 puff into the lungs 2 (two) times daily. Controller    losartan (COZAAR) 100 MG tablet TAKE 1 TABLET ONE TIME DAILY    MULTIVITAMIN W-MINERALS/LUTEIN (CENTRUM SILVER ORAL) Take by mouth once daily.    pravastatin (PRAVACHOL) 20 MG tablet TAKE 1 TABLET EVERY EVENING    predniSONE (DELTASONE) 20 MG tablet TAKE 2 TABLETS(40 MG) BY MOUTH EVERY DAY FOR 5 DAYS    SPIRIVA WITH HANDIHALER 18 mcg inhalation capsule     VENTOLIN HFA 90 mcg/actuation inhaler INHALE TWO PUFFS BY MOUTH EVERY 6 HOURS AS NEEDED FOR WHEEZING    albuterol sulfate 2.5 mg/0.5 mL Nebu Take 2.5 mg by nebulization every 4 (four) hours as needed. One Box    latanoprost 0.005 % ophthalmic solution Place 1 drop into the left eye nightly.    losartan (COZAAR) 100 MG tablet TAKE 1 TABLET EVERY DAY    predniSONE (DELTASONE) 20 MG tablet Take 2 tablets (40 mg total) by mouth once daily. for 5 days     Family History     Problem Relation (Age of Onset)    Cancer Father    Cataracts Sister    Heart disease Mother, Maternal Grandfather    No Known Problems Brother, Maternal Aunt, Maternal Uncle, Paternal Aunt, Paternal Uncle, Maternal Grandmother, Paternal Grandmother, Paternal Grandfather        Tobacco Use    Smoking status: Current Some Day Smoker      Packs/day: 1.00     Years: 45.00     Pack years: 45.00     Types: Cigarettes     Last attempt to quit: 2002     Years since quittin.2    Smokeless tobacco: Never Used   Substance and Sexual Activity    Alcohol use: No    Drug use: No    Sexual activity: Yes     Partners: Male     Review of Systems   Constitution: Negative.   HENT: Negative.    Eyes: Negative.    Cardiovascular: Positive for chest pain, dyspnea on exertion, irregular heartbeat and palpitations. Negative for leg swelling, near-syncope, orthopnea, paroxysmal nocturnal dyspnea and syncope.   Respiratory: Negative for shortness of breath.    Skin: Negative.    Musculoskeletal: Negative.    Gastrointestinal: Negative for abdominal pain, constipation and diarrhea.   Genitourinary: Negative for dysuria.   Neurological: Negative.    Psychiatric/Behavioral: Negative.      Objective:     Vital Signs (Most Recent):  Temp: 98.9 °F (37.2 °C) (19 151)  Pulse: (!) 133 (19 171)  Resp: 18 (19)  BP: (!) 92/54 (19)  SpO2: (!) 84 % (19) Vital Signs (24h Range):  Temp:  [98.1 °F (36.7 °C)-98.9 °F (37.2 °C)] 98.9 °F (37.2 °C)  Pulse:  [] 133  Resp:  [16-39] 18  SpO2:  [76 %-97 %] 84 %  BP: ()/() 92/54     Weight: 38.1 kg (84 lb)  Body mass index is 13.98 kg/m².    SpO2: (!) 84 %  O2 Device (Oxygen Therapy): BiPAP      Intake/Output Summary (Last 24 hours) at 2019 1734  Last data filed at 2019 1123  Gross per 24 hour   Intake 550 ml   Output --   Net 550 ml       Lines/Drains/Airways     Peripheral Intravenous Line                 Peripheral IV - Single Lumen 19 1415 Left Hand less than 1 day         Peripheral IV - Single Lumen 19 Left Antecubital less than 1 day         Peripheral IV - Single Lumen 19 Right Antecubital less than 1 day                Physical Exam   Constitutional: She is oriented to person, place, and time. She appears well-developed and  well-nourished.   HENT:   Head: Normocephalic and atraumatic.   Eyes: Pupils are equal, round, and reactive to light. Conjunctivae and EOM are normal.   Neck: Normal range of motion. Neck supple. No thyromegaly present.   Cardiovascular: An irregularly irregular rhythm present. Tachycardia present.   No murmur heard.  Pulmonary/Chest: Effort normal and breath sounds normal. No respiratory distress.   Abdominal: Soft. Bowel sounds are normal.   Musculoskeletal: She exhibits no edema.   Neurological: She is alert and oriented to person, place, and time.   Skin: Skin is warm and dry.   Psychiatric: She has a normal mood and affect. Her behavior is normal.       Significant Labs:   CMP   Recent Labs   Lab 04/02/19  1000      K 4.2   CL 95   CO2 34*   *   BUN 34*   CREATININE 0.6   CALCIUM 10.1   PROT 6.5   ALBUMIN 2.6*   BILITOT 0.5   ALKPHOS 86   AST 18   ALT 29   ANIONGAP 8   ESTGFRAFRICA >60   EGFRNONAA >60   , CBC   Recent Labs   Lab 04/02/19  1000   WBC 34.15*   HGB 13.6   HCT 40.6      , INR   Recent Labs   Lab 04/02/19  1000   INR 1.1   , Lipid Panel No results for input(s): CHOL, HDL, LDLCALC, TRIG, CHOLHDL in the last 48 hours. and Troponin   Recent Labs   Lab 04/02/19  1000   TROPONINI <0.006       Significant Imaging: Echocardiogram:   Transthoracic echo (TTE) complete (Cupid Only):   Results for orders placed or performed during the hospital encounter of 04/02/19   Transthoracic echo (TTE) 2D with Color Flow   Result Value Ref Range    LA WIDTH 2.64 cm    AORTIC VALVE CUSP SEPERATION 1.17 cm    PV PEAK VELOCITY 2.03 cm/s    LVIDD 2.49 (A) 3.5 - 6.0 cm    IVS 1.23 (A) 0.6 - 1.1 cm    PW 1.38 (A) 0.6 - 1.1 cm    Ao root annulus 1.28 cm    LVIDS 1.86 (A) 2.1 - 4.0 cm    FS 25 28 - 44 %    LA volume 22.14 cm3    Sinus 2.88 cm    STJ 1.91 cm    LV mass 98.12 g    LA size 2.97 cm    RVDD 2.41 cm    TAPSE 1.27 cm    RV S' 10.11 m/s    Left Ventricle Relative Wall Thickness 1.11 cm    AV mean  gradient 3.85 mmHg    AV valve area 2.63 cm2    AV Velocity Ratio 0.85     AV index (prosthetic) 1.26     IVRT 0.05 msec    LVOT diameter 1.63 cm    LVOT area 2.09 cm2    LVOT peak harshad [id number] m/s    LVOT peak VTI 15.11 cm    Ao peak harshad 1.45 m/s    Ao VTI 11.96 cm    LVOT stroke volume 31.51 cm3    AV peak gradient 8.41 mmHg    MV Peak E Harshad 1.24 m/s    TR Max Harshad 3.47 m/s    LV Systolic Volume 10.53 mL    LV Systolic Volume Index 7.7 mL/m2    LV Diastolic Volume 22.18 mL    LV Diastolic Volume Index 16.21 mL/m2    LA Volume Index 16.2 mL/m2    LV Mass Index 71.7 g/m2    RA Major Axis 3.41 cm    Left Atrium Minor Axis 2.84 cm    Left Atrium Major Axis 4.00 cm    Triscuspid Valve Regurgitation Peak Gradient 48.16 mmHg    RA Width 2.10 cm    Right Atrial Pressure (from IVC) 3 mmHg    TV rest pulmonary artery pressure 51 mmHg     · TDS  · Normal left ventricular systolic function. The estimated ejection fraction is 55%  · Concentric left ventricular remodeling.  · Atrial fibrillation observed.  · Mild left atrial enlargement.  · The estimated PA systolic pressure is 51 mm Hg  · Pulmonary hypertension present.    * all labs reviewed and echocardiogram personally interpret

## 2019-04-02 NOTE — ASSESSMENT & PLAN NOTE
Patient with respiratory distress due to multiple factors including AFib with RVR, pneumonia, COPD exacerbation which resulted in acute on chronic respiratory failure  Continue Rocephin, azithromycin and vancomycin for treatment of her pneumonia  Will follow up on blood and sputum come to cultures  Continue BiPAP and wean as tolerated to nasal cannula  Continued nebulizer treatments and prednisone  Pulmonary consulted and appreciate their input  Will treat AFib with RVR with amiodarone infusion which will be discussed below  Cardiology consulted

## 2019-04-02 NOTE — PLAN OF CARE
Problem: Adult Inpatient Plan of Care  Goal: Plan of Care Review  Outcome: Ongoing (interventions implemented as appropriate)    Patient on bipap and comfortable. Still in a-fib RVR. Cardizem started with the amio.   Pt AAOx4 and appropriate at this time. Respirations even and unlabored. No acute distress noted. Pt denies pain, nausea, vomiting. Bed is locked and in lowest position with side rails up x2. Pt on continuous cardiac monitoring, pulse ox, and BP cuff. Weight shift assistance provided q2. No new injury or falls. Family updated as to POC, will continue to monitor.

## 2019-04-02 NOTE — SUBJECTIVE & OBJECTIVE
Past Medical History:   Diagnosis Date    Arthritis     Carotid disease, bilateral     Cataract     Chronic hyponatremia     COPD (chronic obstructive pulmonary disease)     HLD (hyperlipidemia)     HTN (hypertension)        Past Surgical History:   Procedure Laterality Date    APPENDECTOMY      CATARACT EXTRACTION W/  INTRAOCULAR LENS IMPLANT Right 12/06/2018    Dr. Escobar    CATARACT EXTRACTION W/  INTRAOCULAR LENS IMPLANT Left 12/20/2018    DR. Escobar    CERVICAL SPINE SURGERY      DILATION AND CURETTAGE OF UTERUS      x 2    EYE SURGERY      cataract Rt    FRACTURE SURGERY      Lt leg fracture with hardware    INSERTION, IOL PROSTHESIS Left 12/20/2018    [facility]    INSERTION, IOL PROSTHESIS Right 12/6/2018    [facility]    metatarsal repair      OPEN REDUCTION INTERNAL FIXATION-TIBIAL PLATEAU Left 6/27/2014    Performed by [facility] at [facility]    PHACOEMULSIFICATION, CATARACT Left 12/20/2018    [facility]    PHACOEMULSIFICATION, CATARACT Right 12/6/2018    [facility]    SHOULDER ARTHROSCOPY         Review of patient's allergies indicates:   Allergen Reactions    Pcn [penicillins] Other (See Comments)     Fever flushing skin swelling     Biaxin [clarithromycin] Rash    Codeine Rash    Doxycycline Rash    Levaquin [levofloxacin] Rash       Family History     Problem Relation (Age of Onset)    Cancer Father    Cataracts Sister    Heart disease Mother, Maternal Grandfather    No Known Problems Brother, Maternal Aunt, Maternal Uncle, Paternal Aunt, Paternal Uncle, Maternal Grandmother, Paternal Grandmother, Paternal Grandfather        Tobacco Use    Smoking status: Former Smoker     Packs/day: 1.00     Years: 45.00     Pack years: 45.00     Types: Cigarettes     Last attempt to quit: 1/12/2002     Years since  quittin.2    Smokeless tobacco: Never Used   Substance and Sexual Activity    Alcohol use: No    Drug use: No    Sexual activity: Yes     Partners: Male         Review of Systems   Constitutional: Negative for chills and fever.   Respiratory: Positive for cough and shortness of breath. Negative for wheezing.    Cardiovascular: Positive for palpitations. Negative for chest pain.   Gastrointestinal: Negative for abdominal pain, nausea and vomiting.   Genitourinary: Negative for dysuria.   Neurological: Negative for syncope and headaches.     Objective:     Vital Signs (Most Recent):  Temp: 98.8 °F (37.1 °C) (19 1219)  Pulse: (!) 156 (19 1227)  Resp: (!) 25 (19 1227)  BP: (!) 135/59 (19 1219)  SpO2: 95 % (19 121) Vital Signs (24h Range):  Temp:  [98.4 °F (36.9 °C)-98.8 °F (37.1 °C)] 98.8 °F (37.1 °C)  Pulse:  [] 156  Resp:  [21-36] 25  SpO2:  [89 %-97 %] 95 %  BP: (104-208)/() 135/59     Weight: 38.1 kg (84 lb)  Body mass index is 13.98 kg/m².      Intake/Output Summary (Last 24 hours) at 2019 1330  Last data filed at 2019 1123  Gross per 24 hour   Intake 550 ml   Output --   Net 550 ml       Physical Exam   Constitutional: She is oriented to person, place, and time. She appears well-developed. She has a sickly appearance. She appears ill. No distress.   HENT:   Head: Normocephalic and atraumatic.   Eyes: Conjunctivae and EOM are normal. Right eye exhibits no discharge. Left eye exhibits no discharge. No scleral icterus.   Neck: Neck supple. No thyromegaly present.   Cardiovascular: Normal rate, regular rhythm, S1 normal and S2 normal.   Pulses:       Radial pulses are 2+ on the right side, and 2+ on the left side.   Pulmonary/Chest: Accessory muscle usage (supraclavicular) present. Tachypnea noted. No respiratory distress. She has decreased breath sounds in the right middle field, the right lower field, the left middle field and the left lower field. She has  wheezes in the right middle field and the left middle field. She has rales in the right upper field.   Abdominal: Soft. Bowel sounds are normal. She exhibits no distension. There is no tenderness. There is no guarding.   Neurological: She is alert and oriented to person, place, and time. GCS eye subscore is 4. GCS verbal subscore is 5. GCS motor subscore is 6.   Alert, oriented and conversant. Moves all extremities spontaneously   Skin: Skin is warm and dry. Capillary refill takes less than 2 seconds. She is not diaphoretic. There is pallor.       Vents:       Lines/Drains/Airways     Peripheral Intravenous Line                 Peripheral IV - Single Lumen 04/02/19 Left Antecubital less than 1 day         Peripheral IV - Single Lumen 04/02/19 Right Antecubital less than 1 day                Significant Labs:    CBC/Anemia Profile:  Recent Labs   Lab 04/02/19  1000   WBC 34.15*   HGB 13.6   HCT 40.6      MCV 94   RDW 13.1        Chemistries:  Recent Labs   Lab 04/02/19  1000      K 4.2   CL 95   CO2 34*   BUN 34*   CREATININE 0.6   CALCIUM 10.1   ALBUMIN 2.6*   PROT 6.5   BILITOT 0.5   ALKPHOS 86   ALT 29   AST 18   MG 1.5*         Significant Imaging:   I have reviewed all pertinent imaging results/findings within the past 24 hours.

## 2019-04-02 NOTE — ED PROVIDER NOTES
Encounter Date: 4/2/2019    SCRIBE #1 NOTE: I, Alberto Alfaro, am scribing for, and in the presence of,  Judi Lewis MD. I have scribed the following portions of the note - Other sections scribed: HPI, ROS, PE.       History     Chief Complaint   Patient presents with    Dizziness     Weakness and dizziness.  RVR at 198 with EMS.  6 mg of adenosine administered.  Tachypenic.    Tachycardia     CC: Dizziness    HPI: This 73 y.o. Female with arthritis, COPD, hyperlipidemia and HTN presents to the ED via EMS for an evaluation of weakness, dizziness, cough and SOB for the past few days and worsened this morning.  EMS reports patient was tachycardic on arrival in EKG shows atrial fibrillation with RVR at 198. EMS gave 6mg of adenosine after which her BP dropped to approximately 100 systolic from 142/68 initially.  On arrival patient has an albuterol treatment in process.  She is tachypneic tripodding, and splinting.  Further history limited due to respiratory distress    The history is provided by the patient. No  was used.     Review of patient's allergies indicates:   Allergen Reactions    Pcn [penicillins] Other (See Comments)     Fever flushing skin swelling     Biaxin [clarithromycin] Rash    Codeine Rash    Doxycycline Rash    Levaquin [levofloxacin] Rash     Past Medical History:   Diagnosis Date    Arthritis     Carotid disease, bilateral     Cataract     Chronic hyponatremia     COPD (chronic obstructive pulmonary disease)     HLD (hyperlipidemia)     HTN (hypertension)      Past Surgical History:   Procedure Laterality Date    APPENDECTOMY      CATARACT EXTRACTION W/  INTRAOCULAR LENS IMPLANT Right 12/06/2018    Dr. Escobar    CATARACT EXTRACTION W/  INTRAOCULAR LENS IMPLANT Left 12/20/2018    DR. Escobar    CERVICAL SPINE SURGERY      DILATION AND CURETTAGE OF UTERUS      x 2    EYE SURGERY      cataract Rt    FRACTURE SURGERY      Lt leg fracture with hardware     INSERTION, IOL PROSTHESIS Left 2018    [facility]    INSERTION, IOL PROSTHESIS Right 2018    [facility]    metatarsal repair      OPEN REDUCTION INTERNAL FIXATION-TIBIAL PLATEAU Left 2014    [facility]    PHACOEMULSIFICATION, CATARACT Left 2018    [facility]    PHACOEMULSIFICATION, CATARACT Right 2018    [facility]    SHOULDER ARTHROSCOPY       Family History   Problem Relation Age of Onset    Heart disease Mother     Cancer Father     Heart disease Maternal Grandfather     Cataracts Sister     No Known Problems Brother     No Known Problems Maternal Aunt     No Known Problems Maternal Uncle     No Known Problems Paternal Aunt     No Known Problems Paternal Uncle     No Known Problems Maternal Grandmother     No Known Problems Paternal Grandmother     No Known Problems Paternal Grandfather     Amblyopia Neg Hx     Blindness Neg Hx     Glaucoma Neg Hx     Macular degeneration Neg Hx     Retinal detachment Neg Hx     Strabismus Neg Hx     Diabetes Neg Hx     Hypertension Neg Hx     Stroke Neg Hx     Thyroid disease Neg Hx      Social History     Tobacco Use    Smoking status: Former Smoker     Packs/day: 1.00     Years: 45.00     Pack years: 45.00     Types: Cigarettes     Last attempt to quit: 2002     Years since quittin.2    Smokeless tobacco: Never Used   Substance Use Topics    Alcohol use: No    Drug use: No     Review of Systems   Unable to perform ROS: Acuity of condition   Constitutional: Negative for fever.   Respiratory: Positive for cough and shortness of breath.    Cardiovascular: Negative for chest pain.   Genitourinary: Negative for dysuria.   Skin: Negative for rash.   Neurological: Positive for dizziness and weakness.       Physical Exam      Initial Vitals [04/02/19 0926]   BP Pulse Resp Temp SpO2   109/73 (!) 185 (!) 30 98.4 °F (36.9 °C) (!) 89 %      MAP       --         Physical Exam    Constitutional: She is not diaphoretic. She appears distressed.   HENT:   Head: Atraumatic.   Dry mucous membranes   Eyes: EOM are normal. Pupils are equal, round, and reactive to light. No scleral icterus.   Neck: Normal range of motion. Neck supple. No JVD present.   Cardiovascular:   Tachycardic  Irregular rhythm   Pulmonary/Chest: She is in respiratory distress. She has wheezes.   Decreased air entry  Accessory muscles use  Tripodding  Wheezing bilaterally   Abdominal: Soft. She exhibits no distension. There is no tenderness.   Musculoskeletal: Normal range of motion. She exhibits no edema or tenderness.   Neurological: She is alert and oriented to person, place, and time. GCS score is 15. GCS eye subscore is 4. GCS verbal subscore is 5. GCS motor subscore is 6.   Skin: Skin is warm and dry.         ED Course   Procedures  Labs Reviewed   CBC W/ AUTO DIFFERENTIAL - Abnormal; Notable for the following components:       Result Value    WBC 34.15 (*)     MCH 31.4 (*)     MPV 8.8 (*)     Gran # (ANC) 30.5 (*)     Mono # 1.8 (*)     Gran% 90.0 (*)     Lymph% 5.2 (*)     All other components within normal limits   COMPREHENSIVE METABOLIC PANEL - Abnormal; Notable for the following components:    CO2 34 (*)     Glucose 129 (*)     BUN, Bld 34 (*)     Albumin 2.6 (*)     All other components within normal limits   B-TYPE NATRIURETIC PEPTIDE - Abnormal; Notable for the following components:     (*)     All other components within normal limits   MAGNESIUM - Abnormal; Notable for the following components:    Magnesium 1.5 (*)     All other components within normal limits   ISTAT PROCEDURE - Abnormal; Notable for the following components:    POC PCO2 61.6 (*)     POC PO2 133 (*)     POC HCO3 36.3 (*)     POC TCO2 38 (*)     All other components within normal  limits   CULTURE, BLOOD   CULTURE, BLOOD   CULTURE, RESPIRATORY   TROPONIN I   PROTIME-INR     EKG Readings: (Independently Interpreted)   Atrial fibrillation with rapid ventricular response at 179 beats per minute     ECG Results          EKG 12-lead (In process)  Result time 04/02/19 10:39:13    In process by Interface, Lab In [facility] (04/02/19 10:39:13)                 Narrative:    Test Reason : R06.02,    Vent. Rate : 179 BPM     Atrial Rate : 156 BPM     P-R Int : 000 ms          QRS Dur : 076 ms      QT Int : 262 ms       P-R-T Axes : 000 090 -81 degrees     QTc Int : 452 ms    Atrial fibrillation with rapid ventricular response  Rightward axis  Marked ST abnormality, possible inferior subendocardial injury  Abnormal ECG  When compared with ECG of 28-MAR-2019 20:34,  Significant changes have occurred    Referred By: [email or web address]   SELF           Confirmed By:                   In process by Interface, Lab In [facility] (04/02/19 10:35:37)                 Narrative:    Test Reason : R06.02,    Vent. Rate : 179 BPM     Atrial Rate : 156 BPM     P-R Int : 000 ms          QRS Dur : 076 ms      QT Int : 262 ms       P-R-T Axes : 000 090 -81 degrees     QTc Int : 452 ms    Atrial fibrillation with rapid ventricular response  Rightward axis  Marked ST abnormality, possible inferior subendocardial injury  Abnormal ECG  When compared with ECG of 28-MAR-2019 20:34,  Significant changes have occurred    Referred By: [email or web address]   SELF           Confirmed By:                             Imaging Results          X-Ray Chest AP Portable (Final result)  Result time 04/02/19 09:56:54    Final result by Bartolo Baker MD (04/02/19 09:56:54)                 Impression:      1. New focal right upper lobe airspace opacity with air bronchograms which may reflect atelectasis and/or pneumonia.      Electronically signed by: Bartolo Baker  Date:    04/02/2019  Time:    09:56             Narrative:    EXAMINATION:  XR CHEST AP  PORTABLE    CLINICAL HISTORY:  sob;    TECHNIQUE:  Single frontal portable view of the chest was performed.    COMPARISON:  Chest radiograph 03/28/2019    FINDINGS:  Cardiomediastinal silhouette is within normal limits.    New focal right upper lobe airspace opacity with air bronchograms which may reflect atelectasis and/or pneumonia superimposed on a background of chronic obstructive physiology which is not significantly changed.  Blunting of the right costophrenic angle is not significantly changed.  No overt interstitial edema, sizable pleural effusion or pneumothorax.    Multilevel degenerative changes of the imaged spine.                              X-Rays:   Independently Interpreted Readings:   Chest X-Ray: There is an infiltrate in the RUL.     Medical Decision Making:   History:   Old Medical Records: I decided to obtain old medical records.  Old Records Summarized: records from previous admission(s).       <> Summary of Records: Past admissions for COPD exacerbation  Differential Diagnosis:   COPD exacerbation  CHF exacerbation  Pneumonia  Electrolyte abnormality  Atrial fibrillation  ACS  Independently Interpreted Test(s):   I have ordered and independently interpreted X-rays - see prior notes.  I have ordered and independently interpreted EKG Reading(s) - see prior notes  Clinical Tests:   Lab Tests: Ordered and Reviewed  Radiological Study: Ordered and Reviewed  Medical Tests: Ordered and Reviewed  ED Management:  Patient is in respiratory distress at time history and physical.  She has decreased air entry with wheezing bilaterally. She has accessory muscle use.  EKG has atrial fibrillation with rapid ventricular response.  Blood gas demonstrates CO2 retention.  Patient started on continuous albuterol nebulizations along with IV Solu-Medrol and magnesium.    I spent a total of 120 minutes caring for and overseeing the critical care of this patient. Critical care time was spent treating or preventing  major adverse outcome resulting from atrial fibrillation with rapid ventricular response, respiratory distress.  Patient was specifically observed and treated with continuous nebulizations, IV Solu-Medrol, IV magnesium, IV Cardizem, repeat IV Cardizem, Cardizem drip, intravenous antibiotics, followed by discussion with hospitalist and admission to the hospital.       This chart was completed using dictation software, as a result there may be some transcriton errors.             Scribe Attestation:   Scribe #1: I performed the above scribed service and the documentation accurately describes the services I performed. I attest to the accuracy of the note.    Attending Attestation:           Physician Attestation for Scribe:  Physician Attestation Statement for Scribe #1: I, Judi Lewis MD, reviewed documentation, as scribed by Alberto Alfaro in my presence, and it is both accurate and complete.                    Clinical Impression:       ICD-10-CM ICD-9-CM   1. Atrial fibrillation with RVR I48.91 427.31   2. Shortness of breath R06.02 786.05   3. COPD exacerbation J44.1 491.21         Disposition:   Disposition: Admitted  Condition: Serious                        Judi Lewis MD  04/02/19 0534

## 2019-04-02 NOTE — ASSESSMENT & PLAN NOTE
No infiltrate noted in the right upper lobe that was not present when she was mid just a few days ago for observation  Continue treatment with ceftriaxone, azithromycin and vancomycin  Will follow up on culture results  Will deescalate antibiotics when possible

## 2019-04-02 NOTE — ASSESSMENT & PLAN NOTE
Likely secondary to pneumonia and possible steroid and/or leukemoid reaction  Antibiotics as discussed above   Will continue to monitor

## 2019-04-02 NOTE — ASSESSMENT & PLAN NOTE
Currently normotensive without medications except for diltiazem drip which has now been stopped  Will resume medications for blood pressure control when warranted and BP can tolerate

## 2019-04-02 NOTE — SUBJECTIVE & OBJECTIVE
Past Medical History:   Diagnosis Date    Arthritis     Carotid disease, bilateral     Cataract     Chronic hyponatremia     COPD (chronic obstructive pulmonary disease)     HLD (hyperlipidemia)     HTN (hypertension)        Past Surgical History:   Procedure Laterality Date    APPENDECTOMY      CATARACT EXTRACTION W/  INTRAOCULAR LENS IMPLANT Right 12/06/2018    Dr. Escobar    CATARACT EXTRACTION W/  INTRAOCULAR LENS IMPLANT Left 12/20/2018    DR. Escobar    CERVICAL SPINE SURGERY      DILATION AND CURETTAGE OF UTERUS      x 2    EYE SURGERY      cataract Rt    FRACTURE SURGERY      Lt leg fracture with hardware    INSERTION, IOL PROSTHESIS Left 12/20/2018    [facility]    INSERTION, IOL PROSTHESIS Right 12/6/2018    Per[facility]    metatarsal repair      OPEN REDUCTION INTERNAL FIXATION-TIBIAL PLATEAU Left 6/27/2014    Performed by [facility] at [facility]    PHACOEMULSIFICATION, CATARACT Left 12/20/2018    Per[facility]    PHACOEMULSIFICATION, CATARACT Right 12/6/2018    Per[facility] by [facility]    SHOULDER ARTHROSCOPY         Review of patient's allergies indicates:   Allergen Reactions    Pcn [penicillins] Other (See Comments)     Fever flushing skin swelling     Biaxin [clarithromycin] Rash    Codeine Rash    Doxycycline Rash    Levaquin [levofloxacin] Rash       Current Facility-Administered Medications on File Prior to Encounter   Medication    0.9%  NaCl infusion    0.9%  NaCl infusion    cyclopentolate 1% ophthalmic solution 1 drop    phenylephrine HCL 2.5% ophthalmic solution 1 drop    phenylephrine HCL 2.5% ophthalmic solution 1 drop    proparacaine 0.5 % ophthalmic solution 1 drop    proparacaine 0.5 % ophthalmic solution 1 drop    tropicamide 1% ophthalmic solution 1 drop    tropicamide 1% ophthalmic solution 1 drop      Current Outpatient Medications on File Prior to Encounter   Medication Sig    acetaminophen (TYLENOL EXTRA STRENGTH ORAL) Take by mouth as needed.    alendronate (FOSAMAX) 70 MG tablet TAKE 1 TABLET BY MOUTH ONCE A WEEK EVERY 7 DAYS, AS DIRECTED    amLODIPine (NORVASC) 5 MG tablet TAKE 1 TABLET EVERY DAY    aspirin (ECOTRIN) 81 MG EC tablet Take 81 mg by mouth once daily.    azithromycin (ZITHROMAX Z-IMELDA) 250 MG tablet 2 pills on day 1, then 1 pill a day    calcium carbonate (CALCIUM 500 ORAL) Take by mouth 2 (two) times daily.     cetirizine (ZYRTEC) 10 MG tablet Take 10 mg by mouth once daily.    fluticasone-salmeterol 250-50 mcg/dose (ADVAIR) 250-50 mcg/dose diskus inhaler Inhale 1 puff into the lungs 2 (two) times daily. Controller    losartan (COZAAR) 100 MG tablet TAKE 1 TABLET ONE TIME DAILY    MULTIVITAMIN W-MINERALS/LUTEIN (CENTRUM SILVER ORAL) Take by mouth once daily.    pravastatin (PRAVACHOL) 20 MG tablet TAKE 1 TABLET EVERY EVENING    predniSONE (DELTASONE) 20 MG tablet TAKE 2 TABLETS(40 MG) BY MOUTH EVERY DAY FOR 5 DAYS    SPIRIVA WITH HANDIHALER 18 mcg inhalation capsule     VENTOLIN HFA 90 mcg/actuation inhaler INHALE TWO PUFFS BY MOUTH EVERY 6 HOURS AS NEEDED FOR WHEEZING    albuterol sulfate 2.5 mg/0.5 mL Nebu Take 2.5 mg by nebulization every 4 (four) hours as needed. One Box    latanoprost 0.005 % ophthalmic solution Place 1 drop into the left eye nightly.    losartan (COZAAR) 100 MG tablet TAKE 1 TABLET EVERY DAY    predniSONE (DELTASONE) 20 MG tablet Take 2 tablets (40 mg total) by mouth once daily. for 5 days     Family History     Problem Relation (Age of Onset)    Cancer Father    Cataracts Sister    Heart disease Mother, Maternal Grandfather    No Known Problems Brother, Maternal Aunt, Maternal Uncle, Paternal Aunt, Paternal Uncle, Maternal Grandmother, Paternal Grandmother, Paternal Grandfather        Tobacco Use    Smoking status: Current Some Day Smoker      Packs/day: 1.00     Years: 45.00     Pack years: 45.00     Types: Cigarettes     Last attempt to quit: 2002     Years since quittin.2    Smokeless tobacco: Never Used   Substance and Sexual Activity    Alcohol use: No    Drug use: No    Sexual activity: Yes     Partners: Male     Review of Systems   Constitutional: Positive for activity change and appetite change. Negative for chills and fever.   HENT: Negative for sore throat.    Eyes: Negative for visual disturbance.   Respiratory: Positive for cough and shortness of breath.    Cardiovascular: Positive for palpitations. Negative for chest pain.   Gastrointestinal: Negative for nausea and vomiting.   Endocrine: Negative for polyuria.   Genitourinary: Negative for dysuria.   Musculoskeletal: Negative for back pain.   Skin: Negative for rash.   Neurological: Positive for dizziness and weakness. Negative for syncope and speech difficulty.   Psychiatric/Behavioral: Negative for confusion.     Objective:     Vital Signs (Most Recent):  Temp: 98.1 °F (36.7 °C) (19 1315)  Pulse: (!) 144 (19 1400)  Resp: (!) 32 (19 1400)  BP: 121/64 (19 1400)  SpO2: (!) 81 % (19 1400) Vital Signs (24h Range):  Temp:  [98.1 °F (36.7 °C)-98.8 °F (37.1 °C)] 98.1 °F (36.7 °C)  Pulse:  [] 144  Resp:  [21-39] 32  SpO2:  [81 %-97 %] 81 %  BP: ()/() 121/64     Weight: 38.1 kg (84 lb)  Body mass index is 13.98 kg/m².    Physical Exam   Constitutional: She is oriented to person, place, and time. She appears well-developed. She appears distressed.   Cachectic and frail   HENT:   Head: Atraumatic.   + Temporal wasting, BiPAP mask in place   Eyes: Pupils are equal, round, and reactive to light. Conjunctivae are normal.   Neck: Normal range of motion.   Cardiovascular:   Irregular irregular and tachycardic   Pulmonary/Chest: She is in respiratory distress.   Diminished breath sounds throughout, faint expiratory wheeze noted with prolonged  expiratory time; use of accessory muscles for respiration   Abdominal: Soft. Bowel sounds are normal. She exhibits no distension and no mass. There is no tenderness. There is no rebound and no guarding.   Musculoskeletal: Normal range of motion. She exhibits no edema.   Neurological: She is alert and oriented to person, place, and time.   Skin: Capillary refill takes less than 2 seconds.   Psychiatric: She has a normal mood and affect. Her behavior is normal. Thought content normal.         CRANIAL NERVES     CN III, IV, VI   Pupils are equal, round, and reactive to light.       Significant Labs: All pertinent labs within the past 24 hours have been reviewed.    Significant Imaging: I have reviewed and interpreted all pertinent imaging results/findings within the past 24 hours.

## 2019-04-03 LAB
ANION GAP SERPL CALC-SCNC: 9 MMOL/L (ref 8–16)
BASOPHILS # BLD AUTO: 0.04 K/UL (ref 0–0.2)
BASOPHILS NFR BLD: 0.1 % (ref 0–1.9)
BUN SERPL-MCNC: 32 MG/DL (ref 8–23)
CALCIUM SERPL-MCNC: 9.8 MG/DL (ref 8.7–10.5)
CHLORIDE SERPL-SCNC: 93 MMOL/L (ref 95–110)
CO2 SERPL-SCNC: 31 MMOL/L (ref 23–29)
CREAT SERPL-MCNC: 0.6 MG/DL (ref 0.5–1.4)
DIFFERENTIAL METHOD: ABNORMAL
EOSINOPHIL # BLD AUTO: 0 K/UL (ref 0–0.5)
EOSINOPHIL NFR BLD: 0 % (ref 0–8)
ERYTHROCYTE [DISTWIDTH] IN BLOOD BY AUTOMATED COUNT: 13.2 % (ref 11.5–14.5)
EST. GFR  (AFRICAN AMERICAN): >60 ML/MIN/1.73 M^2
EST. GFR  (NON AFRICAN AMERICAN): >60 ML/MIN/1.73 M^2
GLUCOSE SERPL-MCNC: 155 MG/DL (ref 70–110)
HCT VFR BLD AUTO: 38.9 % (ref 37–48.5)
HGB BLD-MCNC: 12.8 G/DL (ref 12–16)
LYMPHOCYTES # BLD AUTO: 1 K/UL (ref 1–4.8)
LYMPHOCYTES NFR BLD: 2.8 % (ref 18–48)
MAGNESIUM SERPL-MCNC: 2 MG/DL (ref 1.6–2.6)
MCH RBC QN AUTO: 31.5 PG (ref 27–31)
MCHC RBC AUTO-ENTMCNC: 32.9 G/DL (ref 32–36)
MCV RBC AUTO: 96 FL (ref 82–98)
MONOCYTES # BLD AUTO: 1.1 K/UL (ref 0.3–1)
MONOCYTES NFR BLD: 3 % (ref 4–15)
NEUTROPHILS # BLD AUTO: 33.7 K/UL (ref 1.8–7.7)
NEUTROPHILS NFR BLD: 94.8 % (ref 38–73)
PHOSPHATE SERPL-MCNC: 2.9 MG/DL (ref 2.7–4.5)
PLATELET # BLD AUTO: 273 K/UL (ref 150–350)
PMV BLD AUTO: 9.2 FL (ref 9.2–12.9)
POTASSIUM SERPL-SCNC: 4.3 MMOL/L (ref 3.5–5.1)
RBC # BLD AUTO: 4.06 M/UL (ref 4–5.4)
SODIUM SERPL-SCNC: 133 MMOL/L (ref 136–145)
WBC # BLD AUTO: 35.82 K/UL (ref 3.9–12.7)

## 2019-04-03 PROCEDURE — 25000003 PHARM REV CODE 250: Mod: HCNC | Performed by: EMERGENCY MEDICINE

## 2019-04-03 PROCEDURE — 99291 PR CRITICAL CARE, E/M 30-74 MINUTES: ICD-10-PCS | Mod: HCNC,,, | Performed by: NURSE PRACTITIONER

## 2019-04-03 PROCEDURE — C9113 INJ PANTOPRAZOLE SODIUM, VIA: HCPCS | Mod: HCNC | Performed by: EMERGENCY MEDICINE

## 2019-04-03 PROCEDURE — 25000242 PHARM REV CODE 250 ALT 637 W/ HCPCS: Mod: HCNC | Performed by: NURSE PRACTITIONER

## 2019-04-03 PROCEDURE — 25000003 PHARM REV CODE 250: Mod: HCNC | Performed by: INTERNAL MEDICINE

## 2019-04-03 PROCEDURE — 94761 N-INVAS EAR/PLS OXIMETRY MLT: CPT | Mod: HCNC

## 2019-04-03 PROCEDURE — 36415 COLL VENOUS BLD VENIPUNCTURE: CPT | Mod: HCNC

## 2019-04-03 PROCEDURE — 20000000 HC ICU ROOM: Mod: HCNC

## 2019-04-03 PROCEDURE — 94640 AIRWAY INHALATION TREATMENT: CPT | Mod: HCNC

## 2019-04-03 PROCEDURE — 63600175 PHARM REV CODE 636 W HCPCS: Mod: HCNC | Performed by: EMERGENCY MEDICINE

## 2019-04-03 PROCEDURE — 99232 SBSQ HOSP IP/OBS MODERATE 35: CPT | Mod: HCNC,,, | Performed by: INTERNAL MEDICINE

## 2019-04-03 PROCEDURE — 83735 ASSAY OF MAGNESIUM: CPT | Mod: HCNC

## 2019-04-03 PROCEDURE — 25000003 PHARM REV CODE 250: Mod: HCNC | Performed by: NURSE PRACTITIONER

## 2019-04-03 PROCEDURE — 63600175 PHARM REV CODE 636 W HCPCS: Mod: HCNC | Performed by: HOSPITALIST

## 2019-04-03 PROCEDURE — 84100 ASSAY OF PHOSPHORUS: CPT | Mod: HCNC

## 2019-04-03 PROCEDURE — 25000003 PHARM REV CODE 250: Mod: HCNC | Performed by: HOSPITALIST

## 2019-04-03 PROCEDURE — 99900035 HC TECH TIME PER 15 MIN (STAT): Mod: HCNC

## 2019-04-03 PROCEDURE — 27000221 HC OXYGEN, UP TO 24 HOURS: Mod: HCNC

## 2019-04-03 PROCEDURE — 99291 CRITICAL CARE FIRST HOUR: CPT | Mod: HCNC,,, | Performed by: NURSE PRACTITIONER

## 2019-04-03 PROCEDURE — 85025 COMPLETE CBC W/AUTO DIFF WBC: CPT | Mod: HCNC

## 2019-04-03 PROCEDURE — 94660 CPAP INITIATION&MGMT: CPT | Mod: HCNC

## 2019-04-03 PROCEDURE — 80048 BASIC METABOLIC PNL TOTAL CA: CPT | Mod: HCNC

## 2019-04-03 PROCEDURE — 99232 PR SUBSEQUENT HOSPITAL CARE,LEVL II: ICD-10-PCS | Mod: HCNC,,, | Performed by: INTERNAL MEDICINE

## 2019-04-03 RX ORDER — BENZONATATE 100 MG/1
100 CAPSULE ORAL 3 TIMES DAILY PRN
Status: DISCONTINUED | OUTPATIENT
Start: 2019-04-03 | End: 2019-04-11 | Stop reason: HOSPADM

## 2019-04-03 RX ORDER — TIOTROPIUM BROMIDE 18 UG/1
1 CAPSULE ORAL; RESPIRATORY (INHALATION) DAILY
Status: DISCONTINUED | OUTPATIENT
Start: 2019-04-03 | End: 2019-04-11 | Stop reason: HOSPADM

## 2019-04-03 RX ORDER — ALPRAZOLAM 0.25 MG/1
0.25 TABLET ORAL ONCE
Status: COMPLETED | OUTPATIENT
Start: 2019-04-03 | End: 2019-04-03

## 2019-04-03 RX ADMIN — PANTOPRAZOLE SODIUM 40 MG: 40 INJECTION, POWDER, FOR SOLUTION INTRAVENOUS at 08:04

## 2019-04-03 RX ADMIN — BENZONATATE 100 MG: 100 CAPSULE ORAL at 09:04

## 2019-04-03 RX ADMIN — ACETAMINOPHEN 500 MG: 500 TABLET, FILM COATED ORAL at 01:04

## 2019-04-03 RX ADMIN — ALPRAZOLAM 0.25 MG: 0.25 TABLET ORAL at 09:04

## 2019-04-03 RX ADMIN — AMIODARONE HYDROCHLORIDE 0.5 MG/MIN: 1.8 INJECTION, SOLUTION INTRAVENOUS at 04:04

## 2019-04-03 RX ADMIN — DILTIAZEM HYDROCHLORIDE 5 MG/HR: 5 INJECTION INTRAVENOUS at 10:04

## 2019-04-03 RX ADMIN — BENZONATATE 100 MG: 100 CAPSULE ORAL at 01:04

## 2019-04-03 RX ADMIN — PRAVASTATIN SODIUM 20 MG: 10 TABLET ORAL at 09:04

## 2019-04-03 RX ADMIN — TIOTROPIUM BROMIDE 18 MCG: 18 CAPSULE ORAL; RESPIRATORY (INHALATION) at 03:04

## 2019-04-03 RX ADMIN — ENOXAPARIN SODIUM 40 MG: 100 INJECTION SUBCUTANEOUS at 08:04

## 2019-04-03 RX ADMIN — LEVALBUTEROL HYDROCHLORIDE 1.25 MG: 1.25 SOLUTION, CONCENTRATE RESPIRATORY (INHALATION) at 03:04

## 2019-04-03 RX ADMIN — AZITHROMYCIN MONOHYDRATE 250 MG: 250 TABLET ORAL at 08:04

## 2019-04-03 RX ADMIN — PREDNISONE 40 MG: 20 TABLET ORAL at 08:04

## 2019-04-03 RX ADMIN — CEFTRIAXONE 1 G: 1 INJECTION, SOLUTION INTRAVENOUS at 12:04

## 2019-04-03 RX ADMIN — ASPIRIN 81 MG: 81 TABLET, COATED ORAL at 08:04

## 2019-04-03 RX ADMIN — ENOXAPARIN SODIUM 40 MG: 100 INJECTION SUBCUTANEOUS at 09:04

## 2019-04-03 RX ADMIN — LEVALBUTEROL HYDROCHLORIDE 1.25 MG: 1.25 SOLUTION, CONCENTRATE RESPIRATORY (INHALATION) at 11:04

## 2019-04-03 RX ADMIN — LEVALBUTEROL HYDROCHLORIDE 1.25 MG: 1.25 SOLUTION, CONCENTRATE RESPIRATORY (INHALATION) at 07:04

## 2019-04-03 RX ADMIN — AMIODARONE HYDROCHLORIDE 0.5 MG/MIN: 1.8 INJECTION, SOLUTION INTRAVENOUS at 05:04

## 2019-04-03 NOTE — ASSESSMENT & PLAN NOTE
--Afib appears to be new for patient, likely triggered by pneumonia, COPD exacerbation  --cardiology following. On weight based lovenox for therapeutic anticoagulation and amiodarone, diltiazem. JOHNNA/DCCV possibly tomorrow if not improved.

## 2019-04-03 NOTE — ASSESSMENT & PLAN NOTE
--PFT's 3 years ago noted moderate-severe COPD  --right upper lobe pneumonia, continued smoking likely trigger for exacerbation  --recommend continuing inhaled bronchodilators, prednisone, spiriva  --Agree with rocephin, azithromycin for now with 1/4 blood cultures obtained from admission growing GNR's. Respiratory status mildly improved from yesterday, improved air movement but remains tachypneic when off of NIPPV. If febrile, or respiratory status not improved, consider broadening coverage to Cefepime for pseudomonal coverage.   --Continue alternating Bipap, 13/5, 30% FiO2 with NC 2 L today and should wear Bipap QHS for now.

## 2019-04-03 NOTE — ASSESSMENT & PLAN NOTE
Patient is underweight and with BMI of 13.98  Regular diet and supplement with Boost  Nutrition consulted

## 2019-04-03 NOTE — ASSESSMENT & PLAN NOTE
Status post pulse dose of IV steroids in the ER and continue on p.o. Prednisone  Continue nebulizer treatments and support with BiPAP PRN  Currently weaned to nasal cannula at 2 L  Pulmonary input appreciated

## 2019-04-03 NOTE — PROGRESS NOTES
[facility]  Cardiology  Progress Note    Patient Name: Latasha Perez  MRN: [id number]  Admission Date: 4/2/2019  Hospital Length of Stay: 1 days  Code Status: Full Code   Attending Physician: Susan Schuster MD   Primary Care Physician: Pollo Oneal MD  Expected Discharge Date:   Principal Problem:Atrial fibrillation with RVR    Subjective:     Hospital Course:   4-2:  Admitted with respiratory failure, COPD/pneumonia in AFib with RVR    Interval History:  Off BiPAP this a.m. without complaints.  She denies any chest pain, shortness of breath or palpitations.    Telemetry:  AFib with RVR    Review of Systems   All other systems reviewed and are negative.    Objective:     Vital Signs (Most Recent):  Temp: 97.7 °F (36.5 °C) (04/03/19 0730)  Pulse: (!) 132 (04/03/19 0800)  Resp: 20 (04/03/19 0800)  BP: (!) 145/71 (04/03/19 0800)  SpO2: (!) 85 % (04/03/19 0800) Vital Signs (24h Range):  Temp:  [97.4 °F (36.3 °C)-98.9 °F (37.2 °C)] 97.7 °F (36.5 °C)  Pulse:  [] 132  Resp:  [15-50] 20  SpO2:  [76 %-100 %] 85 %  BP: ()/() 145/71     Weight: 37.8 kg (83 lb 5.3 oz)  Body mass index is 13.87 kg/m².     SpO2: (!) 85 %  O2 Device (Oxygen Therapy): nasal cannula      Intake/Output Summary (Last 24 hours) at 4/3/2019 0943  Last data filed at 4/3/2019 0800  Gross per 24 hour   Intake 1367.53 ml   Output --   Net 1367.53 ml       Lines/Drains/Airways     Peripheral Intravenous Line                 Peripheral IV - Single Lumen 04/02/19 Left Antecubital 1 day         Peripheral IV - Single Lumen 04/02/19 Right Antecubital 1 day         Peripheral IV - Single Lumen 04/02/19 1415 Left Hand less than 1 day                Physical Exam   Constitutional: She is oriented to person, place, and time. She appears well-developed and well-nourished.   HENT:   Head: Normocephalic and atraumatic.   Eyes: Pupils are equal, round, and reactive to light. Conjunctivae and EOM are normal.   Neck: Normal range  of motion. Neck supple. No thyromegaly present.   Cardiovascular: An irregularly irregular rhythm present. Tachycardia present.   No murmur heard.  Pulmonary/Chest: No respiratory distress.   Severely course decreased breath sounds bilaterally   Abdominal: Soft. Bowel sounds are normal.   Musculoskeletal: She exhibits no edema.   Neurological: She is alert and oriented to person, place, and time.   Skin: Skin is warm and dry.   Psychiatric: She has a normal mood and affect. Her behavior is normal.       Significant Labs:   BMP:   Recent Labs   Lab 04/02/19  1000 04/03/19  0300   * 155*    133*   K 4.2 4.3   CL 95 93*   CO2 34* 31*   BUN 34* 32*   CREATININE 0.6 0.6   CALCIUM 10.1 9.8   MG 1.5* 2.0    and CBC   Recent Labs   Lab 04/02/19  1000 04/03/19  0300   WBC 34.15* 35.82*   HGB 13.6 12.8   HCT 40.6 38.9    273       Significant Imaging: EKG: AFib with RVR     Echocardiogram:  · TDS  · Normal left ventricular systolic function. The estimated ejection fraction is 55%  · Concentric left ventricular remodeling.  · Atrial fibrillation observed.  · Mild left atrial enlargement.  · The estimated PA systolic pressure is 51 mm Hg  · Pulmonary hypertension present.     * all labs reviewed and echocardiogram personally interpreted    Assessment and Plan:     Brief HPI:  No acute events overnight.  Still continuing amiodarone loading and diltiazem IV.  If refractory consider JOHNNA/DC cardioversion in a.m. but would complete full protocol of amiodarone today.  Some hesitancy with tenuous respiratory status if avoidable but otherwise if needed plan for tomorrow.    * Atrial fibrillation with RVR  Refractory to diltiazem drip  Add added amiodarone infusion per protocol  Full-dose Lovenox for OAC currently  If refractory consider JOHNNA/DC cardioversion a.m.        Acute on chronic respiratory failure with hypercapnia  Supportive care  Concurrently treat underlying cardiac and pulmonary issues    COPD (chronic  obstructive pulmonary disease)  Treatment per primary and Pulmonary    Essential hypertension  Currently stable on home regimen    HLD (hyperlipidemia)  On statin      VTE Risk Mitigation (From admission, onward)        Ordered     enoxaparin injection 40 mg  Every 12 hours      04/02/19 1135     IP VTE HIGH RISK PATIENT  Once      04/02/19 1210     Place sequential compression device  Until discontinued      04/02/19 1210          Manfred Figueroa MD  Cardiology  [facility]

## 2019-04-03 NOTE — ASSESSMENT & PLAN NOTE
Patient was treated with diltiazem infusion with minimal response and heart rate still uncontrolled  Continue IV amiodarone infusion  Anticoagulated with full-dose Lovenox  Cardiology following  Echo with normal EF of 55% with PA pressure of 51 mm Hg and pulmonary hypertension

## 2019-04-03 NOTE — SUBJECTIVE & OBJECTIVE
Interval History:  Patient weaned off BIPAP to 2 L via nasal cannula, very anxious still and reports being short of breath.  No acute events overnight.  Heart rate uncontrol still on amiodarone infusion.    Review of Systems   Constitutional: Negative for chills and fever.   Respiratory: Positive for cough and shortness of breath.    Cardiovascular: Positive for palpitations. Negative for chest pain.     Objective:     Vital Signs (Most Recent):  Temp: 97.7 °F (36.5 °C) (04/03/19 0730)  Pulse: (!) 132 (04/03/19 0800)  Resp: 20 (04/03/19 0800)  BP: (!) 145/71 (04/03/19 0800)  SpO2: (!) 85 % (04/03/19 0800) Vital Signs (24h Range):  Temp:  [97.4 °F (36.3 °C)-98.9 °F (37.2 °C)] 97.7 °F (36.5 °C)  Pulse:  [] 132  Resp:  [15-50] 20  SpO2:  [76 %-100 %] 85 %  BP: ()/() 145/71     Weight: 37.8 kg (83 lb 5.3 oz)  Body mass index is 13.87 kg/m².    Intake/Output Summary (Last 24 hours) at 4/3/2019 0946  Last data filed at 4/3/2019 0800  Gross per 24 hour   Intake 1367.53 ml   Output --   Net 1367.53 ml      Physical Exam   Constitutional: She is oriented to person, place, and time. She appears well-developed. She appears distressed.   Cachectic and frail   HENT:   Head: Atraumatic.   + Temporal wasting, on nasal cannula   Eyes: Pupils are equal, round, and reactive to light. Conjunctivae are normal.   Neck: Normal range of motion.   Cardiovascular:   Irregular irregular and tachycardic   Pulmonary/Chest: She is in respiratory distress.   Diminished breath sounds throughout, faint expiratory wheeze noted with prolonged expiratory time; use of accessory muscles for respiration but overall more comfortable in appearance   Abdominal: Soft. Bowel sounds are normal. She exhibits no distension and no mass. There is no tenderness. There is no rebound and no guarding.   Musculoskeletal: Normal range of motion. She exhibits no edema.   Neurological: She is alert and oriented to person, place, and time.   Skin:  Capillary refill takes less than 2 seconds.   Psychiatric: She has a normal mood and affect. Her behavior is normal. Thought content normal.       Significant Labs: All pertinent labs within the past 24 hours have been reviewed.    Significant Imaging: I have reviewed and interpreted all pertinent imaging results/findings within the past 24 hours.

## 2019-04-03 NOTE — ASSESSMENT & PLAN NOTE
Patient with respiratory distress due to multiple factors including AFib with RVR, pneumonia, COPD exacerbation which resulted in acute on chronic respiratory failure  Continue Rocephin, azithromycin and vancomycin for treatment of her pneumonia  She blood and sputum culture NGTD  BiPAP weaned off to nasal cannula; will have PRN  Continued nebulizer treatments and prednisone  Pulmonary input appreciated  Continue to treat AFib with RVR with amiodarone infusion which will be discussed below  Cardiology following

## 2019-04-03 NOTE — ASSESSMENT & PLAN NOTE
New infiltrate noted in the right upper lobe that was not present when she was mid just a few days ago for observation  Continue treatment with ceftriaxone, azithromycin and vancomycin  Blood cultures no growth to date and sputum culture pending  Will deescalate antibiotics when possible

## 2019-04-03 NOTE — HOSPITAL COURSE
4-2:  Admitted with respiratory failure, COPD/pneumonia in AFib with RVR  4-3:  Persistent AFib with RVR on amiodarone infusion

## 2019-04-03 NOTE — PROGRESS NOTES
[facility]  Pulmonology  Progress Note    Patient Name: Latasha Perez  MRN: [id number]  Admission Date: 4/2/2019  Hospital Length of Stay: 1 days  Code Status: Full Code  Attending Provider: Susan Schuster MD  Primary Care Provider: Pollo Oneal MD   Principal Problem: Atrial fibrillation with RVR    Subjective:     HPI: Mrs. Perez is a 72 yo female with history significant for severe COPD (PFT's 3 years ago; on advair, spiriva and PRN albuterol), continued tobacco abuse, and HTN who presented to the ED on 4/2 with complaints of SOB, diffuse weakness that has progressively worsened over preceding 2 days. She was recently admitted to Observation 3/28 to 3/29 for a COPD exacerbation and was discharged home on Azithromycin and prednisone 40 mg daily x 5 days.     Upon ED arrival, patient was in Afib RVR, rate 140-150 and normotensive. CXR with new right upper lobe opacity, not noted on CXR from admission on 3/28. She was afebrile, with significant leukocytosis of 38,000. She was started on a diltiazem gtt for RVR. Pulmonary consulted for assistance in management of acute on chronic hypercapnic respiratory failure.       Interval History: remains in RVR, cardiology following and amiodarone continued, ditliazem at 5 mg/hr. Planning for possible JOHNNA/DCCV tomorrow if not improved. Respiratory status mildly improved, alternating Bipap with NC 2L but still with tachypnea, slight accessory muscle use with exertion    Objective:     Vital Signs (Most Recent):  Temp: 97.7 °F (36.5 °C) (04/03/19 0730)  Pulse: (!) 131 (04/03/19 1215)  Resp: (!) 24 (04/03/19 1215)  BP: 110/68 (04/03/19 1215)  SpO2: (!) 94 % (04/03/19 1145) Vital Signs (24h Range):  Temp:  [97.4 °F (36.3 °C)-98.9 °F (37.2 °C)] 97.7 °F (36.5 °C)  Pulse:  [108-162] 131  Resp:  [15-50] 24  SpO2:  [76 %-100 %] 94 %  BP: ()/(48-93) 110/68     Weight: 37.8 kg (83 lb 5.3 oz)  Body mass index is 13.87 kg/m².      Intake/Output Summary (Last  24 hours) at 4/3/2019 1309  Last data filed at 4/3/2019 1214  Gross per 24 hour   Intake 949.33 ml   Output --   Net 949.33 ml       Physical Exam   Constitutional: She is oriented to person, place, and time. She appears well-developed. She has a sickly appearance. She appears ill. No distress.   HENT:   Head: Normocephalic and atraumatic.   Eyes: Conjunctivae and EOM are normal. Right eye exhibits no discharge. Left eye exhibits no discharge. No scleral icterus.   Neck: Neck supple. No thyromegaly present.   Cardiovascular: Normal rate, regular rhythm, S1 normal and S2 normal.   Pulses:       Radial pulses are 2+ on the right side, and 2+ on the left side.   Pulmonary/Chest: Accessory muscle usage (supraclavicular) present. Tachypnea noted. No respiratory distress. She has decreased breath sounds in the right lower field and the left lower field. She has wheezes (mild, improved from yesterday. increased air movement bilaterally ) in the right middle field and the left middle field. She has no rales.   Abdominal: Soft. Bowel sounds are normal. She exhibits no distension. There is no tenderness. There is no guarding.   Neurological: She is alert and oriented to person, place, and time. GCS eye subscore is 4. GCS verbal subscore is 5. GCS motor subscore is 6.   Alert, oriented and conversant. Moves all extremities spontaneously   Skin: Skin is warm and dry. Capillary refill takes less than 2 seconds. She is not diaphoretic. There is pallor.       Vents:  Oxygen Concentration (%): 30 (04/02/19 1602)    Lines/Drains/Airways     Peripheral Intravenous Line                 Peripheral IV - Single Lumen 04/02/19 Left Antecubital 1 day         Peripheral IV - Single Lumen 04/02/19 Right Antecubital 1 day         Peripheral IV - Single Lumen 04/02/19 1415 Left Hand less than 1 day                Significant Labs:    CBC/Anemia Profile:  Recent Labs   Lab 04/02/19  1000 04/03/19  0300   WBC 34.15* 35.82*   HGB 13.6 12.8   HCT  40.6 38.9    273   MCV 94 96   RDW 13.1 13.2        Chemistries:  Recent Labs   Lab 04/02/19  1000 04/03/19  0300    133*   K 4.2 4.3   CL 95 93*   CO2 34* 31*   BUN 34* 32*   CREATININE 0.6 0.6   CALCIUM 10.1 9.8   ALBUMIN 2.6*  --    PROT 6.5  --    BILITOT 0.5  --    ALKPHOS 86  --    ALT 29  --    AST 18  --    MG 1.5* 2.0   PHOS  --  2.9       Significant Imaging:  I have reviewed all pertinent imaging results/findings within the past 24 hours.    Assessment/Plan:     * Atrial fibrillation with RVR  --Afib appears to be new for patient, likely triggered by pneumonia, COPD exacerbation  --cardiology following. On weight based lovenox for therapeutic anticoagulation and amiodarone, diltiazem. JOHNNA/DCCV possibly tomorrow if not improved.     Acute on chronic respiratory failure with hypercapnia  --PFT's 3 years ago noted moderate-severe COPD  --right upper lobe pneumonia, continued smoking likely trigger for exacerbation  --recommend continuing inhaled bronchodilators, prednisone, spiriva  --Agree with rocephin, azithromycin for now with 1/4 blood cultures obtained from admission growing GNR's. Respiratory status mildly improved from yesterday, improved air movement but remains tachypneic when off of NIPPV. If febrile, or respiratory status not improved, consider broadening coverage to Cefepime for pseudomonal coverage.   --Continue alternating Bipap, 13/5, 30% FiO2 with NC 2 L today and should wear Bipap QHS for now.     COPD exacerbation  --see above    Thank you for the consult. Will continue to follow along with you.     Critical care time: 40 minutes     Kiki Barrera NP  Pulmonology  [facility]

## 2019-04-03 NOTE — SUBJECTIVE & OBJECTIVE
Interval History:  Off BiPAP this a.m. without complaints.  She denies any chest pain, shortness of breath or palpitations.    Telemetry:  AFib with RVR    Review of Systems   All other systems reviewed and are negative.    Objective:     Vital Signs (Most Recent):  Temp: 97.7 °F (36.5 °C) (04/03/19 0730)  Pulse: (!) 132 (04/03/19 0800)  Resp: 20 (04/03/19 0800)  BP: (!) 145/71 (04/03/19 0800)  SpO2: (!) 85 % (04/03/19 0800) Vital Signs (24h Range):  Temp:  [97.4 °F (36.3 °C)-98.9 °F (37.2 °C)] 97.7 °F (36.5 °C)  Pulse:  [] 132  Resp:  [15-50] 20  SpO2:  [76 %-100 %] 85 %  BP: ()/() 145/71     Weight: 37.8 kg (83 lb 5.3 oz)  Body mass index is 13.87 kg/m².     SpO2: (!) 85 %  O2 Device (Oxygen Therapy): nasal cannula      Intake/Output Summary (Last 24 hours) at 4/3/2019 0943  Last data filed at 4/3/2019 0800  Gross per 24 hour   Intake 1367.53 ml   Output --   Net 1367.53 ml       Lines/Drains/Airways     Peripheral Intravenous Line                 Peripheral IV - Single Lumen 04/02/19 Left Antecubital 1 day         Peripheral IV - Single Lumen 04/02/19 Right Antecubital 1 day         Peripheral IV - Single Lumen 04/02/19 1415 Left Hand less than 1 day                Physical Exam   Constitutional: She is oriented to person, place, and time. She appears well-developed and well-nourished.   HENT:   Head: Normocephalic and atraumatic.   Eyes: Pupils are equal, round, and reactive to light. Conjunctivae and EOM are normal.   Neck: Normal range of motion. Neck supple. No thyromegaly present.   Cardiovascular: An irregularly irregular rhythm present. Tachycardia present.   No murmur heard.  Pulmonary/Chest: No respiratory distress.   Severely course decreased breath sounds bilaterally   Abdominal: Soft. Bowel sounds are normal.   Musculoskeletal: She exhibits no edema.   Neurological: She is alert and oriented to person, place, and time.   Skin: Skin is warm and dry.   Psychiatric: She has a normal  mood and affect. Her behavior is normal.       Significant Labs:   BMP:   Recent Labs   Lab 04/02/19  1000 04/03/19  0300   * 155*    133*   K 4.2 4.3   CL 95 93*   CO2 34* 31*   BUN 34* 32*   CREATININE 0.6 0.6   CALCIUM 10.1 9.8   MG 1.5* 2.0    and CBC   Recent Labs   Lab 04/02/19  1000 04/03/19  0300   WBC 34.15* 35.82*   HGB 13.6 12.8   HCT 40.6 38.9    273       Significant Imaging: EKG: AFib with RVR     Echocardiogram:  · TDS  · Normal left ventricular systolic function. The estimated ejection fraction is 55%  · Concentric left ventricular remodeling.  · Atrial fibrillation observed.  · Mild left atrial enlargement.  · The estimated PA systolic pressure is 51 mm Hg  · Pulmonary hypertension present.     * all labs reviewed and echocardiogram personally interpreted

## 2019-04-03 NOTE — PLAN OF CARE
04/03/19 1241   Discharge Assessment   Assessment Type Discharge Planning Assessment   Assessment information obtained from? Patient   Prior to hospitilization cognitive status: Alert/Oriented   Prior to hospitalization functional status: Independent   Current cognitive status: Alert/Oriented   Current Functional Status: Independent   Facility Arrived From: home   Lives With alone   Able to Return to Prior Arrangements yes   Is patient able to care for self after discharge? Unable to determine at this time (comments)   Who are your caregiver(s) and their phone number(s)? Shayy [phone]   Patient's perception of discharge disposition home or selfcare;home health   Readmission Within the Last 30 Days no previous admission in last 30 days   Patient currently being followed by outpatient case management? No   Patient currently receives any other outside agency services? Yes   Name and contact number [facility]    Is it the patient/care giver preference to resume care with the current outside agency? Yes   Equipment Currently Used at Home cane, quad;walker, rolling;bedside commode;shower chair   Do you have any problems affording any of your prescribed medications? No   Is the patient taking medications as prescribed? yes   Does the patient have transportation home? Yes   Transportation Anticipated car, drives self;family or friend will provide   Dialysis Name and Scheduled days N/A   Does the patient receive services at the [facility]? No   Discharge Plan A Home Health;Home   Discharge Plan B Home Health;Home with family   DME Needed Upon Discharge  none   Patient/Family in Agreement with Plan yes       Pt lives at home with her elderly .

## 2019-04-03 NOTE — PROGRESS NOTES
[facility] Medicine  Progress Note    Patient Name: Latasha Perez  MRN: [id number]  Patient Class: IP- Inpatient   Admission Date: 4/2/2019  Length of Stay: 1 days  Attending Physician: Susan Schuster MD  Primary Care Provider: Pollo Oneal MD        Subjective:     Principal Problem:Atrial fibrillation with RVR    HPI:  73-year-old female with HTN, HLP, severe COPD and + tobacco abuse who presented with complaint of dizziness and weakness that is been progressive over the past 2 days.  She also reports increase in shortness of breath along with palpitations.  She was recently admitted to Observation from 3/28 to 3/29/19 for a COPD exacerbation and was discharged home on Azithromycin and prednisone 40 mg daily x 5 days.  She denies any fevers or chills.  In the ER, she was noted to be in Afib with RVR, rate initially in the 190s, status post IV diltiazem push followed by infusion with rate in the 140-150s. CXR with new right upper lobe opacity, leukocytosis of 38,000. Patient with reported wheezing on exam status post IV Solu-Medrol 125 mg IV push x1.  She was placed on BiPAP.    Hospital Course:  Ms. Perez was admitted with AFib with RVR along with acute on chronic respiratory failure due to COPD exacerbation and pneumonia.  Patient was started on diltiazem infusion with at in adequate control of heart rate and therefore converted to amiodarone infusion.  Anticoagulated with full-dose Lovenox and Cardiology consulted.  2D echo performed with EF of 55% and elevated PA pressures of 51 mmHg and pulmonary hypertension.  Patient had new infiltrate in the right upper lobe at presentation concerning for pneumonia which was empirically treated with Rocephin, azithromycin and vancomycin given her allergy profile and recent admission to the hospital.  Patient also reports smoking again which likely exacerbated her COPD in addition to pneumonia.  She was supported with BiPAP and had pulse  dose IV steroids which were quickly converted to prednisone.  Pulmonary following an oxygen being wean as tolerated.    Interval History:  Patient weaned off BIPAP to 2 L via nasal cannula, very anxious still and reports being short of breath.  No acute events overnight.  Heart rate uncontrol still on amiodarone infusion.    Review of Systems   Constitutional: Negative for chills and fever.   Respiratory: Positive for cough and shortness of breath.    Cardiovascular: Positive for palpitations. Negative for chest pain.     Objective:     Vital Signs (Most Recent):  Temp: 97.7 °F (36.5 °C) (04/03/19 0730)  Pulse: (!) 132 (04/03/19 0800)  Resp: 20 (04/03/19 0800)  BP: (!) 145/71 (04/03/19 0800)  SpO2: (!) 85 % (04/03/19 0800) Vital Signs (24h Range):  Temp:  [97.4 °F (36.3 °C)-98.9 °F (37.2 °C)] 97.7 °F (36.5 °C)  Pulse:  [] 132  Resp:  [15-50] 20  SpO2:  [76 %-100 %] 85 %  BP: ()/() 145/71     Weight: 37.8 kg (83 lb 5.3 oz)  Body mass index is 13.87 kg/m².    Intake/Output Summary (Last 24 hours) at 4/3/2019 0946  Last data filed at 4/3/2019 0800  Gross per 24 hour   Intake 1367.53 ml   Output --   Net 1367.53 ml      Physical Exam   Constitutional: She is oriented to person, place, and time. She appears well-developed. She appears distressed.   Cachectic and frail   HENT:   Head: Atraumatic.   + Temporal wasting, on nasal cannula   Eyes: Pupils are equal, round, and reactive to light. Conjunctivae are normal.   Neck: Normal range of motion.   Cardiovascular:   Irregular irregular and tachycardic   Pulmonary/Chest: She is in respiratory distress.   Diminished breath sounds throughout, faint expiratory wheeze noted with prolonged expiratory time; use of accessory muscles for respiration but overall more comfortable in appearance   Abdominal: Soft. Bowel sounds are normal. She exhibits no distension and no mass. There is no tenderness. There is no rebound and no guarding.   Musculoskeletal: Normal  range of motion. She exhibits no edema.   Neurological: She is alert and oriented to person, place, and time.   Skin: Capillary refill takes less than 2 seconds.   Psychiatric: She has a normal mood and affect. Her behavior is normal. Thought content normal.       Significant Labs: All pertinent labs within the past 24 hours have been reviewed.    Significant Imaging: I have reviewed and interpreted all pertinent imaging results/findings within the past 24 hours.    Assessment/Plan:      Addendum (7119): Bacteremia with GNR  1/4 bottles positive for gram-negative rods.  Patient is already on Rocephin which will be continued. Will plan to d/c Vancomycin after discussion with Pulm. Will repeat blood culture in am.    * Atrial fibrillation with RVR  Patient was treated with diltiazem infusion with minimal response and heart rate still uncontrolled  Continue IV amiodarone infusion  Anticoagulated with full-dose Lovenox  Cardiology following  Echo with normal EF of 55% with PA pressure of 51 mm Hg and pulmonary hypertension    Acute on chronic respiratory failure with hypercapnia  Patient with respiratory distress due to multiple factors including AFib with RVR, pneumonia, COPD exacerbation which resulted in acute on chronic respiratory failure  Continue Rocephin, azithromycin and vancomycin for treatment of her pneumonia  She blood and sputum culture NGTD  BiPAP weaned off to nasal cannula; will have PRN  Continued nebulizer treatments and prednisone  Pulmonary input appreciated  Continue to treat AFib with RVR with amiodarone infusion which will be discussed below  Cardiology following    Cachexia  Patient is underweight and with BMI of 13.98  Regular diet and supplement with Boost  Nutrition consulted    Pneumonia  New infiltrate noted in the right upper lobe that was not present when she was mid just a few days ago for observation  Continue treatment with ceftriaxone, azithromycin and vancomycin  Blood cultures no  growth to date and sputum culture pending  Will deescalate antibiotics when possible    COPD exacerbation  Status post pulse dose of IV steroids in the ER and continue on p.o. Prednisone  Continue nebulizer treatments and support with BiPAP PRN  Currently weaned to nasal cannula at 2 L  Pulmonary input appreciated    Essential hypertension  Currently normotensive without medications except for diltiazem drip which has now been stopped  Will resume medications for blood pressure control when warranted and BP can tolerate    Leukocytosis  Likely secondary to pneumonia and possible steroid and/or leukemoid reaction  Antibiotics as discussed above   Will continue to monitor    HLD (hyperlipidemia)  Continue pravastatin      VTE Risk Mitigation (From admission, onward)        Ordered     enoxaparin injection 40 mg  Every 12 hours      04/02/19 1135     IP VTE HIGH RISK PATIENT  Once      04/02/19 1210     Place sequential compression device  Until discontinued      04/02/19 1210          Critical care time spent on the evaluation and treatment of severe organ dysfunction, review of pertinent labs and imaging studies, discussions with consulting providers and discussions with patient/family: 90 minutes.    Susan Schuster MD  Department of Hospital Medicine   [facility]

## 2019-04-03 NOTE — PLAN OF CARE
Problem: Adult Inpatient Plan of Care  Goal: Plan of Care Review  Outcome: Outcome(s) achieved Date Met: 04/03/19  Patient is alert and oriented x4, resting in bed with side rails up x2 and call bell within reach.  Afib with rate in the 110's to 130's throughout day.  MD aware.  Patient able to tolerate coming off Bipap on 2L NC during day.  No complaints of pain at this time.  Some anxiety noted this morning related to patient's  and his health.  Patient received a one time dose of xanax and stated she felt much more relaxed.  PRN medication given for non productive cough.  Bedpan for urination.  Plan of care reviewed with patient and family.  Addressed questions and concerns.

## 2019-04-03 NOTE — HOSPITAL COURSE
Ms. Perez was admitted with AFib with RVR along with acute on chronic respiratory failure due to COPD exacerbation and pneumonia.  Patient was started on diltiazem infusion with inadequate control of heart rate and therefore converted to amiodarone infusion.  Anticoagulated with full-dose Lovenox and Cardiology consulted.  2D echo performed with EF of 55% and elevated PA pressures of 51 mmHg and pulmonary hypertension.  Patient had new infiltrate in the right upper lobe at presentation concerning for pneumonia which was empirically treated with Rocephin, azithromycin and vancomycin given her allergy profile and recent admission to the hospital.  Patient also reports smoking again which likely exacerbated her COPD in addition to pneumonia.  She was supported with BiPAP and had pulse dose IV steroids which was quickly converted to prednisone.  Pulmonary was following and oxygen was weaned as tolerated. Blood culture positive in 1/4 bottles for GNR which has returned as Pseudomonas aeruginosa.ID was consulted,source likely pneumonia, Despite amiodarone, patient HR poorly controlled and she underwent DCCV on 4/4/19 with conversion to NSR but went back into Afib but rate controlled. Pt completed amiodarone IV load which was converted to PO on 4/5. Anticoagulation converted to Eliquis.  picc line was aced,repeat blood culture negative,she should be n cefepime until 4.18.19.  She was stable on NC O 2,  palliaitive care was following,agree with partial DNR.

## 2019-04-03 NOTE — SUBJECTIVE & OBJECTIVE
HPI: Mrs. Perez is a 74 yo female with history significant for severe COPD (PFT's 3 years ago; on advair, spiriva and PRN albuterol), continued tobacco abuse, and HTN who presented to the ED on 4/2 with complaints of SOB, diffuse weakness that has progressively worsened over preceding 2 days. She was recently admitted to Observation 3/28 to 3/29 for a COPD exacerbation and was discharged home on Azithromycin and prednisone 40 mg daily x 5 days.     Upon ED arrival, patient was in Afib RVR, rate 140-150 and normotensive. CXR with new right upper lobe opacity, not noted on CXR from admission on 3/28. She was afebrile, with significant leukocytosis of 38,000. She was started on a diltiazem gtt for RVR. Pulmonary consulted for assistance in management of acute on chronic hypercapnic respiratory failure.       Interval History: remains in RVR, cardiology following and amiodarone continued, ditliazem at 5 mg/hr. Planning for possible JOHNNA/DCCV tomorrow if not improved. Respiratory status mildly improved, alternating Bipap with NC 2L but still with tachypnea, slight accessory muscle use with exertion    Objective:     Vital Signs (Most Recent):  Temp: 97.7 °F (36.5 °C) (04/03/19 0730)  Pulse: (!) 131 (04/03/19 1215)  Resp: (!) 24 (04/03/19 1215)  BP: 110/68 (04/03/19 1215)  SpO2: (!) 94 % (04/03/19 1145) Vital Signs (24h Range):  Temp:  [97.4 °F (36.3 °C)-98.9 °F (37.2 °C)] 97.7 °F (36.5 °C)  Pulse:  [108-162] 131  Resp:  [15-50] 24  SpO2:  [76 %-100 %] 94 %  BP: ()/(48-93) 110/68     Weight: 37.8 kg (83 lb 5.3 oz)  Body mass index is 13.87 kg/m².      Intake/Output Summary (Last 24 hours) at 4/3/2019 1309  Last data filed at 4/3/2019 1214  Gross per 24 hour   Intake 949.33 ml   Output --   Net 949.33 ml       Physical Exam   Constitutional: She is oriented to person, place, and time. She appears well-developed. She has a sickly appearance. She appears ill. No distress.   HENT:   Head: Normocephalic and atraumatic.    Eyes: Conjunctivae and EOM are normal. Right eye exhibits no discharge. Left eye exhibits no discharge. No scleral icterus.   Neck: Neck supple. No thyromegaly present.   Cardiovascular: Normal rate, regular rhythm, S1 normal and S2 normal.   Pulses:       Radial pulses are 2+ on the right side, and 2+ on the left side.   Pulmonary/Chest: Accessory muscle usage (supraclavicular) present. Tachypnea noted. No respiratory distress. She has decreased breath sounds in the right lower field and the left lower field. She has wheezes (mild, improved from yesterday. increased air movement bilaterally ) in the right middle field and the left middle field. She has no rales.   Abdominal: Soft. Bowel sounds are normal. She exhibits no distension. There is no tenderness. There is no guarding.   Neurological: She is alert and oriented to person, place, and time. GCS eye subscore is 4. GCS verbal subscore is 5. GCS motor subscore is 6.   Alert, oriented and conversant. Moves all extremities spontaneously   Skin: Skin is warm and dry. Capillary refill takes less than 2 seconds. She is not diaphoretic. There is pallor.       Vents:  Oxygen Concentration (%): 30 (04/02/19 1602)    Lines/Drains/Airways     Peripheral Intravenous Line                 Peripheral IV - Single Lumen 04/02/19 Left Antecubital 1 day         Peripheral IV - Single Lumen 04/02/19 Right Antecubital 1 day         Peripheral IV - Single Lumen 04/02/19 1415 Left Hand less than 1 day                Significant Labs:    CBC/Anemia Profile:  Recent Labs   Lab 04/02/19  1000 04/03/19  0300   WBC 34.15* 35.82*   HGB 13.6 12.8   HCT 40.6 38.9    273   MCV 94 96   RDW 13.1 13.2        Chemistries:  Recent Labs   Lab 04/02/19  1000 04/03/19  0300    133*   K 4.2 4.3   CL 95 93*   CO2 34* 31*   BUN 34* 32*   CREATININE 0.6 0.6   CALCIUM 10.1 9.8   ALBUMIN 2.6*  --    PROT 6.5  --    BILITOT 0.5  --    ALKPHOS 86  --    ALT 29  --    AST 18  --    MG 1.5* 2.0    PHOS  --  2.9       Significant Imaging:  I have reviewed all pertinent imaging results/findings within the past 24 hours.

## 2019-04-04 ENCOUNTER — ANESTHESIA (OUTPATIENT)
Dept: CARDIOLOGY | Facility: HOSPITAL | Age: 74
DRG: 189 | End: 2019-04-04
Payer: MEDICARE

## 2019-04-04 ENCOUNTER — ANESTHESIA EVENT (OUTPATIENT)
Dept: CARDIOLOGY | Facility: HOSPITAL | Age: 74
DRG: 189 | End: 2019-04-04
Payer: MEDICARE

## 2019-04-04 PROBLEM — R78.81 BACTEREMIA: Status: ACTIVE | Noted: 2019-04-04

## 2019-04-04 LAB
ANION GAP SERPL CALC-SCNC: 6 MMOL/L (ref 8–16)
BASOPHILS # BLD AUTO: 0.05 K/UL (ref 0–0.2)
BASOPHILS NFR BLD: 0.1 % (ref 0–1.9)
BUN SERPL-MCNC: 28 MG/DL (ref 8–23)
CALCIUM SERPL-MCNC: 10 MG/DL (ref 8.7–10.5)
CHLORIDE SERPL-SCNC: 90 MMOL/L (ref 95–110)
CO2 SERPL-SCNC: 38 MMOL/L (ref 23–29)
CREAT SERPL-MCNC: 0.6 MG/DL (ref 0.5–1.4)
DIFFERENTIAL METHOD: ABNORMAL
EOSINOPHIL # BLD AUTO: 0 K/UL (ref 0–0.5)
EOSINOPHIL NFR BLD: 0 % (ref 0–8)
ERYTHROCYTE [DISTWIDTH] IN BLOOD BY AUTOMATED COUNT: 13.4 % (ref 11.5–14.5)
EST. GFR  (AFRICAN AMERICAN): >60 ML/MIN/1.73 M^2
EST. GFR  (NON AFRICAN AMERICAN): >60 ML/MIN/1.73 M^2
GLUCOSE SERPL-MCNC: 114 MG/DL (ref 70–110)
HCT VFR BLD AUTO: 40.1 % (ref 37–48.5)
HGB BLD-MCNC: 13.3 G/DL (ref 12–16)
LYMPHOCYTES # BLD AUTO: 0.7 K/UL (ref 1–4.8)
LYMPHOCYTES NFR BLD: 1.8 % (ref 18–48)
MAGNESIUM SERPL-MCNC: 1.7 MG/DL (ref 1.6–2.6)
MCH RBC QN AUTO: 31.6 PG (ref 27–31)
MCHC RBC AUTO-ENTMCNC: 33.2 G/DL (ref 32–36)
MCV RBC AUTO: 95 FL (ref 82–98)
MONOCYTES # BLD AUTO: 2 K/UL (ref 0.3–1)
MONOCYTES NFR BLD: 5.1 % (ref 4–15)
NEUTROPHILS # BLD AUTO: 36.2 K/UL (ref 1.8–7.7)
NEUTROPHILS NFR BLD: 93.8 % (ref 38–73)
PHOSPHATE SERPL-MCNC: 2.4 MG/DL (ref 2.7–4.5)
PLATELET # BLD AUTO: 258 K/UL (ref 150–350)
PLATELET BLD QL SMEAR: ABNORMAL
PMV BLD AUTO: 8.9 FL (ref 9.2–12.9)
POTASSIUM SERPL-SCNC: 4 MMOL/L (ref 3.5–5.1)
RBC # BLD AUTO: 4.21 M/UL (ref 4–5.4)
SODIUM SERPL-SCNC: 134 MMOL/L (ref 136–145)
TOXIC GRANULES BLD QL SMEAR: PRESENT
WBC # BLD AUTO: 38.97 K/UL (ref 3.9–12.7)
WBC TOXIC VACUOLES BLD QL SMEAR: PRESENT

## 2019-04-04 PROCEDURE — 25000003 PHARM REV CODE 250: Mod: HCNC | Performed by: NURSE PRACTITIONER

## 2019-04-04 PROCEDURE — 99900035 HC TECH TIME PER 15 MIN (STAT): Mod: HCNC

## 2019-04-04 PROCEDURE — 37000008 HC ANESTHESIA 1ST 15 MINUTES: Mod: HCNC | Performed by: INTERNAL MEDICINE

## 2019-04-04 PROCEDURE — 27000221 HC OXYGEN, UP TO 24 HOURS: Mod: HCNC

## 2019-04-04 PROCEDURE — 63600175 PHARM REV CODE 636 W HCPCS: Mod: HCNC | Performed by: EMERGENCY MEDICINE

## 2019-04-04 PROCEDURE — 99233 SBSQ HOSP IP/OBS HIGH 50: CPT | Mod: HCNC,,, | Performed by: NURSE PRACTITIONER

## 2019-04-04 PROCEDURE — 99232 SBSQ HOSP IP/OBS MODERATE 35: CPT | Mod: HCNC,,, | Performed by: INTERNAL MEDICINE

## 2019-04-04 PROCEDURE — 87040 BLOOD CULTURE FOR BACTERIA: CPT | Mod: HCNC

## 2019-04-04 PROCEDURE — 37000009 HC ANESTHESIA EA ADD 15 MINS: Mod: HCNC | Performed by: INTERNAL MEDICINE

## 2019-04-04 PROCEDURE — 80048 BASIC METABOLIC PNL TOTAL CA: CPT | Mod: HCNC

## 2019-04-04 PROCEDURE — 25000003 PHARM REV CODE 250: Mod: HCNC | Performed by: INTERNAL MEDICINE

## 2019-04-04 PROCEDURE — 63600175 PHARM REV CODE 636 W HCPCS: Mod: HCNC | Performed by: HOSPITALIST

## 2019-04-04 PROCEDURE — 99233 PR SUBSEQUENT HOSPITAL CARE,LEVL III: ICD-10-PCS | Mod: HCNC,,, | Performed by: NURSE PRACTITIONER

## 2019-04-04 PROCEDURE — 63600175 PHARM REV CODE 636 W HCPCS: Mod: HCNC | Performed by: REGISTERED NURSE

## 2019-04-04 PROCEDURE — 85025 COMPLETE CBC W/AUTO DIFF WBC: CPT | Mod: HCNC

## 2019-04-04 PROCEDURE — 93010 EKG 12-LEAD: ICD-10-PCS | Mod: 59,HCNC,, | Performed by: INTERNAL MEDICINE

## 2019-04-04 PROCEDURE — 83735 ASSAY OF MAGNESIUM: CPT | Mod: HCNC

## 2019-04-04 PROCEDURE — D9220A PRA ANESTHESIA: Mod: HCNC,,, | Performed by: ANESTHESIOLOGY

## 2019-04-04 PROCEDURE — C9113 INJ PANTOPRAZOLE SODIUM, VIA: HCPCS | Mod: HCNC | Performed by: EMERGENCY MEDICINE

## 2019-04-04 PROCEDURE — 93005 ELECTROCARDIOGRAM TRACING: CPT | Mod: HCNC

## 2019-04-04 PROCEDURE — 36415 COLL VENOUS BLD VENIPUNCTURE: CPT | Mod: HCNC

## 2019-04-04 PROCEDURE — 25000242 PHARM REV CODE 250 ALT 637 W/ HCPCS: Mod: HCNC | Performed by: NURSE PRACTITIONER

## 2019-04-04 PROCEDURE — 20000000 HC ICU ROOM: Mod: HCNC

## 2019-04-04 PROCEDURE — 94660 CPAP INITIATION&MGMT: CPT | Mod: HCNC

## 2019-04-04 PROCEDURE — 84100 ASSAY OF PHOSPHORUS: CPT | Mod: HCNC

## 2019-04-04 PROCEDURE — 25000003 PHARM REV CODE 250: Mod: HCNC | Performed by: EMERGENCY MEDICINE

## 2019-04-04 PROCEDURE — 99232 PR SUBSEQUENT HOSPITAL CARE,LEVL II: ICD-10-PCS | Mod: HCNC,,, | Performed by: INTERNAL MEDICINE

## 2019-04-04 PROCEDURE — 25000003 PHARM REV CODE 250: Mod: HCNC | Performed by: HOSPITALIST

## 2019-04-04 PROCEDURE — 94640 AIRWAY INHALATION TREATMENT: CPT | Mod: HCNC

## 2019-04-04 PROCEDURE — 93010 ELECTROCARDIOGRAM REPORT: CPT | Mod: 59,HCNC,, | Performed by: INTERNAL MEDICINE

## 2019-04-04 PROCEDURE — D9220A PRA ANESTHESIA: ICD-10-PCS | Mod: HCNC,,, | Performed by: ANESTHESIOLOGY

## 2019-04-04 RX ORDER — HYDROCODONE BITARTRATE AND ACETAMINOPHEN 5; 325 MG/1; MG/1
1 TABLET ORAL ONCE
Status: DISCONTINUED | OUTPATIENT
Start: 2019-04-04 | End: 2019-04-11 | Stop reason: HOSPADM

## 2019-04-04 RX ORDER — PROPOFOL 10 MG/ML
VIAL (ML) INTRAVENOUS
Status: DISCONTINUED | OUTPATIENT
Start: 2019-04-04 | End: 2019-04-04

## 2019-04-04 RX ORDER — PREDNISONE 20 MG/1
20 TABLET ORAL DAILY
Status: COMPLETED | OUTPATIENT
Start: 2019-04-05 | End: 2019-04-07

## 2019-04-04 RX ORDER — CEFEPIME HYDROCHLORIDE 2 G/1
2 INJECTION, POWDER, FOR SOLUTION INTRAVENOUS
Status: DISCONTINUED | OUTPATIENT
Start: 2019-04-04 | End: 2019-04-04

## 2019-04-04 RX ORDER — CEFEPIME HYDROCHLORIDE 1 G/50ML
2 INJECTION, SOLUTION INTRAVENOUS
Status: DISCONTINUED | OUTPATIENT
Start: 2019-04-04 | End: 2019-04-05

## 2019-04-04 RX ORDER — LIDOCAINE HCL/PF 100 MG/5ML
SYRINGE (ML) INTRAVENOUS
Status: DISCONTINUED | OUTPATIENT
Start: 2019-04-04 | End: 2019-04-04

## 2019-04-04 RX ORDER — CEFEPIME HYDROCHLORIDE 2 G/1
2 INJECTION, POWDER, FOR SOLUTION INTRAVENOUS
Status: DISCONTINUED | OUTPATIENT
Start: 2019-04-04 | End: 2019-04-04 | Stop reason: CLARIF

## 2019-04-04 RX ORDER — DILTIAZEM HYDROCHLORIDE 120 MG/1
120 CAPSULE, COATED, EXTENDED RELEASE ORAL DAILY
Status: DISCONTINUED | OUTPATIENT
Start: 2019-04-04 | End: 2019-04-09

## 2019-04-04 RX ADMIN — CEFEPIME 2 G: 2 INJECTION, POWDER, FOR SOLUTION INTRAVENOUS at 11:04

## 2019-04-04 RX ADMIN — TIOTROPIUM BROMIDE 18 MCG: 18 CAPSULE ORAL; RESPIRATORY (INHALATION) at 09:04

## 2019-04-04 RX ADMIN — AMIODARONE HYDROCHLORIDE 0.5 MG/MIN: 1.8 INJECTION, SOLUTION INTRAVENOUS at 04:04

## 2019-04-04 RX ADMIN — PROPOFOL 30 MG: 10 INJECTION, EMULSION INTRAVENOUS at 02:04

## 2019-04-04 RX ADMIN — ENOXAPARIN SODIUM 40 MG: 100 INJECTION SUBCUTANEOUS at 10:04

## 2019-04-04 RX ADMIN — APIXABAN 5 MG: 5 TABLET, FILM COATED ORAL at 09:04

## 2019-04-04 RX ADMIN — DILTIAZEM HYDROCHLORIDE 120 MG: 120 CAPSULE, COATED, EXTENDED RELEASE ORAL at 03:04

## 2019-04-04 RX ADMIN — AZITHROMYCIN MONOHYDRATE 250 MG: 250 TABLET ORAL at 10:04

## 2019-04-04 RX ADMIN — LEVALBUTEROL HYDROCHLORIDE 1.25 MG: 1.25 SOLUTION, CONCENTRATE RESPIRATORY (INHALATION) at 11:04

## 2019-04-04 RX ADMIN — PANTOPRAZOLE SODIUM 40 MG: 40 INJECTION, POWDER, FOR SOLUTION INTRAVENOUS at 10:04

## 2019-04-04 RX ADMIN — ASPIRIN 81 MG: 81 TABLET, COATED ORAL at 10:04

## 2019-04-04 RX ADMIN — BENZONATATE 100 MG: 100 CAPSULE ORAL at 11:04

## 2019-04-04 RX ADMIN — PRAVASTATIN SODIUM 20 MG: 10 TABLET ORAL at 09:04

## 2019-04-04 RX ADMIN — LIDOCAINE HYDROCHLORIDE 100 MG: 20 INJECTION, SOLUTION INTRAVENOUS at 02:04

## 2019-04-04 RX ADMIN — LEVALBUTEROL HYDROCHLORIDE 1.25 MG: 1.25 SOLUTION, CONCENTRATE RESPIRATORY (INHALATION) at 03:04

## 2019-04-04 RX ADMIN — LEVALBUTEROL HYDROCHLORIDE 1.25 MG: 1.25 SOLUTION, CONCENTRATE RESPIRATORY (INHALATION) at 07:04

## 2019-04-04 NOTE — ASSESSMENT & PLAN NOTE
New infiltrate noted in the right upper lobe that was not present when she was mid just a few days ago for observation  Empirically treated with ceftriaxone, azithromycin and vancomycin  Blood cultures with GNR with presumptive Pseudomonas  Will change antibiotics to cefepime today

## 2019-04-04 NOTE — PROGRESS NOTES
[facility]  Cardiology  Progress Note    Patient Name: Latasha Perez  MRN: [id number]  Admission Date: 4/2/2019  Hospital Length of Stay: 2 days  Code Status: Full Code   Attending Physician: Susan Schuster MD   Primary Care Physician: Pollo Oneal MD  Expected Discharge Date:   Principal Problem:Atrial fibrillation with RVR    Subjective:     Hospital Course:   4-2:  Admitted with respiratory failure, COPD/pneumonia in AFib with RVR  4-3:  Persistent AFib with RVR on amiodarone infusion    Interval History:  Feels looks a little bit better.  Still short of breath than symptomatic with palpitations.    Telemetry:  AFib with RVR    Review of Systems   All other systems reviewed and are negative.    Objective:     Vital Signs (Most Recent):  Temp: 98.1 °F (36.7 °C) (04/04/19 0730)  Pulse: (!) 138 (04/04/19 0948)  Resp: (!) 28 (04/04/19 0948)  BP: 104/76 (04/04/19 0800)  SpO2: 98 % (04/04/19 0948) Vital Signs (24h Range):  Temp:  [97.8 °F (36.6 °C)-98.5 °F (36.9 °C)] 98.1 °F (36.7 °C)  Pulse:  [110-144] 138  Resp:  [12-52] 28  SpO2:  [79 %-99 %] 98 %  BP: ()/() 104/76     Weight: 37.8 kg (83 lb 5.3 oz)  Body mass index is 13.87 kg/m².     SpO2: 98 %  O2 Device (Oxygen Therapy): nasal cannula      Intake/Output Summary (Last 24 hours) at 4/4/2019 1011  Last data filed at 4/4/2019 0800  Gross per 24 hour   Intake 422.4 ml   Output 660 ml   Net -237.6 ml       Lines/Drains/Airways     Peripheral Intravenous Line                 Peripheral IV - Single Lumen 04/02/19 Left Antecubital 2 days         Peripheral IV - Single Lumen 04/02/19 Right Antecubital 2 days         Peripheral IV - Single Lumen 04/02/19 1415 Left Hand 1 day                Physical Exam   Constitutional: She is oriented to person, place, and time. She appears well-developed and well-nourished.   HENT:   Head: Normocephalic and atraumatic.   Eyes: Pupils are equal, round, and reactive to light. Conjunctivae and EOM are  normal.   Neck: Normal range of motion. Neck supple. No thyromegaly present.   Cardiovascular: An irregularly irregular rhythm present. Tachycardia present.   No murmur heard.  Pulmonary/Chest: No respiratory distress.   Severely decreased breath sounds bilaterally   Abdominal: Soft. Bowel sounds are normal.   Musculoskeletal: She exhibits no edema.   Neurological: She is alert and oriented to person, place, and time.   Skin: Skin is warm and dry.   Psychiatric: She has a normal mood and affect. Her behavior is normal.       Significant Labs:   CMP   Recent Labs   Lab 04/03/19  0300 04/04/19  0633   * 134*   K 4.3 4.0   CL 93* 90*   CO2 31* 38*   * 114*   BUN 32* 28*   CREATININE 0.6 0.6   CALCIUM 9.8 10.0   ANIONGAP 9 6*   ESTGFRAFRICA >60 >60   EGFRNONAA >60 >60   , CBC   Recent Labs   Lab 04/03/19  0300 04/04/19  0633   WBC 35.82* 38.97*   HGB 12.8 13.3   HCT 38.9 40.1    258   , INR No results for input(s): INR, PROTIME in the last 48 hours., Lipid Panel No results for input(s): CHOL, HDL, LDLCALC, TRIG, CHOLHDL in the last 48 hours. and Troponin No results for input(s): TROPONINI in the last 48 hours.    Significant Imaging: Echocardiogram:   Transthoracic echo (TTE) complete (Cupid Only):   Results for orders placed or performed during the hospital encounter of 04/02/19   Transthoracic echo (TTE) 2D with Color Flow   Result Value Ref Range    LA WIDTH 2.64 cm    AORTIC VALVE CUSP SEPERATION 1.17 cm    PV PEAK VELOCITY 2.03 cm/s    LVIDD 2.49 (A) 3.5 - 6.0 cm    IVS 1.23 (A) 0.6 - 1.1 cm    PW 1.38 (A) 0.6 - 1.1 cm    Ao root annulus 1.28 cm    LVIDS 1.86 (A) 2.1 - 4.0 cm    FS 25 28 - 44 %    LA volume 22.14 cm3    Sinus 2.88 cm    STJ 1.91 cm    LV mass 98.12 g    LA size 2.97 cm    RVDD 2.41 cm    TAPSE 1.27 cm    RV S' 10.11 m/s    Left Ventricle Relative Wall Thickness 1.11 cm    AV mean gradient 3.85 mmHg    AV valve area 2.63 cm2    AV Velocity Ratio 0.85     AV index (prosthetic)  1.26     IVRT 0.05 msec    LVOT diameter 1.63 cm    LVOT area 2.09 cm2    LVOT peak harshad [id number] m/s    LVOT peak VTI 15.11 cm    Ao peak harshad 1.45 m/s    Ao VTI 11.96 cm    LVOT stroke volume 31.51 cm3    AV peak gradient 8.41 mmHg    MV Peak E Harshad 1.24 m/s    TR Max Harshad 3.47 m/s    LV Systolic Volume 10.53 mL    LV Systolic Volume Index 7.7 mL/m2    LV Diastolic Volume 22.18 mL    LV Diastolic Volume Index 16.21 mL/m2    LA Volume Index 16.2 mL/m2    LV Mass Index 71.7 g/m2    RA Major Axis 3.41 cm    Left Atrium Minor Axis 2.84 cm    Left Atrium Major Axis 4.00 cm    Triscuspid Valve Regurgitation Peak Gradient 48.16 mmHg    RA Width 2.10 cm    Right Atrial Pressure (from IVC) 3 mmHg    TV rest pulmonary artery pressure 51 mmHg     Assessment and Plan:     Brief HPI:  Still refractory AFib with RVR.  Improved from pulmonary standpoint so will tentatively plan for DC cardioversion today after anesthesia evaluation    * Atrial fibrillation with RVR  Refractory to diltiazem drip  Add added amiodarone infusion per protocol  Full-dose Lovenox for OAC currently  JOHNNA/DC cardioversion today    **Risks/benefits of the procedure were d/w the patient including throat irritation, aspiration, etc.  Patient understands and wishes to proceed.  Consent was placed on the chart.        Acute on chronic respiratory failure with hypercapnia  Supportive care  Concurrently treat underlying cardiac and pulmonary issues    COPD (chronic obstructive pulmonary disease)  Treatment per primary and Pulmonary    Essential hypertension  Currently stable on home regimen    HLD (hyperlipidemia)  On statin        VTE Risk Mitigation (From admission, onward)        Ordered     enoxaparin injection 40 mg  Every 12 hours      04/02/19 1135     IP VTE HIGH RISK PATIENT  Once      04/02/19 1210     Place sequential compression device  Until discontinued      04/02/19 1210          Manfred Figueroa MD  Cardiology  [facility]

## 2019-04-04 NOTE — SUBJECTIVE & OBJECTIVE
Interval History:  Patient has remained off BIPAP and on 2 L via nasal cannula, very anxious still and reports being short of breath.  Heart rate uncontrol still on amiodarone infusion with rate in 130s, + bacteremia reported yesterday with GNR and with report of presumptive Pseudomonas this morning.    Review of Systems   Constitutional: Negative for chills and fever.   Respiratory: Positive for cough and shortness of breath.    Cardiovascular: Positive for palpitations. Negative for chest pain.     Objective:     Vital Signs (Most Recent):  Temp: 98.1 °F (36.7 °C) (04/04/19 0730)  Pulse: (!) 138 (04/04/19 0948)  Resp: (!) 28 (04/04/19 0948)  BP: 104/76 (04/04/19 0800)  SpO2: 98 % (04/04/19 0948) Vital Signs (24h Range):  Temp:  [97.8 °F (36.6 °C)-98.5 °F (36.9 °C)] 98.1 °F (36.7 °C)  Pulse:  [110-144] 138  Resp:  [12-52] 28  SpO2:  [79 %-99 %] 98 %  BP: ()/() 104/76     Weight: 37.8 kg (83 lb 5.3 oz)  Body mass index is 13.87 kg/m².    Intake/Output Summary (Last 24 hours) at 4/4/2019 1003  Last data filed at 4/4/2019 0800  Gross per 24 hour   Intake 422.4 ml   Output 660 ml   Net -237.6 ml      Physical Exam   Constitutional: She is oriented to person, place, and time. She appears well-developed. She appears distressed.   Cachectic and frail   HENT:   Head: Atraumatic.   + Temporal wasting, on nasal cannula   Eyes: Pupils are equal, round, and reactive to light. Conjunctivae are normal.   Neck: Normal range of motion.   Cardiovascular:   Irregular irregular and tachycardic   Pulmonary/Chest: She is in respiratory distress.   Diminished breath sounds throughout, faint expiratory wheeze noted with prolonged expiratory time; use of accessory muscles for respiration but overall more comfortable in appearance   Abdominal: Soft. Bowel sounds are normal. She exhibits no distension and no mass. There is no tenderness. There is no rebound and no guarding.   Musculoskeletal: Normal range of motion. She exhibits  no edema.   Neurological: She is alert and oriented to person, place, and time.   Skin: Capillary refill takes less than 2 seconds.   Psychiatric: She has a normal mood and affect. Her behavior is normal. Thought content normal.       Significant Labs: All pertinent labs within the past 24 hours have been reviewed.    Significant Imaging: I have reviewed and interpreted all pertinent imaging results/findings within the past 24 hours.

## 2019-04-04 NOTE — TRANSFER OF CARE
"Anesthesia Transfer of Care Note    Patient: Latasha Perez    Procedure(s) Performed: Procedure(s) (LRB):  Transesophageal echo (JOHNNA) intra-procedure log documentation (N/A)  Cardioversion or Defibrillation    Patient location: ICU    Anesthesia Type: general    Transport from OR: Transported from OR on 6-10 L/min O2 by face mask with adequate spontaneous ventilation    Post pain: adequate analgesia    Post assessment: no apparent anesthetic complications and tolerated procedure well    Post vital signs: stable    Level of consciousness: alert, oriented and awake    Nausea/Vomiting: no nausea/vomiting    Complications: none    Transfer of care protocol was followed      Last vitals:   Visit Vitals  BP (!) 160/71 (BP Location: Left arm, Patient Position: Lying)   Pulse 100   Temp 36.4 °C (97.5 °F) (Oral)   Resp 20   Ht 5' 5" (1.651 m)   Wt 37.8 kg (83 lb 5.3 oz)   LMP  (LMP Unknown)   SpO2 97%   Breastfeeding? No   BMI 13.87 kg/m²     "

## 2019-04-04 NOTE — ASSESSMENT & PLAN NOTE
--presumptive pseudomonas growing in 1/4 blood cultures. Continue cefepime, follow up repeat cultures to ensure clearance.

## 2019-04-04 NOTE — ASSESSMENT & PLAN NOTE
Due to GNR, possible Pseudomonas  Antibiotics changed as discussed above  Repeat cultures done  Will plan to consult ID

## 2019-04-04 NOTE — PROGRESS NOTES
[facility]  Pulmonology  Progress Note    Patient Name: Latasha Perez  MRN: [id number]  Admission Date: 4/2/2019  Hospital Length of Stay: 2 days  Code Status: Full Code  Attending Provider: Susan Schuster MD  Primary Care Provider: Pollo Oneal MD   Principal Problem: Atrial fibrillation with RVR    Subjective:     HPI: Mrs. Perez is a 74 yo female with history significant for severe COPD (PFT's 3 years ago; on advair, spiriva and PRN albuterol), continued tobacco abuse, and HTN who presented to the ED on 4/2 with complaints of SOB, diffuse weakness that has progressively worsened over preceding 2 days. She was recently admitted to Observation 3/28 to 3/29 for a COPD exacerbation and was discharged home on Azithromycin and prednisone 40 mg daily x 5 days.     Upon ED arrival, patient was in Afib RVR, rate 140-150 and normotensive. CXR with new right upper lobe opacity, not noted on CXR from admission on 3/28. She was afebrile, with significant leukocytosis of 38,000. She was started on a diltiazem gtt for RVR. Pulmonary consulted for assistance in management of acute on chronic hypercapnic respiratory failure.       Interval History: respiratory status improved, better air movement. Tolerating 2 L NC. remains in RVR, cardiology following planning for possible JOHNNA/DCCV today.     Objective:     Vital Signs (Most Recent):  Temp: 98.1 °F (36.7 °C) (04/04/19 0730)  Pulse: (!) 138 (04/04/19 0948)  Resp: (!) 28 (04/04/19 0948)  BP: 104/76 (04/04/19 0800)  SpO2: 98 % (04/04/19 0948) Vital Signs (24h Range):  Temp:  [97.8 °F (36.6 °C)-98.5 °F (36.9 °C)] 98.1 °F (36.7 °C)  Pulse:  [110-144] 138  Resp:  [12-52] 28  SpO2:  [79 %-99 %] 98 %  BP: ()/() 104/76     Weight: 37.8 kg (83 lb 5.3 oz)  Body mass index is 13.87 kg/m².      Intake/Output Summary (Last 24 hours) at 4/4/2019 1023  Last data filed at 4/4/2019 0800  Gross per 24 hour   Intake 422.4 ml   Output 660 ml   Net -237.6 ml        Physical Exam   Constitutional: She is oriented to person, place, and time. She appears well-developed. She has a sickly appearance. She appears ill. No distress.   HENT:   Head: Normocephalic and atraumatic.   Eyes: Conjunctivae and EOM are normal. Right eye exhibits no discharge. Left eye exhibits no discharge. No scleral icterus.   Neck: Neck supple. No thyromegaly present.   Cardiovascular: Normal rate, regular rhythm, S1 normal and S2 normal.   Pulses:       Radial pulses are 2+ on the right side, and 2+ on the left side.   Pulmonary/Chest: No accessory muscle usage. Tachypnea noted. No respiratory distress. She has decreased breath sounds in the right lower field and the left lower field. She has wheezes (mild, improved from yesterday. increased air movement bilaterally ) in the right middle field and the left middle field. She has rales in the right upper field.   Abdominal: Soft. Bowel sounds are normal. She exhibits no distension. There is no tenderness. There is no guarding.   Neurological: She is alert and oriented to person, place, and time. GCS eye subscore is 4. GCS verbal subscore is 5. GCS motor subscore is 6.   Alert, oriented and conversant. Moves all extremities spontaneously   Skin: Skin is warm and dry. Capillary refill takes less than 2 seconds. She is not diaphoretic. There is pallor.       Vents:  Oxygen Concentration (%): 4 (04/04/19 0948)    Lines/Drains/Airways     Peripheral Intravenous Line                 Peripheral IV - Single Lumen 04/02/19 Left Antecubital 2 days         Peripheral IV - Single Lumen 04/02/19 Right Antecubital 2 days         Peripheral IV - Single Lumen 04/02/19 1415 Left Hand 1 day                Significant Labs:    CBC/Anemia Profile:  Recent Labs   Lab 04/03/19  0300 04/04/19  0633   WBC 35.82* 38.97*   HGB 12.8 13.3   HCT 38.9 40.1    258   MCV 96 95   RDW 13.2 13.4        Chemistries:  Recent Labs   Lab 04/03/19  0300 04/04/19  0633   * 134*    K 4.3 4.0   CL 93* 90*   CO2 31* 38*   BUN 32* 28*   CREATININE 0.6 0.6   CALCIUM 9.8 10.0   MG 2.0 1.7   PHOS 2.9 2.4*       Significant Imaging:  I have reviewed all pertinent imaging results/findings within the past 24 hours.    Assessment/Plan:     * Atrial fibrillation with RVR  --Afib appears to be new for patient, likely triggered by pneumonia, COPD exacerbation  --cardiology following. On weight based lovenox for therapeutic anticoagulation and amiodarone. JOHNNA/DCCV possibly today    Acute on chronic respiratory failure with hypercapnia  --PFT's 3 years ago noted moderate-severe COPD  --right upper lobe pneumonia, continued smoking likely trigger for exacerbation  --recommend continuing inhaled bronchodilators, prednisone, spiriva  --Cefepime initiated today for  Pseudomonas bacteremia (1/4 blood cultures obtained from admission) --Continue alternating Bipap, 13/5, 30% FiO2 with NC 2 L today as needed. Respiratory status improved today compared to yesterday, but likely still needs nightly Bipap.   --Upon discharge, resume home Advair, spiriva, and PRN ventolin. She should follow up with Dr. Baron in Pulmonary clinic as well.     COPD exacerbation  --see above    Bacteremia  --presumptive pseudomonas growing in 1/4 blood cultures. Continue cefepime, follow up repeat cultures to ensure clearance.       Above discussed with Dr. Brown    I spent >35 minutes reviewing patient records, examining, and counseling the patient with greater than 50% of the time spent with direct patient care and coordination.          Kiki Barrera, AYESHA  Pulmonology  [facility] [place]

## 2019-04-04 NOTE — ASSESSMENT & PLAN NOTE
--Afib appears to be new for patient, likely triggered by pneumonia, COPD exacerbation  --cardiology following. On weight based lovenox for therapeutic anticoagulation and amiodarone. JOHNNA/DCCV possibly today

## 2019-04-04 NOTE — SUBJECTIVE & OBJECTIVE
Interval History:  Feels looks a little bit better.  Still short of breath than symptomatic with palpitations.    Telemetry:  AFib with RVR    Review of Systems   All other systems reviewed and are negative.    Objective:     Vital Signs (Most Recent):  Temp: 98.1 °F (36.7 °C) (04/04/19 0730)  Pulse: (!) 138 (04/04/19 0948)  Resp: (!) 28 (04/04/19 0948)  BP: 104/76 (04/04/19 0800)  SpO2: 98 % (04/04/19 0948) Vital Signs (24h Range):  Temp:  [97.8 °F (36.6 °C)-98.5 °F (36.9 °C)] 98.1 °F (36.7 °C)  Pulse:  [110-144] 138  Resp:  [12-52] 28  SpO2:  [79 %-99 %] 98 %  BP: ()/() 104/76     Weight: 37.8 kg (83 lb 5.3 oz)  Body mass index is 13.87 kg/m².     SpO2: 98 %  O2 Device (Oxygen Therapy): nasal cannula      Intake/Output Summary (Last 24 hours) at 4/4/2019 1011  Last data filed at 4/4/2019 0800  Gross per 24 hour   Intake 422.4 ml   Output 660 ml   Net -237.6 ml       Lines/Drains/Airways     Peripheral Intravenous Line                 Peripheral IV - Single Lumen 04/02/19 Left Antecubital 2 days         Peripheral IV - Single Lumen 04/02/19 Right Antecubital 2 days         Peripheral IV - Single Lumen 04/02/19 1415 Left Hand 1 day                Physical Exam   Constitutional: She is oriented to person, place, and time. She appears well-developed and well-nourished.   HENT:   Head: Normocephalic and atraumatic.   Eyes: Pupils are equal, round, and reactive to light. Conjunctivae and EOM are normal.   Neck: Normal range of motion. Neck supple. No thyromegaly present.   Cardiovascular: An irregularly irregular rhythm present. Tachycardia present.   No murmur heard.  Pulmonary/Chest: No respiratory distress.   Severely decreased breath sounds bilaterally   Abdominal: Soft. Bowel sounds are normal.   Musculoskeletal: She exhibits no edema.   Neurological: She is alert and oriented to person, place, and time.   Skin: Skin is warm and dry.   Psychiatric: She has a normal mood and affect. Her behavior is  normal.       Significant Labs:   CMP   Recent Labs   Lab 04/03/19  0300 04/04/19  0633   * 134*   K 4.3 4.0   CL 93* 90*   CO2 31* 38*   * 114*   BUN 32* 28*   CREATININE 0.6 0.6   CALCIUM 9.8 10.0   ANIONGAP 9 6*   ESTGFRAFRICA >60 >60   EGFRNONAA >60 >60   , CBC   Recent Labs   Lab 04/03/19  0300 04/04/19  0633   WBC 35.82* 38.97*   HGB 12.8 13.3   HCT 38.9 40.1    258   , INR No results for input(s): INR, PROTIME in the last 48 hours., Lipid Panel No results for input(s): CHOL, HDL, LDLCALC, TRIG, CHOLHDL in the last 48 hours. and Troponin No results for input(s): TROPONINI in the last 48 hours.    Significant Imaging: Echocardiogram:   Transthoracic echo (TTE) complete (Cupid Only):   Results for orders placed or performed during the hospital encounter of 04/02/19   Transthoracic echo (TTE) 2D with Color Flow   Result Value Ref Range    LA WIDTH 2.64 cm    AORTIC VALVE CUSP SEPERATION 1.17 cm    PV PEAK VELOCITY 2.03 cm/s    LVIDD 2.49 (A) 3.5 - 6.0 cm    IVS 1.23 (A) 0.6 - 1.1 cm    PW 1.38 (A) 0.6 - 1.1 cm    Ao root annulus 1.28 cm    LVIDS 1.86 (A) 2.1 - 4.0 cm    FS 25 28 - 44 %    LA volume 22.14 cm3    Sinus 2.88 cm    STJ 1.91 cm    LV mass 98.12 g    LA size 2.97 cm    RVDD 2.41 cm    TAPSE 1.27 cm    RV S' 10.11 m/s    Left Ventricle Relative Wall Thickness 1.11 cm    AV mean gradient 3.85 mmHg    AV valve area 2.63 cm2    AV Velocity Ratio 0.85     AV index (prosthetic) 1.26     IVRT 0.05 msec    LVOT diameter 1.63 cm    LVOT area 2.09 cm2    LVOT peak harshad [id number] m/s    LVOT peak VTI 15.11 cm    Ao peak harshad 1.45 m/s    Ao VTI 11.96 cm    LVOT stroke volume 31.51 cm3    AV peak gradient 8.41 mmHg    MV Peak E Harshad 1.24 m/s    TR Max Harshad 3.47 m/s    LV Systolic Volume 10.53 mL    LV Systolic Volume Index 7.7 mL/m2    LV Diastolic Volume 22.18 mL    LV Diastolic Volume Index 16.21 mL/m2    LA Volume Index 16.2 mL/m2    LV Mass Index 71.7 g/m2    RA Major Axis 3.41 cm    Left  Atrium Minor Axis 2.84 cm    Left Atrium Major Axis 4.00 cm    Triscuspid Valve Regurgitation Peak Gradient 48.16 mmHg    RA Width 2.10 cm    Right Atrial Pressure (from IVC) 3 mmHg    TV rest pulmonary artery pressure 51 mmHg

## 2019-04-04 NOTE — ASSESSMENT & PLAN NOTE
Patient was treated with diltiazem infusion with minimal response and heart rate still uncontrolled  Continue IV amiodarone infusion  Anticoagulated with full-dose Lovenox  Cardiology following  Echo with normal EF of 55% with PA pressure of 51 mm Hg and pulmonary hypertension  Plan for possible DCCV if respiratory status permits

## 2019-04-04 NOTE — PROGRESS NOTES
[facility] Medicine  Progress Note    Patient Name: Latasha Perez  MRN: [id number]  Patient Class: IP- Inpatient   Admission Date: 4/2/2019  Length of Stay: 2 days  Attending Physician: Susan Schuster MD  Primary Care Provider: Pollo Oneal MD        Subjective:     Principal Problem:Atrial fibrillation with RVR    HPI:  73-year-old female with HTN, HLP, severe COPD and + tobacco abuse who presented with complaint of dizziness and weakness that is been progressive over the past 2 days.  She also reports increase in shortness of breath along with palpitations.  She was recently admitted to Observation from 3/28 to 3/29/19 for a COPD exacerbation and was discharged home on Azithromycin and prednisone 40 mg daily x 5 days.  She denies any fevers or chills.  In the ER, she was noted to be in Afib with RVR, rate initially in the 190s, status post IV diltiazem push followed by infusion with rate in the 140-150s. CXR with new right upper lobe opacity, leukocytosis of 38,000. Patient with reported wheezing on exam status post IV Solu-Medrol 125 mg IV push x1.  She was placed on BiPAP.    Hospital Course:  Ms. Perez was admitted with AFib with RVR along with acute on chronic respiratory failure due to COPD exacerbation and pneumonia.  Patient was started on diltiazem infusion with at in adequate control of heart rate and therefore converted to amiodarone infusion.  Anticoagulated with full-dose Lovenox and Cardiology consulted.  2D echo performed with EF of 55% and elevated PA pressures of 51 mmHg and pulmonary hypertension.  Patient had new infiltrate in the right upper lobe at presentation concerning for pneumonia which was empirically treated with Rocephin, azithromycin and vancomycin given her allergy profile and recent admission to the hospital.  Patient also reports smoking again which likely exacerbated her COPD in addition to pneumonia.  She was supported with BiPAP and had pulse  dose IV steroids which were quickly converted to prednisone.  Pulmonary following an oxygen being wean as tolerated. Blood culture positive in 1/4 bottles for GNR which has returned as presumptive Pseudomonas species.    Interval History:  Patient has remained off BIPAP and on 2 L via nasal cannula, very anxious still and reports being short of breath.  Heart rate uncontrol still on amiodarone infusion with rate in 130s, + bacteremia reported yesterday with GNR and with report of presumptive Pseudomonas this morning.    Review of Systems   Constitutional: Negative for chills and fever.   Respiratory: Positive for cough and shortness of breath.    Cardiovascular: Positive for palpitations. Negative for chest pain.     Objective:     Vital Signs (Most Recent):  Temp: 98.1 °F (36.7 °C) (04/04/19 0730)  Pulse: (!) 138 (04/04/19 0948)  Resp: (!) 28 (04/04/19 0948)  BP: 104/76 (04/04/19 0800)  SpO2: 98 % (04/04/19 0948) Vital Signs (24h Range):  Temp:  [97.8 °F (36.6 °C)-98.5 °F (36.9 °C)] 98.1 °F (36.7 °C)  Pulse:  [110-144] 138  Resp:  [12-52] 28  SpO2:  [79 %-99 %] 98 %  BP: ()/() 104/76     Weight: 37.8 kg (83 lb 5.3 oz)  Body mass index is 13.87 kg/m².    Intake/Output Summary (Last 24 hours) at 4/4/2019 1003  Last data filed at 4/4/2019 0800  Gross per 24 hour   Intake 422.4 ml   Output 660 ml   Net -237.6 ml      Physical Exam   Constitutional: She is oriented to person, place, and time. She appears well-developed. She appears distressed.   Cachectic and frail   HENT:   Head: Atraumatic.   + Temporal wasting, on nasal cannula   Eyes: Pupils are equal, round, and reactive to light. Conjunctivae are normal.   Neck: Normal range of motion.   Cardiovascular:   Irregular irregular and tachycardic   Pulmonary/Chest: She is in respiratory distress.   Diminished breath sounds throughout, faint expiratory wheeze noted with prolonged expiratory time; use of accessory muscles for respiration but overall more  comfortable in appearance   Abdominal: Soft. Bowel sounds are normal. She exhibits no distension and no mass. There is no tenderness. There is no rebound and no guarding.   Musculoskeletal: Normal range of motion. She exhibits no edema.   Neurological: She is alert and oriented to person, place, and time.   Skin: Capillary refill takes less than 2 seconds.   Psychiatric: She has a normal mood and affect. Her behavior is normal. Thought content normal.       Significant Labs: All pertinent labs within the past 24 hours have been reviewed.    Significant Imaging: I have reviewed and interpreted all pertinent imaging results/findings within the past 24 hours.    Assessment/Plan:      * Atrial fibrillation with RVR  Patient was treated with diltiazem infusion with minimal response and heart rate still uncontrolled  Continue IV amiodarone infusion  Anticoagulated with full-dose Lovenox  Cardiology following  Echo with normal EF of 55% with PA pressure of 51 mm Hg and pulmonary hypertension  Plan for possible DCCV if respiratory status permits    Acute on chronic respiratory failure with hypercapnia  Patient with respiratory distress due to multiple factors including AFib with RVR, pneumonia, COPD exacerbation which resulted in acute on chronic respiratory failure  Empirically treated with Rocephin, azithromycin and vancomycin for treatment of her pneumonia  Blood culture reported with GNR yesterday and now likely presumptive Pseudomonas  Sputum culture NGTD/pending  Will stop Rocephin and start cefepime today for presumed Pseudomonas  Repeat blood cultures done today  BiPAP weaned off to nasal cannula; will continue to have PRN  Continued nebulizer treatments and prednisone  Pulmonary input appreciated  Continue to treat AFib with RVR with amiodarone infusion which will be discussed below  Cardiology following    Bacteremia  Due to GNR, possible Pseudomonas  Antibiotics changed as discussed above  Repeat cultures  done  Will plan to consult ID    Cachexia  Patient is underweight and with BMI of 13.98  Regular diet and supplement with Boost  Nutrition consulted    Pneumonia  New infiltrate noted in the right upper lobe that was not present when she was mid just a few days ago for observation  Empirically treated with ceftriaxone, azithromycin and vancomycin  Blood cultures with GNR with presumptive Pseudomonas  Will change antibiotics to cefepime today    COPD exacerbation  Status post pulse dose of IV steroids in the ER and continue on p.o. Prednisone  Continue nebulizer treatments and support with BiPAP PRN  Currently weaned to nasal cannula at 2 L  Pulmonary input appreciated    Essential hypertension  Currently normotensive without medications except for diltiazem drip which has now been stopped  Will resume medications for blood pressure control when warranted and BP can tolerate    Leukocytosis  Likely secondary to pneumonia and possible steroid and/or leukemoid reaction  Antibiotics as discussed above   Will continue to monitor    HLD (hyperlipidemia)  Continue pravastatin      VTE Risk Mitigation (From admission, onward)        Ordered     enoxaparin injection 40 mg  Every 12 hours      04/02/19 1135     IP VTE HIGH RISK PATIENT  Once      04/02/19 1210     Place sequential compression device  Until discontinued      04/02/19 1210          Critical care time spent on the evaluation and treatment of severe organ dysfunction, review of pertinent labs and imaging studies, discussions with consulting providers and discussions with patient/family: 45 minutes.    Susan Schuster MD  Department of Hospital Medicine   [facility]

## 2019-04-04 NOTE — ASSESSMENT & PLAN NOTE
Patient with respiratory distress due to multiple factors including AFib with RVR, pneumonia, COPD exacerbation which resulted in acute on chronic respiratory failure  Empirically treated with Rocephin, azithromycin and vancomycin for treatment of her pneumonia  Blood culture reported with GNR yesterday and now likely presumptive Pseudomonas  Sputum culture NGTD/pending  Will stop Rocephin and start cefepime today for presumed Pseudomonas  Repeat blood cultures done today  BiPAP weaned off to nasal cannula; will continue to have PRN  Continued nebulizer treatments and prednisone  Pulmonary input appreciated  Continue to treat AFib with RVR with amiodarone infusion which will be discussed below  Cardiology following

## 2019-04-04 NOTE — SUBJECTIVE & OBJECTIVE
HPI: Mrs. Perez is a 74 yo female with history significant for severe COPD (PFT's 3 years ago; on advair, spiriva and PRN albuterol), continued tobacco abuse, and HTN who presented to the ED on 4/2 with complaints of SOB, diffuse weakness that has progressively worsened over preceding 2 days. She was recently admitted to Observation 3/28 to 3/29 for a COPD exacerbation and was discharged home on Azithromycin and prednisone 40 mg daily x 5 days.     Upon ED arrival, patient was in Afib RVR, rate 140-150 and normotensive. CXR with new right upper lobe opacity, not noted on CXR from admission on 3/28. She was afebrile, with significant leukocytosis of 38,000. She was started on a diltiazem gtt for RVR. Pulmonary consulted for assistance in management of acute on chronic hypercapnic respiratory failure.       Interval History: respiratory status improved, better air movement. Tolerating 2 L NC. remains in RVR, cardiology following planning for possible JOHNNA/DCCV today.     Objective:     Vital Signs (Most Recent):  Temp: 98.1 °F (36.7 °C) (04/04/19 0730)  Pulse: (!) 138 (04/04/19 0948)  Resp: (!) 28 (04/04/19 0948)  BP: 104/76 (04/04/19 0800)  SpO2: 98 % (04/04/19 0948) Vital Signs (24h Range):  Temp:  [97.8 °F (36.6 °C)-98.5 °F (36.9 °C)] 98.1 °F (36.7 °C)  Pulse:  [110-144] 138  Resp:  [12-52] 28  SpO2:  [79 %-99 %] 98 %  BP: ()/() 104/76     Weight: 37.8 kg (83 lb 5.3 oz)  Body mass index is 13.87 kg/m².      Intake/Output Summary (Last 24 hours) at 4/4/2019 1023  Last data filed at 4/4/2019 0800  Gross per 24 hour   Intake 422.4 ml   Output 660 ml   Net -237.6 ml       Physical Exam   Constitutional: She is oriented to person, place, and time. She appears well-developed. She has a sickly appearance. She appears ill. No distress.   HENT:   Head: Normocephalic and atraumatic.   Eyes: Conjunctivae and EOM are normal. Right eye exhibits no discharge. Left eye exhibits no discharge. No scleral icterus.    Neck: Neck supple. No thyromegaly present.   Cardiovascular: Normal rate, regular rhythm, S1 normal and S2 normal.   Pulses:       Radial pulses are 2+ on the right side, and 2+ on the left side.   Pulmonary/Chest: No accessory muscle usage. Tachypnea noted. No respiratory distress. She has decreased breath sounds in the right lower field and the left lower field. She has wheezes (mild, improved from yesterday. increased air movement bilaterally ) in the right middle field and the left middle field. She has rales in the right upper field.   Abdominal: Soft. Bowel sounds are normal. She exhibits no distension. There is no tenderness. There is no guarding.   Neurological: She is alert and oriented to person, place, and time. GCS eye subscore is 4. GCS verbal subscore is 5. GCS motor subscore is 6.   Alert, oriented and conversant. Moves all extremities spontaneously   Skin: Skin is warm and dry. Capillary refill takes less than 2 seconds. She is not diaphoretic. There is pallor.       Vents:  Oxygen Concentration (%): 4 (04/04/19 0948)    Lines/Drains/Airways     Peripheral Intravenous Line                 Peripheral IV - Single Lumen 04/02/19 Left Antecubital 2 days         Peripheral IV - Single Lumen 04/02/19 Right Antecubital 2 days         Peripheral IV - Single Lumen 04/02/19 1415 Left Hand 1 day                Significant Labs:    CBC/Anemia Profile:  Recent Labs   Lab 04/03/19  0300 04/04/19  0633   WBC 35.82* 38.97*   HGB 12.8 13.3   HCT 38.9 40.1    258   MCV 96 95   RDW 13.2 13.4        Chemistries:  Recent Labs   Lab 04/03/19  0300 04/04/19  0633   * 134*   K 4.3 4.0   CL 93* 90*   CO2 31* 38*   BUN 32* 28*   CREATININE 0.6 0.6   CALCIUM 9.8 10.0   MG 2.0 1.7   PHOS 2.9 2.4*       Significant Imaging:  I have reviewed all pertinent imaging results/findings within the past 24 hours.

## 2019-04-04 NOTE — PROCEDURES
Transesophageal echocardiogram/DC cardioversion:    Indication:  Refractory AFib with RVR symptomatic    Procedure detail:  After informed consent was obtained patient was notice ties using etomidate by Anesthesia.  Intubation was performed without any difficulty.  We quickly mainly looked at the left atrial appendage as she has tenuous respiratory status with severe COPD and pneumonia currently.  There was no clot in the left atrial appendage and adequate emptying velocities were visualized.  A single 200 joule shock was delivered then subsequently a 360 joule synchronized shock was delivered which finally converted the patient to normal sinus rhythm at a rate of 98 beats per minute.      Recommendation:  -oral diltiazem and continue amiodarone infusion until a.m.  -transition to Eliquis for OAC has no further cardiac testing planned at this time    * observe in ICU overnight on continued infusion as high possibility of return to the AFib

## 2019-04-04 NOTE — PLAN OF CARE
Problem: Adult Inpatient Plan of Care  Goal: Plan of Care Review  Outcome: Ongoing (interventions implemented as appropriate)  VSS. Cardioverted with two shocks, now NSR. Amio drip to continue overnight. Much improved shortness of breath post cardioversion, but still has some residual. Afebrile. Cardizem PO given. Diet restarted and supplements encouraged.

## 2019-04-04 NOTE — ASSESSMENT & PLAN NOTE
--PFT's 3 years ago noted moderate-severe COPD  --right upper lobe pneumonia, continued smoking likely trigger for exacerbation  --recommend continuing inhaled bronchodilators, prednisone, spiriva  --Cefepime initiated today for  Pseudomonas bacteremia (1/4 blood cultures obtained from admission) --Continue alternating Bipap, 13/5, 30% FiO2 with NC 2 L today as needed. Respiratory status improved today compared to yesterday, but likely still needs nightly Bipap.   --Upon discharge, resume home Advair, spiriva, and PRN ventolin. She should follow up with Dr. Baron in Pulmonary clinic as well.

## 2019-04-04 NOTE — ANESTHESIA PREPROCEDURE EVALUATION
04/04/2019  Latasha Perez is a 73 y.o., female.    Anesthesia Evaluation    I have reviewed the Patient Summary Reports.    I have reviewed the Nursing Notes.      Review of Systems  Anesthesia Hx:  No problems with previous Anesthesia  Denies Family Hx of Anesthesia complications.   Denies Personal Hx of Anesthesia complications.   Social:  Smoker, No Alcohol Use    Cardiovascular:   Exercise tolerance: poor Hypertension Denies MI.  Denies CAD.    Denies CABG/stent. Dysrhythmias  New onset Afib with RVR ('140's)    Echo: EF 55%; PAP 51    Carotid U/S: left 50-69% occlusion; right 50%   Pulmonary:   Pneumonia COPD Asthma Shortness of breath Admitted for SOB; found to have RUL pneumonia; has needed intermittent BIPAP; currently on nasal canula   Renal/:  Renal/ Normal     Hepatic/GI:   Denies GERD.    Neurological:  Neurology Normal    Endocrine:  Endocrine Normal        Physical Exam  General:  frail    Airway/Jaw/Neck:  Airway Findings: Mouth Opening: Small, but > 3cm Tongue: Normal  Mallampati: III  TM Distance: 4 - 6 cm  Jaw/Neck Findings:  Neck ROM: Extension Decreased, Mild      Dental:  Dental Findings:Poor dentition; none loose per patient   Chest/Lungs:  Chest/Lungs Findings: Decreased Breathe Sounds Right, Decreased Breathe Sounds Left  Increased work of breathing   Heart/Vascular:  Heart Findings: Rate: Tachycardia  Rhythm: Irregularly Irregular        Mental Status:  Mental Status Findings:  Cooperative, Anxious       Wt Readings from Last 3 Encounters:   04/02/19 37.8 kg (83 lb 5.3 oz)   03/28/19 40.1 kg (88 lb 6.5 oz)   02/25/19 41.7 kg (92 lb)     Temp Readings from Last 3 Encounters:   04/04/19 36.8 °C (98.2 °F) (Oral)   03/29/19 36.9 °C (98.4 °F) (Oral)   12/20/18 36.9 °C (98.4 °F) (Oral)     BP Readings from Last 3 Encounters:   04/04/19 128/73   03/29/19 138/73   12/20/18  136/76     Pulse Readings from Last 3 Encounters:   04/04/19 (!) 138   03/29/19 110   12/20/18 100     Lab Results   Component Value Date    WBC 38.97 (H) 04/04/2019    HGB 13.3 04/04/2019    HCT 40.1 04/04/2019    MCV 95 04/04/2019     04/04/2019     CMP  Sodium   Date Value Ref Range Status   04/04/2019 134 (L) 136 - 145 mmol/L Final     Potassium   Date Value Ref Range Status   04/04/2019 4.0 3.5 - 5.1 mmol/L Final     Chloride   Date Value Ref Range Status   04/04/2019 90 (L) 95 - 110 mmol/L Final     CO2   Date Value Ref Range Status   04/04/2019 38 (H) 23 - 29 mmol/L Final     Glucose   Date Value Ref Range Status   04/04/2019 114 (H) 70 - 110 mg/dL Final     BUN, Bld   Date Value Ref Range Status   04/04/2019 28 (H) 8 - 23 mg/dL Final     Creatinine   Date Value Ref Range Status   04/04/2019 0.6 0.5 - 1.4 mg/dL Final     Calcium   Date Value Ref Range Status   04/04/2019 10.0 8.7 - 10.5 mg/dL Final     Total Protein   Date Value Ref Range Status   04/02/2019 6.5 6.0 - 8.4 g/dL Final     Albumin   Date Value Ref Range Status   04/02/2019 2.6 (L) 3.5 - 5.2 g/dL Final     Total Bilirubin   Date Value Ref Range Status   04/02/2019 0.5 0.1 - 1.0 mg/dL Final     Comment:     For infants and newborns, interpretation of results should be based  on gestational age, weight and in agreement with clinical  observations.  Premature Infant recommended reference ranges:  Up to 24 hours.............<8.0 mg/dL  Up to 48 hours............<12.0 mg/dL  3-5 days..................<15.0 mg/dL  6-29 days.................<15.0 mg/dL       Alkaline Phosphatase   Date Value Ref Range Status   04/02/2019 86 55 - 135 U/L Final     AST   Date Value Ref Range Status   04/02/2019 18 10 - 40 U/L Final     ALT   Date Value Ref Range Status   04/02/2019 29 10 - 44 U/L Final     Anion Gap   Date Value Ref Range Status   04/04/2019 6 (L) 8 - 16 mmol/L Final     eGFR if    Date Value Ref Range Status   04/04/2019 >60 >60  mL/min/1.73 m^2 Final     eGFR if non    Date Value Ref Range Status   04/04/2019 >60 >60 mL/min/1.73 m^2 Final     Comment:     Calculation used to obtain the estimated glomerular filtration  rate (eGFR) is the CKD-EPI equation.            Anesthesia Plan  Type of Anesthesia, risks & benefits discussed:  Anesthesia Type:  general, MAC  Patient's Preference:   Intra-op Monitoring Plan: standard ASA monitors  Intra-op Monitoring Plan Comments:   Post Op Pain Control Plan: multimodal analgesia and per primary service following discharge from PACU  Post Op Pain Control Plan Comments:   Induction:   IV  Beta Blocker:  Patient is not currently on a Beta-Blocker (No further documentation required).       Informed Consent: Patient understands risks and agrees with Anesthesia plan.  Questions answered. Anesthesia consent signed with patient.  ASA Score: 3     Day of Surgery Review of History & Physical: I have interviewed and examined the patient. I have reviewed the patient's H&P dated:            Ready For Surgery From Anesthesia Perspective.

## 2019-04-05 LAB
ANION GAP SERPL CALC-SCNC: 7 MMOL/L (ref 8–16)
BACTERIA BLD CULT: NORMAL
BASOPHILS # BLD AUTO: 0.04 K/UL (ref 0–0.2)
BASOPHILS NFR BLD: 0.1 % (ref 0–1.9)
BSA FOR ECHO PROCEDURE: 1.32 M2
BUN SERPL-MCNC: 22 MG/DL (ref 8–23)
CALCIUM SERPL-MCNC: 10 MG/DL (ref 8.7–10.5)
CHLORIDE SERPL-SCNC: 92 MMOL/L (ref 95–110)
CO2 SERPL-SCNC: 35 MMOL/L (ref 23–29)
CREAT SERPL-MCNC: 0.6 MG/DL (ref 0.5–1.4)
DIFFERENTIAL METHOD: ABNORMAL
EOSINOPHIL # BLD AUTO: 0 K/UL (ref 0–0.5)
EOSINOPHIL NFR BLD: 0 % (ref 0–8)
ERYTHROCYTE [DISTWIDTH] IN BLOOD BY AUTOMATED COUNT: 13.3 % (ref 11.5–14.5)
EST. GFR  (AFRICAN AMERICAN): >60 ML/MIN/1.73 M^2
EST. GFR  (NON AFRICAN AMERICAN): >60 ML/MIN/1.73 M^2
GLUCOSE SERPL-MCNC: 102 MG/DL (ref 70–110)
HCT VFR BLD AUTO: 40.9 % (ref 37–48.5)
HGB BLD-MCNC: 13.6 G/DL (ref 12–16)
LYMPHOCYTES # BLD AUTO: 1.4 K/UL (ref 1–4.8)
LYMPHOCYTES NFR BLD: 5 % (ref 18–48)
MAGNESIUM SERPL-MCNC: 1.5 MG/DL (ref 1.6–2.6)
MCH RBC QN AUTO: 31.6 PG (ref 27–31)
MCHC RBC AUTO-ENTMCNC: 33.3 G/DL (ref 32–36)
MCV RBC AUTO: 95 FL (ref 82–98)
MONOCYTES # BLD AUTO: 0.6 K/UL (ref 0.3–1)
MONOCYTES NFR BLD: 2.3 % (ref 4–15)
NEUTROPHILS # BLD AUTO: 25.5 K/UL (ref 1.8–7.7)
NEUTROPHILS NFR BLD: 93.4 % (ref 38–73)
PHOSPHATE SERPL-MCNC: 2.3 MG/DL (ref 2.7–4.5)
PLATELET # BLD AUTO: 221 K/UL (ref 150–350)
PMV BLD AUTO: 8.9 FL (ref 9.2–12.9)
POTASSIUM SERPL-SCNC: 4.7 MMOL/L (ref 3.5–5.1)
RBC # BLD AUTO: 4.31 M/UL (ref 4–5.4)
SODIUM SERPL-SCNC: 134 MMOL/L (ref 136–145)
WBC # BLD AUTO: 27.58 K/UL (ref 3.9–12.7)

## 2019-04-05 PROCEDURE — 25000003 PHARM REV CODE 250: Mod: HCNC | Performed by: HOSPITALIST

## 2019-04-05 PROCEDURE — 99223 1ST HOSP IP/OBS HIGH 75: CPT | Mod: HCNC,,, | Performed by: INTERNAL MEDICINE

## 2019-04-05 PROCEDURE — 63600175 PHARM REV CODE 636 W HCPCS: Mod: HCNC | Performed by: NURSE PRACTITIONER

## 2019-04-05 PROCEDURE — 99223 PR INITIAL HOSPITAL CARE,LEVL III: ICD-10-PCS | Mod: HCNC,,, | Performed by: INTERNAL MEDICINE

## 2019-04-05 PROCEDURE — 21400001 HC TELEMETRY ROOM: Mod: HCNC

## 2019-04-05 PROCEDURE — 25000242 PHARM REV CODE 250 ALT 637 W/ HCPCS: Mod: HCNC | Performed by: NURSE PRACTITIONER

## 2019-04-05 PROCEDURE — 99406 PR TOBACCO USE CESSATION INTERMEDIATE 3-10 MINUTES: ICD-10-PCS | Mod: HCNC,,, | Performed by: INTERNAL MEDICINE

## 2019-04-05 PROCEDURE — 25000003 PHARM REV CODE 250: Mod: HCNC | Performed by: INTERNAL MEDICINE

## 2019-04-05 PROCEDURE — 94660 CPAP INITIATION&MGMT: CPT | Mod: HCNC

## 2019-04-05 PROCEDURE — 83735 ASSAY OF MAGNESIUM: CPT | Mod: HCNC

## 2019-04-05 PROCEDURE — 63600175 PHARM REV CODE 636 W HCPCS: Mod: HCNC | Performed by: EMERGENCY MEDICINE

## 2019-04-05 PROCEDURE — 25000242 PHARM REV CODE 250 ALT 637 W/ HCPCS: Mod: HCNC | Performed by: HOSPITALIST

## 2019-04-05 PROCEDURE — 84100 ASSAY OF PHOSPHORUS: CPT | Mod: HCNC

## 2019-04-05 PROCEDURE — 99233 PR SUBSEQUENT HOSPITAL CARE,LEVL III: ICD-10-PCS | Mod: 25,HCNC,, | Performed by: INTERNAL MEDICINE

## 2019-04-05 PROCEDURE — 99233 SBSQ HOSP IP/OBS HIGH 50: CPT | Mod: 25,HCNC,, | Performed by: INTERNAL MEDICINE

## 2019-04-05 PROCEDURE — 85025 COMPLETE CBC W/AUTO DIFF WBC: CPT | Mod: HCNC

## 2019-04-05 PROCEDURE — 99406 BEHAV CHNG SMOKING 3-10 MIN: CPT | Mod: HCNC,,, | Performed by: INTERNAL MEDICINE

## 2019-04-05 PROCEDURE — 63600175 PHARM REV CODE 636 W HCPCS: Mod: HCNC | Performed by: PHYSICIAN ASSISTANT

## 2019-04-05 PROCEDURE — 97166 OT EVAL MOD COMPLEX 45 MIN: CPT | Mod: HCNC

## 2019-04-05 PROCEDURE — C9113 INJ PANTOPRAZOLE SODIUM, VIA: HCPCS | Mod: HCNC | Performed by: EMERGENCY MEDICINE

## 2019-04-05 PROCEDURE — 63600175 PHARM REV CODE 636 W HCPCS: Mod: HCNC | Performed by: HOSPITALIST

## 2019-04-05 PROCEDURE — 94640 AIRWAY INHALATION TREATMENT: CPT | Mod: HCNC

## 2019-04-05 PROCEDURE — 27000221 HC OXYGEN, UP TO 24 HOURS: Mod: HCNC

## 2019-04-05 PROCEDURE — 97162 PT EVAL MOD COMPLEX 30 MIN: CPT | Mod: HCNC

## 2019-04-05 PROCEDURE — 80048 BASIC METABOLIC PNL TOTAL CA: CPT | Mod: HCNC

## 2019-04-05 PROCEDURE — 25000003 PHARM REV CODE 250: Mod: HCNC | Performed by: EMERGENCY MEDICINE

## 2019-04-05 PROCEDURE — 36415 COLL VENOUS BLD VENIPUNCTURE: CPT | Mod: HCNC

## 2019-04-05 PROCEDURE — 99900035 HC TECH TIME PER 15 MIN (STAT): Mod: HCNC

## 2019-04-05 PROCEDURE — 94761 N-INVAS EAR/PLS OXIMETRY MLT: CPT | Mod: HCNC

## 2019-04-05 RX ORDER — MAGNESIUM SULFATE HEPTAHYDRATE 40 MG/ML
2 INJECTION, SOLUTION INTRAVENOUS ONCE
Status: COMPLETED | OUTPATIENT
Start: 2019-04-05 | End: 2019-04-05

## 2019-04-05 RX ORDER — AMIODARONE HYDROCHLORIDE 200 MG/1
400 TABLET ORAL 2 TIMES DAILY
Status: DISCONTINUED | OUTPATIENT
Start: 2019-04-05 | End: 2019-04-11 | Stop reason: HOSPADM

## 2019-04-05 RX ORDER — PANTOPRAZOLE SODIUM 40 MG/1
40 TABLET, DELAYED RELEASE ORAL DAILY
Status: DISCONTINUED | OUTPATIENT
Start: 2019-04-06 | End: 2019-04-06

## 2019-04-05 RX ORDER — CEFEPIME HYDROCHLORIDE 1 G/50ML
2 INJECTION, SOLUTION INTRAVENOUS
Status: DISCONTINUED | OUTPATIENT
Start: 2019-04-05 | End: 2019-04-11 | Stop reason: HOSPADM

## 2019-04-05 RX ADMIN — PANTOPRAZOLE SODIUM 40 MG: 40 INJECTION, POWDER, FOR SOLUTION INTRAVENOUS at 08:04

## 2019-04-05 RX ADMIN — LEVALBUTEROL HYDROCHLORIDE 1.25 MG: 1.25 SOLUTION, CONCENTRATE RESPIRATORY (INHALATION) at 11:04

## 2019-04-05 RX ADMIN — CEFEPIME 2 G: 2 INJECTION, POWDER, FOR SOLUTION INTRAVENOUS at 06:04

## 2019-04-05 RX ADMIN — MAGNESIUM SULFATE HEPTAHYDRATE 2 G: 40 INJECTION, SOLUTION INTRAVENOUS at 10:04

## 2019-04-05 RX ADMIN — BENZONATATE 100 MG: 100 CAPSULE ORAL at 09:04

## 2019-04-05 RX ADMIN — LEVALBUTEROL HYDROCHLORIDE 1.25 MG: 1.25 SOLUTION, CONCENTRATE RESPIRATORY (INHALATION) at 08:04

## 2019-04-05 RX ADMIN — PRAVASTATIN SODIUM 20 MG: 10 TABLET ORAL at 09:04

## 2019-04-05 RX ADMIN — APIXABAN 5 MG: 5 TABLET, FILM COATED ORAL at 08:04

## 2019-04-05 RX ADMIN — ASPIRIN 81 MG: 81 TABLET, COATED ORAL at 08:04

## 2019-04-05 RX ADMIN — AMIODARONE HYDROCHLORIDE 400 MG: 200 TABLET ORAL at 09:04

## 2019-04-05 RX ADMIN — CEFEPIME 2 G: 2 INJECTION, POWDER, FOR SOLUTION INTRAVENOUS at 10:04

## 2019-04-05 RX ADMIN — AMIODARONE HYDROCHLORIDE 0.5 MG/MIN: 1.8 INJECTION, SOLUTION INTRAVENOUS at 03:04

## 2019-04-05 RX ADMIN — LEVALBUTEROL HYDROCHLORIDE 1.25 MG: 1.25 SOLUTION, CONCENTRATE RESPIRATORY (INHALATION) at 03:04

## 2019-04-05 RX ADMIN — PREDNISONE 20 MG: 20 TABLET ORAL at 08:04

## 2019-04-05 RX ADMIN — APIXABAN 5 MG: 5 TABLET, FILM COATED ORAL at 09:04

## 2019-04-05 RX ADMIN — TIOTROPIUM BROMIDE 18 MCG: 18 CAPSULE ORAL; RESPIRATORY (INHALATION) at 08:04

## 2019-04-05 RX ADMIN — AZITHROMYCIN MONOHYDRATE 250 MG: 250 TABLET ORAL at 08:04

## 2019-04-05 RX ADMIN — DILTIAZEM HYDROCHLORIDE 120 MG: 120 CAPSULE, COATED, EXTENDED RELEASE ORAL at 08:04

## 2019-04-05 RX ADMIN — AMIODARONE HYDROCHLORIDE 400 MG: 200 TABLET ORAL at 08:04

## 2019-04-05 NOTE — CONSULTS
[facility]  Infectious Disease  Consult Note    Patient Name: Latasha Perez  MRN: [id number]  Admission Date: 4/2/2019  Hospital Length of Stay: 3 days  Attending Physician: Susan Schuster MD  Primary Care Provider: Pollo Oneal MD     Isolation Status: No active isolations    Patient information was obtained from patient, past medical records and ER records.      Inpatient consult to Infectious Diseases  Consult performed by: Erika Olea PA-C  Consult ordered by: Susan Schuster MD        Assessment/Plan:     Bacteremia  Mrs. Perez is a 72 yo female with history significant for severe COPD presented to ED for SOB found to have pseudomonas bacteremia and PNA. We are consulted for abx recommendations     Repeat blood cultures NGTD. She is on cefepime and azithromycin       1. Continue cefepime 2g, increased q8hr for pseudomonas.   2. Repeat blood cultures NGTD. Ciprofloxacin contraindicated as pt is on amiodarone and can prolong QT interval .  3. Recommend 14 days of IV cefepime from date of first negative blood cultures (4/18/19) with weekly cbc cmp sent to ID clinic at   4. May d/c azithromycin    Seen and discussed with ID staff. Will sign off. Please call if with questions           Thank you for your consult. I will sign off. Please contact us if you have any additional questions.    Erika Olea PA-C  Infectious Disease  [facility]    Subjective:     Principal Problem: Atrial fibrillation with RVR    HPI: Mrs. Perez is a 72 yo female with history significant for severe COPD (PFT's 3 years ago; on advair, spiriva and PRN albuterol), continued tobacco abuse, and HTN who presented to the ED on 4/2 with complaints of SOB, diffuse weakness that has progressively worsened over preceding 2 days. She was recently admitted to Observation 3/28 to 3/29 for a COPD exacerbation and was discharged home on Azithromycin and prednisone 40 mg daily x 5 days.      Upon  ED arrival, patient was in Afib RVR, rate 140-150 and normotensive. CXR with new right upper lobe opacity, not noted on CXR from admission on 3/28. She was afebrile, with significant leukocytosis of 38,000. She was started on a diltiazem gtt for RVR. Pulmonary consulted for assistance in management of acute on chronic hypercapnic respiratory failure.     She was found to have pseudomonas bacteremia. Repeat blood cultures NGTD. She is on cefepime and azithromycin. She feels overall improved    Past Medical History:   Diagnosis Date    Arthritis     Carotid disease, bilateral     Cataract     Chronic hyponatremia     COPD (chronic obstructive pulmonary disease)     HLD (hyperlipidemia)     HTN (hypertension)        Past Surgical History:   Procedure Laterality Date    APPENDECTOMY      Cardioversion or Defibrillation  4/4/2019    Performed by Manfred Figueroa [facility] at [facility] CATH LAB    CATARACT EXTRACTION W/  INTRAOCULAR LENS IMPLANT Right 12/06/2018    Dr. Escobar    CATARACT EXTRACTION W/  INTRAOCULAR LENS IMPLANT Left 12/20/2018    DR. Escobar    CERVICAL SPINE SURGERY      DILATION AND CURETTAGE OF UTERUS      x 2    EYE SURGERY      cataract Rt    FRACTURE SURGERY      Lt leg fracture with hardware    INSERTION, IOL PROSTHESIS Left 12/20/2018    Performed by Broderick Escobar MD at [facility] OR    INSERTION, IOL PROSTHESIS Right 12/6/2018    Performed by [facility]ick Escobar MD at [facility] OR    metatarsal repair      OPEN REDUCTION INTERNAL FIXATION-TIBIAL PLATEAU Left 6/27/2014    Performed by Isak Pereira MD at [facility] OR    PHACOEMULSIFICATION, CATARACT Left 12/20/2018    Performed by [facility]ick Escobar MD at [facility] OR    PHACOEMULSIFICATION, CATARACT Right 12/6/2018    Performed by Bro[facility]ick Escobar [facility] at [facility] OR    SHOULDER ARTHROSCOPY      Transesophageal echo (JOHNNA) intra-procedure log documentation N/A 4/4/2019    Performed by Manfred Figueroa MD at [facility] CATH LAB        Review of patient's allergies indicates:   Allergen Reactions    Pcn [penicillins] Other (See Comments)     Fever flushing skin swelling     Biaxin [clarithromycin] Rash    Codeine Rash    Doxycycline Rash    Levaquin [levofloxacin] Rash       Medications:  Medications Prior to Admission   Medication Sig    acetaminophen (TYLENOL EXTRA STRENGTH ORAL) Take by mouth as needed.    alendronate (FOSAMAX) 70 MG tablet TAKE 1 TABLET BY MOUTH ONCE A WEEK EVERY 7 DAYS, AS DIRECTED    amLODIPine (NORVASC) 5 MG tablet TAKE 1 TABLET EVERY DAY    aspirin (ECOTRIN) 81 MG EC tablet Take 81 mg by mouth once daily.    azithromycin (ZITHROMAX Z-IMELDA) 250 MG tablet 2 pills on day 1, then 1 pill a day    calcium carbonate (CALCIUM 500 ORAL) Take by mouth 2 (two) times daily.     cetirizine (ZYRTEC) 10 MG tablet Take 10 mg by mouth once daily.    fluticasone-salmeterol 250-50 mcg/dose (ADVAIR) 250-50 mcg/dose diskus inhaler Inhale 1 puff into the lungs 2 (two) times daily. Controller    losartan (COZAAR) 100 MG tablet TAKE 1 TABLET ONE TIME DAILY    MULTIVITAMIN W-MINERALS/LUTEIN (CENTRUM SILVER ORAL) Take by mouth once daily.    pravastatin (PRAVACHOL) 20 MG tablet TAKE 1 TABLET EVERY EVENING    predniSONE (DELTASONE) 20 MG tablet TAKE 2 TABLETS(40 MG) BY MOUTH EVERY DAY FOR 5 DAYS    SPIRIVA WITH HANDIHALER 18 mcg inhalation capsule     VENTOLIN HFA 90 mcg/actuation inhaler INHALE TWO PUFFS BY MOUTH EVERY 6 HOURS AS NEEDED FOR WHEEZING    albuterol sulfate 2.5 mg/0.5 mL Nebu Take 2.5 mg by nebulization every 4 (four) hours as needed. One Box    latanoprost 0.005 % ophthalmic solution Place 1 drop into the left eye nightly.    losartan (COZAAR) 100 MG tablet TAKE 1 TABLET EVERY DAY    [] predniSONE (DELTASONE) 20 MG tablet Take 2 tablets (40 mg total) by mouth once daily. for 5 days     Antibiotics (From admission, onward)    Start     Stop Route Frequency Ordered    19 8494  cefepime in  dextrose 5 % IVPB 2 g      -- IV Every 8 hours (non-standard times) 19 1232    19 1215  azithromycin tablet 250 mg      -- Oral Daily 19 1210        Antifungals (From admission, onward)    None        Antivirals (From admission, onward)    None           Immunization History   Administered Date(s) Administered    Influenza - High Dose 2013, 2014, 12/10/2015, 10/12/2018    Pneumococcal Conjugate - 13 Valent 2016    Pneumococcal Polysaccharide - 23 Valent 2013    Tdap 2013       Family History     Problem Relation (Age of Onset)    Cancer Father    Cataracts Sister    Heart disease Mother, Maternal Grandfather    No Known Problems Brother, Maternal Aunt, Maternal Uncle, Paternal Aunt, Paternal Uncle, Maternal Grandmother, Paternal Grandmother, Paternal Grandfather        Social History     Socioeconomic History    Marital status:      Spouse name: Not on file    Number of children: Not on file    Years of education: Not on file    Highest education level: Not on file   Occupational History    Occupation: teacher   Social Needs    Financial resource strain: Not on file    Food insecurity:     Worry: Not on file     Inability: Not on file    Transportation needs:     Medical: Not on file     Non-medical: Not on file   Tobacco Use    Smoking status: Current Some Day Smoker     Packs/day: 1.00     Years: 45.00     Pack years: 45.00     Types: Cigarettes     Last attempt to quit: 2002     Years since quittin.2    Smokeless tobacco: Never Used   Substance and Sexual Activity    Alcohol use: No    Drug use: No    Sexual activity: Yes     Partners: Male   Lifestyle    Physical activity:     Days per week: Not on file     Minutes per session: Not on file    Stress: Not on file   Relationships    Social connections:     Talks on phone: Not on file     Gets together: Not on file     Attends Hinduism service: Not on file     Active member of  club or organization: Not on file     Attends meetings of clubs or organizations: Not on file     Relationship status: Not on file   Other Topics Concern    Not on file   Social History Narrative    Not on file     Review of Systems   Constitutional: Negative for chills, diaphoresis, fatigue and fever.   Respiratory: Positive for cough and shortness of breath.    Cardiovascular: Negative for chest pain.   Gastrointestinal: Negative for abdominal pain, diarrhea, nausea and vomiting.   Genitourinary: Negative for dysuria.   Musculoskeletal: Negative for back pain.   Skin: Negative for wound.   Neurological: Negative for dizziness and headaches.     Objective:     Vital Signs (Most Recent):  Temp: 98 °F (36.7 °C) (04/05/19 1115)  Pulse: 78 (04/05/19 1200)  Resp: (!) 28 (04/05/19 1200)  BP: 139/65 (04/05/19 1200)  SpO2: 96 % (04/05/19 1200) Vital Signs (24h Range):  Temp:  [97.5 °F (36.4 °C)-98.3 °F (36.8 °C)] 98 °F (36.7 °C)  Pulse:  [] 78  Resp:  [16-40] 28  SpO2:  [91 %-100 %] 96 %  BP: (107-160)/(56-71) 139/65     Weight: 37.8 kg (83 lb 5.3 oz)  Body mass index is 13.87 kg/m².    Estimated Creatinine Clearance: 50.6 mL/min (based on SCr of 0.6 mg/dL).    Physical Exam   Constitutional: No distress.   Thin, cachetic    HENT:   Head: Normocephalic.   Cardiovascular: Normal rate, regular rhythm and normal heart sounds.   No murmur heard.  Pulmonary/Chest: Effort normal. No stridor. No respiratory distress. She has no wheezes.   Decrease breath sounds right side   Abdominal: Soft. She exhibits no distension. There is no tenderness.   Musculoskeletal: Normal range of motion. She exhibits no edema or deformity.   Neurological: She is alert.   Skin: Skin is warm and dry. She is not diaphoretic. No erythema. No pallor.   Vitals reviewed.      Significant Labs:   Blood Culture:   Recent Labs   Lab 04/02/19  1015 04/02/19  1020 04/04/19  0633 04/04/19  0700   LABBLOO Gram stain aer bottle: Gram negative rods    Results called to and read back by:Brandi Talamantes-ICU  04/03/2019  11:37  PSEUDOMONAS AERUGINOSA No Growth to date  No Growth to date  No Growth to date  No Growth to date No Growth to date  No Growth to date No Growth to date  No Growth to date     CBC:   Recent Labs   Lab 04/04/19  0633 04/05/19 0651   WBC 38.97* 27.58*   HGB 13.3 13.6   HCT 40.1 40.9    221     CMP:   Recent Labs   Lab 04/04/19  0633 04/05/19 0651   * 134*   K 4.0 4.7   CL 90* 92*   CO2 38* 35*   * 102   BUN 28* 22   CREATININE 0.6 0.6   CALCIUM 10.0 10.0   ANIONGAP 6* 7*   EGFRNONAA >60 >60     Respiratory Culture: No results for input(s): GSRESP, RESPIRATORYC in the last 4320 hours.  Urine Culture: No results for input(s): LABURIN in the last 4320 hours.  Urine Studies: No results for input(s): COLORU, APPEARANCEUA, PHUR, SPECGRAV, PROTEINUA, GLUCUA, KETONESU, BILIRUBINUA, OCCULTUA, NITRITE, UROBILINOGEN, LEUKOCYTESUR, RBCUA, WBCUA, BACTERIA, SQUAMEPITHEL, HYALINECASTS in the last 4320 hours.    Invalid input(s): WRIGHTSUR  Wound Culture: No results for input(s): LABAERO in the last 4320 hours.  All pertinent labs within the past 24 hours have been reviewed.    Significant Imaging: I have reviewed all pertinent imaging results/findings within the past 24 hours.

## 2019-04-05 NOTE — HPI
Mrs. Perez is a 72 yo female with history significant for severe COPD (PFT's 3 years ago; on advair, spiriva and PRN albuterol), continued tobacco abuse, and HTN who presented to the ED on 4/2 with complaints of SOB, diffuse weakness that has progressively worsened over preceding 2 days. She was recently admitted to Observation 3/28 to 3/29 for a COPD exacerbation and was discharged home on Azithromycin and prednisone 40 mg daily x 5 days.      Upon ED arrival, patient was in Afib RVR, rate 140-150 and normotensive. CXR with new right upper lobe opacity, not noted on CXR from admission on 3/28. She was afebrile, with significant leukocytosis of 38,000. She was started on a diltiazem gtt for RVR. Pulmonary consulted for assistance in management of acute on chronic hypercapnic respiratory failure.     She was found to have pseudomonas bacteremia. Repeat blood cultures NGTD. She is on cefepime and azithromycin. She feels overall improved

## 2019-04-05 NOTE — SUBJECTIVE & OBJECTIVE
Interval History: No acute issues. Cardioverted yesterday.     Objective:     Vital Signs (Most Recent):  Temp: 97.8 °F (36.6 °C) (04/05/19 0715)  Pulse: 80 (04/05/19 0845)  Resp: (!) 28 (04/05/19 0845)  BP: (!) 155/67 (04/05/19 0800)  SpO2: 95 % (04/05/19 0845) Vital Signs (24h Range):  Temp:  [97.5 °F (36.4 °C)-98.3 °F (36.8 °C)] 97.8 °F (36.6 °C)  Pulse:  [] 80  Resp:  [16-40] 28  SpO2:  [86 %-100 %] 95 %  BP: (107-160)/(56-73) 155/67     Weight: 37.8 kg (83 lb 5.3 oz)  Body mass index is 13.87 kg/m².      Intake/Output Summary (Last 24 hours) at 4/5/2019 1033  Last data filed at 4/5/2019 0800  Gross per 24 hour   Intake 887.4 ml   Output 160 ml   Net 727.4 ml       Physical Exam   Constitutional: She is oriented to person, place, and time. She appears well-developed and well-nourished. No distress.   HENT:   Head: Normocephalic and atraumatic.   Eyes: Pupils are equal, round, and reactive to light. EOM are normal.   Neck: Normal range of motion. Neck supple.   Cardiovascular: Normal rate and regular rhythm.   Pulmonary/Chest: Effort normal and breath sounds normal. She has no wheezes.   Abdominal: Soft. Bowel sounds are normal.   Musculoskeletal: Normal range of motion. She exhibits no edema.   Neurological: She is alert and oriented to person, place, and time.   Skin: Skin is warm and dry. She is not diaphoretic.   Psychiatric: She has a normal mood and affect. Her behavior is normal.   Nursing note and vitals reviewed.      Vents:  Oxygen Concentration (%): 30 (04/05/19 0400)    Lines/Drains/Airways     Peripheral Intravenous Line                 Peripheral IV - Single Lumen 04/02/19 Left Antecubital 3 days         Peripheral IV - Single Lumen 04/02/19 1415 Left Hand 2 days                Significant Labs:    CBC/Anemia Profile:  Recent Labs   Lab 04/04/19  0633 04/05/19  0651   WBC 38.97* 27.58*   HGB 13.3 13.6   HCT 40.1 40.9    221   MCV 95 95   RDW 13.4 13.3        Chemistries:  Recent Labs    Lab 04/04/19  0633 04/05/19  0651   * 134*   K 4.0 4.7   CL 90* 92*   CO2 38* 35*   BUN 28* 22   CREATININE 0.6 0.6   CALCIUM 10.0 10.0   MG 1.7 1.5*   PHOS 2.4* 2.3*       All pertinent labs within the past 24 hours have been reviewed.    Significant Imaging:  I have reviewed and interpreted all pertinent imaging results/findings within the past 24 hours.

## 2019-04-05 NOTE — PT/OT/SLP EVAL
Physical Therapy Evaluation    Patient Name:  Latasha Perez   MRN:  [id number]    Recommendations:     Discharge Recommendations:  home health PT(with family assistance)   Discharge Equipment Recommendations: none   Barriers to discharge: None    Assessment:     Latasha Perez is a 73 y.o. female admitted with a medical diagnosis of Atrial fibrillation with RVR.  She presents with the following impairments/functional limitations:  weakness, impaired endurance, impaired functional mobilty, gait instability, impaired balance, decreased lower extremity function, pain, impaired cardiopulmonary response to activity.    Rehab Prognosis: Fair+; patient would benefit from acute skilled PT services to address these deficits and reach maximum level of function.    Recent Surgery: Procedure(s) (LRB):  Transesophageal echo (JOHNNA) intra-procedure log documentation (N/A)  Cardioversion or Defibrillation 1 Day Post-Op    Plan:     During this hospitalization, patient to be seen daily to address the identified rehab impairments via gait training, therapeutic activities, therapeutic exercises and progress toward the following goals:    · Plan of Care Expires:  04/19/19    Subjective     Chief Complaint: weakness and SOB  Patient/Family Comments/goals: to go home  Pain/Comfort:  · Pain Rating 1: (Pt c/o back and calf pain.)    Living Environment:  Pt lives with spouse (currently at a SNF) in a  house with 1 step at entry.   Prior to admission, patients level of function was independent and driving.  Equipment used at home: wheelchair, walker, rolling, bedside commode, bath bench, oxygen.  Upon discharge, patient will have assistance from niece and family/friend.    Objective:     Communicated with nurse Beal prior to session.  Patient found HOB elevated with oxygen 4L, peripheral IV, telemetry, SCD upon PT entry to room.    General Precautions: Standard, fall, respiratory   Orthopedic Precautions:N/A   Braces: N/A      Exams:  · Cognitive Exam:  Patient was able to follow 2 step commands.   · Gross Motor Coordination:  WFL  · Postural Exam:  Patient presented with the following abnormalities:    · -       Rounded shoulders  · -       Forward head  · -       Trunk flexion  · -   Cachexia  · Skin Integrity/Edema:      · -       Skin integrity: Visible skin intact  · -       Edema: None noted BLE  · RLE ROM: WFL  · RLE Strength: WFL  · LLE ROM: WFL  · LLE Strength: WFL    Functional Mobility:  Pt with anxiety 2* SOB, required encouragement and extra time to complete tasks.    · Bed Mobility:     · Scooting: contact guard assistance  · Supine to Sit: contact guard assistance with HOB elevated   · Transfers:     · Sit to Stand:  contact guard assistance with rolling walker  · Bed to Chair: contact guard assistance with  rolling walker  using  Step Transfer  · Gait: Pt ambulated ~20 ft with CGA using RW, 4L O2 NC, and chair to follow.  Pt with decreased foot clearance, decreased step length, and max decreased patience.  Pt required mod VC's for management of RW.  · Balance: Pt with fair+ balance.       Therapeutic Activities and Exercises:  Pt educated on seated LE therex: hip flex, LAQ, and AP.    AM-PAC 6 CLICK MOBILITY  Total Score:18     Patient left up in chair with all lines intact, call button in reach and nurse Lian notified.  Tray table in front.      GOALS:   Multidisciplinary Problems     Physical Therapy Goals        Problem: Physical Therapy Goal    Goal Priority Disciplines Outcome Goal Variances Interventions   Physical Therapy Goal     PT, PT/OT      Description:  Goals to be met by: 19     Patient will increase functional independence with mobility by performin. Supine to sit with Modified [place]  2. Rolling to Left and Right with Modified [place]  3. Sit to stand transfer with Modified [place]  4. Bed to chair transfer with Modified [place]   5. Gait  x50-100 feet with Modified  [place] using Rolling Walker and O2  6. Lower extremity exercise program 2 sets x10 reps per handout, with independence                      History:     Past Medical History:   Diagnosis Date    Arthritis     Carotid disease, bilateral     Cataract     Chronic hyponatremia     COPD (chronic obstructive pulmonary disease)     HLD (hyperlipidemia)     HTN (hypertension)        Past Surgical History:   Procedure Laterality Date    APPENDECTOMY      Cardioversion or Defibrillation  4/4/2019    Performed by [facility]fred Fig[facility]eroa [facility] at [facility] CATH LAB    CATARACT EXTRACTION W/  INTRAOCULAR LENS IMPLANT Right 12/06/2018    Dr. Escobar    CATARACT EXTRACTION W/  INTRAOCULAR LENS IMPLANT Left 12/20/2018    DR. Escobar    CERVICAL SPINE SURGERY      DILATION AND CURETTAGE OF UTERUS      x 2    EYE SURGERY      cataract Rt    FRACTURE SURGERY      Lt leg fracture with hardware    INSERTION, IOL PROSTHESIS Left 12/20/2018    Performed by Bro[facility]ick Escobar [facility] at [facility] OR    INSERTION, IOL PROSTHESIS Right 12/6/2018    Performed by [facility]ick [facility]obar [facility] at [facility] OR    metatarsal repair      OPEN REDUCTION INTERNAL FIXATION-TIBIAL PLATEAU Left 6/27/2014    Performed by Isak Pereira MD at [facility] OR    PHACOEMULSIFICATION, CATARACT Left 12/20/2018    Performed by [facility]obar [facility] at [facility] OR    PHACOEMULSIFICATION, CATARACT Right 12/6/2018    Performed by [facility]ick [facility]obar [facility] at [facility] OR    SHOULDER ARTHROSCOPY      Transesophageal echo (JOHNNA) intra-procedure log documentation N/A 4/4/2019    Performed by Manfred Figueroa MD at [facility] CATH LAB       Time Tracking:     PT Received On: 04/05/19  PT Start Time: 1445     PT Stop Time: 1511  PT Total Time (min): 26 min     Billable Minutes: Evaluation 26 min co-eval with MARIANA Cook, PT  04/05/2019

## 2019-04-05 NOTE — PT/OT/SLP EVAL
Occupational Therapy   Evaluation    Name: Latasha Perez  MRN: [id number]  Admitting Diagnosis:  Atrial fibrillation with RVR 1 Day Post-Op    Recommendations:     Discharge Recommendations: home health OT(with family assistance)  Discharge Equipment Recommendations:  none  Barriers to discharge:  Other (Comment)(Pt's  is in SNF, but she has family/friends lined up to help at the home. )    Assessment:     Latasha Perez is a 73 y.o. female with a medical diagnosis of Atrial fibrillation with RVR.  She presents with increased time to complete ADLs and all functional mobility d/t respiratory issues. Pt presents with motivation to participate and has strong support from family and friends. Performance deficits affecting function: weakness, impaired functional mobilty, impaired balance, decreased upper extremity function, decreased safety awareness, impaired cardiopulmonary response to activity, impaired endurance, impaired self care skills, gait instability, decreased lower extremity function, pain.      Rehab Prognosis: Fair; patient would benefit from acute skilled OT services to address these deficits and reach maximum level of function.       Plan:     Patient to be seen 5 x/week to address the above listed problems via    · Plan of Care Expires: 04/19/19  · Plan of Care Reviewed with: patient    Subjective     Chief Complaint: weak and SOB  Patient/Family Comments/goals: to go home    Occupational Profile:  Living Environment: Pt lives in a H with 1 step to enter. Pt lives with her , but he is currently at a SNF.  Previous level of function: Pt was independent and driving prior to admission.    Roles and Routines: Pt was caring for her , cooking, and cleaning.  Equipment Used at Home:  wheelchair, oxygen, walker, rolling, bath bench, bedside commode(Pt reported that she does not use her oxygen, wheelchair, or RW. )  Assistance upon Discharge: niece and  family/friends    Pain/Comfort:  · Pain Rating 1: other (see comments)(Pt did not rank pain, but reported back and calf pain.)    Patients cultural, spiritual, [employer]ism conflicts given the current situation: no    Objective:     Communicated with: nurseLian, prior to session.  Patient found HOB elevated with SCD, peripheral IV, oxygen, telemetry upon OT entry to room.    General Precautions: Standard, fall, respiratory   Orthopedic Precautions:N/A   Braces: N/A     Occupational Performance:    Bed Mobility:    · Patient completed Scooting/Bridging with contact guard assistance  · Patient completed Supine to Sit with contact guard assistance and HOB elevated to ~30*    Functional Mobility/Transfers:  · Patient completed Sit <> Stand Transfer with contact guard assistance  with  rolling walker   · Patient completed Bed <> Chair Transfer using Step Transfer technique with contact guard assistance with rolling walker.  · Functional Mobility: Pt ambulated from the bed to ~5-6 steps outside of the hallway with CGA and RW with the recliner chair following. Pt required to sit right away d/t weakness. Pt required mod VC to safely use RW while walking.     Activities of Daily Living:  · Upper Body Dressing: supervision with hospital gown   · Lower Body Dressing: stand by assistance  with socks at EOB    Cognitive/Visual Perceptual:  Cognitive/Psychosocial Skills:     -       Oriented to: Person, Place, Time and Situation   -       Follows Commands/attention:Follows two-step commands  -       Communication: clear/fluent  -       Memory: No Deficits noted  -       Safety awareness/insight to disability: impaired   -       Mood/Affect/Coping skills/emotional control: Anxious. Pt reported that when she feels nervous, she has a harder time with her breathing. Pt required mod verbal cues to ease nerves to complete functional mobility.   Visual/Perceptual:      -Intact      Physical Exam:  Balance:    -       CGA with dynamic  standing balance  Postural examination/scapula alignment:    -       Rounded shoulders  -       Forward head  Skin integrity: Visible skin intact  Edema:  None noted  Sensation:    -       Intact  -       Impaired  Pt reported she currently saw someone regarding numbness/tingling in her L UE digits IV and V.   Dominant hand:    -       R hand  Upper Extremity Range of Motion:  -       Right Upper Extremity: WFL  -       Left Upper Extremity: WFL  Upper Extremity Strength:    -       Right Upper Extremity: WFL  -       Left Upper Extremity: WFL   Strength:    -       Right Upper Extremity: WFL  -       Left Upper Extremity: WFL  Fine Motor Coordination:    -       Intact  Gross motor coordination:   WFL    AMPAC 6 Click ADL:  AMPAC Total Score: 17    Treatment & Education:  Pt consented to OT eval. Pt required increased time with all parts of functional mobility d/t her respiratory issues. Pt educated on the importance of OOB activities and proper positioning in the chair to increase lung expansion.   Education:    Patient left up in chair with all lines intact, call button in reach and nurseLian notified    GOALS:   Multidisciplinary Problems     Occupational Therapy Goals        Problem: Occupational Therapy Goal    Goal Priority Disciplines Outcome Interventions   Occupational Therapy Goal     OT, PT/OT Ongoing (interventions implemented as appropriate)    Description:  Goals to be met by: 04/19/19     Patient will increase functional independence with ADLs by performing:    Feeding with Modified [place].  UE Dressing with Modified [place].  LE Dressing with Modified [place].  Grooming while standing at sink with Modified [place].  Toileting from toilet with Supervision for hygiene and clothing management.   Sitting at edge of bed x25 minutes with Modified [place].  Rolling to Bilateral with Modified [place].   Supine to sit with Modified [place].  Step transfer with  Modified [place]  Toilet transfer to toilet with Modified [place].  Upper extremity exercise program x15 reps per handout, with supervision.                      History:     Past Medical History:   Diagnosis Date    Arthritis     Carotid disease, bilateral     Cataract     Chronic hyponatremia     COPD (chronic obstructive pulmonary disease)     HLD (hyperlipidemia)     HTN (hypertension)        Past Surgical History:   Procedure Laterality Date    APPENDECTOMY      Cardioversion or Defibrillation  4/4/2019    Performed by [facility]fred Fig[facility]eroa [facility] at [facility] CATH LAB    CATARACT EXTRACTION W/  INTRAOCULAR LENS IMPLANT Right 12/06/2018    Dr. Escobar    CATARACT EXTRACTION W/  INTRAOCULAR LENS IMPLANT Left 12/20/2018    DR. Escobar    CERVICAL SPINE SURGERY      DILATION AND CURETTAGE OF UTERUS      x 2    EYE SURGERY      cataract Rt    FRACTURE SURGERY      Lt leg fracture with hardware    INSERTION, IOL PROSTHESIS Left 12/20/2018    Performed by [facility]obar [facility] at [facility] OR    INSERTION, IOL PROSTHESIS Right 12/6/2018    Performed by [facility] at [facility] OR    metatarsal repair      OPEN REDUCTION INTERNAL FIXATION-TIBIAL PLATEAU Left 6/27/2014    Performed by Isak Pereira [facility] at [facility] OR    PHACOEMULSIFICATION, CATARACT Left 12/20/2018    Per[facility] by [facility] at [facility] OR    PHACOEMULSIFICATION, CATARACT Right 12/6/2018    Performed by [facility] at [facility] OR    SHOULDER ARTHROSCOPY      Transesophageal echo (JOHNNA) intra-procedure log documentation N/A 4/4/2019    Performed by Manfred Figueroa MD at [facility] CATH LAB       Time Tracking:     OT Date of Treatment: 04/05/19  OT Start Time: 1445  OT Stop Time: 1511  OT Total Time (min): 26 min    Billable Minutes:Evaluation 26 min (co-treat with PT)    Kendra Wong OT  4/5/2019

## 2019-04-05 NOTE — PROGRESS NOTES
[facility]  Pulmonology  Progress Note    Patient Name: Latasha Perez  MRN: [id number]  Admission Date: 4/2/2019  Hospital Length of Stay: 3 days  Code Status: Full Code  Attending Provider: Susan Schuster MD  Primary Care Provider: Pollo Oneal MD   Principal Problem: Atrial fibrillation with RVR    Subjective:     Interval History: No acute issues. Cardioverted yesterday.     Objective:     Vital Signs (Most Recent):  Temp: 97.8 °F (36.6 °C) (04/05/19 0715)  Pulse: 80 (04/05/19 0845)  Resp: (!) 28 (04/05/19 0845)  BP: (!) 155/67 (04/05/19 0800)  SpO2: 95 % (04/05/19 0845) Vital Signs (24h Range):  Temp:  [97.5 °F (36.4 °C)-98.3 °F (36.8 °C)] 97.8 °F (36.6 °C)  Pulse:  [] 80  Resp:  [16-40] 28  SpO2:  [86 %-100 %] 95 %  BP: (107-160)/(56-73) 155/67     Weight: 37.8 kg (83 lb 5.3 oz)  Body mass index is 13.87 kg/m².      Intake/Output Summary (Last 24 hours) at 4/5/2019 1033  Last data filed at 4/5/2019 0800  Gross per 24 hour   Intake 887.4 ml   Output 160 ml   Net 727.4 ml       Physical Exam   Constitutional: She is oriented to person, place, and time. She appears well-developed and well-nourished. No distress.   HENT:   Head: Normocephalic and atraumatic.   Eyes: Pupils are equal, round, and reactive to light. EOM are normal.   Neck: Normal range of motion. Neck supple.   Cardiovascular: Normal rate and regular rhythm.   Pulmonary/Chest: Effort normal and breath sounds normal. She has no wheezes.   Abdominal: Soft. Bowel sounds are normal.   Musculoskeletal: Normal range of motion. She exhibits no edema.   Neurological: She is alert and oriented to person, place, and time.   Skin: Skin is warm and dry. She is not diaphoretic.   Psychiatric: She has a normal mood and affect. Her behavior is normal.   Nursing note and vitals reviewed.      Vents:  Oxygen Concentration (%): 30 (04/05/19 0400)    Lines/Drains/Airways     Peripheral Intravenous Line                 Peripheral IV -  Single Lumen 04/02/19 Left Antecubital 3 days         Peripheral IV - Single Lumen 04/02/19 1415 Left Hand 2 days                Significant Labs:    CBC/Anemia Profile:  Recent Labs   Lab 04/04/19  0633 04/05/19  0651   WBC 38.97* 27.58*   HGB 13.3 13.6   HCT 40.1 40.9    221   MCV 95 95   RDW 13.4 13.3        Chemistries:  Recent Labs   Lab 04/04/19  0633 04/05/19  0651   * 134*   K 4.0 4.7   CL 90* 92*   CO2 38* 35*   BUN 28* 22   CREATININE 0.6 0.6   CALCIUM 10.0 10.0   MG 1.7 1.5*   PHOS 2.4* 2.3*       All pertinent labs within the past 24 hours have been reviewed.    Significant Imaging:  I have reviewed and interpreted all pertinent imaging results/findings within the past 24 hours.    Assessment/Plan:     Acute on chronic respiratory failure with hypercapnia  --PFT's 3 years ago noted moderate-severe COPD  --right upper lobe pneumonia, continued smoking likely trigger for exacerbation  Recommend:  1)Continuing Abx for bacteremia/PNA. Pan sensitive. Complete course for 14 days.   2)Prednisone to complete 5-7 days  3)Bronchodilators- discharge on triple therapy(Breo + Spiriva or Trelegy--LABA/LAMA/ICS)  4)NIPPV as needed.  5)TObacco cessation counseling.    COPD exacerbation  --see above      Patient needs outpatient Pulmonary follow up with Dr. Paco Baron.   Tobacco cessation counseling provided for > 5 minutes. Counseled that smoking increases risk of lung and heart disease.   Also, I let patient know that if she continue to smoke she will likely be unable to stay out of an acute care setting.      Will sign off. Please call with questions.        Juan Pablo Brown MD  Pulmonology  [facility]

## 2019-04-05 NOTE — PLAN OF CARE
Problem: Physical Therapy Goal  Goal: Physical Therapy Goal  Goals to be met by: 19     Patient will increase functional independence with mobility by performin. Supine to sit with Modified [place]  2. Rolling to Left and Right with Modified [place]  3. Sit to stand transfer with Modified [place]  4. Bed to chair transfer with Modified [place]   5. Gait  x50-100 feet with Modified [place] using Rolling Walker and O2  6. Lower extremity exercise program 2 sets x10 reps per handout, with independence    Pt ambulated ~20 ft with CGA using RW, 4L O2 NC, and chair to follow.

## 2019-04-05 NOTE — ASSESSMENT & PLAN NOTE
--PFT's 3 years ago noted moderate-severe COPD  --right upper lobe pneumonia, continued smoking likely trigger for exacerbation  Recommend:  1)Continuing Abx for bacteremia/PNA. Pan sensitive. Complete course for 14 days.   2)Prednisone to complete 5-7 days  3)Bronchodilators- discharge on triple therapy(Breo + Spiriva or Trelegy--LABA/LAMA/ICS)  4)NIPPV as needed.  5)TObacco cessation counseling.

## 2019-04-05 NOTE — PLAN OF CARE
Patient AAOx4. No acute distress. VS WNL. NSR on cardiac monitor. Afebrile. Amiodarone gtt continuing to infuse per cardiology. Cardiac diet. Voids per bedpan. SCD's intact. BiPap 13/5 and FiO2 @ 30% placed on patient, and patient wore throughout the night while sleep. Turns self. Skin intact. Patient aware of plan.

## 2019-04-05 NOTE — ANESTHESIA POSTPROCEDURE EVALUATION
Anesthesia Post Evaluation    Patient: Latasha Perez    Procedure(s) Performed: Procedure(s) (LRB):  Transesophageal echo (JOHNNA) intra-procedure log documentation (N/A)  Cardioversion or Defibrillation    Final Anesthesia Type: MAC  Patient location during evaluation: floor  Patient participation: Yes- Able to Participate  Level of consciousness: awake and alert  Post-procedure vital signs: reviewed and stable  Pain management: adequate  Airway patency: patent  PONV status at discharge: No PONV  Anesthetic complications: no      Cardiovascular status: hemodynamically stable  Respiratory status: unassisted and spontaneous ventilation  Hydration status: euvolemic  Follow-up not needed.          Vitals Value Taken Time   /83 4/5/2019  1:16 PM   Temp 36.9 °C (98.4 °F) 4/5/2019  1:16 PM   Pulse 84 4/5/2019  1:16 PM   Resp 20 4/5/2019  1:16 PM   SpO2 98 % 4/5/2019  1:16 PM         No case tracking events are documented in the log.      Pain/Arron Score: Arron Score: 7 (4/4/2019  2:47 PM)

## 2019-04-05 NOTE — ASSESSMENT & PLAN NOTE
Mrs. Perez is a 74 yo female with history significant for severe COPD presented to ED for SOB found to have pseudomonas bacteremia and PNA. We are consulted for abx recommendations     Repeat blood cultures NGTD. She is on cefepime and azithromycin       1. Continue cefepime 2g, increased q8hr for pseudomonas.   2. Repeat blood cultures NGTD. Ciprofloxacin contraindicated as pt is on amiodarone and can prolong QT interval .  3. Recommend 14 days of IV cefepime from date of first negative blood cultures (4/18/19) with weekly cbc cmp sent to ID clinic at   4. May d/c azithromycin. WBC trending downward. Remained afebrile. Stable.     Seen and discussed with ID staff. Will sign off. Please call if with questions

## 2019-04-05 NOTE — SUBJECTIVE & OBJECTIVE
Past Medical History:   Diagnosis Date    Arthritis     Carotid disease, bilateral     Cataract     Chronic hyponatremia     COPD (chronic obstructive pulmonary disease)     HLD (hyperlipidemia)     HTN (hypertension)        Past Surgical History:   Procedure Laterality Date    APPENDECTOMY      Cardioversion or Defibrillation  4/4/2019    Performed by aMnfred Figueroa MD at [facility] CATH LAB    CATARACT EXTRACTION W/  INTRAOCULAR LENS IMPLANT Right 12/06/2018    Dr. Escobar    CATARACT EXTRACTION W/  INTRAOCULAR LENS IMPLANT Left 12/20/2018    DR. Escobar    CERVICAL SPINE SURGERY      DILATION AND CURETTAGE OF UTERUS      x 2    EYE SURGERY      cataract Rt    FRACTURE SURGERY      Lt leg fracture with hardware    INSERTION, IOL PROSTHESIS Left 12/20/2018    Performed by Broderick Escobar MD at [facility] OR    INSERTION, IOL PROSTHESIS Right 12/6/2018    Performed by Broderick Escobar MD at [facility] OR    metatarsal repair      OPEN REDUCTION INTERNAL FIXATION-TIBIAL PLATEAU Left 6/27/2014    Performed by Isak Pereira MD at [facility] OR    PHACOEMULSIFICATION, CATARACT Left 12/20/2018    Performed by Broderick Escobar MD at [facility] OR    PHACOEMULSIFICATION, CATARACT Right 12/6/2018    Performed by Broderick Escobar MD at [facility] OR    SHOULDER ARTHROSCOPY      Transesophageal echo (JOHNNA) intra-procedure log documentation N/A 4/4/2019    Performed by Manfred Figueroa MD at [facility] CATH LAB       Review of patient's allergies indicates:   Allergen Reactions    Pcn [penicillins] Other (See Comments)     Fever flushing skin swelling     Biaxin [clarithromycin] Rash    Codeine Rash    Doxycycline Rash    Levaquin [levofloxacin] Rash       Medications:  Medications Prior to Admission   Medication Sig    acetaminophen (TYLENOL EXTRA STRENGTH ORAL) Take by mouth as needed.    alendronate (FOSAMAX) 70 MG tablet TAKE 1 TABLET BY MOUTH ONCE A WEEK EVERY 7 DAYS, AS DIRECTED     amLODIPine (NORVASC) 5 MG tablet TAKE 1 TABLET EVERY DAY    aspirin (ECOTRIN) 81 MG EC tablet Take 81 mg by mouth once daily.    azithromycin (ZITHROMAX Z-IMELDA) 250 MG tablet 2 pills on day 1, then 1 pill a day    calcium carbonate (CALCIUM 500 ORAL) Take by mouth 2 (two) times daily.     cetirizine (ZYRTEC) 10 MG tablet Take 10 mg by mouth once daily.    fluticasone-salmeterol 250-50 mcg/dose (ADVAIR) 250-50 mcg/dose diskus inhaler Inhale 1 puff into the lungs 2 (two) times daily. Controller    losartan (COZAAR) 100 MG tablet TAKE 1 TABLET ONE TIME DAILY    MULTIVITAMIN W-MINERALS/LUTEIN (CENTRUM SILVER ORAL) Take by mouth once daily.    pravastatin (PRAVACHOL) 20 MG tablet TAKE 1 TABLET EVERY EVENING    predniSONE (DELTASONE) 20 MG tablet TAKE 2 TABLETS(40 MG) BY MOUTH EVERY DAY FOR 5 DAYS    SPIRIVA WITH HANDIHALER 18 mcg inhalation capsule     VENTOLIN HFA 90 mcg/actuation inhaler INHALE TWO PUFFS BY MOUTH EVERY 6 HOURS AS NEEDED FOR WHEEZING    albuterol sulfate 2.5 mg/0.5 mL Nebu Take 2.5 mg by nebulization every 4 (four) hours as needed. One Box    latanoprost 0.005 % ophthalmic solution Place 1 drop into the left eye nightly.    losartan (COZAAR) 100 MG tablet TAKE 1 TABLET EVERY DAY    [] predniSONE (DELTASONE) 20 MG tablet Take 2 tablets (40 mg total) by mouth once daily. for 5 days     Antibiotics (From admission, onward)    Start     Stop Route Frequency Ordered    19 1847  cefepime in dextrose 5 % IVPB 2 g      -- IV Every 8 hours (non-standard times) 19 1232    19 1215  azithromycin tablet 250 mg      -- Oral Daily 19 1210        Antifungals (From admission, onward)    None        Antivirals (From admission, onward)    None           Immunization History   Administered Date(s) Administered    Influenza - High Dose 2013, 2014, 12/10/2015, 10/12/2018    Pneumococcal Conjugate - 13 Valent 2016    Pneumococcal Polysaccharide - 23 Valent  2013    Tdap 2013       Family History     Problem Relation (Age of Onset)    Cancer Father    Cataracts Sister    Heart disease Mother, Maternal Grandfather    No Known Problems Brother, Maternal Aunt, Maternal Uncle, Paternal Aunt, Paternal Uncle, Maternal Grandmother, Paternal Grandmother, Paternal Grandfather        Social History     Socioeconomic History    Marital status:      Spouse name: Not on file    Number of children: Not on file    Years of education: Not on file    Highest education level: Not on file   Occupational History    Occupation: teacher   Social Needs    Financial resource strain: Not on file    Food insecurity:     Worry: Not on file     Inability: Not on file    Transportation needs:     Medical: Not on file     Non-medical: Not on file   Tobacco Use    Smoking status: Current Some Day Smoker     Packs/day: 1.00     Years: 45.00     Pack years: 45.00     Types: Cigarettes     Last attempt to quit: 2002     Years since quittin.2    Smokeless tobacco: Never Used   Substance and Sexual Activity    Alcohol use: No    Drug use: No    Sexual activity: Yes     Partners: Male   Lifestyle    Physical activity:     Days per week: Not on file     Minutes per session: Not on file    Stress: Not on file   Relationships    Social connections:     Talks on phone: Not on file     Gets together: Not on file     Attends Voodoo service: Not on file     Active member of club or organization: Not on file     Attends meetings of clubs or organizations: Not on file     Relationship status: Not on file   Other Topics Concern    Not on file   Social History Narrative    Not on file     Review of Systems   Constitutional: Negative for chills, diaphoresis, fatigue and fever.   Respiratory: Positive for cough and shortness of breath.    Cardiovascular: Negative for chest pain.   Gastrointestinal: Negative for abdominal pain, diarrhea, nausea and vomiting.    Genitourinary: Negative for dysuria.   Musculoskeletal: Negative for back pain.   Skin: Negative for wound.   Neurological: Negative for dizziness and headaches.     Objective:     Vital Signs (Most Recent):  Temp: 98 °F (36.7 °C) (04/05/19 1115)  Pulse: 78 (04/05/19 1200)  Resp: (!) 28 (04/05/19 1200)  BP: 139/65 (04/05/19 1200)  SpO2: 96 % (04/05/19 1200) Vital Signs (24h Range):  Temp:  [97.5 °F (36.4 °C)-98.3 °F (36.8 °C)] 98 °F (36.7 °C)  Pulse:  [] 78  Resp:  [16-40] 28  SpO2:  [91 %-100 %] 96 %  BP: (107-160)/(56-71) 139/65     Weight: 37.8 kg (83 lb 5.3 oz)  Body mass index is 13.87 kg/m².    Estimated Creatinine Clearance: 50.6 mL/min (based on SCr of 0.6 mg/dL).    Physical Exam   Constitutional: No distress.   Thin, cachetic    HENT:   Head: Normocephalic.   Cardiovascular: Normal rate, regular rhythm and normal heart sounds.   No murmur heard.  Pulmonary/Chest: Effort normal. No stridor. No respiratory distress. She has no wheezes.   Decrease breath sounds right side   Abdominal: Soft. She exhibits no distension. There is no tenderness.   Musculoskeletal: Normal range of motion. She exhibits no edema or deformity.   Neurological: She is alert.   Skin: Skin is warm and dry. She is not diaphoretic. No erythema. No pallor.   Vitals reviewed.      Significant Labs:   Blood Culture:   Recent Labs   Lab 04/02/19  1015 04/02/19  1020 04/04/19  0633 04/04/19  0700   LABBLOO Gram stain aer bottle: Gram negative rods   Results called to and read back by:Brandi Talamantes-ICU  04/03/2019  11:37  PSEUDOMONAS AERUGINOSA No Growth to date  No Growth to date  No Growth to date  No Growth to date No Growth to date  No Growth to date No Growth to date  No Growth to date     CBC:   Recent Labs   Lab 04/04/19  0633 04/05/19  0651   WBC 38.97* 27.58*   HGB 13.3 13.6   HCT 40.1 40.9    221     CMP:   Recent Labs   Lab 04/04/19  0633 04/05/19  0651   * 134*   K 4.0 4.7   CL 90* 92*   CO2 38* 35*   GLU  114* 102   BUN 28* 22   CREATININE 0.6 0.6   CALCIUM 10.0 10.0   ANIONGAP 6* 7*   EGFRNONAA >60 >60     Respiratory Culture: No results for input(s): GSRESP, RESPIRATORYC in the last 4320 hours.  Urine Culture: No results for input(s): LABURIN in the last 4320 hours.  Urine Studies: No results for input(s): COLORU, APPEARANCEUA, PHUR, SPECGRAV, PROTEINUA, GLUCUA, KETONESU, BILIRUBINUA, OCCULTUA, NITRITE, UROBILINOGEN, LEUKOCYTESUR, RBCUA, WBCUA, BACTERIA, SQUAMEPITHEL, HYALINECASTS in the last 4320 hours.    Invalid input(s): WRIGHTSUR  Wound Culture: No results for input(s): LABAERO in the last 4320 hours.  All pertinent labs within the past 24 hours have been reviewed.    Significant Imaging: I have reviewed all pertinent imaging results/findings within the past 24 hours.

## 2019-04-05 NOTE — CARE UPDATE
Seen briefly without complaints.  She feels much better.  She has been maintaining normal sinus rhythm.  She is stable for transfer to telemetry.  Can consider outpatient ischemic workup otherwise continue current medications for AFib.  On Eliquis for OAC but otherwise no further recommendations from CV standpoint.  We will follow loosely.

## 2019-04-05 NOTE — PLAN OF CARE
Problem: Occupational Therapy Goal  Goal: Occupational Therapy Goal  Goals to be met by: 04/19/19     Patient will increase functional independence with ADLs by performing:    Feeding with Modified [place].  UE Dressing with Modified [place].  LE Dressing with Modified [place].  Grooming while standing at sink with Modified [place].  Toileting from toilet with Supervision for hygiene and clothing management.   Sitting at edge of bed x25 minutes with Modified [place].  Rolling to Bilateral with Modified [place].   Supine to sit with Modified [place].  Step transfer with Modified [place]  Toilet transfer to toilet with Modified [place].  Upper extremity exercise program x15 reps per handout, with supervision.    Outcome: Ongoing (interventions implemented as appropriate)  Pt will continue to benefit from continued OT acute services. OT rec. HHOT with 24 hour assistance.

## 2019-04-05 NOTE — PHYSICIAN QUERY
PT Name: Latasha Perez  MR #: [id number]    Physician Query Form - Cause and Effect Relationship Clarification      CDS/: Shahnaz Kapoor               Contact information: [email or web address]      This form is a permanent document in the medical record.     Query Date: April 5, 2019    By submitting this query, we are merely seeking further clarification of documentation. Please utilize your independent clinical judgment when addressing the question(s) below.    The Medical record contains the following:  Supporting Clinical Findings   Location in record          ED for SOB found to have pseudomonas bacteremia and PNA  Continue cefepime 2g, increased q8hr for pseudomonas.      --right upper lobe pneumonia, continued smoking likely trigger for exacerbation    1)Continuing Abx for bacteremia/PNA. Pan sensitive. Complete course for 14 days.      4-2:  Admitted with respiratory failure, COPD/pneumonia in AFib with RVR                                                                                                                                                                               ID consult           Pulmonary note 4/5       Pulmonary note 4/5        Cardiology note 4/4          Provider, please clarify if there is any correlation between _Pneumonia _ and _pseudomonas_.           Are the conditions:      [x  ] Due to or associated with each other   [  ] Unrelated to each other   [  ] Other (Please Specify): _________________________   [  ] Clinically Undetermined

## 2019-04-05 NOTE — CHAPLAIN
Met with patient at bedside.  No family present.  Patient is devout.  Taught patient a breath prayer to use when she feels anxious.  Patient practiced during visit.  Reported to RN.  Spiritual care provided.

## 2019-04-06 LAB
ANION GAP SERPL CALC-SCNC: 6 MMOL/L (ref 8–16)
BASOPHILS # BLD AUTO: 0.02 K/UL (ref 0–0.2)
BASOPHILS NFR BLD: 0.1 % (ref 0–1.9)
BUN SERPL-MCNC: 18 MG/DL (ref 8–23)
CALCIUM SERPL-MCNC: 9.5 MG/DL (ref 8.7–10.5)
CHLORIDE SERPL-SCNC: 94 MMOL/L (ref 95–110)
CO2 SERPL-SCNC: 32 MMOL/L (ref 23–29)
CREAT SERPL-MCNC: 0.5 MG/DL (ref 0.5–1.4)
DIFFERENTIAL METHOD: ABNORMAL
EOSINOPHIL # BLD AUTO: 0 K/UL (ref 0–0.5)
EOSINOPHIL NFR BLD: 0.1 % (ref 0–8)
ERYTHROCYTE [DISTWIDTH] IN BLOOD BY AUTOMATED COUNT: 13.3 % (ref 11.5–14.5)
EST. GFR  (AFRICAN AMERICAN): >60 ML/MIN/1.73 M^2
EST. GFR  (NON AFRICAN AMERICAN): >60 ML/MIN/1.73 M^2
GLUCOSE SERPL-MCNC: 82 MG/DL (ref 70–110)
HCT VFR BLD AUTO: 36.2 % (ref 37–48.5)
HGB BLD-MCNC: 12.2 G/DL (ref 12–16)
LYMPHOCYTES # BLD AUTO: 0.9 K/UL (ref 1–4.8)
LYMPHOCYTES NFR BLD: 4.3 % (ref 18–48)
MAGNESIUM SERPL-MCNC: 1.7 MG/DL (ref 1.6–2.6)
MCH RBC QN AUTO: 31.4 PG (ref 27–31)
MCHC RBC AUTO-ENTMCNC: 33.7 G/DL (ref 32–36)
MCV RBC AUTO: 93 FL (ref 82–98)
MONOCYTES # BLD AUTO: 1.1 K/UL (ref 0.3–1)
MONOCYTES NFR BLD: 5 % (ref 4–15)
NEUTROPHILS # BLD AUTO: 19.5 K/UL (ref 1.8–7.7)
NEUTROPHILS NFR BLD: 90.5 % (ref 38–73)
PHOSPHATE SERPL-MCNC: 2.4 MG/DL (ref 2.7–4.5)
PLATELET # BLD AUTO: 217 K/UL (ref 150–350)
PMV BLD AUTO: 9.3 FL (ref 9.2–12.9)
POTASSIUM SERPL-SCNC: 4.2 MMOL/L (ref 3.5–5.1)
RBC # BLD AUTO: 3.89 M/UL (ref 4–5.4)
SODIUM SERPL-SCNC: 132 MMOL/L (ref 136–145)
WBC # BLD AUTO: 21.53 K/UL (ref 3.9–12.7)

## 2019-04-06 PROCEDURE — 94640 AIRWAY INHALATION TREATMENT: CPT | Mod: HCNC

## 2019-04-06 PROCEDURE — 99900035 HC TECH TIME PER 15 MIN (STAT): Mod: HCNC

## 2019-04-06 PROCEDURE — 97530 THERAPEUTIC ACTIVITIES: CPT | Mod: HCNC

## 2019-04-06 PROCEDURE — S0028 INJECTION, FAMOTIDINE, 20 MG: HCPCS | Mod: HCNC | Performed by: INTERNAL MEDICINE

## 2019-04-06 PROCEDURE — 94761 N-INVAS EAR/PLS OXIMETRY MLT: CPT | Mod: HCNC

## 2019-04-06 PROCEDURE — 27000221 HC OXYGEN, UP TO 24 HOURS: Mod: HCNC

## 2019-04-06 PROCEDURE — 83735 ASSAY OF MAGNESIUM: CPT | Mod: HCNC

## 2019-04-06 PROCEDURE — 25000003 PHARM REV CODE 250: Mod: HCNC | Performed by: HOSPITALIST

## 2019-04-06 PROCEDURE — 25000003 PHARM REV CODE 250: Mod: HCNC | Performed by: INTERNAL MEDICINE

## 2019-04-06 PROCEDURE — 36415 COLL VENOUS BLD VENIPUNCTURE: CPT | Mod: HCNC

## 2019-04-06 PROCEDURE — 85025 COMPLETE CBC W/AUTO DIFF WBC: CPT | Mod: HCNC

## 2019-04-06 PROCEDURE — 97535 SELF CARE MNGMENT TRAINING: CPT | Mod: HCNC

## 2019-04-06 PROCEDURE — 84100 ASSAY OF PHOSPHORUS: CPT | Mod: HCNC

## 2019-04-06 PROCEDURE — 80048 BASIC METABOLIC PNL TOTAL CA: CPT | Mod: HCNC

## 2019-04-06 PROCEDURE — 63600175 PHARM REV CODE 636 W HCPCS: Mod: HCNC | Performed by: PHYSICIAN ASSISTANT

## 2019-04-06 PROCEDURE — 21400001 HC TELEMETRY ROOM: Mod: HCNC

## 2019-04-06 PROCEDURE — A4216 STERILE WATER/SALINE, 10 ML: HCPCS | Mod: HCNC | Performed by: INTERNAL MEDICINE

## 2019-04-06 PROCEDURE — 36569 INSJ PICC 5 YR+ W/O IMAGING: CPT | Mod: HCNC

## 2019-04-06 PROCEDURE — 25000003 PHARM REV CODE 250: Mod: HCNC | Performed by: PHYSICIAN ASSISTANT

## 2019-04-06 PROCEDURE — 63600175 PHARM REV CODE 636 W HCPCS: Mod: HCNC | Performed by: HOSPITALIST

## 2019-04-06 PROCEDURE — 25000242 PHARM REV CODE 250 ALT 637 W/ HCPCS: Mod: HCNC | Performed by: HOSPITALIST

## 2019-04-06 RX ORDER — RAMELTEON 8 MG/1
8 TABLET ORAL NIGHTLY PRN
Status: DISCONTINUED | OUTPATIENT
Start: 2019-04-06 | End: 2019-04-11 | Stop reason: HOSPADM

## 2019-04-06 RX ORDER — CALCIUM CARBONATE 200(500)MG
500 TABLET,CHEWABLE ORAL DAILY PRN
Status: DISCONTINUED | OUTPATIENT
Start: 2019-04-06 | End: 2019-04-11 | Stop reason: HOSPADM

## 2019-04-06 RX ORDER — FAMOTIDINE 10 MG/ML
20 INJECTION INTRAVENOUS 2 TIMES DAILY
Status: DISCONTINUED | OUTPATIENT
Start: 2019-04-06 | End: 2019-04-11 | Stop reason: HOSPADM

## 2019-04-06 RX ORDER — ONDANSETRON 2 MG/ML
8 INJECTION INTRAMUSCULAR; INTRAVENOUS EVERY 8 HOURS PRN
Status: DISCONTINUED | OUTPATIENT
Start: 2019-04-06 | End: 2019-04-11 | Stop reason: HOSPADM

## 2019-04-06 RX ORDER — POLYETHYLENE GLYCOL 3350 17 G/17G
17 POWDER, FOR SOLUTION ORAL 2 TIMES DAILY PRN
Status: DISCONTINUED | OUTPATIENT
Start: 2019-04-06 | End: 2019-04-11 | Stop reason: HOSPADM

## 2019-04-06 RX ORDER — SODIUM CHLORIDE 0.9 % (FLUSH) 0.9 %
10 SYRINGE (ML) INJECTION
Status: DISCONTINUED | OUTPATIENT
Start: 2019-04-06 | End: 2019-04-11 | Stop reason: HOSPADM

## 2019-04-06 RX ORDER — CLONIDINE HYDROCHLORIDE 0.1 MG/1
0.1 TABLET ORAL 3 TIMES DAILY PRN
Status: DISCONTINUED | OUTPATIENT
Start: 2019-04-06 | End: 2019-04-09

## 2019-04-06 RX ORDER — SODIUM CHLORIDE 0.9 % (FLUSH) 0.9 %
10 SYRINGE (ML) INJECTION EVERY 6 HOURS
Status: DISCONTINUED | OUTPATIENT
Start: 2019-04-06 | End: 2019-04-11 | Stop reason: HOSPADM

## 2019-04-06 RX ADMIN — AMIODARONE HYDROCHLORIDE 400 MG: 200 TABLET ORAL at 10:04

## 2019-04-06 RX ADMIN — Medication 10 ML: at 06:04

## 2019-04-06 RX ADMIN — LEVALBUTEROL HYDROCHLORIDE 1.25 MG: 1.25 SOLUTION, CONCENTRATE RESPIRATORY (INHALATION) at 07:04

## 2019-04-06 RX ADMIN — ASPIRIN 81 MG: 81 TABLET, COATED ORAL at 09:04

## 2019-04-06 RX ADMIN — FAMOTIDINE 20 MG: 10 INJECTION INTRAVENOUS at 11:04

## 2019-04-06 RX ADMIN — AMIODARONE HYDROCHLORIDE 400 MG: 200 TABLET ORAL at 09:04

## 2019-04-06 RX ADMIN — PREDNISONE 20 MG: 20 TABLET ORAL at 09:04

## 2019-04-06 RX ADMIN — LEVALBUTEROL HYDROCHLORIDE 1.25 MG: 1.25 SOLUTION, CONCENTRATE RESPIRATORY (INHALATION) at 11:04

## 2019-04-06 RX ADMIN — CEFEPIME 2 G: 2 INJECTION, POWDER, FOR SOLUTION INTRAVENOUS at 10:04

## 2019-04-06 RX ADMIN — CEFEPIME 2 G: 2 INJECTION, POWDER, FOR SOLUTION INTRAVENOUS at 06:04

## 2019-04-06 RX ADMIN — TIOTROPIUM BROMIDE 18 MCG: 18 CAPSULE ORAL; RESPIRATORY (INHALATION) at 07:04

## 2019-04-06 RX ADMIN — FAMOTIDINE 20 MG: 10 INJECTION INTRAVENOUS at 10:04

## 2019-04-06 RX ADMIN — CEFEPIME 2 G: 2 INJECTION, POWDER, FOR SOLUTION INTRAVENOUS at 02:04

## 2019-04-06 RX ADMIN — APIXABAN 5 MG: 5 TABLET, FILM COATED ORAL at 10:04

## 2019-04-06 RX ADMIN — APIXABAN 5 MG: 5 TABLET, FILM COATED ORAL at 09:04

## 2019-04-06 RX ADMIN — PANTOPRAZOLE SODIUM 40 MG: 40 TABLET, DELAYED RELEASE ORAL at 09:04

## 2019-04-06 RX ADMIN — LEVALBUTEROL HYDROCHLORIDE 1.25 MG: 1.25 SOLUTION, CONCENTRATE RESPIRATORY (INHALATION) at 03:04

## 2019-04-06 RX ADMIN — DILTIAZEM HYDROCHLORIDE 120 MG: 120 CAPSULE, COATED, EXTENDED RELEASE ORAL at 09:04

## 2019-04-06 RX ADMIN — RAMELTEON 8 MG: 8 TABLET, FILM COATED ORAL at 10:04

## 2019-04-06 RX ADMIN — POLYETHYLENE GLYCOL 3350 17 G: 17 POWDER, FOR SOLUTION ORAL at 03:04

## 2019-04-06 RX ADMIN — PRAVASTATIN SODIUM 20 MG: 10 TABLET ORAL at 10:04

## 2019-04-06 NOTE — ASSESSMENT & PLAN NOTE
New infiltrate noted in the right upper lobe that was not present when she was admitted just a few days ago for observation  Empirically treated with ceftriaxone, azithromycin and vancomycin  Blood cultures with Pseudomonas  Change antibiotics to cefepime on 4/4  ID consulted

## 2019-04-06 NOTE — PLAN OF CARE
Problem: Occupational Therapy Goal  Goal: Occupational Therapy Goal  Goals to be met by: 04/19/19     Patient will increase functional independence with ADLs by performing:    Feeding with Modified [place].  UE Dressing with Modified [place].  LE Dressing with Modified [place].  Grooming while standing at sink with Modified [place].  Toileting from toilet with Supervision for hygiene and clothing management.   Sitting at edge of bed x25 minutes with Modified [place].  Rolling to Bilateral with Modified [place].   Supine to sit with Modified [place].  Step transfer with Modified [place]  Toilet transfer to toilet with Modified [place].  Upper extremity exercise program x15 reps per handout, with supervision.     Outcome: Ongoing (interventions implemented as appropriate)  Pt. Making steady progress toward goals.  Continue with OT POC.

## 2019-04-06 NOTE — SUBJECTIVE & OBJECTIVE
Interval History:  Felt a piece of biscuit got stuck by her stomach. Felt some heart burn then went away. Feels ok otherwise    Review of Systems   Constitutional: Negative for chills and fever.   Respiratory: Positive for cough and shortness of breath (better).    Cardiovascular: Negative for chest pain and palpitations.     Objective:     Vital Signs (Most Recent):  Temp: 98 °F (36.7 °C) (04/06/19 1132)  Pulse: 76 (04/06/19 1132)  Resp: 18 (04/06/19 1132)  BP: 123/62 (04/06/19 1132)  SpO2: 99 % (04/06/19 1132) Vital Signs (24h Range):  Temp:  [97.4 °F (36.3 °C)-98.8 °F (37.1 °C)] 98 °F (36.7 °C)  Pulse:  [69-84] 76  Resp:  [16-22] 18  SpO2:  [96 %-100 %] 99 %  BP: (117-139)/(59-83) 123/62     Weight: 37.8 kg (83 lb 5.3 oz)  Body mass index is 13.87 kg/m².    Intake/Output Summary (Last 24 hours) at 4/6/2019 1312  Last data filed at 4/6/2019 0600  Gross per 24 hour   Intake 120 ml   Output 306 ml   Net -186 ml      Physical Exam   Constitutional: She is oriented to person, place, and time. She appears well-developed. No distress.   Cachectic and frail   HENT:   Head: Atraumatic.   + Temporal wasting, on nasal cannula   Eyes: Pupils are equal, round, and reactive to light. Conjunctivae are normal.   Neck: Normal range of motion.   Cardiovascular: Normal rate and regular rhythm.   Pulmonary/Chest: No respiratory distress.   Diminished breath sounds throughout, no wheeze noted with prolonged expiratory time   Abdominal: Soft. Bowel sounds are normal. She exhibits no distension and no mass. There is no tenderness. There is no rebound and no guarding.   Musculoskeletal: Normal range of motion. She exhibits no edema.   Neurological: She is alert and oriented to person, place, and time.   Skin: Capillary refill takes less than 2 seconds. She is not diaphoretic.   Psychiatric: She has a normal mood and affect. Her behavior is normal. Thought content normal.   Nursing note and vitals reviewed.      Significant Labs: All  pertinent labs within the past 24 hours have been reviewed.    Significant Imaging: I have reviewed and interpreted all pertinent imaging results/findings within the past 24 hours.

## 2019-04-06 NOTE — PLAN OF CARE
Problem: Fall Injury Risk  Goal: Absence of Fall and Fall-Related Injury    Intervention: Promote Injury-Free Environment     04/05/19 1917   Optimize [place] and Functional Mobility   Environmental Safety Modification assistive device/personal items within reach;room near unit station   Optimize Balance and Safe Activity   Safety Promotion/Fall Prevention assistive device/personal item within reach;bed alarm set;side rails raised x 2;instructed to call staff for mobility         Problem: Adult Inpatient Plan of Care  Goal: Plan of Care Review     04/05/19 1917   Plan of Care Review   Plan of Care Reviewed With patient   Progress improving     Goal: Optimal Comfort and Wellbeing    Intervention: Provide Person-Centered Care     04/05/19 1917   Support Dyspnea Relief   Trust Relationship/Rapport care explained;thoughts/feelings acknowledged;empathic listening provided;questions answered

## 2019-04-06 NOTE — PROVIDER TRANSFER
74 y/o F was admitted with AFib with RVR along with acute on chronic respiratory failure due to COPD exacerbation and pneumonia.  Patient was started on diltiazem infusion with inadequate control of heart rate and therefore converted to amiodarone infusion.  Anticoagulated with full-dose Lovenox and Cardiology consulted.  2D echo performed with EF of 55% and elevated PA pressures of 51 mmHg and pulmonary hypertension.  Patient had new infiltrate in the right upper lobe at presentation concerning for pneumonia which was empirically treated with Rocephin, azithromycin and vancomycin given her allergy profile and recent admission to the hospital.  Patient also reports smoking again which likely exacerbated her COPD in addition to pneumonia.  She was supported with BiPAP and had pulse dose IV steroids which was quickly converted to prednisone.  Pulmonary following and oxygen being wean as tolerated. Blood culture positive in 1/4 bottles for GNR which has returned as Pseudomonas aeruginosa. Despite amiodarone, patient HR poorly controlled and she underwent DCCV on 4/4/19 with conversion to NSR but went back into Afib but rate controlled. Pt completed amiodarone IV load which was converted to PO on 4/5. Anticoagulation converted to Eliquis. ID consulted for bacteremia given allergy profile with recommendation for 14 days of cefepime at 2g IV Q8 from date of negative blood culture with last day on 4/18/19. She will need weekly CMP and CBC sent to ID clinic. Stable for transfer to floor, get PT/OT and , plan for PICC tomorrow. Pt may need SNF vs home health with IV abx depending on therapy recommendations. Discharge in 1-3 days depending on needs for placement and continued clinical progress.    WILLY Schuster MD

## 2019-04-06 NOTE — PLAN OF CARE
Problem: Adult Inpatient Plan of Care  Goal: Plan of Care Review  Outcome: Ongoing (interventions implemented as appropriate)     04/06/19 5906   Plan of Care Review   Plan of Care Reviewed With patient

## 2019-04-06 NOTE — PROGRESS NOTES
[facility] Medicine  Progress Note    Patient Name: Latasha Perez  MRN: [id number]  Patient Class: IP- Inpatient   Admission Date: 4/2/2019  Length of Stay: 3 days  Attending Physician: Susan Schuster MD  Primary Care Provider: Pollo Oneal MD        Subjective:     Principal Problem:Atrial fibrillation with RVR    HPI:  73-year-old female with HTN, HLP, severe COPD and + tobacco abuse who presented with complaint of dizziness and weakness that is been progressive over the past 2 days.  She also reports increase in shortness of breath along with palpitations.  She was recently admitted to Observation from 3/28 to 3/29/19 for a COPD exacerbation and was discharged home on Azithromycin and prednisone 40 mg daily x 5 days.  She denies any fevers or chills.  In the ER, she was noted to be in Afib with RVR, rate initially in the 190s, status post IV diltiazem push followed by infusion with rate in the 140-150s. CXR with new right upper lobe opacity, leukocytosis of 38,000. Patient with reported wheezing on exam status post IV Solu-Medrol 125 mg IV push x1.  She was placed on BiPAP.    Hospital Course:  Ms. Perez was admitted with AFib with RVR along with acute on chronic respiratory failure due to COPD exacerbation and pneumonia.  Patient was started on diltiazem infusion with inadequate control of heart rate and therefore converted to amiodarone infusion.  Anticoagulated with full-dose Lovenox and Cardiology consulted.  2D echo performed with EF of 55% and elevated PA pressures of 51 mmHg and pulmonary hypertension.  Patient had new infiltrate in the right upper lobe at presentation concerning for pneumonia which was empirically treated with Rocephin, azithromycin and vancomycin given her allergy profile and recent admission to the hospital.  Patient also reports smoking again which likely exacerbated her COPD in addition to pneumonia.  She was supported with BiPAP and had pulse dose  IV steroids which was quickly converted to prednisone.  Pulmonary following and oxygen being wean as tolerated. Blood culture positive in 1/4 bottles for GNR which has returned as Pseudomonas aeruginosa. Despite amiodarone, patient HR poorly controlled and she underwent DCCV on 4/4/19 with conversion to NSR but went back into Afib but rate controlled. Pt completed amiodarone IV load which was converted to PO on 4/5. Anticoagulation converted to Eliquis. ID consulted for bacteremia given allergy profile.    Interval History:  Patient doing well on 2 L via nasal cannula, SOB much improved. No acute events overnight. Weaned off IV amiodarone this morning.    Review of Systems   Constitutional: Negative for chills and fever.   Respiratory: Positive for cough and shortness of breath.    Cardiovascular: Negative for chest pain and palpitations.     Objective:     Vital Signs (Most Recent):  Temp: 98 °F (36.7 °C) (04/05/19 1959)  Pulse: 79 (04/05/19 1959)  Resp: (!) 22 (04/05/19 1959)  BP: (!) 122/59 (04/05/19 1959)  SpO2: 100 % (04/05/19 2024) Vital Signs (24h Range):  Temp:  [97.5 °F (36.4 °C)-98.8 °F (37.1 °C)] 98 °F (36.7 °C)  Pulse:  [61-84] 79  Resp:  [16-40] 22  SpO2:  [93 %-100 %] 100 %  BP: (107-155)/(56-83) 122/59     Weight: 37.8 kg (83 lb 5.3 oz)  Body mass index is 13.87 kg/m².    Intake/Output Summary (Last 24 hours) at 4/5/2019 2207  Last data filed at 4/5/2019 2000  Gross per 24 hour   Intake 770.17 ml   Output 225 ml   Net 545.17 ml      Physical Exam   Constitutional: She is oriented to person, place, and time. She appears well-developed. No distress.   Cachectic and frail   HENT:   Head: Atraumatic.   + Temporal wasting, on nasal cannula   Eyes: Pupils are equal, round, and reactive to light. Conjunctivae are normal.   Neck: Normal range of motion.   Cardiovascular:   Irregular irregular    Pulmonary/Chest: No respiratory distress.   Diminished breath sounds throughout, faint expiratory wheeze noted with  prolonged expiratory time; but overall more comfortable in appearance   Abdominal: Soft. Bowel sounds are normal. She exhibits no distension and no mass. There is no tenderness. There is no rebound and no guarding.   Musculoskeletal: Normal range of motion. She exhibits no edema.   Neurological: She is alert and oriented to person, place, and time.   Skin: Capillary refill takes less than 2 seconds.   Psychiatric: She has a normal mood and affect. Her behavior is normal. Thought content normal.       Significant Labs: All pertinent labs within the past 24 hours have been reviewed.    Significant Imaging: I have reviewed and interpreted all pertinent imaging results/findings within the past 24 hours.    Assessment/Plan:      * Atrial fibrillation with RVR  Patient initially treated with diltiazem infusion with minimal response so stopped  s/p IV amiodarone infusion converted to PO as of today  s/p DCCV on 4/4 with NSR, but now back in Afib with with controlled rate  Anticoagulation with full-dose Lovenox converted to PO apaxiban  Cardiology input appreciated  Echo with normal EF of 55% with PA pressure of 51 mm Hg and pulmonary hypertension  Rate controlled on amiodarone and diltiazem  Stable for transfer to the floor today    Acute on chronic respiratory failure with hypercapnia  Patient with respiratory distress due to multiple factors including AFib with RVR, pneumonia, COPD exacerbation which resulted in acute on chronic respiratory failure  Empirically treated with Rocephin, azithromycin and vancomycin for treatment of her pneumonia  Blood culture with Pseudomonas aeruginosis  Sputum culture NGTD/pending  Stopped Rocephin and started cefepime on 4/4  Repeat blood cultures on 4/4 NGTD  BiPAP weaned off to nasal cannula; will continue to have PRN  Continued nebulizer treatments and prednisone  Pulmonary input appreciated  Stable for transfer to the [facility]  ID consulted    Bacteremia  Due to Pseudomonas  aeruginosa  Antibiotics changed as discussed above  Repeat cultures NGTD  ID consulted    Cachexia  Patient is underweight and with BMI of 13.98  Regular diet and supplement with Boost  Nutrition consulted    Pneumonia  New infiltrate noted in the right upper lobe that was not present when she was admitted just a few days ago for observation  Empirically treated with ceftriaxone, azithromycin and vancomycin  Blood cultures with Pseudomonas  Change antibiotics to cefepime on 4/4  ID consulted    COPD exacerbation  Status post pulse dose of IV steroids in the ER and continued on p.o. Prednisone  Continue nebulizer treatments and support with BiPAP PRN  Currently weaned to nasal cannula at 2 L  Pulmonary input appreciated    Essential hypertension  Continue diltiazem for HR and BP control    Leukocytosis  Likely secondary to pneumonia and possible steroid and/or leukemoid reaction  Antibiotics as discussed above   Improving and trending down  Will continue to monitor    HLD (hyperlipidemia)  Continue pravastatin      VTE Risk Mitigation (From admission, onward)        Ordered     apixaban tablet 5 mg  2 times daily      04/04/19 1500     IP VTE HIGH RISK PATIENT  Once      04/02/19 1210     Place sequential compression device  Until discontinued      04/02/19 1210        Critical care time spent on the evaluation and treatment of severe organ dysfunction, review of pertinent labs and imaging studies, discussions with consulting providers and discussions with patient/family: 45 minutes.          Susan Schuster MD  Department of Hospital Medicine   [facility]

## 2019-04-06 NOTE — ASSESSMENT & PLAN NOTE
Status post pulse dose of IV steroids in the ER and continued on p.o. Prednisone  Continue nebulizer treatments and support with BiPAP PRN  Currently weaned to nasal cannula at 2 L  Pulmonary input appreciated

## 2019-04-06 NOTE — ASSESSMENT & PLAN NOTE
Patient with respiratory distress due to multiple factors including AFib with RVR, pneumonia, COPD exacerbation which resulted in acute on chronic respiratory failure  Empirically treated with Rocephin, azithromycin and vancomycin for treatment of her pneumonia  Blood culture with Pseudomonas aeruginosis  Sputum culture NGTD/pending  Stopped Rocephin and started cefepime on 4/4  Pending PICC line for 2 weeks of abx  Repeat blood cultures on 4/4 NGTD  BiPAP weaned off to nasal cannula; will continue to have PRN  Continued nebulizer treatments and prednisone  Pulmonary input appreciated  ID consulted

## 2019-04-06 NOTE — NURSING
Reported off to oncoming nurse, patient resting in bed, aaox4. Patient can make needs known to staff, no acute distress noted, safety precautions maintained.    Chart check completed.

## 2019-04-06 NOTE — PT/OT/SLP PROGRESS
Physical Therapy      Patient Name:  Latasha Perez   MRN:  [id number]    Patient not seen today secondary to (pt receiving PICC placement and awaiting x-ray post picc, per pt's nurse, Symone).  Pt unable to be seen until after X ray is performed and unsure what time pt is going, per Symone. Will follow-up as able.    Haylie Briones, PTA

## 2019-04-06 NOTE — ASSESSMENT & PLAN NOTE
Due to Pseudomonas aeruginosa  Antibiotics changed as discussed above  Repeat cultures NGTD  ID consulted

## 2019-04-06 NOTE — ASSESSMENT & PLAN NOTE
Likely secondary to pneumonia and possible steroid and/or leukemoid reaction  Antibiotics as discussed above   Improving and trending down  Will continue to monitor

## 2019-04-06 NOTE — PROCEDURES
"Latasha Perez is a 73 y.o. female patient.    Temp: 98 °F (36.7 °C) (04/06/19 1132)  Pulse: 76 (04/06/19 1132)  Resp: 18 (04/06/19 1132)  BP: 123/62 (04/06/19 1132)  SpO2: 99 % (04/06/19 1132)  Weight: 37.8 kg (83 lb 5.3 oz) (04/02/19 2015)  Height: 5' 5" (165.1 cm) (04/02/19 2015)    PICC  Date/Time: 4/6/2019 1:44 PM  Performed by: Ray Arguello Jr. RN  Consent Done: Yes  Indications: med administration  Anesthesia: local infiltration  Local anesthetic: lidocaine 2% without epinephrine  Anesthetic Total (mL): 2  Preparation: skin prepped with ChloraPrep  Skin prep agent dried: skin prep agent completely dried prior to procedure  Sterile barriers: all five maximum sterile barriers used - cap, mask, sterile gown, sterile gloves, and large sterile sheet  Hand hygiene: hand hygiene performed prior to central venous catheter insertion  Location details: right basilic  Catheter type: double lumen  Catheter size: 5 Fr  Catheter Length: 33cm    Ultrasound guidance: yes  Vessel Caliber: small, compressibility normal  Vascular Doppler: not done  Needle advanced into vessel with real time Ultrasound guidance.  Guidewire confirmed in vessel.  Sterile sheath used.  Manometry: esophageal manometry  Number of attempts: 1  Post-procedure: blood return through all ports  Technical procedures used: modified oj Arguello Jr  4/6/2019    "

## 2019-04-06 NOTE — SUBJECTIVE & OBJECTIVE
Interval History:  Patient doing well on 2 L via nasal cannula, SOB much improved. No acute events overnight. Weaned off IV amiodarone this morning.    Review of Systems   Constitutional: Negative for chills and fever.   Respiratory: Positive for cough and shortness of breath.    Cardiovascular: Negative for chest pain and palpitations.     Objective:     Vital Signs (Most Recent):  Temp: 98 °F (36.7 °C) (04/05/19 1959)  Pulse: 79 (04/05/19 1959)  Resp: (!) 22 (04/05/19 1959)  BP: (!) 122/59 (04/05/19 1959)  SpO2: 100 % (04/05/19 2024) Vital Signs (24h Range):  Temp:  [97.5 °F (36.4 °C)-98.8 °F (37.1 °C)] 98 °F (36.7 °C)  Pulse:  [61-84] 79  Resp:  [16-40] 22  SpO2:  [93 %-100 %] 100 %  BP: (107-155)/(56-83) 122/59     Weight: 37.8 kg (83 lb 5.3 oz)  Body mass index is 13.87 kg/m².    Intake/Output Summary (Last 24 hours) at 4/5/2019 2207  Last data filed at 4/5/2019 2000  Gross per 24 hour   Intake 770.17 ml   Output 225 ml   Net 545.17 ml      Physical Exam   Constitutional: She is oriented to person, place, and time. She appears well-developed. No distress.   Cachectic and frail   HENT:   Head: Atraumatic.   + Temporal wasting, on nasal cannula   Eyes: Pupils are equal, round, and reactive to light. Conjunctivae are normal.   Neck: Normal range of motion.   Cardiovascular:   Irregular irregular    Pulmonary/Chest: No respiratory distress.   Diminished breath sounds throughout, faint expiratory wheeze noted with prolonged expiratory time; but overall more comfortable in appearance   Abdominal: Soft. Bowel sounds are normal. She exhibits no distension and no mass. There is no tenderness. There is no rebound and no guarding.   Musculoskeletal: Normal range of motion. She exhibits no edema.   Neurological: She is alert and oriented to person, place, and time.   Skin: Capillary refill takes less than 2 seconds.   Psychiatric: She has a normal mood and affect. Her behavior is normal. Thought content normal.        Significant Labs: All pertinent labs within the past 24 hours have been reviewed.    Significant Imaging: I have reviewed and interpreted all pertinent imaging results/findings within the past 24 hours.

## 2019-04-06 NOTE — PLAN OF CARE
Problem: Fall Injury Risk  Goal: Absence of Fall and Fall-Related Injury  Outcome: Ongoing (interventions implemented as appropriate)  Intervention: Identify and Manage Contributors to Fall Injury Risk     04/06/19 1246   Manage Acute Allergic Reaction   Medication Review/Management medications reviewed   Identify and Manage Contributors to Fall Injury Risk   Self-Care Promotion independence encouraged;BADL personal objects within reach;meal setup provided

## 2019-04-06 NOTE — PT/OT/SLP PROGRESS
Occupational Therapy   Treatment    Name: Latasha Perez  MRN: [id number]  Admitting Diagnosis:  Atrial fibrillation with RVR  2 Days Post-Op    Recommendations:     Discharge Recommendations: home health OT(with family assist)  Discharge Equipment Recommendations:  none  Barriers to discharge:       Assessment:     Latasha Perez is a 73 y.o. female with a medical diagnosis of Atrial fibrillation with RVR.  She presents with steady progress toward goals but required rest breaks and easily fatigued today. Constipated on toilet and nurse made aware. Continue with OT POC.. Performance deficits affecting function are weakness, impaired endurance, impaired self care skills, impaired functional mobilty, gait instability, impaired balance, decreased lower extremity function, decreased safety awareness, decreased upper extremity function, decreased coordination, impaired cardiopulmonary response to activity.     Rehab Prognosis:  Good; patient would benefit from acute skilled OT services to address these deficits and reach maximum level of function.       Plan:     Patient to be seen 5 x/week to address the above listed problems via self-care/home management, therapeutic activities, therapeutic exercises  · Plan of Care Expires: 04/19/19  · Plan of Care Reviewed with: patient, friend    Subjective     Pain/Comfort:  · Pain Rating 1: 0/10    Objective:     Communicated with: nurse prior to session.  Patient found HOB elevated with bed alarm, telemetry, SCD, oxygen, peripheral IV upon OT entry to room.    General Precautions: Standard, fall, respiratory   Orthopedic Precautions:N/A   Braces: N/A     Occupational Performance:     Bed Mobility:    · Patient completed Rolling/Turning to Left with  stand by assistance  · Patient completed Scooting/Bridging with stand by assistance  · Patient completed Supine to Sit with stand by assistance     Functional Mobility/Transfers:  · Patient completed Sit <> Stand Transfer with  "minimum assistance  with  rolling walker   · Patient completed Toilet Transfer Step Transfer technique with minimum assistance with  rolling walker  · Functional Mobility: Ambulated with min assist with RW bed<>bathrooml; slow pace, stopping at times to "catch her breath"  O2 NC in place all times.    Activities of Daily Living:  · Grooming: setup seated EOB to wash face and hands and brush teeth .  · Toileting: moderate assistance for toileting hygiene and clothes mnagement 2/2 fatigue after sitting on toilet for extened period due to constipation.       [facility] 6 Click ADL: 16    Treatment & Education:  Role of OT and POC;safety education, pt. left sitting EOB  With bed alarm activated to visit with her friend; nurse notifed call light instruction  To call for nurse for back to bed and OOB activity and pt verbalized understanding..       Patient left sitting EOB with tray table in front with all lines intact, call button in reach, bed alarm on, nurse notified and friend presentEducation:      GOALS:   Multidisciplinary Problems     Occupational Therapy Goals        Problem: Occupational Therapy Goal    Goal Priority Disciplines Outcome Interventions   Occupational Therapy Goal     OT, PT/OT Ongoing (interventions implemented as appropriate)    Description:  Goals to be met by: 04/19/19     Patient will increase functional independence with ADLs by performing:    Feeding with Modified [place].  UE Dressing with Modified [place].  LE Dressing with Modified [place].  Grooming while standing at sink with Modified [place].  Toileting from toilet with Supervision for hygiene and clothing management.   Sitting at edge of bed x25 minutes with Modified [place].  Rolling to Bilateral with Modified [place].   Supine to sit with Modified [place].  Step transfer with Modified [place]  Toilet transfer to toilet with Modified [place].  Upper extremity exercise program x15 reps per " handout, with supervision.                      Time Tracking:     OT Date of Treatment: 04/06/19  OT Start Time: 1422  OT Stop Time: 1500  OT Total Time (min): 38 min    Billable Minutes:Self Care/Home Management 25  Therapeutic Activity 13  Total Time 38    Meena Pickard OT  4/6/2019

## 2019-04-06 NOTE — ASSESSMENT & PLAN NOTE
Patient with respiratory distress due to multiple factors including AFib with RVR, pneumonia, COPD exacerbation which resulted in acute on chronic respiratory failure  Empirically treated with Rocephin, azithromycin and vancomycin for treatment of her pneumonia  Blood culture with Pseudomonas aeruginosis  Sputum culture NGTD/pending  Stopped Rocephin and started cefepime on 4/4  Repeat blood cultures on 4/4 NGTD  BiPAP weaned off to nasal cannula; will continue to have PRN  Continued nebulizer treatments and prednisone  Pulmonary input appreciated  Stable for transfer to the [facility]  ID consulted

## 2019-04-06 NOTE — ASSESSMENT & PLAN NOTE
Likely secondary to pneumonia and possible steroid and/or leukemoid reaction  Antibiotics as discussed above   Stop checking CBC unless clinically indicated

## 2019-04-06 NOTE — PLAN OF CARE
Problem: Respiratory Compromise (Pneumonia)  Goal: Effective Oxygenation and Ventilation    Intervention: Optimize Oxygenation and Ventilation     04/06/19 0415   Prevent Additional Skin Injury   Head of Bed (HOB) HOB elevated         Comments: Optimal oxygen intake was promoted during the shift by elevating head of bed, using O2 equipment (Bipap and Nasal canula), and proper non-slumping bed posture to expand lung capacity.

## 2019-04-06 NOTE — ASSESSMENT & PLAN NOTE
Patient initially treated with diltiazem infusion with minimal response so stopped  s/p IV amiodarone infusion converted to PO as of today  s/p DCCV on 4/4 with NSR. Then in and out of AFib but with controlled rate  Anticoagulation converted to PO apaxiban  Cardiology input appreciated  Echo with normal EF of 55% with PA pressure of 51 mm Hg and pulmonary hypertension  Rate controlled on amiodarone and diltiazem

## 2019-04-06 NOTE — PROGRESS NOTES
[facility] [place] Medicine  Progress Note    Patient Name: Latasha Perez  MRN: [id number]  Patient Class: IP- Inpatient   Admission Date: 4/2/2019  Length of Stay: 4 days  Attending Physician: Sabra Mayorga MD  Primary Care Provider: Pollo Oneal MD        Subjective:     Principal Problem:Atrial fibrillation with RVR    HPI:  73-year-old female with HTN, HLP, severe COPD and + tobacco abuse who presented with complaint of dizziness and weakness that is been progressive over the past 2 days.  She also reports increase in shortness of breath along with palpitations.  She was recently admitted to Observation from 3/28 to 3/29/19 for a COPD exacerbation and was discharged home on Azithromycin and prednisone 40 mg daily x 5 days.  She denies any fevers or chills.  In the ER, she was noted to be in Afib with RVR, rate initially in the 190s, status post IV diltiazem push followed by infusion with rate in the 140-150s. CXR with new right upper lobe opacity, leukocytosis of 38,000. Patient with reported wheezing on exam status post IV Solu-Medrol 125 mg IV push x1.  She was placed on BiPAP.    Hospital Course:  Ms. Perez was admitted with AFib with RVR along with acute on chronic respiratory failure due to COPD exacerbation and pneumonia.  Patient was started on diltiazem infusion with inadequate control of heart rate and therefore converted to amiodarone infusion.  Anticoagulated with full-dose Lovenox and Cardiology consulted.  2D echo performed with EF of 55% and elevated PA pressures of 51 mmHg and pulmonary hypertension.  Patient had new infiltrate in the right upper lobe at presentation concerning for pneumonia which was empirically treated with Rocephin, azithromycin and vancomycin given her allergy profile and recent admission to the hospital.  Patient also reports smoking again which likely exacerbated her COPD in addition to pneumonia.  She was supported with BiPAP and had pulse  dose IV steroids which was quickly converted to prednisone.  Pulmonary following and oxygen being wean as tolerated. Blood culture positive in 1/4 bottles for GNR which has returned as Pseudomonas aeruginosa. Despite amiodarone, patient HR poorly controlled and she underwent DCCV on 4/4/19 with conversion to NSR but went back into Afib but rate controlled. Pt completed amiodarone IV load which was converted to PO on 4/5. Anticoagulation converted to Eliquis. ID consulted for bacteremia given allergy profile.    Interval History:  Felt a piece of biscuit got stuck by her stomach. Felt some heart burn then went away. Feels ok otherwise    Review of Systems   Constitutional: Negative for chills and fever.   Respiratory: Positive for cough and shortness of breath (better).    Cardiovascular: Negative for chest pain and palpitations.     Objective:     Vital Signs (Most Recent):  Temp: 98 °F (36.7 °C) (04/06/19 1132)  Pulse: 76 (04/06/19 1132)  Resp: 18 (04/06/19 1132)  BP: 123/62 (04/06/19 1132)  SpO2: 99 % (04/06/19 1132) Vital Signs (24h Range):  Temp:  [97.4 °F (36.3 °C)-98.8 °F (37.1 °C)] 98 °F (36.7 °C)  Pulse:  [69-84] 76  Resp:  [16-22] 18  SpO2:  [96 %-100 %] 99 %  BP: (117-139)/(59-83) 123/62     Weight: 37.8 kg (83 lb 5.3 oz)  Body mass index is 13.87 kg/m².    Intake/Output Summary (Last 24 hours) at 4/6/2019 1312  Last data filed at 4/6/2019 0600  Gross per 24 hour   Intake 120 ml   Output 306 ml   Net -186 ml      Physical Exam   Constitutional: She is oriented to person, place, and time. She appears well-developed. No distress.   Cachectic and frail   HENT:   Head: Atraumatic.   + Temporal wasting, on nasal cannula   Eyes: Pupils are equal, round, and reactive to light. Conjunctivae are normal.   Neck: Normal range of motion.   Cardiovascular: Normal rate and regular rhythm.   Pulmonary/Chest: No respiratory distress.   Diminished breath sounds throughout, no wheeze noted with prolonged expiratory time    Abdominal: Soft. Bowel sounds are normal. She exhibits no distension and no mass. There is no tenderness. There is no rebound and no guarding.   Musculoskeletal: Normal range of motion. She exhibits no edema.   Neurological: She is alert and oriented to person, place, and time.   Skin: Capillary refill takes less than 2 seconds. She is not diaphoretic.   Psychiatric: She has a normal mood and affect. Her behavior is normal. Thought content normal.   Nursing note and vitals reviewed.      Significant Labs: All pertinent labs within the past 24 hours have been reviewed.    Significant Imaging: I have reviewed and interpreted all pertinent imaging results/findings within the past 24 hours.    Assessment/Plan:      * Atrial fibrillation with RVR  Patient initially treated with diltiazem infusion with minimal response so stopped  s/p IV amiodarone infusion converted to PO as of today  s/p DCCV on 4/4 with NSR. Then in and out of AFib but with controlled rate  Anticoagulation converted to PO apaxiban  Cardiology input appreciated  Echo with normal EF of 55% with PA pressure of 51 mm Hg and pulmonary hypertension  Rate controlled on amiodarone and diltiazem      Bacteremia  Due to Pseudomonas aeruginosa  Antibiotics changed as discussed above  Repeat cultures NGTD  ID consulted    Cachexia  Patient is underweight and with BMI of 13.98  Regular diet and supplement with Boost  Nutrition consulted    Pneumonia  New infiltrate noted in the right upper lobe that was not present when she was admitted just a few days ago for observation  Empirically treated with ceftriaxone, azithromycin and vancomycin  Blood cultures with Pseudomonas  Change antibiotics to cefepime on 4/4  ID consulted    Acute on chronic respiratory failure with hypercapnia  Patient with respiratory distress due to multiple factors including AFib with RVR, pneumonia, COPD exacerbation which resulted in acute on chronic respiratory failure  Empirically treated  with Rocephin, azithromycin and vancomycin for treatment of her pneumonia  Blood culture with Pseudomonas aeruginosis  Sputum culture NGTD/pending  Stopped Rocephin and started cefepime on 4/4  Pending PICC line for 2 weeks of abx  Repeat blood cultures on 4/4 NGTD  BiPAP weaned off to nasal cannula; will continue to have PRN  Continued nebulizer treatments and prednisone  Pulmonary input appreciated  ID consulted    COPD exacerbation  Status post pulse dose of IV steroids in the ER and continued on p.o. Prednisone  Continue nebulizer treatments and support with BiPAP PRN  Currently weaned to nasal cannula at 2 L  Will test for O2 tomorrow  Pulmonary input appreciated    Essential hypertension  Continue diltiazem for HR and BP control    Leukocytosis  Likely secondary to pneumonia and possible steroid and/or leukemoid reaction  Antibiotics as discussed above   Stop checking CBC unless clinically indicated    HLD (hyperlipidemia)  Continue pravastatin    VTE Risk Mitigation (From admission, onward)        Ordered     apixaban tablet 5 mg  2 times daily      04/04/19 1500     IP VTE HIGH RISK PATIENT  Once      04/02/19 1210     Place sequential compression device  Until discontinued      04/02/19 1210          Dispo: HH +/- portable O2. Tentative discharge tomorrow    Sabra Varner MD  Department of Hospital Medicine   [facility]

## 2019-04-06 NOTE — ASSESSMENT & PLAN NOTE
Patient initially treated with diltiazem infusion with minimal response so stopped  s/p IV amiodarone infusion converted to PO as of today  s/p DCCV on 4/4 with NSR, but now back in Afib with with controlled rate  Anticoagulation with full-dose Lovenox converted to PO apaxiban  Cardiology input appreciated  Echo with normal EF of 55% with PA pressure of 51 mm Hg and pulmonary hypertension  Rate controlled on amiodarone and diltiazem  Stable for transfer to the floor today

## 2019-04-06 NOTE — CARE UPDATE
Seen briefly in looks better each day.  If still admitted will consider NST on Monday.  Otherwise no further recommendations from CV standpoint and if discharge then okay to follow up with us in 1-2 weeks.

## 2019-04-06 NOTE — ASSESSMENT & PLAN NOTE
Status post pulse dose of IV steroids in the ER and continued on p.o. Prednisone  Continue nebulizer treatments and support with BiPAP PRN  Currently weaned to nasal cannula at 2 L  Will test for O2 tomorrow  Pulmonary input appreciated

## 2019-04-07 PROBLEM — B96.5 BACTEREMIA DUE TO PSEUDOMONAS: Status: ACTIVE | Noted: 2019-04-04

## 2019-04-07 LAB — BACTERIA BLD CULT: NORMAL

## 2019-04-07 PROCEDURE — 27000221 HC OXYGEN, UP TO 24 HOURS: Mod: HCNC

## 2019-04-07 PROCEDURE — 94761 N-INVAS EAR/PLS OXIMETRY MLT: CPT | Mod: HCNC

## 2019-04-07 PROCEDURE — 25000003 PHARM REV CODE 250: Mod: HCNC | Performed by: HOSPITALIST

## 2019-04-07 PROCEDURE — 97530 THERAPEUTIC ACTIVITIES: CPT | Mod: HCNC

## 2019-04-07 PROCEDURE — 97116 GAIT TRAINING THERAPY: CPT | Mod: HCNC

## 2019-04-07 PROCEDURE — 94640 AIRWAY INHALATION TREATMENT: CPT | Mod: HCNC

## 2019-04-07 PROCEDURE — 25000242 PHARM REV CODE 250 ALT 637 W/ HCPCS: Mod: HCNC | Performed by: HOSPITALIST

## 2019-04-07 PROCEDURE — 21400001 HC TELEMETRY ROOM: Mod: HCNC

## 2019-04-07 PROCEDURE — 25000003 PHARM REV CODE 250: Mod: HCNC | Performed by: PHYSICIAN ASSISTANT

## 2019-04-07 PROCEDURE — 97535 SELF CARE MNGMENT TRAINING: CPT | Mod: HCNC

## 2019-04-07 PROCEDURE — 25000003 PHARM REV CODE 250: Mod: HCNC | Performed by: INTERNAL MEDICINE

## 2019-04-07 PROCEDURE — 63600175 PHARM REV CODE 636 W HCPCS: Mod: HCNC | Performed by: HOSPITALIST

## 2019-04-07 PROCEDURE — A4216 STERILE WATER/SALINE, 10 ML: HCPCS | Mod: HCNC | Performed by: INTERNAL MEDICINE

## 2019-04-07 PROCEDURE — 97110 THERAPEUTIC EXERCISES: CPT | Mod: HCNC

## 2019-04-07 PROCEDURE — S0028 INJECTION, FAMOTIDINE, 20 MG: HCPCS | Mod: HCNC | Performed by: INTERNAL MEDICINE

## 2019-04-07 PROCEDURE — 63600175 PHARM REV CODE 636 W HCPCS: Mod: HCNC | Performed by: PHYSICIAN ASSISTANT

## 2019-04-07 RX ORDER — ALPRAZOLAM 0.25 MG/1
0.25 TABLET ORAL 3 TIMES DAILY PRN
Status: DISCONTINUED | OUTPATIENT
Start: 2019-04-07 | End: 2019-04-11 | Stop reason: HOSPADM

## 2019-04-07 RX ADMIN — PRAVASTATIN SODIUM 20 MG: 10 TABLET ORAL at 09:04

## 2019-04-07 RX ADMIN — CEFEPIME 2 G: 2 INJECTION, POWDER, FOR SOLUTION INTRAVENOUS at 11:04

## 2019-04-07 RX ADMIN — APIXABAN 5 MG: 5 TABLET, FILM COATED ORAL at 09:04

## 2019-04-07 RX ADMIN — CEFEPIME 2 G: 2 INJECTION, POWDER, FOR SOLUTION INTRAVENOUS at 02:04

## 2019-04-07 RX ADMIN — ASPIRIN 81 MG: 81 TABLET, COATED ORAL at 09:04

## 2019-04-07 RX ADMIN — TIOTROPIUM BROMIDE 18 MCG: 18 CAPSULE ORAL; RESPIRATORY (INHALATION) at 07:04

## 2019-04-07 RX ADMIN — Medication 10 ML: at 11:04

## 2019-04-07 RX ADMIN — AMIODARONE HYDROCHLORIDE 400 MG: 200 TABLET ORAL at 09:04

## 2019-04-07 RX ADMIN — Medication 10 ML: at 12:04

## 2019-04-07 RX ADMIN — Medication 10 ML: at 07:04

## 2019-04-07 RX ADMIN — FAMOTIDINE 20 MG: 10 INJECTION INTRAVENOUS at 09:04

## 2019-04-07 RX ADMIN — CEFEPIME 2 G: 2 INJECTION, POWDER, FOR SOLUTION INTRAVENOUS at 06:04

## 2019-04-07 RX ADMIN — DILTIAZEM HYDROCHLORIDE 120 MG: 120 CAPSULE, COATED, EXTENDED RELEASE ORAL at 09:04

## 2019-04-07 RX ADMIN — RAMELTEON 8 MG: 8 TABLET, FILM COATED ORAL at 09:04

## 2019-04-07 RX ADMIN — LEVALBUTEROL HYDROCHLORIDE 1.25 MG: 1.25 SOLUTION, CONCENTRATE RESPIRATORY (INHALATION) at 03:04

## 2019-04-07 RX ADMIN — PREDNISONE 20 MG: 20 TABLET ORAL at 09:04

## 2019-04-07 RX ADMIN — Medication 10 ML: at 06:04

## 2019-04-07 RX ADMIN — LEVALBUTEROL HYDROCHLORIDE 1.25 MG: 1.25 SOLUTION, CONCENTRATE RESPIRATORY (INHALATION) at 07:04

## 2019-04-07 NOTE — PLAN OF CARE
Problem: Skin Injury Risk Increased  Goal: Skin Health and Integrity  Outcome: Ongoing (interventions implemented as appropriate)  Intervention: Optimize Skin Protection     04/05/19 2011 04/06/19 0800 04/07/19 0534   Prevent Additional Skin Injury   Head of Bed (HOB)  --   --  HOB elevated   Pressure Reduction Devices  --  pressure-redistributing mattress utilized  --    Pressure Reduction Techniques  --  frequent weight shift encouraged  --    Monitor and Manage Hypervolemia   Skin Protection adhesive use limited  --   --      Intervention: Promote and Optimize Oral Intake     04/07/19 0534   Monitor and Manage Anemia   Oral Nutrition Promotion rest periods promoted         Problem: Infection  Goal: Infection Symptom Resolution  Outcome: Ongoing (interventions implemented as appropriate)  Intervention: Prevent or Manage Infection     04/07/19 0534   Manage Diarrhea   Isolation Precautions protective environment maintained   Prevent or Manage Infection   Fever Reduction/Comfort Measures medication administered   Infection Management aseptic technique maintained

## 2019-04-07 NOTE — PT/OT/SLP PROGRESS
"Physical Therapy Treatment    Patient Name:  Latasha Perez   MRN:  [id number]    Recommendations:     Discharge Recommendations:  home health PT(with family assist)   Discharge Equipment Recommendations: none (possible home O2)  Barriers to discharge: decreased endurance and SOB    Assessment:     Latasha Perez is a 73 y.o. female admitted with a medical diagnosis of Atrial fibrillation with RVR.  She presents with the following impairments/functional limitations:  weakness, impaired endurance, impaired self care skills, impaired functional mobilty, impaired balance, decreased upper extremity function, decreased safety awareness, impaired coordination, impaired cardiopulmonary response to activity, gait instability, decreased coordination, decreased lower extremity function, decreased ROM.  Pt made gains with gait distance.  However, experienced decreased O2 sats with BM, transfers, and ambulation.  Pt with decreased endurance and SOB with ambulation.  Pt ambulated ~26ft with RW, CGA.    Rehab Prognosis: Fair; patient would benefit from acute skilled PT services to address these deficits and reach maximum level of function.    Recent Surgery: Procedure(s) (LRB):  Transesophageal echo (JOHNNA) intra-procedure log documentation (N/A)  Cardioversion or Defibrillation 3 Days Post-Op    Plan:     During this hospitalization, patient to be seen daily to address the identified rehab impairments via gait training, therapeutic activities, therapeutic exercises and progress toward the following goals:    · Plan of Care Expires:  04/19/19    Subjective     Chief Complaint: anxious  Patient/Family Comments/goals: Pt states " I am nervous I am going to fall because my legs are weak."  Pain/Comfort:  · Pain Rating 1: 0/10      Objective:     Communicated with pt's nurse, Symone, prior to session.  Patient found HOB elevated on bed pan with telemetry, PICC line, pt on RA, bed alarm upon PT entry to room.      General Precautions: " Standard, respiratory, fall   Orthopedic Precautions:N/A   Braces: N/A     Functional Mobility: Obtain O2 sats, per Dr. Varner.  Supine on RA pt was 96%. Pt supine on RA while receiving pericare and donning of brief.  Post pericare pt's O2 sats dropped to 75% requiring 3LO2 to increase to 94%.  Pt on 3LO2 to perform Bed>BSchair transfer with O2 sats dropping to 80% requiring pursed lip breathing and 4LO2 to recover to 92%.  With ambulation, pt on 4LO2 with O2 sats dropping to 87% requiring seated rest break and pursed lip breathing to recover to 95%. Per pt's nurse, pt is to be left on 4LO2 post therapy session.  At end of session on 4LO2 pt was 92% at rest.  · Bed Mobility:     · Scooting: stand by assistance  · Bridging: stand by assistance (x4 trials)  · Supine to Sit: stand by assistance  · Transfers:     · Sit to Stand:  minimum assistance with no AD and rolling walker  · Bed to Chair: contact guard assistance and minimum assistance with  hand-held assist  using  Stand Pivot  · Gait: Pt ambulated ~26ft with RW, CGA with multiple standing rest breaks in between.  Pt with decreased endurance and SOB.  Pt on 4LO2 NC with O2 sats dropping to 87% requring seated rest break and pursed lip breathing technique to increase to 95% on 4LO2.  Pt with max decreased patience, step length, velocity of limb, postural control, safety awareness, and weight shifting ability.  · Balance: fair+ sitting and fair- standing      AM-PAC 6 CLICK MOBILITY  Turning over in bed (including adjusting bedclothes, sheets and blankets)?: 4  Sitting down on and standing up from a chair with arms (e.g., wheelchair, bedside commode, etc.): 4  Moving from lying on back to sitting on the side of the bed?: 4  Moving to and from a bed to a chair (including a wheelchair)?: 3  Need to walk in hospital room?: 3  Climbing 3-5 steps with a railing?: 2  Basic Mobility Total Score: 20       Therapeutic Activities and Exercises:   Pt performed bridging for  pericare and donning of diaper.  Pt performed sit>stand transfers with RW, Min A and Bed>chair transfer with no AD, Min A HHA with cues for proper technique.  Pt with decreased endurance and SOB requiring cues for relaxation and pursed lip breathing.  Strongly educated pt on energy conservation and proper technique for pursed lip breathing, pt verbalized understanding.    Patient left reclined in BSchair with B heels elevated with all lines intact, call button in reach and pt's nurse, Symone and Dr. Mayorga notified. notified..    GOALS:   Multidisciplinary Problems     Physical Therapy Goals        Problem: Physical Therapy Goal    Goal Priority Disciplines Outcome Goal Variances Interventions   Physical Therapy Goal     PT, PT/OT      Description:  Goals to be met by: 19     Patient will increase functional independence with mobility by performin. Supine to sit with Modified [place]  2. Rolling to Left and Right with Modified [place]  3. Sit to stand transfer with Modified [place]  4. Bed to chair transfer with Modified [place]   5. Gait  x50-100 feet with Modified [place] using Rolling Walker and O2  6. Lower extremity exercise program 2 sets x10 reps per handout, with independence                      Time Tracking:     PT Received On: 19  PT Start Time: 0950     PT Stop Time: 1033  PT Total Time (min): 43 min     Billable Minutes: Gait Training 30 and Therapeutic Activity 13    Treatment Type: Treatment  PT/PTA: PTA     PTA Visit Number: 1     Haylie Briones, PTA  2019

## 2019-04-07 NOTE — SUBJECTIVE & OBJECTIVE
Interval History: no issues with food today. Does get quite winded with minimal activity. Sats dropped to mid 80s while on 4 L     Review of Systems   Constitutional: Negative for chills and fever.   Respiratory: Positive for cough and shortness of breath (worse with minimal exertion).    Cardiovascular: Negative for chest pain and palpitations.     Objective:     Vital Signs (Most Recent):  Temp: 98.1 °F (36.7 °C) (04/07/19 1054)  Pulse: 68 (04/07/19 1054)  Resp: 18 (04/07/19 1054)  BP: 125/82 (04/07/19 1054)  SpO2: 100 % (04/07/19 1054) Vital Signs (24h Range):  Temp:  [97.4 °F (36.3 °C)-98.1 °F (36.7 °C)] 98.1 °F (36.7 °C)  Pulse:  [63-79] 68  Resp:  [17-20] 18  SpO2:  [93 %-100 %] 100 %  BP: (116-152)/(56-82) 125/82     Weight: 37.8 kg (83 lb 5.3 oz)  Body mass index is 13.87 kg/m².    Intake/Output Summary (Last 24 hours) at 4/7/2019 1101  Last data filed at 4/6/2019 1200  Gross per 24 hour   Intake 230 ml   Output --   Net 230 ml      Physical Exam   Constitutional: She is oriented to person, place, and time. She appears well-developed. No distress.   Cachectic and frail   HENT:   + Temporal wasting, on nasal cannula   Cardiovascular: Normal rate and regular rhythm.   Pulmonary/Chest: No respiratory distress. She has no rales.   Diminished breath sounds throughout, no wheeze noted with prolonged expiratory time   Abdominal: Soft. Bowel sounds are normal. She exhibits no distension and no mass. There is no tenderness. There is no rebound and no guarding.   Musculoskeletal: Normal range of motion. She exhibits no edema.   Neurological: She is alert and oriented to person, place, and time.   Skin: Capillary refill takes less than 2 seconds. She is not diaphoretic.   Psychiatric: She has a normal mood and affect. Her behavior is normal. Thought content normal.   Nursing note and vitals reviewed.      Significant Labs: All pertinent labs within the past 24 hours have been reviewed.    Significant Imaging: I have  reviewed all pertinent imaging results/findings within the past 24 hours.  I have reviewed and interpreted all pertinent imaging results/findings within the past 24 hours.

## 2019-04-07 NOTE — CARE UPDATE
Seen briefly in discussed with primary.  She is staying inpatient and will plan for ischemic workup in a.m. with NST.

## 2019-04-07 NOTE — PROGRESS NOTES
[facility] Medicine  Progress Note    Patient Name: Latasha Perez  MRN: [id number]  Patient Class: IP- Inpatient   Admission Date: 4/2/2019  Length of Stay: 5 days  Attending Physician: Sabra Mayorga MD  Primary Care Provider: Pollo Oneal MD        Subjective:     Principal Problem:Atrial fibrillation with RVR    HPI:  73-year-old female with HTN, HLP, severe COPD and + tobacco abuse who presented with complaint of dizziness and weakness that is been progressive over the past 2 days.  She also reports increase in shortness of breath along with palpitations.  She was recently admitted to Observation from 3/28 to 3/29/19 for a COPD exacerbation and was discharged home on Azithromycin and prednisone 40 mg daily x 5 days.  She denies any fevers or chills.  In the ER, she was noted to be in Afib with RVR, rate initially in the 190s, status post IV diltiazem push followed by infusion with rate in the 140-150s. CXR with new right upper lobe opacity, leukocytosis of 38,000. Patient with reported wheezing on exam status post IV Solu-Medrol 125 mg IV push x1.  She was placed on BiPAP.    Hospital Course:  Ms. Perez was admitted with AFib with RVR along with acute on chronic respiratory failure due to COPD exacerbation and pneumonia.  Patient was started on diltiazem infusion with inadequate control of heart rate and therefore converted to amiodarone infusion.  Anticoagulated with full-dose Lovenox and Cardiology consulted.  2D echo performed with EF of 55% and elevated PA pressures of 51 mmHg and pulmonary hypertension.  Patient had new infiltrate in the right upper lobe at presentation concerning for pneumonia which was empirically treated with Rocephin, azithromycin and vancomycin given her allergy profile and recent admission to the hospital.  Patient also reports smoking again which likely exacerbated her COPD in addition to pneumonia.  She was supported with BiPAP and had pulse  dose IV steroids which was quickly converted to prednisone.  Pulmonary following and oxygen being wean as tolerated. Blood culture positive in 1/4 bottles for GNR which has returned as Pseudomonas aeruginosa. Despite amiodarone, patient HR poorly controlled and she underwent DCCV on 4/4/19 with conversion to NSR but went back into Afib but rate controlled. Pt completed amiodarone IV load which was converted to PO on 4/5. Anticoagulation converted to Eliquis. ID consulted for bacteremia given allergy profile. Recommendation is cefepime 2g every 8 hours for 14 days. PICC line placed.     Interval History: no issues with food today. Does get quite winded with minimal activity. Sats dropped to mid 80s while on 4 L     Review of Systems   Constitutional: Negative for chills and fever.   Respiratory: Positive for cough and shortness of breath (worse with minimal exertion).    Cardiovascular: Negative for chest pain and palpitations.     Objective:     Vital Signs (Most Recent):  Temp: 98.1 °F (36.7 °C) (04/07/19 1054)  Pulse: 68 (04/07/19 1054)  Resp: 18 (04/07/19 1054)  BP: 125/82 (04/07/19 1054)  SpO2: 100 % (04/07/19 1054) Vital Signs (24h Range):  Temp:  [97.4 °F (36.3 °C)-98.1 °F (36.7 °C)] 98.1 °F (36.7 °C)  Pulse:  [63-79] 68  Resp:  [17-20] 18  SpO2:  [93 %-100 %] 100 %  BP: (116-152)/(56-82) 125/82     Weight: 37.8 kg (83 lb 5.3 oz)  Body mass index is 13.87 kg/m².    Intake/Output Summary (Last 24 hours) at 4/7/2019 1101  Last data filed at 4/6/2019 1200  Gross per 24 hour   Intake 230 ml   Output --   Net 230 ml      Physical Exam   Constitutional: She is oriented to person, place, and time. She appears well-developed. No distress.   Cachectic and frail   HENT:   + Temporal wasting, on nasal cannula   Cardiovascular: Normal rate and regular rhythm.   Pulmonary/Chest: No respiratory distress. She has no rales.   Diminished breath sounds throughout, no wheeze noted with prolonged expiratory time   Abdominal:  Soft. Bowel sounds are normal. She exhibits no distension and no mass. There is no tenderness. There is no rebound and no guarding.   Musculoskeletal: Normal range of motion. She exhibits no edema.   Neurological: She is alert and oriented to person, place, and time.   Skin: Capillary refill takes less than 2 seconds. She is not diaphoretic.   Psychiatric: She has a normal mood and affect. Her behavior is normal. Thought content normal.   Nursing note and vitals reviewed.      Significant Labs: All pertinent labs within the past 24 hours have been reviewed.    Significant Imaging: I have reviewed all pertinent imaging results/findings within the past 24 hours.  I have reviewed and interpreted all pertinent imaging results/findings within the past 24 hours.    Assessment/Plan:      * Atrial fibrillation with RVR  Patient initially treated with diltiazem infusion with minimal response so stopped  s/p IV amiodarone infusion converted to PO as of today  s/p DCCV on 4/4 with NSR. Then in and out of AFib but with controlled rate  Anticoagulation converted to PO apaxiban  Cardiology input appreciated  Echo with normal EF of 55% with PA pressure of 51 mm Hg and pulmonary hypertension  Rate controlled on amiodarone and diltiazem  Plan for nuclear stress test tomorrow      Bacteremia due to Pseudomonas  Due to Pseudomonas aeruginosa  Antibiotics changed as discussed above      Cachexia  Patient is underweight and with BMI of 13.98  Regular diet and supplement with Boost  Nutrition consulted    Pneumonia  New infiltrate noted in the right upper lobe that was not present when she was admitted just a few days ago for observation  Empirically treated with ceftriaxone, azithromycin and vancomycin  Blood cultures with Pseudomonas  Change antibiotics to cefepime on 4/4  ID on board    Acute on chronic respiratory failure with hypercapnia  Now on cefepime 2 g q 8 hrs x 14 days for Pseudomonas ae bacteremia (selection by ID mainly due  to allergy profile)  PICC placed  Repeat blood cultures on 4/4 NGTD  BiPAP QHS + low flow NC continuously (unable to wean off at this time)  I will repeat CXR to see if any pulmonary edema I could not appreciate on exam as patient becomes very symptomatic and hypoxic with minimal activity. Pulm will see patient again today for any additional recs concerning her care  Continued nebulizer treatments and prednisone  Pulmonary input appreciated  ID also on board for abx co-management    COPD exacerbation  As above  Is motivated to quit smoking  Pulmonary input appreciated    Essential hypertension  Continue diltiazem for HR and BP control    Leukocytosis  Likely secondary to pneumonia and possible steroid and/or leukemoid reaction  Antibiotics as discussed above   Stop checking CBC unless clinically indicated    HLD (hyperlipidemia)  Continue pravastatin    VTE Risk Mitigation (From admission, onward)        Ordered     apixaban tablet 5 mg  2 times daily      04/04/19 1500     IP VTE HIGH RISK PATIENT  Once      04/02/19 1210     Place sequential compression device  Until discontinued      04/02/19 1210          Dispo: home health with 24 hr care by family. Not cleared for discharge yet    Of note, patient prefers cardiac monitor discontinued to allow more freedom when mobilized. Since she's been stable from AFib I am ok with that but if she becomes tachycardic will have to put back on monitor.     Sabra Varner MD  Department of Hospital Medicine   [facility] [place]

## 2019-04-07 NOTE — ASSESSMENT & PLAN NOTE
Patient initially treated with diltiazem infusion with minimal response so stopped  s/p IV amiodarone infusion converted to PO as of today  s/p DCCV on 4/4 with NSR. Then in and out of AFib but with controlled rate  Anticoagulation converted to PO apaxiban  Cardiology input appreciated  Echo with normal EF of 55% with PA pressure of 51 mm Hg and pulmonary hypertension  Rate controlled on amiodarone and diltiazem  Plan for nuclear stress test tomorrow

## 2019-04-07 NOTE — PROCEDURES
"Latasha Perez is a 73 y.o. female patient.    Temp: 97.6 °F (36.4 °C) (04/06/19 1932)  Pulse: 69 (04/06/19 1932)  Resp: 18 (04/06/19 1932)  BP: (!) 116/56 (04/06/19 1932)  SpO2: 99 % (04/06/19 1932)  Weight: 37.8 kg (83 lb 5.3 oz) (04/02/19 2015)  Height: 5' 5" (165.1 cm) (04/02/19 2015)    PICC  Date/Time: 4/6/2019 9:35 PM  Performed by: William Dumont RN  Consent Done: Yes  Time out: Immediately prior to procedure a time out was called to verify the correct patient, procedure, equipment, support staff and site/side marked as required  Indications: med administration and vascular access  Preparation: skin prepped with ChloraPrep  Skin prep agent dried: skin prep agent completely dried prior to procedure  Sterile barriers: all five maximum sterile barriers used - cap, mask, sterile gown, sterile gloves, and large sterile sheet  Hand hygiene: hand hygiene performed prior to central venous catheter insertion  Location details: right basilic  Catheter type: double lumen  Catheter size: 5 Fr  Catheter Length: 33cm    noNumber of attempts: 1  Post-procedure: blood return through all ports, chlorhexidine patch and sterile dressing applied            William Dumont  4/6/2019  "

## 2019-04-07 NOTE — ASSESSMENT & PLAN NOTE
Now on cefepime 2 g q 8 hrs x 14 days for Pseudomonas ae bacteremia (selection by ID mainly due to allergy profile)  PICC placed  Repeat blood cultures on 4/4 NGTD  BiPAP QHS + low flow NC continuously (unable to wean off at this time)  I will repeat CXR to see if any pulmonary edema I could not appreciate on exam as patient becomes very symptomatic and hypoxic with minimal activity. Pulm will see patient again today for any additional recs concerning her care  Continued nebulizer treatments and prednisone  Pulmonary input appreciated  ID also on board for abx co-management

## 2019-04-07 NOTE — NURSING
Testing for Home O2    O2 Sats on RA at rest = 96 %    O2 sats on RA with exercise  = 75%    O2 sats on 4 L O2 with exercise = 87%

## 2019-04-07 NOTE — PT/OT/SLP PROGRESS
Occupational Therapy   Treatment    Name: Latasha Perez  MRN: [id number]  Admitting Diagnosis:  Atrial fibrillation with RVR  3 Days Post-Op    Recommendations:     Discharge Recommendations: home health OT(24 hr sup)  Discharge Equipment Recommendations:  none  Barriers to discharge:  Other (Comment)(Pt's  is in SNF, but she has family/friends lined up to help at the home.)    Assessment:     Latasha Perez is a 73 y.o. female with a medical diagnosis of Atrial fibrillation with RVR.  She presents with significant LE weakness, as she walked earlier this morning with PT. Pt attempted to stand, but was fearful she might fall d/t weakness and did not stand. Pt was motivated to participate in other areas, including self-care and therapeutic exercises.  Performance deficits affecting function are weakness, impaired functional mobilty, decreased safety awareness, impaired coordination, impaired cardiopulmonary response to activity, impaired endurance, gait instability, pain, impaired balance, decreased upper extremity function, impaired self care skills, decreased lower extremity function.     Rehab Prognosis:  Good; patient would benefit from acute skilled OT services to address these deficits and reach maximum level of function.       Plan:     Patient to be seen 5 x/week to address the above listed problems via self-care/home management, therapeutic activities, therapeutic exercises  · Plan of Care Expires: 04/19/19  · Plan of Care Reviewed with: patient    Subjective     Pain/Comfort:  · Pain Rating 1: 0/10    Objective:     Communicated with: Symone Mckee prior to session.  Patient found up in chair with telemetry, oxygen, PICC line, peripheral IV upon OT entry to room.    General Precautions: Standard, fall, respiratory   Orthopedic Precautions:N/A   Braces: N/A     Occupational Performance:     Bed Mobility:    · Not tested.     Functional Mobility/Transfers:  · Functional Mobility: Pt attempted a sit  to stand transfer 2 times, and both unsuccessful and unable to stand at all. She reported significant weakness in her legs from walking with physical therapy and has a fear of falling and hurting her L knee.     Activities of Daily Living:  · Grooming: supervision sitting upright in the chair unsupported.       [facility] 6 Click ADL: 16    Treatment & Education:  Pt consented to OT. Pt presented motivated to participate, but also very anxious with her health and trying to cope with her admission to the hospital. OT asked if pt is spiritual and she said she is and has been getting visits from the [facility]  here which has helped her.. Pt educated on HEP with demo from OT. Pt participated in 15 bilateral: hand squeezes, forearm supination/pronation, elbow flexion/extension, and shoulder flexion/extension. Pt educated on pursed lip breathing and demo'd back with mod verbal cues. Pt required rest breaks intermittently to implement pursed lip breathing. Pt demo'd dynamic sitting balance with reaching down while completing grooming (applying lotion to bilateral legs) with SUP.     Patient left up in chair with all lines intact, call button in reach, nurse, Symone, notified and MD presentEducation:      GOALS:   Multidisciplinary Problems     Occupational Therapy Goals        Problem: Occupational Therapy Goal    Goal Priority Disciplines Outcome Interventions   Occupational Therapy Goal     OT, PT/OT Ongoing (interventions implemented as appropriate)    Description:  Goals to be met by: 04/19/19     Patient will increase functional independence with ADLs by performing:    Feeding with Modified [place].  UE Dressing with Modified [place].  LE Dressing with Modified [place].  Grooming while standing at sink with Modified [place].  Toileting from toilet with Supervision for hygiene and clothing management.   Sitting at edge of bed x25 minutes with Modified [place].  Rolling to Bilateral with  Modified [place].   Supine to sit with Modified [place].  Step transfer with Modified [place]  Toilet transfer to toilet with Modified [place].  Upper extremity exercise program x15 reps per handout, with supervision.                      Time Tracking:     OT Date of Treatment: 04/07/19  OT Start Time: 1104  OT Stop Time: 1130  OT Total Time (min): 26 min    Billable Minutes:Self Care/Home Management 12 min  Therapeutic Exercise 14 min  Total Time 26 min    Kendra Wong OT  4/7/2019

## 2019-04-07 NOTE — PLAN OF CARE
Problem: Physical Therapy Goal  Goal: Physical Therapy Goal  Goals to be met by: 19     Patient will increase functional independence with mobility by performin. Supine to sit with Modified [place]  2. Rolling to Left and Right with Modified [place]  3. Sit to stand transfer with Modified [place]  4. Bed to chair transfer with Modified [place]   5. Gait  x50-100 feet with Modified [place] using Rolling Walker and O2  6. Lower extremity exercise program 2 sets x10 reps per handout, with independence     Outcome: Ongoing (interventions implemented as appropriate)  Pt made gains with gait distance.  However, experienced decreased O2 sats with BM, transfers, and ambulation.  Pt with decreased endurance and SOB with ambulation.  Pt ambulated ~26ft with RW, CGA.      O2 sats:  Supine on RA pt was 96%. Pt supine on RA while receiving pericare and donning of brief.  Post pericare pt's O2 sats dropped to 75% requiring 3LO2 to increase to 94%.  Pt on 3LO2 to perform Bed>BSchair transfer with O2 sats dropping to 80% requiring pursed lip breathing and 4LO2 to recover to 92%.  With ambulation, pt on 4LO2 with O2 sats dropping to 87% requiring seated rest break and pursed lip breathing to recover to 95%. Per pt's nurse, pt is to be left on 4LO2 post therapy session.  At end of session on 4LO2 pt was 92% at rest.

## 2019-04-07 NOTE — PLAN OF CARE
Problem: Occupational Therapy Goal  Goal: Occupational Therapy Goal  Goals to be met by: 04/19/19     Patient will increase functional independence with ADLs by performing:    Feeding with Modified [place].  UE Dressing with Modified [place].  LE Dressing with Modified [place].  Grooming while standing at sink with Modified [place].  Toileting from toilet with Supervision for hygiene and clothing management.   Sitting at edge of bed x25 minutes with Modified [place].  Rolling to Bilateral with Modified [place].   Supine to sit with Modified [place].  Step transfer with Modified [place]  Toilet transfer to toilet with Modified [place].  Upper extremity exercise program x15 reps per handout, with supervision.     Outcome: Ongoing (interventions implemented as appropriate)  Pt progressing towards goals and will continue to benefit from skilled OT acute services. OT rec. HHOT with 24 hour family assistance.

## 2019-04-07 NOTE — ASSESSMENT & PLAN NOTE
New infiltrate noted in the right upper lobe that was not present when she was admitted just a few days ago for observation  Empirically treated with ceftriaxone, azithromycin and vancomycin  Blood cultures with Pseudomonas  Change antibiotics to cefepime on 4/4  ID on board

## 2019-04-08 PROBLEM — E43 SEVERE MALNUTRITION: Status: ACTIVE | Noted: 2019-04-08

## 2019-04-08 PROBLEM — Z51.5 PALLIATIVE CARE ENCOUNTER: Status: ACTIVE | Noted: 2019-04-08

## 2019-04-08 LAB
CV STRESS BASE HR: 71 BPM
DIASTOLIC BLOOD PRESSURE: 70 MMHG
NUC STRESS EJECTION FRACTION: 80 %
OHS CV CPX 85 PERCENT MAX PREDICTED HEART RATE MALE: 121
OHS CV CPX MAX PREDICTED HEART RATE: 143
OHS CV CPX PATIENT IS FEMALE: 1
OHS CV CPX PATIENT IS MALE: 0
OHS CV CPX PEAK DIASTOLIC BLOOD PRESSURE: 63 MMHG
OHS CV CPX PEAK HEAR RATE: 88 BPM
OHS CV CPX PEAK RATE PRESSURE PRODUCT: NORMAL
OHS CV CPX PEAK SYSTOLIC BLOOD PRESSURE: 138 MMHG
OHS CV CPX PERCENT MAX PREDICTED HEART RATE ACHIEVED: 62
OHS CV CPX RATE PRESSURE PRODUCT PRESENTING: NORMAL
SYSTOLIC BLOOD PRESSURE: 145 MMHG

## 2019-04-08 PROCEDURE — 94761 N-INVAS EAR/PLS OXIMETRY MLT: CPT | Mod: HCNC

## 2019-04-08 PROCEDURE — 86580 TB INTRADERMAL TEST: CPT | Mod: HCNC | Performed by: INTERNAL MEDICINE

## 2019-04-08 PROCEDURE — 25000003 PHARM REV CODE 250: Mod: HCNC | Performed by: INTERNAL MEDICINE

## 2019-04-08 PROCEDURE — 99223 1ST HOSP IP/OBS HIGH 75: CPT | Mod: HCNC,GC,, | Performed by: NURSE PRACTITIONER

## 2019-04-08 PROCEDURE — 97116 GAIT TRAINING THERAPY: CPT | Mod: HCNC

## 2019-04-08 PROCEDURE — 99900035 HC TECH TIME PER 15 MIN (STAT): Mod: HCNC

## 2019-04-08 PROCEDURE — 94640 AIRWAY INHALATION TREATMENT: CPT | Mod: HCNC

## 2019-04-08 PROCEDURE — S0028 INJECTION, FAMOTIDINE, 20 MG: HCPCS | Mod: HCNC | Performed by: INTERNAL MEDICINE

## 2019-04-08 PROCEDURE — 21400001 HC TELEMETRY ROOM: Mod: HCNC

## 2019-04-08 PROCEDURE — 25000003 PHARM REV CODE 250: Mod: HCNC | Performed by: HOSPITALIST

## 2019-04-08 PROCEDURE — 63600175 PHARM REV CODE 636 W HCPCS: Mod: HCNC | Performed by: PHYSICIAN ASSISTANT

## 2019-04-08 PROCEDURE — 25000242 PHARM REV CODE 250 ALT 637 W/ HCPCS: Mod: HCNC | Performed by: HOSPITALIST

## 2019-04-08 PROCEDURE — 99223 PR INITIAL HOSPITAL CARE,LEVL III: ICD-10-PCS | Mod: HCNC,GC,, | Performed by: NURSE PRACTITIONER

## 2019-04-08 PROCEDURE — 27000221 HC OXYGEN, UP TO 24 HOURS: Mod: HCNC

## 2019-04-08 PROCEDURE — 97535 SELF CARE MNGMENT TRAINING: CPT | Mod: HCNC

## 2019-04-08 PROCEDURE — 63600175 PHARM REV CODE 636 W HCPCS: Mod: HCNC | Performed by: INTERNAL MEDICINE

## 2019-04-08 PROCEDURE — A4216 STERILE WATER/SALINE, 10 ML: HCPCS | Mod: HCNC | Performed by: INTERNAL MEDICINE

## 2019-04-08 PROCEDURE — 25000003 PHARM REV CODE 250: Mod: HCNC | Performed by: PHYSICIAN ASSISTANT

## 2019-04-08 RX ORDER — REGADENOSON 0.08 MG/ML
0.4 INJECTION, SOLUTION INTRAVENOUS ONCE
Status: COMPLETED | OUTPATIENT
Start: 2019-04-08 | End: 2019-04-08

## 2019-04-08 RX ADMIN — LEVALBUTEROL HYDROCHLORIDE 1.25 MG: 1.25 SOLUTION, CONCENTRATE RESPIRATORY (INHALATION) at 12:04

## 2019-04-08 RX ADMIN — CEFEPIME 2 G: 2 INJECTION, POWDER, FOR SOLUTION INTRAVENOUS at 06:04

## 2019-04-08 RX ADMIN — LEVALBUTEROL HYDROCHLORIDE 1.25 MG: 1.25 SOLUTION, CONCENTRATE RESPIRATORY (INHALATION) at 07:04

## 2019-04-08 RX ADMIN — DILTIAZEM HYDROCHLORIDE 120 MG: 120 CAPSULE, COATED, EXTENDED RELEASE ORAL at 09:04

## 2019-04-08 RX ADMIN — APIXABAN 5 MG: 5 TABLET, FILM COATED ORAL at 09:04

## 2019-04-08 RX ADMIN — LEVALBUTEROL HYDROCHLORIDE 1.25 MG: 1.25 SOLUTION, CONCENTRATE RESPIRATORY (INHALATION) at 11:04

## 2019-04-08 RX ADMIN — PRAVASTATIN SODIUM 20 MG: 10 TABLET ORAL at 09:04

## 2019-04-08 RX ADMIN — AMIODARONE HYDROCHLORIDE 400 MG: 200 TABLET ORAL at 09:04

## 2019-04-08 RX ADMIN — CEFEPIME 2 G: 2 INJECTION, POWDER, FOR SOLUTION INTRAVENOUS at 03:04

## 2019-04-08 RX ADMIN — Medication 10 ML: at 06:04

## 2019-04-08 RX ADMIN — FAMOTIDINE 20 MG: 10 INJECTION INTRAVENOUS at 09:04

## 2019-04-08 RX ADMIN — TUBERCULIN PURIFIED PROTEIN DERIVATIVE 5 UNITS: 5 INJECTION, SOLUTION INTRADERMAL at 04:04

## 2019-04-08 RX ADMIN — ASPIRIN 81 MG: 81 TABLET, COATED ORAL at 09:04

## 2019-04-08 RX ADMIN — LEVALBUTEROL HYDROCHLORIDE 1.25 MG: 1.25 SOLUTION, CONCENTRATE RESPIRATORY (INHALATION) at 03:04

## 2019-04-08 RX ADMIN — TIOTROPIUM BROMIDE 18 MCG: 18 CAPSULE ORAL; RESPIRATORY (INHALATION) at 07:04

## 2019-04-08 RX ADMIN — REGADENOSON 0.4 MG: 0.08 INJECTION, SOLUTION INTRAVENOUS at 11:04

## 2019-04-08 RX ADMIN — Medication 10 ML: at 12:04

## 2019-04-08 RX ADMIN — CEFEPIME 2 G: 2 INJECTION, POWDER, FOR SOLUTION INTRAVENOUS at 10:04

## 2019-04-08 RX ADMIN — Medication 10 ML: at 07:04

## 2019-04-08 NOTE — PT/OT/SLP PROGRESS
Occupational Therapy   Treatment    Name: Latasha Perez  MRN: [id number]  Admitting Diagnosis:  Atrial fibrillation with RVR  4 Days Post-Op    Recommendations:     Discharge Recommendations: nursing facility, skilled(The patient states she realizes she is too weak care for herself at home)  Discharge Equipment Recommendations:  (TBD)  Barriers to discharge:  Decreased caregiver support    Assessment:     Latasha Perez is a 73 y.o. female with a medical diagnosis of Atrial fibrillation with RVR.  The patient was able to transfer to the BSC and chair with CGA/min assist despite c/o feeling weak from testing. Performance deficits affecting function are weakness, impaired endurance, impaired self care skills, decreased upper extremity function, impaired balance, gait instability, impaired functional mobilty, decreased safety awareness, impaired cardiopulmonary response to activity.     Rehab Prognosis:  Good; patient would benefit from acute skilled OT services to address these deficits and reach maximum level of function.       Plan:     Patient to be seen 5 x/week to address the above listed problems via self-care/home management, therapeutic activities, therapeutic exercises  · Plan of Care Expires: 04/19/19  · Plan of Care Reviewed with: patient    Subjective     Pain/Comfort:  · Pain Rating 1: 0/10    Objective:     Communicated with: nurse prior to session.  Patient found HOB elevated with PICC line, telemetry, oxygen upon OT entry to room.    General Precautions: Standard, fall, respiratory   Orthopedic Precautions:N/A   Braces: N/A     Occupational Performance:     Bed Mobility:    · Patient completed Scooting/Bridging with stand by assistance  · Patient completed Supine to Sit with stand by assistance and HOB elevated     Functional Mobility/Transfers:  · Patient completed Sit <> Stand Transfer with contact guard assistance  with  hand-held assist   · Patient completed Bed <> Chair Transfer using Step  Transfer technique with contact guard assistance and minimum assistance with hand-held assist  · Patient completed Toilet Transfer Step Transfer technique with contact guard assistance and minimum assistance with  hand-held assist and bedside commode    Activities of Daily Living:  Feeding: The patient was able to feed herself a sandwich and soup with c/o food getting stuck in her chest.  · Grooming: stand by assistance to wipe hands using a wipe while seated on the EOB  · Upper Body Dressing: minimum assistance to don back gown  · Toileting: minimum assistance to manage clothing, The patient was able to wipe with SBA after set up.      [facility] 6 Click ADL: 19    Treatment & Education:  The patient participated in transfer from bed<> BSC and bed >chair with CGA/min assist. The patient required a seated rest break between tasks with c/o feeling weak. The patient was educated re: need to call the nurse to assist with transfer to the bed or BSC. The patient was given a soft air seat cushion and a BSC. The white board was updated.    Patient left up in chair with all lines intact and call button in reachEducation:      GOALS:   Multidisciplinary Problems     Occupational Therapy Goals        Problem: Occupational Therapy Goal    Goal Priority Disciplines Outcome Interventions   Occupational Therapy Goal     OT, PT/OT Ongoing (interventions implemented as appropriate)    Description:  Goals to be met by: 04/19/19     Patient will increase functional independence with ADLs by performing:    Feeding with Modified [place].  UE Dressing with Modified [place].  LE Dressing with Modified [place].  Grooming while standing at sink with Modified [place].  Toileting from toilet with Supervision for hygiene and clothing management.   Sitting at edge of bed x25 minutes with Modified [place].  Rolling to Bilateral with Modified [place].   Supine to sit with Modified [place].  Step transfer with  Modified [place]  Toilet transfer to toilet with Modified [place].  Upper extremity exercise program x15 reps per handout, with supervision.                      Time Tracking:     OT Date of Treatment: 04/08/19  OT Start Time: 1420  OT Stop Time: 1444  OT Total Time (min): 24 min    Billable Minutes:Self Care/Home Management 24 Janisra Castelan OT  4/8/2019

## 2019-04-08 NOTE — ASSESSMENT & PLAN NOTE
Patient initially treated with diltiazem infusion with minimal response so stopped  s/p IV amiodarone infusion converted to PO as of today  s/p DCCV on 4/4 with NSR. Then in and out of AFib but with controlled rate  Anticoagulation converted to PO apaxiban  Cardiology input appreciated  Echo with normal EF of 55% with PA pressure of 51 mm Hg and pulmonary hypertension  Rate controlled on amiodarone and diltiazem  Plan for nuclear stress test today

## 2019-04-08 NOTE — PT/OT/SLP PROGRESS
Physical Therapy      Patient Name:  Latasha Perez   MRN:  [id number]    Patient not seen today secondary to (Pt off the unit for tests this morning.  Attempted this afternoon, however, pt is eating lunch.  Will follow up at a later time.). .    Haylie Briones, PTA

## 2019-04-08 NOTE — PLAN OF CARE
Problem: Physical Therapy Goal  Goal: Physical Therapy Goal  Goals to be met by: 19     Patient will increase functional independence with mobility by performin. Supine to sit with Modified [place]  2. Rolling to Left and Right with Modified [place]  3. Sit to stand transfer with Modified [place]  4. Bed to chair transfer with Modified [place]   5. Gait  x50-100 feet with Modified [place] using Rolling Walker and O2  6. Lower extremity exercise program 2 sets x10 reps per handout, with independence     Outcome: Ongoing (interventions implemented as appropriate)    Pt is making gains with gait distance, however, continues to display SOB and decreased endurance.  Pt ambulated 42ft x2 trials with RW, CGA. Pt would benefit from SNF services at time of discharge, per supervising PT.

## 2019-04-08 NOTE — PLAN OF CARE
Problem: Fluid Imbalance (Pneumonia)  Goal: Fluid Balance  Outcome: Ongoing (interventions implemented as appropriate)  Intervention: Monitor and Manage Fluid Balance     04/08/19 1158   Monitor and Manage Cardiac Rhythm Effects   Fluid/Electrolyte Management fluids provided         Problem: Infection (Pneumonia)  Goal: Resolution of Infection Signs/Symptoms  Outcome: Ongoing (interventions implemented as appropriate)  Intervention: Prevent Infection Progression     04/07/19 0534 04/07/19 0800   Manage Diarrhea   Isolation Precautions protective environment maintained  --    Prevent or Manage Infection   Fever Reduction/Comfort Measures medication administered  --    Infection Management  --  aseptic technique maintained         Problem: Respiratory Compromise (Pneumonia)  Goal: Effective Oxygenation and Ventilation  Outcome: Ongoing (interventions implemented as appropriate)  Intervention: Promote Airway Secretion Clearance     04/08/19 1158   Promote Airway Secretion Clearance   Breathing Techniques/Airway Clearance deep/controlled cough encouraged   Promote Airway Secretion Clearance   Cough And Deep Breathing done independently per patient     Intervention: Optimize Oxygenation and Ventilation     04/08/19 1043   Prevent Additional Skin Injury   Head of Bed (HOB) HOB at 30-45 degrees

## 2019-04-08 NOTE — PROGRESS NOTES
TN attempted to meet with pt at bedside, pt off unit at this time.  TN to follow up at later time to discuss d/c plans.

## 2019-04-08 NOTE — NURSING
Patient transported to stress test via wheelchair with 4 L N/C in use. No acute distress noted. Will monitor upon return to unit.

## 2019-04-08 NOTE — PROGRESS NOTES
"[facility]  Adult Nutrition  Progress Note    SUMMARY       Recommendations    1. Consider appetite stimulant to aid with po intake   2. Encourage po/supplements; honor preferences as able   3. Monitor weight weekly   4. RD to monitor    Goals: Meet > 85% EEN daily  Nutrition Goal Status: new  Communication of RD Recs: reviewed with RN(POC)    Reason for Assessment    Reason For Assessment: identified at risk by screening criteria  Diagnosis: (Afib)  Relevant Medical History: COPd, HTN, HLD, hyponatremia  Interdisciplinary Rounds: did not attend    General Information Comments: NFPE : Pt with obvious sx/o severe fat mass and LBM losses evident, see malnutrition section for details. Pt denies n/v; reports decreased appetite and that feels like food gets stuck going down. Pt doesn't drink oral supplements at home, but likes chocolate boost. Obtained pt preferences. Pt states she was not aware that she had been losing weight. Plan for rehab facility s/p d/c. Pt prefers soft foods- prev ordered mechanical soft diet with >50% intake with meals.     Nutrition Discharge Planning: Mechanical soft diet with oral supplements    Nutrition Risk Screen    Nutrition Risk Screen: no indicators present    Nutrition/Diet History    Spiritual, Cultural Beliefs, Temple Practices, Values that Affect Care: no    Anthropometrics    Temp: 98 °F (36.7 °C)  Height Method: Stated  Height: 5' 5" (165.1 cm)  Height (inches): 65 in  Weight Method: Bed Scale  Weight: 37.8 kg (83 lb 5.3 oz)  Weight (lb): 83.33 lb  Ideal Body Weight (IBW), Female: 125 lb  % Ideal Body Weight, Female (lb): 66.66 lb  BMI (Calculated): 13.9  Weight Loss: unintentional  Usual Body Weight (UBW), k.7 kg(2019)  % Usual Body Weight: 90.84  % Weight Change From Usual Weight: -9.35 %       Lab/Procedures/Meds    Pertinent Labs Reviewed: reviewed  Pertinent Medications Reviewed: reviewed    Estimated/Assessed Needs    Weight Used For Calorie " Calculations: 37.8 kg (83 lb 5.3 oz)  Energy Calorie Requirements (kcal): 5135-9203 kcal for repletion  Energy Need Method: Kcal/kg  Protein Requirements: 45-56g (1.2-1.5g/kg)  Weight Used For Protein Calculations: 37.8 kg (83 lb 5.3 oz)     Estimated Fluid Requirement Method: RDA Method  RDA Method (mL): 1325       Nutrition Prescription Ordered    Current Diet Order: NPO    Evaluation of Received Nutrient/Fluid Intake    I/O: reviewed  Energy Calories Required: not meeting needs  Protein Required: not meeting needs  Fluid Required: (per MD)  Comments: LBM: 4/7  Tolerance: (small amts)  % Intake of Estimated Energy Needs: 0-25%  % Meal Intake: NPO; 0-50% with meals    Nutrition Risk    Level of Risk/Frequency of Follow-up: (2 x week)     Assessment and Plan     Severe Malnutrition in the context of Chronic Illness/Injury    Related to (etiology):  COPD    Signs and Symptoms (as evidenced by):    Body Fat Depletion: severe depletion of orbitals, triceps and thoracic and lumbar region   Muscle Mass Depletion: severe depletion of temples, clavicle region, scapular region, interosseous muscle and lower extremities   Weight Loss: 9% x 1 month    Interventions  Collaboration with providers  Commercial beverage    Nutrition Diagnosis Status:  New    Monitor and Evaluation    Food and Nutrient Intake: energy intake, food and beverage intake  Food and Nutrient Adminstration: diet order  Anthropometric Measurements: weight, weight change  Biochemical Data, Medical Tests and Procedures: electrolyte and renal panel  Nutrition-Focused Physical Findings: overall appearance     Malnutrition Assessment  Malnutrition Type: chronic illness        Orbital Region (Subcutaneous Fat Loss): moderate depletion  Upper Arm Region (Subcutaneous Fat Loss): severe depletion  Thoracic and Lumbar Region: severe depletion   [place] Region (Muscle Loss): moderate depletion  Clavicle Bone Region (Muscle Loss): severe depletion  Clavicle and Acromion  Bone Region (Muscle Loss): severe depletion  Scapular Bone Region (Muscle Loss): severe depletion  Dorsal Hand (Muscle Loss): severe depletion  Patellar Region (Muscle Loss): severe depletion  Anterior Thigh Region (Muscle Loss): severe depletion  Posterior Calf Region (Muscle Loss): severe depletion       Subcutaneous Fat Loss (Final Summary): severe protein-calorie malnutrition  Muscle Loss Evaluation (Final Summary): severe protein-calorie malnutrition    Severe Weight Loss (Malnutrition): greater than 5% in 1 month    Nutrition Follow-Up    RD Follow-up?: Yes

## 2019-04-08 NOTE — SUBJECTIVE & OBJECTIVE
Interval History: returned from stress test    Past Medical History:   Diagnosis Date    Arthritis     Carotid disease, bilateral     Cataract     Chronic hyponatremia     COPD (chronic obstructive pulmonary disease)     HLD (hyperlipidemia)     HTN (hypertension)        Past Surgical History:   Procedure Laterality Date    APPENDECTOMY      Cardioversion or Defibrillation  4/4/2019    Performed by Manfred Figueroa [facility] at [facility] CATH LAB    CATARACT EXTRACTION W/  INTRAOCULAR LENS IMPLANT Right 12/06/2018    Dr. Escobar    CATARACT EXTRACTION W/  INTRAOCULAR LENS IMPLANT Left 12/20/2018    DR. Escobar    CERVICAL SPINE SURGERY      DILATION AND CURETTAGE OF UTERUS      x 2    EYE SURGERY      cataract Rt    FRACTURE SURGERY      Lt leg fracture with hardware    INSERTION, IOL PROSTHESIS Left 12/20/2018    Performed by Broderick Escobar MD at [facility] OR    INSERTION, IOL PROSTHESIS Right 12/6/2018    Performed by [facility]ick Escobar [facility] at [facility] OR    metatarsal repair      OPEN REDUCTION INTERNAL FIXATION-TIBIAL PLATEAU Left 6/27/2014    Performed by Isak Pereira MD at [facility] OR    PHACOEMULSIFICATION, CATARACT Left 12/20/2018    Performed by Broderick Escobar MD at [facility] OR    PHACOEMULSIFICATION, CATARACT Right 12/6/2018    Performed by Bro[facility]ick Escobar [facility] at [facility] OR    SHOULDER ARTHROSCOPY      Transesophageal echo (JOHNNA) intra-procedure log documentation N/A 4/4/2019    Performed by Manfred Figueroa MD at [facility] CATH LAB       Review of patient's allergies indicates:   Allergen Reactions    Pcn [penicillins] Other (See Comments)     Fever flushing skin swelling     Biaxin [clarithromycin] Rash    Codeine Rash    Doxycycline Rash    Levaquin [levofloxacin] Rash       Medications:  Continuous Infusions:  Scheduled Meds:   amiodarone  400 mg Oral BID    apixaban  5 mg Oral BID    aspirin  81 mg Oral Daily    ceFEPime (MAXIPIME) IVPB  2 g Intravenous Q8H     diltiaZEM  120 mg Oral Daily    famotidine (PF)  20 mg Intravenous BID    HYDROcodone-acetaminophen  1 tablet Oral Once    levalbuterol  1.25 mg Nebulization Q8H    pravastatin  20 mg Oral QHS    sodium chloride 0.9%  10 mL Intravenous Q6H    tiotropium  1 capsule Inhalation Daily    tuberculin  5 Units Intradermal Once     PRN Meds:acetaminophen, ALPRAZolam, benzonatate, calcium carbonate, cloNIDine, ondansetron, polyethylene glycol, promethazine (PHENERGAN) IVPB, ramelteon, Flushing PICC Protocol **AND** sodium chloride 0.9% **AND** sodium chloride 0.9%    Family History     Problem Relation (Age of Onset)    Cancer Father    Cataracts Sister    Heart disease Mother, Maternal Grandfather    No Known Problems Brother, Maternal Aunt, Maternal Uncle, Paternal Aunt, Paternal Uncle, Maternal Grandmother, Paternal Grandmother, Paternal Grandfather        Tobacco Use    Smoking status: Current Some Day Smoker     Packs/day: 1.00     Years: 45.00     Pack years: 45.00     Types: Cigarettes     Last attempt to quit: 2002     Years since quittin.2    Smokeless tobacco: Never Used   Substance and Sexual Activity    Alcohol use: No    Drug use: No    Sexual activity: Yes     Partners: Male       Review of Systems   Respiratory: Positive for shortness of breath.      Objective:     Vital Signs (Most Recent):  Temp: 98 °F (36.7 °C) (19)  Pulse: 76 (19)  Resp: 18 (19)  BP: (!) 150/69 (19)  SpO2: 96 % (19) Vital Signs (24h Range):  Temp:  [18 °F (-7.8 °C)-98.1 °F (36.7 °C)] 98 °F (36.7 °C)  Pulse:  [64-76] 76  Resp:  [16-19] 18  SpO2:  [90 %-100 %] 96 %  BP: (118-154)/(63-74) 150/69     Weight: 37.8 kg (83 lb 5.3 oz)  Body mass index is 13.87 kg/m².    Review of Symptoms  Symptom Assessment (ESAS 0-10 scale)   ESAS 0 1 2 3 4 5 6 7 8 9 10   Pain x             Dyspnea              Anxiety              Nausea x             Depression  x             Anorexia  "x             Fatigue x             Insomnia x             Restlessness  x             Agitation x             Bowel Management Plan (BMP): No        Physical Exam   Constitutional: She is oriented to person, place, and time.   Frail, cachectic   Cardiovascular: Normal rate and regular rhythm.   Pulmonary/Chest:   Oxygen on , diminished breath sounds   Abdominal: Soft. Bowel sounds are normal.   Neurological: She is alert and oriented to person, place, and time.   Skin: Skin is warm and dry.   Nursing note and vitals reviewed.      Significant Labs: All pertinent labs within the past 24 hours have been reviewed.  CBC:   Recent Labs   Lab 04/06/19  0506   WBC 21.53*   HGB 12.2   HCT 36.2*   MCV 93        BMP:  No results for input(s): GLU, NA, K, CL, CO2, BUN, CREATININE, CALCIUM, MG in the last 24 hours.  LFT:  Lab Results   Component Value Date    AST 18 04/02/2019    ALKPHOS 86 04/02/2019    BILITOT 0.5 04/02/2019     Albumin:   Albumin   Date Value Ref Range Status   04/02/2019 2.6 (L) 3.5 - 5.2 g/dL Final     Protein:   Total Protein   Date Value Ref Range Status   04/02/2019 6.5 6.0 - 8.4 g/dL Final     Lactic acid:   No results found for: LACTATE    Significant Imaging: I have reviewed all pertinent imaging results/findings within the past 24 hours.    Advance Care Planning   Advanced Directives::  Living Will: No  LaPOST: No  Do Not Resuscitate Status:Patient is a full code  Medical Power of : No    Decision-Making Capacity: Patient answered questions       Living Arrangements: Lives with spouse    ASSESSMENT/PLAN:    Palliative encounter:    Bedside consult with patient who recently returned from her stress test. She states she thinks she did ok and is still in shock about her heart problems. She reports she couldn't tell her heart was "beating funny" except when she would lie down. She reports being under a lot of stress while her spouse was in hospital 2 weeks ago and was at his bedside all " "the time and not caring for herself. She was not eating and noticed she had lost 10 lbs in a short period of time. She states she was feeling guilty about letting her spouse go to the nursing facility but knew she could not care for him any longer. She is having some anxiety about going to the nursing facility herself but after talking she realizes she cannot go home at this time while she is so weak. She does say this has been a wake up call for her and she will no longer be a "fool" and smoke. We discussed her current condition, options, code status and   long term predicted outcomes   She reports she has a Living will and her niece hs access to it and is suppose to get a copy for us. We discussed her meaning of QOL and her wishes, and pros and cons of CPR in detail.  She is ok with short term intubation for respiratory distress not relieved by CPAP/BiPAP but does not want long term vent support or tracheostomy. She states if she is on life support and the prognosis is poor or she will not be wean then she wants all life sustaining measures to be withdrawn. She does not want CPR in the event of cardiac arrest and wants a peaceful, natural   passing.    All questions and concerns addressed. Emotional support provided      -Patient has a Living will (requested a copy)  -Patient is now a DNR in the event of cardiac arrest  -patient is ok with short term intubation for respiratory distress only  -will need LaPOST prior to discharge  -Palliative to continue to follow    "

## 2019-04-08 NOTE — PT/OT/SLP PROGRESS
"Physical Therapy Treatment    Patient Name:  Latasha Perez   MRN:  [id number]    Recommendations:     Discharge Recommendations:  nursing facility, skilled(per supervising PT)   Discharge Equipment Recommendations: O2 ?   Barriers to discharge: Decreased caregiver support (pt lives alone)    Assessment:     Latasha Perez is a 73 y.o. female admitted with a medical diagnosis of Atrial fibrillation with RVR.  She presents with the following impairments/functional limitations:  weakness, impaired endurance, impaired functional mobilty, impaired balance, decreased safety awareness, decreased upper extremity function, decreased lower extremity function, decreased coordination, gait instability, impaired self care skills, decreased ROM, impaired cardiopulmonary response to activity, impaired skin, impaired coordination.  Pt is making gains with gait distance, however, continues to display SOB and decreased endurance.  Pt ambulated 42ft x2 trials with RW, CGA.    Rehab Prognosis: Fair; patient would benefit from acute skilled PT services to address these deficits and reach maximum level of function.    Recent Surgery: Procedure(s) (LRB):  Transesophageal echo (JOHNNA) intra-procedure log documentation (N/A)  Cardioversion or Defibrillation 4 Days Post-Op    Plan:     During this hospitalization, patient to be seen daily to address the identified rehab impairments via gait training, therapeutic activities, therapeutic exercises and progress toward the following goals:    · Plan of Care Expires:  04/19/19    Subjective     Chief Complaint: not being able to take care of herself  Patient/Family Comments/goals: Pt states " I know I can't do it alone."  Pain/Comfort:  Pain Rating 1: 0/10      Objective:     Communicated with pt's nurse, Myesha prior to session.  Patient found seated in [facility] with lunch tray with telemetry, PICC line, oxygen upon PT entry to room.     General Precautions: Standard, fall, respiratory "   Orthopedic Precautions:N/A   Braces: N/A     Functional Mobility:  · Transfers: x3 trials    · Sit to Stand:  contact guard assistance with rolling walker  · Gait(requires extra time): Pt ambulated 42ft x2 trials with RW, CGA on 4LO2 with B[place] to follow.  Pt required seated rest break in between ambulation trials.  Pt with SOB, fatigue and decreased endurance.  Pt displays decreased patience, step length, velocity of limb, postural control and safety awarneess.  · Balance: good sitting and fair standing      AM-PAC 6 CLICK MOBILITY  Turning over in bed (including adjusting bedclothes, sheets and blankets)?: 4  Sitting down on and standing up from a chair with arms (e.g., wheelchair, bedside commode, etc.): 4  Moving from lying on back to sitting on the side of the bed?: 4  Moving to and from a bed to a chair (including a wheelchair)?: 3  Need to walk in hospital room?: 3  Climbing 3-5 steps with a railing?: 2  Basic Mobility Total Score: 20       Therapeutic Activities and Exercises:   Pt performed sit<>stand transfers x3 trials with RW, CGA with cues for proper hand placement.  Held off on exercises today due to pt ready for breathing treatment 2/2 SOB and ready to finish lunch.  Patient left up in chair with lunch tray and RT with all lines intact, call button in reach, pt's nurse, Myesha, notified and RT present..  Pt with complaints of chicken getting stuck in her chest after swallowing.  Pt's nurse, Myesha, notified.    GOALS:   Multidisciplinary Problems     Physical Therapy Goals        Problem: Physical Therapy Goal    Goal Priority Disciplines Outcome Goal Variances Interventions   Physical Therapy Goal     PT, PT/OT Ongoing (interventions implemented as appropriate)     Description:  Goals to be met by: 19     Patient will increase functional independence with mobility by performin. Supine to sit with Modified [place]  2. Rolling to Left and Right with Modified [place]  3. Sit to  stand transfer with Modified [place]  4. Bed to chair transfer with Modified [place]   5. Gait  x50-100 feet with Modified [place] using Rolling Walker and O2  6. Lower extremity exercise program 2 sets x10 reps per handout, with independence                      Time Tracking:     PT Received On: 04/08/19  PT Start Time: 1452     PT Stop Time: 1517  PT Total Time (min): 25 min     Billable Minutes: Gait Training 25    Treatment Type: Treatment  PT/PTA: PTA     PTA Visit Number: 2     Haylie Briones, PTA  04/08/2019

## 2019-04-08 NOTE — ASSESSMENT & PLAN NOTE
Severe Malnutrition in the context of Chronic Illness/Injury     Related to (etiology):  COPD     Signs and Symptoms (as evidenced by):     Body Fat Depletion: severe depletion of orbitals, triceps and thoracic and lumbar region   Muscle Mass Depletion: severe depletion of temples, clavicle region, scapular region, interosseous muscle and lower extremities   Weight Loss: 9% x 1 month     Interventions  Collaboration with providers  [employer]     Nutrition Diagnosis Status:  New

## 2019-04-08 NOTE — ASSESSMENT & PLAN NOTE
Now on cefepime 2 g q 8 hrs x 14 days for Pseudomonas ae bacteremia (selection by ID mainly due to allergy profile)  PICC placed  Repeat blood cultures on 4/4 NGTD  BiPAP QHS + low flow NC continuously (unable to wean off at this time)  Continued nebulizer treatments and prednisone  No edema therefore no diuretics indicated at this time  Pulmonary input appreciated  ID also on board for abx co-management  Will consult palliative care for code status

## 2019-04-08 NOTE — SUBJECTIVE & OBJECTIVE
Interval History: stable. No edema on exam nor xray    Review of Systems   Constitutional: Negative for chills and fever.   Respiratory: Positive for cough and shortness of breath (worse with minimal exertion).    Cardiovascular: Negative for chest pain and palpitations.     Objective:     Vital Signs (Most Recent):  Temp: 98 °F (36.7 °C) (04/08/19 0813)  Pulse: 76 (04/08/19 0813)  Resp: 18 (04/08/19 0813)  BP: (!) 150/69 (04/08/19 0813)  SpO2: 96 % (04/08/19 0813) Vital Signs (24h Range):  Temp:  [18 °F (-7.8 °C)-98.1 °F (36.7 °C)] 98 °F (36.7 °C)  Pulse:  [64-76] 76  Resp:  [16-19] 18  SpO2:  [90 %-100 %] 96 %  BP: (118-154)/(63-74) 150/69     Weight: 37.8 kg (83 lb 5.3 oz)  Body mass index is 13.87 kg/m².    Intake/Output Summary (Last 24 hours) at 4/8/2019 1212  Last data filed at 4/8/2019 1019  Gross per 24 hour   Intake 150 ml   Output 200 ml   Net -50 ml      Physical Exam   Constitutional: She is oriented to person, place, and time. She appears well-developed. No distress.   Cachectic and frail   HENT:   + Temporal wasting, on nasal cannula   Cardiovascular: Normal rate and regular rhythm.   Pulmonary/Chest: No respiratory distress. She has no rales.   Diminished breath sounds throughout, no wheeze noted with prolonged expiratory time   Abdominal: Soft. Bowel sounds are normal. She exhibits no distension and no mass. There is no tenderness. There is no rebound and no guarding.   Musculoskeletal: Normal range of motion. She exhibits no edema.   Neurological: She is alert and oriented to person, place, and time.   Skin: Capillary refill takes less than 2 seconds. She is not diaphoretic.   Psychiatric: She has a normal mood and affect. Her behavior is normal. Thought content normal.   Nursing note and vitals reviewed.      Significant Labs: All pertinent labs within the past 24 hours have been reviewed.    Significant Imaging: I have reviewed all pertinent imaging results/findings within the past 24 hours.  I  have reviewed and interpreted all pertinent imaging results/findings within the past 24 hours.

## 2019-04-08 NOTE — PLAN OF CARE
Problem: Occupational Therapy Goal  Goal: Occupational Therapy Goal  Goals to be met by: 04/19/19     Patient will increase functional independence with ADLs by performing:    Feeding with Modified [place].  UE Dressing with Modified [place].  LE Dressing with Modified [place].  Grooming while standing at sink with Modified [place].  Toileting from toilet with Supervision for hygiene and clothing management.   Sitting at edge of bed x25 minutes with Modified [place].  Rolling to Bilateral with Modified [place].   Supine to sit with Modified [place].  Step transfer with Modified [place]  Toilet transfer to toilet with Modified [place].  Upper extremity exercise program x15 reps per handout, with supervision.     Outcome: Ongoing (interventions implemented as appropriate)  The patient was able to transfer from bed to BSC with CGA/min assist. The patient will benefit from continued OT.    Comments: The patient will benefit from SNF.

## 2019-04-08 NOTE — NURSING
Report received from SALMA Laguerre. Patient resting comfortably, no complaints, no acute distress noted. Patient educated on plan of care, verbalized understanding of NPO status. Will continue to monitor.

## 2019-04-08 NOTE — PROGRESS NOTES
[facility]  Progress Note    Patient Name: Latasha Perez  MRN: [id number]  Patient Class: IP- Inpatient   Admission Date: 4/2/2019  Length of Stay: 6 days  Attending Physician: Sabra Mayorga MD  Primary Care Provider: Pollo Oneal MD        Subjective:     Principal Problem:Atrial fibrillation with RVR    HPI:  73-year-old female with HTN, HLP, severe COPD and + tobacco abuse who presented with complaint of dizziness and weakness that is been progressive over the past 2 days.  She also reports increase in shortness of breath along with palpitations.  She was recently admitted to Observation from 3/28 to 3/29/19 for a COPD exacerbation and was discharged home on Azithromycin and prednisone 40 mg daily x 5 days.  She denies any fevers or chills.  In the ER, she was noted to be in Afib with RVR, rate initially in the 190s, status post IV diltiazem push followed by infusion with rate in the 140-150s. CXR with new right upper lobe opacity, leukocytosis of 38,000. Patient with reported wheezing on exam status post IV Solu-Medrol 125 mg IV push x1.  She was placed on BiPAP.    Hospital Course:  Ms. Perez was admitted with AFib with RVR along with acute on chronic respiratory failure due to COPD exacerbation and pneumonia.  Patient was started on diltiazem infusion with inadequate control of heart rate and therefore converted to amiodarone infusion.  Anticoagulated with full-dose Lovenox and Cardiology consulted.  2D echo performed with EF of 55% and elevated PA pressures of 51 mmHg and pulmonary hypertension.  Patient had new infiltrate in the right upper lobe at presentation concerning for pneumonia which was empirically treated with Rocephin, azithromycin and vancomycin given her allergy profile and recent admission to the hospital.  Patient also reports smoking again which likely exacerbated her COPD in addition to pneumonia.  She was supported with BiPAP and had pulse  dose IV steroids which was quickly converted to prednisone.  Pulmonary following and oxygen being wean as tolerated. Blood culture positive in 1/4 bottles for GNR which has returned as Pseudomonas aeruginosa. Despite amiodarone, patient HR poorly controlled and she underwent DCCV on 4/4/19 with conversion to NSR but went back into Afib but rate controlled. Pt completed amiodarone IV load which was converted to PO on 4/5. Anticoagulation converted to Eliquis. ID consulted for bacteremia given allergy profile. Recommendation is cefepime 2g every 8 hours for 14 days. PICC line placed.     Interval History: stable. No edema on exam nor xray    Review of Systems   Constitutional: Negative for chills and fever.   Respiratory: Positive for cough and shortness of breath (worse with minimal exertion).    Cardiovascular: Negative for chest pain and palpitations.     Objective:     Vital Signs (Most Recent):  Temp: 98 °F (36.7 °C) (04/08/19 0813)  Pulse: 76 (04/08/19 0813)  Resp: 18 (04/08/19 0813)  BP: (!) 150/69 (04/08/19 0813)  SpO2: 96 % (04/08/19 0813) Vital Signs (24h Range):  Temp:  [18 °F (-7.8 °C)-98.1 °F (36.7 °C)] 98 °F (36.7 °C)  Pulse:  [64-76] 76  Resp:  [16-19] 18  SpO2:  [90 %-100 %] 96 %  BP: (118-154)/(63-74) 150/69     Weight: 37.8 kg (83 lb 5.3 oz)  Body mass index is 13.87 kg/m².    Intake/Output Summary (Last 24 hours) at 4/8/2019 1212  Last data filed at 4/8/2019 1019  Gross per 24 hour   Intake 150 ml   Output 200 ml   Net -50 ml      Physical Exam   Constitutional: She is oriented to person, place, and time. She appears well-developed. No distress.   Cachectic and frail   HENT:   + Temporal wasting, on nasal cannula   Cardiovascular: Normal rate and regular rhythm.   Pulmonary/Chest: No respiratory distress. She has no rales.   Diminished breath sounds throughout, no wheeze noted with prolonged expiratory time   Abdominal: Soft. Bowel sounds are normal. She exhibits no distension and no mass. There is  no tenderness. There is no rebound and no guarding.   Musculoskeletal: Normal range of motion. She exhibits no edema.   Neurological: She is alert and oriented to person, place, and time.   Skin: Capillary refill takes less than 2 seconds. She is not diaphoretic.   Psychiatric: She has a normal mood and affect. Her behavior is normal. Thought content normal.   Nursing note and vitals reviewed.      Significant Labs: All pertinent labs within the past 24 hours have been reviewed.    Significant Imaging: I have reviewed all pertinent imaging results/findings within the past 24 hours.  I have reviewed and interpreted all pertinent imaging results/findings within the past 24 hours.    Assessment/Plan:      * Atrial fibrillation with RVR  Patient initially treated with diltiazem infusion with minimal response so stopped  s/p IV amiodarone infusion converted to PO as of today  s/p DCCV on 4/4 with NSR. Then in and out of AFib but with controlled rate  Anticoagulation converted to PO apaxiban  Cardiology input appreciated  Echo with normal EF of 55% with PA pressure of 51 mm Hg and pulmonary hypertension  Rate controlled on amiodarone and diltiazem  Plan for nuclear stress test today      Bacteremia due to Pseudomonas  Due to Pseudomonas aeruginosa  Antibiotics changed as discussed above  Expected stop date 4/18      Cachexia  Patient is underweight and with BMI of 13.98  Regular diet and supplement with Boost  Nutrition consulted    Pneumonia  New infiltrate noted in the right upper lobe that was not present when she was admitted just a few days ago for observation  Empirically treated with ceftriaxone, azithromycin and vancomycin  Blood cultures with Pseudomonas  Change antibiotics to cefepime on 4/4  ID on board    Acute on chronic respiratory failure with hypercapnia  Now on cefepime 2 g q 8 hrs x 14 days for Pseudomonas ae bacteremia (selection by ID mainly due to allergy profile)  PICC placed  Repeat blood cultures  on 4/4 NGTD  BiPAP QHS + low flow NC continuously (unable to wean off at this time)  Continued nebulizer treatments and prednisone  No edema therefore no diuretics indicated at this time  Pulmonary input appreciated  ID also on board for abx co-management  Will consult palliative care for code status    COPD exacerbation  As above  Is motivated to quit smoking  Pulmonary input appreciated    Essential hypertension  Continue diltiazem for HR and BP control    Leukocytosis  Likely secondary to pneumonia and possible steroid and/or leukemoid reaction  Antibiotics as discussed above   Stop checking CBC unless clinically indicated    HLD (hyperlipidemia)  Continue pravastatin    VTE Risk Mitigation (From admission, onward)        Ordered     apixaban tablet 5 mg  2 times daily      04/04/19 1500     IP VTE HIGH RISK PATIENT  Once      04/02/19 1210     Place sequential compression device  Until discontinued      04/02/19 1210        Dispo: patient is very deconditioned and becomes dyspneic with exertion. Home environment is not safe for patient as there is no 24 hour care and she cannot stay by herself. For patient's safety in setting of PT/OT, O2 requirements and IV abx needs, patient would benefit from SNF. Discussed at huddle today. Will place MARK Varner MD  Department of Hospital Medicine   [facility]

## 2019-04-08 NOTE — CONSULTS
[facility]  Palliative Medicine  Consult Note    Patient Name: Latasha Perez  MRN: [id number]  Admission Date: 4/2/2019  Hospital Length of Stay: 6 days  Code Status: Full Code   Attending Provider: Sabra Mayorga MD  Consulting Provider: Kassandra Howe NP  Primary Care Physician: Pollo Oneal MD  Principal Problem:Atrial fibrillation with RVR    Patient information was obtained from past medical records and ER records. patient         Thank you for your consult. I will follow-up with patient. Please contact us if you have any additional questions.    Subjective:       Principal Problem:Atrial fibrillation with RVR     HPI:  73-year-old female with HTN, HLP, severe COPD and + tobacco abuse who presented with complaint of dizziness and weakness that is been progressive over the past 2 days.  She also reports increase in shortness of breath along with palpitations.  She was recently admitted to Observation from 3/28 to 3/29/19 for a COPD exacerbation and was discharged home on Azithromycin and prednisone 40 mg daily x 5 days.  She denies any fevers or chills.  In the ER, she was noted to be in Afib with RVR, rate initially in the 190s, status post IV diltiazem push followed by infusion with rate in the 140-150s. CXR with new right upper lobe opacity, leukocytosis of 38,000. Patient with reported wheezing on exam status post IV Solu-Medrol 125 mg IV push x1.  She was placed on BiPAP.     Hospital Course:  Ms. Perez was admitted with AFib with RVR along with acute on chronic respiratory failure due to COPD exacerbation and pneumonia.  Patient was started on diltiazem infusion with inadequate control of heart rate and therefore converted to amiodarone infusion.  Anticoagulated with full-dose Lovenox and Cardiology consulted.  2D echo performed with EF of 55% and elevated PA pressures of 51 mmHg and pulmonary hypertension.  Patient had new infiltrate in the right upper lobe at  presentation concerning for pneumonia which was empirically treated with Rocephin, azithromycin and vancomycin given her allergy profile and recent admission to the hospital.  Patient also reports smoking again which likely exacerbated her COPD in addition to pneumonia.  She was supported with BiPAP and had pulse dose IV steroids which was quickly converted to prednisone.  Pulmonary following and oxygen being wean as tolerated. Blood culture positive in 1/4 bottles for GNR which has returned as Pseudomonas aeruginosa. Despite amiodarone, patient HR poorly controlled and she underwent DCCV on 4/4/19 with conversion to NSR but went back into Afib but rate controlled. Pt completed amiodarone IV load which was converted to PO on 4/5. Anticoagulation converted to Eliquis. ID consulted for bacteremia given allergy profile. Recommendation is cefepime 2g every 8 hours for 14 days. PICC line placed.           Interval History: returned from stress test    Past Medical History:   Diagnosis Date    Arthritis     Carotid disease, bilateral     Cataract     Chronic hyponatremia     COPD (chronic obstructive pulmonary disease)     HLD (hyperlipidemia)     HTN (hypertension)        Past Surgical History:   Procedure Laterality Date    APPENDECTOMY      Cardioversion or Defibrillation  4/4/2019    Performed by [facility]fred [facility]ero[facility] at [facility] CATH LAB    CATARACT EXTRACTION W/  INTRAOCULAR LENS IMPLANT Right 12/06/2018    Dr. Escobar    CATARACT EXTRACTION W/  INTRAOCULAR LENS IMPLANT Left 12/20/2018    DR. Escobar    CERVICAL SPINE SURGERY      DILATION AND CURETTAGE OF UTERUS      x 2    EYE SURGERY      cataract Rt    FRACTURE SURGERY      Lt leg fracture with hardware    INSERTION, IOL PROSTHESIS Left 12/20/2018    Performed by [facility]ick [facility]obar [facility] at [facility] OR    INSERTION, IOL PROSTHESIS Right 12/6/2018    Per[facility] at [facility] OR    metatarsal repair      OPEN REDUCTION  INTERNAL FIXATION-TIBIAL PLATEAU Left 6/27/2014    Per[facility]ak [facility]reira [facility] at [facility] OR    PHACOEMULSIFICATION, CATARACT Left 12/20/2018    Performed by [facility]obar [facility] at [facility] OR    PHACOEMULSIFICATION, CATARACT Right 12/6/2018    Performed by [facility]obar [facility] at [facility] OR    SHOULDER ARTHROSCOPY      Transesophageal echo (JOHNNA) intra-procedure log documentation N/A 4/4/2019    Performed by Manfred Figueroa MD at [facility] CATH LAB       Review of patient's allergies indicates:   Allergen Reactions    Pcn [penicillins] Other (See Comments)     Fever flushing skin swelling     Biaxin [clarithromycin] Rash    Codeine Rash    Doxycycline Rash    Levaquin [levofloxacin] Rash       Medications:  Continuous Infusions:  Scheduled Meds:   amiodarone  400 mg Oral BID    apixaban  5 mg Oral BID    aspirin  81 mg Oral Daily    ceFEPime (MAXIPIME) IVPB  2 g Intravenous Q8H    diltiaZEM  120 mg Oral Daily    famotidine (PF)  20 mg Intravenous BID    HYDROcodone-acetaminophen  1 tablet Oral Once    levalbuterol  1.25 mg Nebulization Q8H    pravastatin  20 mg Oral QHS    sodium chloride 0.9%  10 mL Intravenous Q6H    tiotropium  1 capsule Inhalation Daily    tuberculin  5 Units Intradermal Once     PRN Meds:acetaminophen, ALPRAZolam, benzonatate, calcium carbonate, cloNIDine, ondansetron, polyethylene glycol, promethazine (PHENERGAN) IVPB, ramelteon, Flushing PICC Protocol **AND** sodium chloride 0.9% **AND** sodium chloride 0.9%    Family History     Problem Relation (Age of Onset)    Cancer Father    Cataracts Sister    Heart disease Mother, Maternal Grandfather    No Known Problems Brother, Maternal Aunt, Maternal Uncle, Paternal Aunt, Paternal Uncle, Maternal Grandmother, Paternal Grandmother, Paternal Grandfather        Tobacco Use    Smoking status: Current Some Day Smoker     Packs/day: 1.00     Years: 45.00     Pack years: 45.00     Types: Cigarettes     Last attempt to quit:  2002     Years since quittin.2    Smokeless tobacco: Never Used   Substance and Sexual Activity    Alcohol use: No    Drug use: No    Sexual activity: Yes     Partners: Male       Review of Systems   Respiratory: Positive for shortness of breath.      Objective:     Vital Signs (Most Recent):  Temp: 98 °F (36.7 °C) (19)  Pulse: 76 (19)  Resp: 18 (19)  BP: (!) 150/69 (19)  SpO2: 96 % (19) Vital Signs (24h Range):  Temp:  [18 °F (-7.8 °C)-98.1 °F (36.7 °C)] 98 °F (36.7 °C)  Pulse:  [64-76] 76  Resp:  [16-19] 18  SpO2:  [90 %-100 %] 96 %  BP: (118-154)/(63-74) 150/69     Weight: 37.8 kg (83 lb 5.3 oz)  Body mass index is 13.87 kg/m².    Review of Symptoms  Symptom Assessment (ESAS 0-10 scale)   ESAS 0 1 2 3 4 5 6 7 8 9 10   Pain x             Dyspnea              Anxiety              Nausea x             Depression  x             Anorexia x             Fatigue x             Insomnia x             Restlessness  x             Agitation x             Bowel Management Plan (BMP): No        Physical Exam   Constitutional: She is oriented to person, place, and time.   Frail, cachectic   Cardiovascular: Normal rate and regular rhythm.   Pulmonary/Chest:   Oxygen on , diminished breath sounds   Abdominal: Soft. Bowel sounds are normal.   Neurological: She is alert and oriented to person, place, and time.   Skin: Skin is warm and dry.   Nursing note and vitals reviewed.      Significant Labs: All pertinent labs within the past 24 hours have been reviewed.  CBC:   Recent Labs   Lab 19  0506   WBC 21.53*   HGB 12.2   HCT 36.2*   MCV 93        BMP:  No results for input(s): GLU, NA, K, CL, CO2, BUN, CREATININE, CALCIUM, MG in the last 24 hours.  LFT:  Lab Results   Component Value Date    AST 18 2019    ALKPHOS 86 2019    BILITOT 0.5 2019     Albumin:   Albumin   Date Value Ref Range Status   2019 2.6 (L) 3.5 - 5.2 g/dL Final  "    Protein:   Total Protein   Date Value Ref Range Status   04/02/2019 6.5 6.0 - 8.4 g/dL Final     Lactic acid:   No results found for: LACTATE    Significant Imaging: I have reviewed all pertinent imaging results/findings within the past 24 hours.    Advance Care Planning   Advanced Directives::  Living Will: No  LaPOST: No  Do Not Resuscitate Status:Patient is a full code  Medical Power of : No    Decision-Making Capacity: Patient answered questions       Living Arrangements: Lives with spouse    ASSESSMENT/PLAN:    Palliative encounter:    Bedside consult with patient who recently returned from her stress test. She states she thinks she did ok and is still in shock about her heart problems. She reports she couldn't tell her heart was "beating funny" except when she would lie down. She reports being under a lot of stress while her spouse was in hospital 2 weeks ago and was at his bedside all the time and not caring for herself. She was not eating and noticed she had lost 10 lbs in a short period of time. She states she was feeling guilty about letting her spouse go to the nursing facility but knew she could not care for him any longer. She is having some anxiety about going to the nursing facility herself but after talking she realizes she cannot go home at this time while she is so weak. She does say this has been a wake up call for her and she will no longer be a "fool" and smoke. We discussed her current condition, options, code status and   long term predicted outcomes   She reports she has a Living will and her niece hs access to it and is suppose to get a copy for us. We discussed her meaning of QOL and her wishes, and pros and cons of CPR in detail.  She is ok with short term intubation for respiratory distress not relieved by CPAP/BiPAP but does not want long term vent support or tracheostomy. She states if she is on life support and the prognosis is poor or she will not be wean then she wants " all life sustaining measures to be withdrawn. She does not want CPR in the event of cardiac arrest and wants a peaceful, natural   passing.    All questions and concerns addressed. Emotional support provided      -Patient has a Living will (requested a copy)  -Patient is now a DNR in the event of cardiac arrest  -patient is ok with short term intubation for respiratory distress only  -will need LaPOST prior to discharge  -Palliative to continue to follow        > 50% of 70 min visit spent in chart review, face to face discussion of goals of care,  symptom assessment, coordination of care and emotional support.    Kassandra Howe, NP  Palliative Medicine  [facility]

## 2019-04-08 NOTE — PLAN OF CARE
04/08/19 1131   Discharge Reassessment   Assessment Type Discharge Planning Reassessment   Do you have any problems affording any of your prescribed medications? No   Discharge Plan A Home Health;Home with family   Discharge Plan B Skilled Nursing Facility   DME Needed Upon Discharge    (TBD)   Patient choice form signed by patient/caregiver N/A   Anticipated Discharge Disposition   (TBD pt off unit at this time will follo wup at later time to discuss d/s plans.  as pts spouse currently in [facility] )   Can the patient answer the patient profile reliably? Yes, cognitively intact   How does the patient rate their overall health at the present time? Fair   Describe the patient's ability to walk at the present time. Minor restrictions or changes   How often would a person be available to care for the patient? Infrequently   Number of comorbid conditions (as recorded on the chart) Three   During the past month, has the patient often been bothered by feeling down, depressed or hopeless? No   During the past month, has the patient often been bothered by little interest or pleasure in doing things? No

## 2019-04-08 NOTE — NURSING
Patient arrived back to unit via wheelchair. No complaints, no acute distress noted. 4L N/C in use. Will continue to monitor.

## 2019-04-08 NOTE — HPI
Principal Problem:Atrial fibrillation with RVR     HPI:  73-year-old female with HTN, HLP, severe COPD and + tobacco abuse who presented with complaint of dizziness and weakness that is been progressive over the past 2 days.  She also reports increase in shortness of breath along with palpitations.  She was recently admitted to Observation from 3/28 to 3/29/19 for a COPD exacerbation and was discharged home on Azithromycin and prednisone 40 mg daily x 5 days.  She denies any fevers or chills.  In the ER, she was noted to be in Afib with RVR, rate initially in the 190s, status post IV diltiazem push followed by infusion with rate in the 140-150s. CXR with new right upper lobe opacity, leukocytosis of 38,000. Patient with reported wheezing on exam status post IV Solu-Medrol 125 mg IV push x1.  She was placed on BiPAP.     Hospital Course:  Ms. Perez was admitted with AFib with RVR along with acute on chronic respiratory failure due to COPD exacerbation and pneumonia.  Patient was started on diltiazem infusion with inadequate control of heart rate and therefore converted to amiodarone infusion.  Anticoagulated with full-dose Lovenox and Cardiology consulted.  2D echo performed with EF of 55% and elevated PA pressures of 51 mmHg and pulmonary hypertension.  Patient had new infiltrate in the right upper lobe at presentation concerning for pneumonia which was empirically treated with Rocephin, azithromycin and vancomycin given her allergy profile and recent admission to the hospital.  Patient also reports smoking again which likely exacerbated her COPD in addition to pneumonia.  She was supported with BiPAP and had pulse dose IV steroids which was quickly converted to prednisone.  Pulmonary following and oxygen being wean as tolerated. Blood culture positive in 1/4 bottles for GNR which has returned as Pseudomonas aeruginosa. Despite amiodarone, patient HR poorly controlled and she underwent DCCV on 4/4/19 with  conversion to NSR but went back into Afib but rate controlled. Pt completed amiodarone IV load which was converted to PO on 4/5. Anticoagulation converted to Eliquis. ID consulted for bacteremia given allergy profile. Recommendation is cefepime 2g every 8 hours for 14 days. PICC line placed.

## 2019-04-08 NOTE — PLAN OF CARE
Recommendations     1. Consider appetite stimulant to aid with po intake   2. Encourage po/supplements; honor preferences as able   3. Monitor weight weekly   4. RD to monitor     Goals: Meet > 85% EEN daily  Nutrition Goal Status: new  Communication of RD Recs: reviewed with RN(POC)

## 2019-04-08 NOTE — PROGRESS NOTES
Referral sent via [facility] to [facility] to see if the pt can be reviewed for SNF level of care services at their facility as the pts spouse was placed there on last week for IV abx therapy. [place] to follow in [facility] for response.

## 2019-04-09 PROBLEM — J13 PNEUMONIA DUE TO STREPTOCOCCUS PNEUMONIAE: Status: ACTIVE | Noted: 2019-04-02

## 2019-04-09 PROBLEM — J15.1 PNEUMONIA DUE TO PSEUDOMONAS SPECIES: Status: ACTIVE | Noted: 2019-04-02

## 2019-04-09 LAB
ANION GAP SERPL CALC-SCNC: 8 MMOL/L (ref 8–16)
BACTERIA BLD CULT: NORMAL
BACTERIA BLD CULT: NORMAL
BASOPHILS # BLD AUTO: 0 K/UL (ref 0–0.2)
BASOPHILS NFR BLD: 0 % (ref 0–1.9)
BUN SERPL-MCNC: 13 MG/DL (ref 8–23)
CALCIUM SERPL-MCNC: 9.1 MG/DL (ref 8.7–10.5)
CHLORIDE SERPL-SCNC: 90 MMOL/L (ref 95–110)
CO2 SERPL-SCNC: 35 MMOL/L (ref 23–29)
CREAT SERPL-MCNC: 0.6 MG/DL (ref 0.5–1.4)
DIFFERENTIAL METHOD: ABNORMAL
EOSINOPHIL # BLD AUTO: 0.1 K/UL (ref 0–0.5)
EOSINOPHIL NFR BLD: 0.4 % (ref 0–8)
ERYTHROCYTE [DISTWIDTH] IN BLOOD BY AUTOMATED COUNT: 12.9 % (ref 11.5–14.5)
EST. GFR  (AFRICAN AMERICAN): >60 ML/MIN/1.73 M^2
EST. GFR  (NON AFRICAN AMERICAN): >60 ML/MIN/1.73 M^2
GLUCOSE SERPL-MCNC: 101 MG/DL (ref 70–110)
HCT VFR BLD AUTO: 35.4 % (ref 37–48.5)
HGB BLD-MCNC: 11.9 G/DL (ref 12–16)
LYMPHOCYTES # BLD AUTO: 0.7 K/UL (ref 1–4.8)
LYMPHOCYTES NFR BLD: 4.6 % (ref 18–48)
MCH RBC QN AUTO: 31.3 PG (ref 27–31)
MCHC RBC AUTO-ENTMCNC: 33.6 G/DL (ref 32–36)
MCV RBC AUTO: 93 FL (ref 82–98)
MONOCYTES # BLD AUTO: 0.9 K/UL (ref 0.3–1)
MONOCYTES NFR BLD: 5.3 % (ref 4–15)
NEUTROPHILS # BLD AUTO: 14.4 K/UL (ref 1.8–7.7)
NEUTROPHILS NFR BLD: 89.7 % (ref 38–73)
PLATELET # BLD AUTO: 262 K/UL (ref 150–350)
PMV BLD AUTO: 9.3 FL (ref 9.2–12.9)
POTASSIUM SERPL-SCNC: 3.2 MMOL/L (ref 3.5–5.1)
RBC # BLD AUTO: 3.8 M/UL (ref 4–5.4)
SODIUM SERPL-SCNC: 133 MMOL/L (ref 136–145)
WBC # BLD AUTO: 16.04 K/UL (ref 3.9–12.7)

## 2019-04-09 PROCEDURE — 25000003 PHARM REV CODE 250: Mod: HCNC | Performed by: HOSPITALIST

## 2019-04-09 PROCEDURE — 25000003 PHARM REV CODE 250: Mod: HCNC | Performed by: NURSE PRACTITIONER

## 2019-04-09 PROCEDURE — 92610 EVALUATE SWALLOWING FUNCTION: CPT | Mod: HCNC

## 2019-04-09 PROCEDURE — 63600175 PHARM REV CODE 636 W HCPCS: Mod: HCNC | Performed by: PHYSICIAN ASSISTANT

## 2019-04-09 PROCEDURE — 94640 AIRWAY INHALATION TREATMENT: CPT | Mod: HCNC

## 2019-04-09 PROCEDURE — 97116 GAIT TRAINING THERAPY: CPT | Mod: HCNC

## 2019-04-09 PROCEDURE — S0028 INJECTION, FAMOTIDINE, 20 MG: HCPCS | Mod: HCNC | Performed by: INTERNAL MEDICINE

## 2019-04-09 PROCEDURE — 36415 COLL VENOUS BLD VENIPUNCTURE: CPT | Mod: HCNC

## 2019-04-09 PROCEDURE — 25000242 PHARM REV CODE 250 ALT 637 W/ HCPCS: Mod: HCNC | Performed by: HOSPITALIST

## 2019-04-09 PROCEDURE — 99233 SBSQ HOSP IP/OBS HIGH 50: CPT | Mod: HCNC,,, | Performed by: NURSE PRACTITIONER

## 2019-04-09 PROCEDURE — 80048 BASIC METABOLIC PNL TOTAL CA: CPT | Mod: HCNC

## 2019-04-09 PROCEDURE — 99233 PR SUBSEQUENT HOSPITAL CARE,LEVL III: ICD-10-PCS | Mod: HCNC,,, | Performed by: NURSE PRACTITIONER

## 2019-04-09 PROCEDURE — 97110 THERAPEUTIC EXERCISES: CPT | Mod: HCNC

## 2019-04-09 PROCEDURE — 97535 SELF CARE MNGMENT TRAINING: CPT | Mod: HCNC

## 2019-04-09 PROCEDURE — 27000221 HC OXYGEN, UP TO 24 HOURS: Mod: HCNC

## 2019-04-09 PROCEDURE — A4216 STERILE WATER/SALINE, 10 ML: HCPCS | Mod: HCNC | Performed by: INTERNAL MEDICINE

## 2019-04-09 PROCEDURE — 94761 N-INVAS EAR/PLS OXIMETRY MLT: CPT | Mod: HCNC

## 2019-04-09 PROCEDURE — 25000003 PHARM REV CODE 250: Mod: HCNC | Performed by: INTERNAL MEDICINE

## 2019-04-09 PROCEDURE — 21400001 HC TELEMETRY ROOM: Mod: HCNC

## 2019-04-09 PROCEDURE — 85025 COMPLETE CBC W/AUTO DIFF WBC: CPT | Mod: HCNC

## 2019-04-09 RX ORDER — CLONIDINE HYDROCHLORIDE 0.1 MG/1
0.1 TABLET ORAL EVERY 4 HOURS PRN
Status: DISCONTINUED | OUTPATIENT
Start: 2019-04-09 | End: 2019-04-11 | Stop reason: HOSPADM

## 2019-04-09 RX ORDER — DILTIAZEM HYDROCHLORIDE 120 MG/1
240 CAPSULE, COATED, EXTENDED RELEASE ORAL DAILY
Status: DISCONTINUED | OUTPATIENT
Start: 2019-04-09 | End: 2019-04-11 | Stop reason: HOSPADM

## 2019-04-09 RX ORDER — MAG HYDROX/ALUMINUM HYD/SIMETH 200-200-20
30 SUSPENSION, ORAL (FINAL DOSE FORM) ORAL ONCE
Status: COMPLETED | OUTPATIENT
Start: 2019-04-09 | End: 2019-04-09

## 2019-04-09 RX ADMIN — FAMOTIDINE 20 MG: 10 INJECTION INTRAVENOUS at 08:04

## 2019-04-09 RX ADMIN — CEFEPIME 2 G: 2 INJECTION, POWDER, FOR SOLUTION INTRAVENOUS at 03:04

## 2019-04-09 RX ADMIN — DILTIAZEM HYDROCHLORIDE 120 MG: 120 CAPSULE, COATED, EXTENDED RELEASE ORAL at 08:04

## 2019-04-09 RX ADMIN — CEFEPIME 2 G: 2 INJECTION, POWDER, FOR SOLUTION INTRAVENOUS at 06:04

## 2019-04-09 RX ADMIN — Medication 10 ML: at 05:04

## 2019-04-09 RX ADMIN — ACETAMINOPHEN 500 MG: 500 TABLET, FILM COATED ORAL at 05:04

## 2019-04-09 RX ADMIN — CEFEPIME 2 G: 2 INJECTION, POWDER, FOR SOLUTION INTRAVENOUS at 12:04

## 2019-04-09 RX ADMIN — ALPRAZOLAM 0.25 MG: 0.25 TABLET ORAL at 08:04

## 2019-04-09 RX ADMIN — APIXABAN 5 MG: 5 TABLET, FILM COATED ORAL at 08:04

## 2019-04-09 RX ADMIN — TIOTROPIUM BROMIDE 18 MCG: 18 CAPSULE ORAL; RESPIRATORY (INHALATION) at 01:04

## 2019-04-09 RX ADMIN — ALUMINUM HYDROXIDE, MAGNESIUM HYDROXIDE, AND SIMETHICONE 30 ML: 200; 200; 20 SUSPENSION ORAL at 10:04

## 2019-04-09 RX ADMIN — AMIODARONE HYDROCHLORIDE 400 MG: 200 TABLET ORAL at 08:04

## 2019-04-09 RX ADMIN — LEVALBUTEROL HYDROCHLORIDE 1.25 MG: 1.25 SOLUTION, CONCENTRATE RESPIRATORY (INHALATION) at 03:04

## 2019-04-09 RX ADMIN — ASPIRIN 81 MG: 81 TABLET, COATED ORAL at 08:04

## 2019-04-09 RX ADMIN — Medication 10 ML: at 12:04

## 2019-04-09 RX ADMIN — LEVALBUTEROL HYDROCHLORIDE 1.25 MG: 1.25 SOLUTION, CONCENTRATE RESPIRATORY (INHALATION) at 08:04

## 2019-04-09 RX ADMIN — Medication 10 ML: at 06:04

## 2019-04-09 RX ADMIN — POTASSIUM BICARBONATE 50 MEQ: 978 TABLET, EFFERVESCENT ORAL at 12:04

## 2019-04-09 RX ADMIN — PRAVASTATIN SODIUM 20 MG: 10 TABLET ORAL at 08:04

## 2019-04-09 NOTE — PROGRESS NOTES
Received bedside report from SALMA Brunner. Patient AAOx4, resting quietly in bed, no distress noted. Safety maintained, call light in reach.

## 2019-04-09 NOTE — SUBJECTIVE & OBJECTIVE
Interval History: uneventful night    Medications:  Continuous Infusions:  Scheduled Meds:   aluminum-magnesium hydroxide-simethicone  30 mL Oral Once    amiodarone  400 mg Oral BID    apixaban  5 mg Oral BID    aspirin  81 mg Oral Daily    ceFEPime (MAXIPIME) IVPB  2 g Intravenous Q8H    diltiaZEM  120 mg Oral Daily    famotidine (PF)  20 mg Intravenous BID    HYDROcodone-acetaminophen  1 tablet Oral Once    levalbuterol  1.25 mg Nebulization Q8H    pravastatin  20 mg Oral QHS    sodium chloride 0.9%  10 mL Intravenous Q6H    tiotropium  1 capsule Inhalation Daily     PRN Meds:acetaminophen, ALPRAZolam, benzonatate, calcium carbonate, cloNIDine, ondansetron, polyethylene glycol, promethazine (PHENERGAN) IVPB, ramelteon, Flushing PICC Protocol **AND** sodium chloride 0.9% **AND** sodium chloride 0.9%    Objective:     Vital Signs (Most Recent):  Temp: 98 °F (36.7 °C) (04/09/19 0748)  Pulse: 84 (04/09/19 0827)  Resp: 20 (04/09/19 0827)  BP: (!) 160/74 (04/09/19 0748)  SpO2: 96 % (04/09/19 0827) Vital Signs (24h Range):  Temp:  [96.3 °F (35.7 °C)-98.3 °F (36.8 °C)] 98 °F (36.7 °C)  Pulse:  [69-84] 84  Resp:  [17-20] 20  SpO2:  [93 %-100 %] 96 %  BP: (148-174)/(67-78) 160/74     Weight: 37.8 kg (83 lb 5.3 oz)  Body mass index is 13.87 kg/m².    Review of Symptoms  Symptom Assessment (ESAS 0-10 scale)  ESAS 0 1 2 3 4 5 6 7 8 9 10   Pain x             Dyspnea x             Anxiety x             Nausea x             Depression  x             Anorexia x             Fatigue x             Insomnia x             Restlessness  x             Agitation x             Physical Exam   Constitutional: She is oriented to person, place, and time. She appears well-developed.   cachectic   Neck: Neck supple.   Cardiovascular: Normal rate and regular rhythm.   Pulmonary/Chest:   Oxygen on BNC, diminished breath sounds RLL   Abdominal: Soft. Bowel sounds are normal.   Musculoskeletal: Normal range of motion.   Neurological:  She is alert and oriented to person, place, and time.   Skin: Skin is warm and dry.   Nursing note and vitals reviewed.      Significant Labs: All pertinent labs within the past 24 hours have been reviewed.  CBC:   Recent Labs   Lab 04/09/19  0535   WBC 16.04*   HGB 11.9*   HCT 35.4*   MCV 93        BMP:  Recent Labs   Lab 04/09/19  0535      *   K 3.2*   CL 90*   CO2 35*   BUN 13   CREATININE 0.6   CALCIUM 9.1     LFT:  Lab Results   Component Value Date    AST 18 04/02/2019    ALKPHOS 86 04/02/2019    BILITOT 0.5 04/02/2019     Albumin:   Albumin   Date Value Ref Range Status   04/02/2019 2.6 (L) 3.5 - 5.2 g/dL Final     Protein:   Total Protein   Date Value Ref Range Status   04/02/2019 6.5 6.0 - 8.4 g/dL Final     Lactic acid:   No results found for: LACTATE    Significant Imaging: I have reviewed all pertinent imaging results/findings within the past 24 hours.    Advanced Directives::  Living Will: Patient reports she has a Living Will but no copy on short  [place]: No  Do Not Resuscitate Status:patient is now a Partial code-no chest compressions, no shocking/cardioversion    ASSESSMENT/PLAN:    Palliative encounter:    Patient in bed and states she is having a bad morning. She did not eat much this am and c/o when trying to eat it feels like it got stuck and causing discomfort in her back. She has famotodine IV ordered bid but I will order one dose of Maalox to see if it will provide any more relief and if not it may be cardiac. Asked patient about her refusal to wear telemetry and states she did not refuse. I recommended we need to put her back on it and she agreed. She may benefit from mirtazapine to assist with appetite and feelings of depression she is exhibiting due to loss of independence.    She does state she feels better overall but is down also because she knows she cannot go home when she leaves here. She talks about how active she was prior to her spouse getting down and was a  . Allowed her to vent and answered questions and concerns. Emotional support provided.          -symptom managment

## 2019-04-09 NOTE — PROGRESS NOTES
[facility] Medicine  Progress Note    Patient Name: Latasha Perez  MRN: [id number]  Patient Class: IP- Inpatient   Admission Date: 4/2/2019  Length of Stay: 7 days  Attending Physician: Gaetano Nieto MD  Primary Care Provider: Pollo Oneal MD        Subjective:     Principal Problem:Atrial fibrillation with RVR    HPI:  73-year-old female with HTN, HLP, severe COPD and + tobacco abuse who presented with complaint of dizziness and weakness that is been progressive over the past 2 days.  She also reports increase in shortness of breath along with palpitations.  She was recently admitted to Observation from 3/28 to 3/29/19 for a COPD exacerbation and was discharged home on Azithromycin and prednisone 40 mg daily x 5 days.  She denies any fevers or chills.  In the ER, she was noted to be in Afib with RVR, rate initially in the 190s, status post IV diltiazem push followed by infusion with rate in the 140-150s. CXR with new right upper lobe opacity, leukocytosis of 38,000. Patient with reported wheezing on exam status post IV Solu-Medrol 125 mg IV push x1.  She was placed on BiPAP.    Hospital Course:  Ms. Perez was admitted with AFib with RVR along with acute on chronic respiratory failure due to COPD exacerbation and pneumonia.  Patient was started on diltiazem infusion with inadequate control of heart rate and therefore converted to amiodarone infusion.  Anticoagulated with full-dose Lovenox and Cardiology consulted.  2D echo performed with EF of 55% and elevated PA pressures of 51 mmHg and pulmonary hypertension.  Patient had new infiltrate in the right upper lobe at presentation concerning for pneumonia which was empirically treated with Rocephin, azithromycin and vancomycin given her allergy profile and recent admission to the hospital.  Patient also reports smoking again which likely exacerbated her COPD in addition to pneumonia.  She was supported with BiPAP and had  pulse dose IV steroids which was quickly converted to prednisone.  Pulmonary following and oxygen being wean as tolerated. Blood culture positive in 1/4 bottles for GNR which has returned as Pseudomonas aeruginosa. Despite amiodarone, patient HR poorly controlled and she underwent DCCV on 4/4/19 with conversion to NSR but went back into Afib but rate controlled. Pt completed amiodarone IV load which was converted to PO on 4/5. Anticoagulation converted to Eliquis. ID consulted for bacteremia given allergy profile. Recommendation is cefepime 2g every 8 hours for 14 days. PICC line placed. Pending placement.    Interval History: stable. No edema on exam nor xray    Review of Systems   Constitutional: Negative for chills and fever.   Respiratory: Positive for cough and shortness of breath (worse with minimal exertion).    Cardiovascular: Negative for chest pain and palpitations.     Objective:     Vital Signs (Most Recent):  Temp: 98 °F (36.7 °C) (04/09/19 1119)  Pulse: 75 (04/09/19 1119)  Resp: 17 (04/09/19 1119)  BP: (!) 159/70 (04/09/19 1119)  SpO2: (!) 94 % (04/09/19 1119) Vital Signs (24h Range):  Temp:  [96.3 °F (35.7 °C)-98.3 °F (36.8 °C)] 98 °F (36.7 °C)  Pulse:  [69-84] 75  Resp:  [17-20] 17  SpO2:  [93 %-100 %] 94 %  BP: (148-174)/(67-78) 159/70     Weight: 37.8 kg (83 lb 5.3 oz)  Body mass index is 13.87 kg/m².    Intake/Output Summary (Last 24 hours) at 4/9/2019 1154  Last data filed at 4/9/2019 1000  Gross per 24 hour   Intake 240 ml   Output 350 ml   Net -110 ml      Physical Exam   Constitutional: She is oriented to person, place, and time. She appears well-developed. No distress.   Cachectic and frail   HENT:   + Temporal wasting, on nasal cannula   Cardiovascular: Normal rate and regular rhythm.   Pulmonary/Chest: No respiratory distress. She has no rales.   Diminished breath sounds throughout, no wheeze noted with prolonged expiratory time   Abdominal: Soft. Bowel sounds are normal. She exhibits no  distension and no mass. There is no tenderness. There is no rebound and no guarding.   Musculoskeletal: Normal range of motion. She exhibits no edema.   Neurological: She is alert and oriented to person, place, and time.   Skin: Capillary refill takes less than 2 seconds. She is not diaphoretic.   Psychiatric: She has a normal mood and affect. Her behavior is normal. Thought content normal.   Nursing note and vitals reviewed.      Significant Labs: All pertinent labs within the past 24 hours have been reviewed.    Significant Imaging: I have reviewed all pertinent imaging results/findings within the past 24 hours.  I have reviewed and interpreted all pertinent imaging results/findings within the past 24 hours.    Assessment/Plan:      * Atrial fibrillation with RVR  Patient initially treated with diltiazem infusion with minimal response so stopped  s/p IV amiodarone infusion converted to PO as of today  s/p DCCV on 4/4 with NSR. Then in and out of AFib but with controlled rate  Anticoagulation converted to PO apaxiban  Cardiology input appreciated  Echo with normal EF of 55% with PA pressure of 51 mm Hg and pulmonary hypertension  Rate controlled on amiodarone and diltiazem  increased diltiazem.  NST show no acute ischemia,    Palliative care encounter  Agree for partial code,no intubation.      Bacteremia due to Pseudomonas  Due to Pseudomonas aeruginosa from pneumonia,  Antibiotics changed as discussed above  Expected stop date 4/18      Cachexia  Patient is underweight and with BMI of 13.98  Regular diet and supplement with Boost  Nutrition consulted    Pneumonia due to Pseudomonas species  New infiltrate noted in the right upper lobe that was not present when she was admitted just a few days ago for observation  Empirically treated with ceftriaxone, azithromycin and vancomycin  Blood cultures with Pseudomonas  Change antibiotics to cefepime on 4/4  ID on board    Acute on chronic respiratory failure with  hypercapnia  Now on cefepime 2 g q 8 hrs x 14 days for Pseudomonas ae bacteremia (selection by ID mainly due to allergy profile)  PICC placed  Repeat blood cultures on 4/4 NGTD  BiPAP QHS + low flow NC continuously (unable to wean off at this time)  Continued nebulizer treatments and prednisone  No edema therefore no diuretics indicated at this time  Pulmonary input appreciated  ID also on board for abx co-management  Will consult palliative care for code status    COPD exacerbation  As above  Is motivated to quit smoking  Pulmonary input appreciated    Essential hypertension  Continue diltiazem for HR and BP control    Leukocytosis  Likely secondary to pneumonia and possible steroid and/or leukemoid reaction  Antibiotics as discussed above   Stop checking CBC unless clinically indicated    HLD (hyperlipidemia)  Continue pravastatin      VTE Risk Mitigation (From admission, onward)        Ordered     apixaban tablet 5 mg  2 times daily      04/04/19 1500     IP VTE HIGH RISK PATIENT  Once      04/02/19 1210     Place sequential compression device  Until discontinued      04/02/19 1210              Gaetano Nieto MD  Department of Hospital Medicine   [facility]

## 2019-04-09 NOTE — ASSESSMENT & PLAN NOTE
Due to Pseudomonas aeruginosa from pneumonia,  Antibiotics changed as discussed above  Expected stop date 4/18

## 2019-04-09 NOTE — NURSING
Report received from SALMA srivastava. Patient sitting up in chair. No complaints, no acute distress noted. Oxygen in use via nasal cannula. Friends at bedside. Will continue to monitor.

## 2019-04-09 NOTE — PT/OT/SLP EVAL
Speech Language Pathology Evaluation  Bedside Swallow    Patient Name:  Latasha Perez   MRN:  [id number]  Admitting Diagnosis: Atrial fibrillation with RVR    Recommendations:                 General Recommendations:  GI evaluation  Diet recommendations:  Mechanical soft, Thin GI consult  Aspiration Precautions: 1 bite/sip at a time   General Precautions: Standard,    Communication strategies:  none    History:     Past Medical History:   Diagnosis Date    Arthritis     Carotid disease, bilateral     Cataract     Chronic hyponatremia     COPD (chronic obstructive pulmonary disease)     HLD (hyperlipidemia)     HTN (hypertension)        Past Surgical History:   Procedure Laterality Date    APPENDECTOMY      Cardioversion or Defibrillation  4/4/2019    Performed by Manfred Figueroa MD at [facility] CATH LAB    CATARACT EXTRACTION W/  INTRAOCULAR LENS IMPLANT Right 12/06/2018    Dr. Escobar    CATARACT EXTRACTION W/  INTRAOCULAR LENS IMPLANT Left 12/20/2018    DR. Escobar    CERVICAL SPINE SURGERY      DILATION AND CURETTAGE OF UTERUS      x 2    EYE SURGERY      cataract Rt    FRACTURE SURGERY      Lt leg fracture with hardware    INSERTION, IOL PROSTHESIS Left 12/20/2018    Performed by Broderick Escobar MD at [facility] OR    INSERTION, IOL PROSTHESIS Right 12/6/2018    Performed by Bro[facility]ick Escobar MD at [facility] OR    metatarsal repair      OPEN REDUCTION INTERNAL FIXATION-TIBIAL PLATEAU Left 6/27/2014    Performed by Isak Pereira MD at [facility] OR    PHACOEMULSIFICATION, CATARACT Left 12/20/2018    Performed by Broderick Escobar MD at [facility] OR    PHACOEMULSIFICATION, CATARACT Right 12/6/2018    Performed by [facility]ick [facility]obar [facility] at [facility] OR    SHOULDER ARTHROSCOPY      Transesophageal echo (JOHNNA) intra-procedure log documentation N/A 4/4/2019    Performed by Manfred Figueroa MD at [facility] CATH LAB       Social History: Patient (I) for ADLs    Modified Barium Swallow:  n/a    Chest X-Rays: 4/7/19 There is persistent airspace/stranding type density noted within the right upper lung zone.      Prior diet: unrestricted    Subjective   Pt reporting frequent sensation of mid esophogeal stasis post intake of solids which negtivley impacts her appetite.   Patient goals: resolve of feeling of esophogeal stasis      Pain/Comfort:  Pain Rating 1: 0/10    Objective:     Oral Musculature Evaluation  Oral Musculature: WFL  Dentition: present and adequate  Mandibular Strength and Mobility: WFL  Oral Labial Strength and Mobility: WFL  Lingual Strength and Mobility: WFL  Velar Elevation: WFL  Buccal Strength and Mobility: WFL    Bedside Swallow Eval:   Consistencies Assessed:  · Thin liquids X5 via straw self presented  · Solids X3     Oral Phase:   · WFL    Pharyngeal Phase:   · no overt clinical signs/symptoms of aspiration    Compensatory Strategies  · None    Treatment: please note silent aspiration cannot be r/o at bedside    Assessment:     Latasha Perez is a 73 y.o. female with dx of Atrial fibrillation with RVR she presents with functional oral and pharyngeal phases of swallow.     Goals:   Multidisciplinary Problems     SLP Goals     Not on file          Multidisciplinary Problems (Resolved)        Problem: SLP Goal    Goal Priority Disciplines Outcome   SLP Goal   (Resolved)    Low SLP Outcome(s) achieved   Description:  ST RECS: mech soft with thin liquids, GI consult VALERY Tesfaye                    Plan:     · Plan of Care reviewed with:  patient   · SLP Follow-Up:  No       Discharge recommendations:    no further ST is warranted.   Barriers to Discharge:  None    Time Tracking:     SLP Treatment Date:   04/09/19  Speech Start Time:  1330  Speech Stop Time:  1340     Speech Total Time (min):  10 min    Billable Minutes: Eval Swallow and Oral Function 10    VALERY Tesfaye  04/09/2019

## 2019-04-09 NOTE — PT/OT/SLP PROGRESS
Occupational Therapy   Treatment    Name: Latasha Perez  MRN: [id number]  Admitting Diagnosis:  Atrial fibrillation with RVR  5 Days Post-Op    Recommendations:     Discharge Recommendations: nursing facility, skilled  Discharge Equipment Recommendations:  (TBD)  Barriers to discharge:  Decreased caregiver support    Assessment:     Latasha Perez is a 73 y.o. female with a medical diagnosis of Atrial fibrillation with RVR.  She reported increased weakness today, with increase time for rest breaks needed during bed mobility. Performance deficits affecting function are weakness, impaired functional mobilty, decreased safety awareness, impaired cardiopulmonary response to activity, impaired endurance, gait instability, impaired fine motor, impaired balance, decreased upper extremity function, impaired self care skills, decreased lower extremity function, decreased ROM.     Rehab Prognosis:  Good; patient would benefit from acute skilled OT services to address these deficits and reach maximum level of function.       Plan:     Patient to be seen 5 x/week to address the above listed problems via self-care/home management, therapeutic activities, therapeutic exercises  · Plan of Care Expires: 04/19/19  · Plan of Care Reviewed with: patient    Subjective     Pain/Comfort:  · Pain Rating 1: 0/10    Objective:     Communicated with: nurseMelania,  prior to session.  Patient found HOB elevated with telemetry, peripheral IV, PICC line, oxygen upon OT entry to room.    General Precautions: Standard, fall, respiratory   Orthopedic Precautions:N/A   Braces: N/A     Occupational Performance:     Bed Mobility:    · Patient completed Rolling/Turning to Right with contact guard assistance, with side rail and HOB elevated to 30*  · Patient completed Scooting/Bridging with contact guard assistance  · Patient completed Supine to Sit with contact guard assistance     Functional Mobility/Transfers:  · Patient completed Sit <> Stand  Transfer with minimum assistance  with  rolling walker   · Patient completed Bed <> Chair Transfer using Step Transfer technique with minimum assistance with rolling walker  · Functional Mobility: Pt took ~4-5 steps from the bed to the chair using RW with min A.     Activities of Daily Living:  · Grooming: minimum assistance. Pt required assist in opening mouthwash cap and opening toothbrush wrapper. Pt felt too fatigued standing to complete tasks, so pt completed grooming in unsupported sitting in chair.       [facility] 6 Click ADL: 18    Treatment & Education:  Pt consented to OT treatment and friend was present. Pt felt very weak and unable to stand for long, so grooming took place in unsupported sit in chair for safety. Pt educated in goals, which will aim to complete these standing if possible in the future. Pt re-educated on importance of UE exercises 2-3x/day and pt agreed to comply. Pt completed 15 reps of UE exercises in unsupported sit with a rest break required during shoulder flexion/extension exercise d/t fatigue.     Patient left up in chair with all lines intact, call button in reach and nurse, Melania, notifiedEducation:      GOALS:   Multidisciplinary Problems     Occupational Therapy Goals        Problem: Occupational Therapy Goal    Goal Priority Disciplines Outcome Interventions   Occupational Therapy Goal     OT, PT/OT Ongoing (interventions implemented as appropriate)    Description:  Goals to be met by: 04/19/19     Patient will increase functional independence with ADLs by performing:    Feeding with Modified [place].  UE Dressing with Modified [place].  LE Dressing with Modified [place].  Grooming while standing at sink with Modified [place].  Toileting from toilet with Supervision for hygiene and clothing management.   Sitting at edge of bed x25 minutes with Modified [place].  Rolling to Bilateral with Modified [place].   Supine to sit with Modified  [place].  Step transfer with Modified [place]  Toilet transfer to toilet with Modified [place].  Upper extremity exercise program x15 reps per handout, with supervision.                      Time Tracking:     OT Date of Treatment: 04/09/19  OT Start Time: 1049  OT Stop Time: 1113  OT Total Time (min): 24 min    Billable Minutes:Self Care/Home Management 10 min  Therapeutic Exercise 14 min   Total Time 24 min     Kendra Wong OT  4/9/2019

## 2019-04-09 NOTE — PT/OT/SLP PROGRESS
Physical Therapy Treatment    Patient Name:  Latasha Perez   MRN:  [id number]    Recommendations:     Discharge Recommendations:  nursing facility, skilled(per supervising PT)   Discharge Equipment Recommendations: (possible O2)   Barriers to discharge: Decreased caregiver support (lives alone)    Assessment:     Latasha Perez is a 73 y.o. female admitted with a medical diagnosis of Atrial fibrillation with RVR.  She presents with the following impairments/functional limitations:  weakness, impaired endurance, impaired functional mobilty, impaired balance, impaired self care skills, gait instability, decreased coordination, decreased lower extremity function, impaired cardiopulmonary response to activity, decreased safety awareness, decreased upper extremity function, impaired coordination, decreased ROM.  Pt with limited ambulation today due to increased fatigue and decreased endurance.  Pt is not feeling well today with complaints of increased weakness.  Pt ambulated ~20ft, ~4ft with RW, CGA on 4LO2 NC.    Rehab Prognosis: Fair; patient would benefit from acute skilled PT services to address these deficits and reach maximum level of function.    Recent Surgery: Procedure(s) (LRB):  Transesophageal echo (JOHNNA) intra-procedure log documentation (N/A)  Cardioversion or Defibrillation 5 Days Post-Op    Plan:     During this hospitalization, patient to be seen daily to address the identified rehab impairments via gait training, therapeutic activities, therapeutic exercises and progress toward the following goals:    · Plan of Care Expires:  04/19/19    Subjective     Chief Complaint: not feeling well  Patient/Family Comments/goals: Pt reports she feels really weak today.  Pain/Comfort:  · Pain Rating 1: 0/10      Objective:     Communicated with pt's nurse, Melania, prior to session.  Patient found up in chair on pressure cushion with telemetry, PICC line, oxygen upon PT entry to room.     General Precautions:  Standard, fall, respiratory   Orthopedic Precautions:N/A   Braces: N/A     Functional Mobility:  · Transfers:x2 from [place]    · Sit to Stand:  contact guard assistance with rolling walker  · Gait: Pt ambulated ~20ft, ~4ft with RW, CGA with seated rest break in between.  Pt with increased fatigue and decreased endurance. Pt requires verbal cues for energy conservation.  Displays decreased patience, step length, velocity of limb, postural control and weight shifting ability.  · Balance: good sitting and fair standing      AM-PAC 6 CLICK MOBILITY  Turning over in bed (including adjusting bedclothes, sheets and blankets)?: 4  Sitting down on and standing up from a chair with arms (e.g., wheelchair, bedside commode, etc.): 4  Moving from lying on back to sitting on the side of the bed?: 4  Moving to and from a bed to a chair (including a wheelchair)?: 3  Need to walk in hospital room?: 3  Climbing 3-5 steps with a railing?: 2  Basic Mobility Total Score: 20       Therapeutic Activities and Exercises:   Pt performed seated therex to BLEs x12 reps AROM including: heel raises, toe raises, hip flexion, LAQs, active hip abduction/adduction.    Patient left up in chair with pressure cushion with all lines intact, call button in reach, pt's nurse, Melania, and Dr. Feliz notified and danay friend present..    GOALS:   Multidisciplinary Problems     Physical Therapy Goals        Problem: Physical Therapy Goal    Goal Priority Disciplines Outcome Goal Variances Interventions   Physical Therapy Goal     PT, PT/OT Ongoing (interventions implemented as appropriate)     Description:  Goals to be met by: 19     Patient will increase functional independence with mobility by performin. Supine to sit with Modified [place]  2. Rolling to Left and Right with Modified [place]  3. Sit to stand transfer with Modified [place]  4. Bed to chair transfer with Modified [place]   5. Gait  x50-100 feet with Modified  [place] using Rolling Walker and O2  6. Lower extremity exercise program 2 sets x10 reps per handout, with independence                      Time Tracking:     PT Received On: 04/09/19  PT Start Time: 1130     PT Stop Time: 1155  PT Total Time (min): 25 min     Billable Minutes: Gait Training 13 and Therapeutic Exercise 12    Treatment Type: Treatment  PT/PTA: PTA     PTA Visit Number: 3     Haylie Anali, PTA  04/09/2019

## 2019-04-09 NOTE — PLAN OF CARE
Problem: Fall Injury Risk  Goal: Absence of Fall and Fall-Related Injury  Outcome: Ongoing (interventions implemented as appropriate)  Intervention: Identify and Manage Contributors to Fall Injury Risk     04/07/19 0800 04/08/19 0743   Manage Acute Allergic Reaction   Medication Review/Management  --  medications reviewed   Identify and Manage Contributors to Fall Injury Risk   Self-Care Promotion independence encouraged  --      Intervention: Promote Injury-Free Environment     04/07/19 1930 04/09/19 1420   Optimize [place] and Functional Mobility   Environmental Safety Modification assistive device/personal items within reach  --    Optimize Balance and Safe Activity   Safety Promotion/Fall Prevention  --  assistive device/personal item within reach;lighting adjusted;medications reviewed;nonskid shoes/socks when out of bed;room near unit station;instructed to call staff for mobility         Problem: Skin Injury Risk Increased  Goal: Skin Health and Integrity    Intervention: Optimize Skin Protection     04/08/19 0743 04/09/19 1421   Prevent Additional Skin Injury   Head of Bed (HOB)  --  HOB at 20-30 degrees   Pressure Reduction Techniques frequent weight shift encouraged;weight shift assistance provided  --    Monitor and Manage Hypervolemia   Skin Protection adhesive use limited;electrode sites changed;pouching devices used;tubing/devices free from skin contact;incontinence pads utilized  --      Intervention: Promote and Optimize Oral Intake     04/07/19 0800   Monitor and Manage Anemia   Oral Nutrition Promotion rest periods promoted

## 2019-04-09 NOTE — PLAN OF CARE
Problem: Physical Therapy Goal  Goal: Physical Therapy Goal  Goals to be met by: 19     Patient will increase functional independence with mobility by performin. Supine to sit with Modified [place]  2. Rolling to Left and Right with Modified [place]  3. Sit to stand transfer with Modified [place]  4. Bed to chair transfer with Modified [place]   5. Gait  x50-100 feet with Modified [place] using Rolling Walker and O2  6. Lower extremity exercise program 2 sets x10 reps per handout, with independence     Outcome: Ongoing (interventions implemented as appropriate)   Pt with limited ambulation today due to increased fatigue and decreased endurance.  Pt is not feeling well today with complaints of increased weakness.  Pt ambulated ~20ft, ~4ft with RW, CGA on 4LO2 NC.

## 2019-04-09 NOTE — PROGRESS NOTES
PASRR completed    0957- LOCET called in and completed with Raysa. TN instructed to follow protocol to obtain 142.    1008- faxed face sheet and PASRR to [facility] to obtain 142.      1240- TN received 142 via email and uploaded to [facility] and sent to [facility].  TN sent message advising that the pt may be ready as soon as tomorrow for discharge if the facility would like to submit to insurance for auth and contact pts rimma Lucas who will be person to complete paperwork for admission. TN to follow in [facility] for response.

## 2019-04-09 NOTE — NURSING
Bedside Report given to nurse SALMA Brunner. Visualized and assessed patient NAD noted. Safety precautions maintained and call light within reach.    Chart check completed.

## 2019-04-09 NOTE — PLAN OF CARE
Problem: Occupational Therapy Goal  Goal: Occupational Therapy Goal  Goals to be met by: 04/19/19     Patient will increase functional independence with ADLs by performing:    Feeding with Modified [place].  UE Dressing with Modified [place].  LE Dressing with Modified [place].  Grooming while standing at sink with Modified [place].  Toileting from toilet with Supervision for hygiene and clothing management.   Sitting at edge of bed x25 minutes with Modified [place].  Rolling to Bilateral with Modified [place].   Supine to sit with Modified [place].  Step transfer with Modified [place]  Toilet transfer to toilet with Modified [place].  Upper extremity exercise program x15 reps per handout, with supervision.     Outcome: Ongoing (interventions implemented as appropriate)  Pt progressing towards goals and will continue to benefit from continued acute OT services. OT rec. SNF at d/c.

## 2019-04-09 NOTE — ASSESSMENT & PLAN NOTE
Patient initially treated with diltiazem infusion with minimal response so stopped  s/p IV amiodarone infusion converted to PO as of today  s/p DCCV on 4/4 with NSR. Then in and out of AFib but with controlled rate  Anticoagulation converted to PO apaxiban  Cardiology input appreciated  Echo with normal EF of 55% with PA pressure of 51 mm Hg and pulmonary hypertension  Rate controlled on amiodarone and diltiazem  increased diltiazem.  NST show no acute ischemia,

## 2019-04-09 NOTE — NURSING
Report given to SALMA Owen. Patient resting comfortably, no complaints, no acute distress noted. 12 hour chart check completed.

## 2019-04-09 NOTE — SUBJECTIVE & OBJECTIVE
Interval History: stable. No edema on exam nor xray    Review of Systems   Constitutional: Negative for chills and fever.   Respiratory: Positive for cough and shortness of breath (worse with minimal exertion).    Cardiovascular: Negative for chest pain and palpitations.     Objective:     Vital Signs (Most Recent):  Temp: 98 °F (36.7 °C) (04/09/19 1119)  Pulse: 75 (04/09/19 1119)  Resp: 17 (04/09/19 1119)  BP: (!) 159/70 (04/09/19 1119)  SpO2: (!) 94 % (04/09/19 1119) Vital Signs (24h Range):  Temp:  [96.3 °F (35.7 °C)-98.3 °F (36.8 °C)] 98 °F (36.7 °C)  Pulse:  [69-84] 75  Resp:  [17-20] 17  SpO2:  [93 %-100 %] 94 %  BP: (148-174)/(67-78) 159/70     Weight: 37.8 kg (83 lb 5.3 oz)  Body mass index is 13.87 kg/m².    Intake/Output Summary (Last 24 hours) at 4/9/2019 1154  Last data filed at 4/9/2019 1000  Gross per 24 hour   Intake 240 ml   Output 350 ml   Net -110 ml      Physical Exam   Constitutional: She is oriented to person, place, and time. She appears well-developed. No distress.   Cachectic and frail   HENT:   + Temporal wasting, on nasal cannula   Cardiovascular: Normal rate and regular rhythm.   Pulmonary/Chest: No respiratory distress. She has no rales.   Diminished breath sounds throughout, no wheeze noted with prolonged expiratory time   Abdominal: Soft. Bowel sounds are normal. She exhibits no distension and no mass. There is no tenderness. There is no rebound and no guarding.   Musculoskeletal: Normal range of motion. She exhibits no edema.   Neurological: She is alert and oriented to person, place, and time.   Skin: Capillary refill takes less than 2 seconds. She is not diaphoretic.   Psychiatric: She has a normal mood and affect. Her behavior is normal. Thought content normal.   Nursing note and vitals reviewed.      Significant Labs: All pertinent labs within the past 24 hours have been reviewed.    Significant Imaging: I have reviewed all pertinent imaging results/findings within the past 24  hours.  I have reviewed and interpreted all pertinent imaging results/findings within the past 24 hours.

## 2019-04-10 PROCEDURE — A4216 STERILE WATER/SALINE, 10 ML: HCPCS | Mod: HCNC | Performed by: INTERNAL MEDICINE

## 2019-04-10 PROCEDURE — 25000003 PHARM REV CODE 250: Mod: HCNC | Performed by: HOSPITALIST

## 2019-04-10 PROCEDURE — 99232 PR SUBSEQUENT HOSPITAL CARE,LEVL II: ICD-10-PCS | Mod: HCNC,,, | Performed by: NURSE PRACTITIONER

## 2019-04-10 PROCEDURE — 97535 SELF CARE MNGMENT TRAINING: CPT | Mod: HCNC

## 2019-04-10 PROCEDURE — 25000003 PHARM REV CODE 250: Mod: HCNC | Performed by: PHYSICIAN ASSISTANT

## 2019-04-10 PROCEDURE — 94640 AIRWAY INHALATION TREATMENT: CPT | Mod: HCNC

## 2019-04-10 PROCEDURE — 25000242 PHARM REV CODE 250 ALT 637 W/ HCPCS: Mod: HCNC | Performed by: HOSPITALIST

## 2019-04-10 PROCEDURE — 99232 SBSQ HOSP IP/OBS MODERATE 35: CPT | Mod: HCNC,,, | Performed by: NURSE PRACTITIONER

## 2019-04-10 PROCEDURE — 25000003 PHARM REV CODE 250: Mod: HCNC | Performed by: INTERNAL MEDICINE

## 2019-04-10 PROCEDURE — 94761 N-INVAS EAR/PLS OXIMETRY MLT: CPT | Mod: HCNC

## 2019-04-10 PROCEDURE — S0028 INJECTION, FAMOTIDINE, 20 MG: HCPCS | Mod: HCNC | Performed by: INTERNAL MEDICINE

## 2019-04-10 PROCEDURE — 21400001 HC TELEMETRY ROOM: Mod: HCNC

## 2019-04-10 PROCEDURE — 63600175 PHARM REV CODE 636 W HCPCS: Mod: HCNC | Performed by: PHYSICIAN ASSISTANT

## 2019-04-10 RX ORDER — CEFEPIME HYDROCHLORIDE 1 G/50ML
2 INJECTION, SOLUTION INTRAVENOUS EVERY 8 HOURS
Status: ON HOLD
Start: 2019-04-10 | End: 2019-05-03 | Stop reason: HOSPADM

## 2019-04-10 RX ORDER — AMIODARONE HYDROCHLORIDE 400 MG/1
200 TABLET ORAL 2 TIMES DAILY
Qty: 30 TABLET | Refills: 0 | Status: ON HOLD | OUTPATIENT
Start: 2019-04-10 | End: 2019-05-19

## 2019-04-10 RX ORDER — BENZONATATE 100 MG/1
100 CAPSULE ORAL 3 TIMES DAILY PRN
Start: 2019-04-10 | End: 2019-04-20

## 2019-04-10 RX ORDER — ALBUTEROL SULFATE 2.5 MG/.5ML
2.5 SOLUTION RESPIRATORY (INHALATION)
Qty: 1 EACH | Refills: 0 | Status: ON HOLD
Start: 2019-04-10 | End: 2019-05-15

## 2019-04-10 RX ORDER — ALPRAZOLAM 0.25 MG/1
0.25 TABLET ORAL 3 TIMES DAILY PRN
Qty: 20 TABLET | Refills: 0 | Status: ON HOLD | OUTPATIENT
Start: 2019-04-10 | End: 2019-04-30 | Stop reason: ALTCHOICE

## 2019-04-10 RX ORDER — DILTIAZEM HYDROCHLORIDE 240 MG/1
240 CAPSULE, COATED, EXTENDED RELEASE ORAL DAILY
Qty: 30 CAPSULE | Refills: 11
Start: 2019-04-11 | End: 2020-04-10

## 2019-04-10 RX ADMIN — AMIODARONE HYDROCHLORIDE 400 MG: 200 TABLET ORAL at 09:04

## 2019-04-10 RX ADMIN — FAMOTIDINE 20 MG: 10 INJECTION INTRAVENOUS at 09:04

## 2019-04-10 RX ADMIN — CEFEPIME 2 G: 2 INJECTION, POWDER, FOR SOLUTION INTRAVENOUS at 08:04

## 2019-04-10 RX ADMIN — ALPRAZOLAM 0.25 MG: 0.25 TABLET ORAL at 09:04

## 2019-04-10 RX ADMIN — Medication 10 ML: at 12:04

## 2019-04-10 RX ADMIN — CEFEPIME 2 G: 2 INJECTION, POWDER, FOR SOLUTION INTRAVENOUS at 11:04

## 2019-04-10 RX ADMIN — ASPIRIN 81 MG: 81 TABLET, COATED ORAL at 09:04

## 2019-04-10 RX ADMIN — Medication 10 ML: at 05:04

## 2019-04-10 RX ADMIN — DILTIAZEM HYDROCHLORIDE 240 MG: 120 CAPSULE, COATED, EXTENDED RELEASE ORAL at 09:04

## 2019-04-10 RX ADMIN — LEVALBUTEROL HYDROCHLORIDE 1.25 MG: 1.25 SOLUTION, CONCENTRATE RESPIRATORY (INHALATION) at 03:04

## 2019-04-10 RX ADMIN — TIOTROPIUM BROMIDE 18 MCG: 18 CAPSULE ORAL; RESPIRATORY (INHALATION) at 07:04

## 2019-04-10 RX ADMIN — APIXABAN 5 MG: 5 TABLET, FILM COATED ORAL at 09:04

## 2019-04-10 RX ADMIN — CEFEPIME 2 G: 2 INJECTION, POWDER, FOR SOLUTION INTRAVENOUS at 02:04

## 2019-04-10 RX ADMIN — LEVALBUTEROL HYDROCHLORIDE 1.25 MG: 1.25 SOLUTION, CONCENTRATE RESPIRATORY (INHALATION) at 12:04

## 2019-04-10 RX ADMIN — PRAVASTATIN SODIUM 20 MG: 10 TABLET ORAL at 09:04

## 2019-04-10 RX ADMIN — FAMOTIDINE 20 MG: 10 INJECTION INTRAVENOUS at 11:04

## 2019-04-10 RX ADMIN — LEVALBUTEROL HYDROCHLORIDE 1.25 MG: 1.25 SOLUTION, CONCENTRATE RESPIRATORY (INHALATION) at 07:04

## 2019-04-10 NOTE — PLAN OF CARE
Problem: Occupational Therapy Goal  Goal: Occupational Therapy Goal  Goals to be met by: 04/19/19     Patient will increase functional independence with ADLs by performing:    Feeding with Modified [address].  UE Dressing with Modified [address]. Goal met 4/10/19  LE Dressing with Modified [address].  Grooming while standing at sink with Modified [address].  Toileting from toilet with Supervision for hygiene and clothing management.   Sitting at edge of bed x25 minutes with Modified [address].  Rolling to Bilateral with Modified [address]. Goal met 4/10/19  Supine to sit with Modified [address].  Step transfer with Modified [address]  Toilet transfer to toilet with Modified [address].  Upper extremity exercise program x15 reps per handout, with supervision.     Outcome: Ongoing (interventions implemented as appropriate)  Pt will continue to benefit from acute OT services. OT rec. SNF at d/c.

## 2019-04-10 NOTE — SUBJECTIVE & OBJECTIVE
Interval History: stable. No edema on exam nor xray    Review of Systems   Constitutional: Negative for chills and fever.   Respiratory: Positive for cough and shortness of breath (worse with minimal exertion).    Cardiovascular: Negative for chest pain and palpitations.     Objective:     Vital Signs (Most Recent):  Temp: 97.6 °F (36.4 °C) (04/10/19 0807)  Pulse: 70 (04/10/19 1546)  Resp: 18 (04/10/19 1546)  BP: (!) 145/66 (04/10/19 0807)  SpO2: 100 % (04/10/19 1546) Vital Signs (24h Range):  Temp:  [97.4 °F (36.3 °C)-98.1 °F (36.7 °C)] 97.6 °F (36.4 °C)  Pulse:  [65-90] 70  Resp:  [17-20] 18  SpO2:  [90 %-100 %] 100 %  BP: (134-168)/(59-74) 145/66     Weight: 37.8 kg (83 lb 5.3 oz)  Body mass index is 13.87 kg/m².    Intake/Output Summary (Last 24 hours) at 4/10/2019 1653  Last data filed at 4/10/2019 1215  Gross per 24 hour   Intake 670 ml   Output 610 ml   Net 60 ml      Physical Exam   Constitutional: She is oriented to person, place, and time. She appears well-developed. No distress.   Cachectic and frail   HENT:   + Temporal wasting, on nasal cannula   Cardiovascular: Normal rate and regular rhythm.   Pulmonary/Chest: No respiratory distress. She has no rales.   Diminished breath sounds throughout, no wheeze noted with prolonged expiratory time   Abdominal: Soft. Bowel sounds are normal. She exhibits no distension and no mass. There is no tenderness. There is no rebound and no guarding.   Musculoskeletal: Normal range of motion. She exhibits no edema.   Neurological: She is alert and oriented to person, place, and time.   Skin: Capillary refill takes less than 2 seconds. She is not diaphoretic.   Psychiatric: She has a normal mood and affect. Her behavior is normal. Thought content normal.   Nursing note and vitals reviewed.      Significant Labs: All pertinent labs within the past 24 hours have been reviewed.    Significant Imaging: I have reviewed all pertinent imaging results/findings within the past 24  hours.  I have reviewed and interpreted all pertinent imaging results/findings within the past 24 hours.

## 2019-04-10 NOTE — PROGRESS NOTES
Met with pt at bedside to update on d/c plan and advise that she has been accepted by [facility], which is the same place as spouse and that she will d/c there on today.  Pt in agreement with plan and is grateful that she is able to go to same facility as spouse.    1014- message sent via [facility] to [facility] to advise that pt is medically ready for discharge on today and that orders will be sent for review as soon as they are available.  TN to follow in [facility] for response.      1050- orders uploaded to [facility] and sent to [facility] for review.  Message sent as well to advise of uploaded orders. TN to follow in [facility] for response.      1136- call received from Davis with [facility] who reports that she is awaiting pts rimma to complete main admission papers so that pt can transfer and pt can complete the rest of admit papers on arrival to facility.  Davis to call [place] when additional information available.  TN to follow    1344- call placed to [facility] to follow up on call report information.  Spoke to Davis who states that she has sent the admission paper work via email to pts rimma Lucas and is awaiting her to return them at this time.  Davis to follow up with Shayy to see if she completed paperwork and call TN back. TN to follow      1447- call placed to pts rimma Lucas to follow up on admission paperwork faxed to her to sign for pt to admit.  Shayy reports that she completed paperwork and sent back to Davis at [facility] approx 30 mins ago.    1452- call from Davis with [facility] stating that pt will go to room 27A and nurse to call report to [phone]    1500- call placed to pts nurse Laura to provide call report information and that w/c van transportation will be scheduled for  in the next 30 minutes      1502- ADT 30 order entered and will follow for ETA.

## 2019-04-10 NOTE — PT/OT/SLP PROGRESS
Occupational Therapy   Treatment    Name: Latasha Perez  MRN: [id number]  Admitting Diagnosis:  Atrial fibrillation with RVR  6 Days Post-Op    Recommendations:     Discharge Recommendations: nursing facility, skilled  Discharge Equipment Recommendations:  (TBD)  Barriers to discharge:  Decreased caregiver support    Assessment:     Latasha Perez is a 73 y.o. female with a medical diagnosis of Atrial fibrillation with RVR.  She presents as anxious, with her discharge soon approaching to SNF. Pt feels overwhelmed with these life transitions, but motivated to participate in therapy session after she discussed these concerns. Performance deficits affecting function are weakness, impaired functional mobilty, impaired balance, decreased upper extremity function, decreased safety awareness, impaired cardiopulmonary response to activity, impaired endurance, impaired self care skills, gait instability, decreased lower extremity function, impaired fine motor.     Rehab Prognosis:  Good; patient would benefit from acute skilled OT services to address these deficits and reach maximum level of function.       Plan:     Patient to be seen 5 x/week to address the above listed problems via self-care/home management, therapeutic exercises, therapeutic activities  · Plan of Care Expires: 04/19/19  · Plan of Care Reviewed with: patient    Subjective     Pain/Comfort:  · Pain Rating 1: 0/10    Objective:     Communicated with: Nurse, Leanna, prior to session.  Patient found HOB elevated with telemetry, oxygen, PICC line upon OT entry to room.    General Precautions: Standard, fall, respiratory   Orthopedic Precautions:N/A   Braces: N/A     Occupational Performance:     Bed Mobility:    · Patient completed Rolling/Turning to Left with  modified independence and HOB elevated  · Patient completed Rolling/Turning to Right with modified independence and HOB elevated  · Patient completed Scooting/Bridging with supervision  · Patient  completed Supine to Sit with supervision  · Patient completed Sit to Supine with supervision     Functional Mobility/Transfers:  · Patient completed Sit <> Stand Transfer with minimum assistance  with  hand-held assist   · Patient completed Toilet Transfer Step Transfer technique with minimum assistance with  hand-held assist and bedside commode  · Functional Mobility: Pt took 4-5 steps from bed to BSC and back. Pt was CGA for transfer from bed to BSC, but demo'd increased fatigue for the transfer back to bed, requiring min A.     Activities of Daily Living:  · Grooming: supervision sitting EOB  · Toileting: supervision on BSC      [facility] 6 Click ADL: 19    Treatment & Education:  Pt consented to OT treatment. Pt presented as anxious, worrying about her transition to the SNF. Time was taken to discuss her concerns and what to expect from therapy and pt was then able to focus on therapy session. Pt used handheld assist with 4-5 steps from the bed to the BSC with CGA. Pt demo'd fatigue with transfer back from the BSC to the bed, with a quick sit back into bed without using safe measures. Pt required min A and mod verbal cues for safe hand placement and body positioning. Pt required min verbal and tactile cueing sitting up in bed to promote proper body posture.     Patient left HOB elevated with all lines intact, call button in reach and nurse, Leanna,  notifiedEducation:      GOALS:   Multidisciplinary Problems     Occupational Therapy Goals        Problem: Occupational Therapy Goal    Goal Priority Disciplines Outcome Interventions   Occupational Therapy Goal     OT, PT/OT Ongoing (interventions implemented as appropriate)    Description:  Goals to be met by: 04/19/19     Patient will increase functional independence with ADLs by performing:    Feeding with Modified [place].  UE Dressing with Modified [place]. Goal met 4/10/19  LE Dressing with Modified [place].  Grooming while standing at sink with  Modified [place].  Toileting from toilet with Supervision for hygiene and clothing management.   Sitting at edge of bed x25 minutes with Modified [place].  Rolling to Bilateral with Modified [place]. Goal met 4/10/19  Supine to sit with Modified [place].  Step transfer with Modified [place]  Toilet transfer to toilet with Modified [place].  Upper extremity exercise program x15 reps per handout, with supervision.                       Time Tracking:     OT Date of Treatment: 04/10/19  OT Start Time: 1445  OT Stop Time: 1502  OT Total Time (min): 17 min    Billable Minutes:Self Care/Home Management 17 min    Kendra Wong OT  4/10/2019

## 2019-04-10 NOTE — NURSING
Bedside report received from SALMA Gardiner. Patient sitting up in bed. 4L nasal cannula in place. NAD noted at this time. All safety precautions in place. Will continue to monitor.

## 2019-04-10 NOTE — PROGRESS NOTES
[facility]  Palliative Medicine  Progress Note    Patient Name: Latasha Perez  MRN: [id number]  Admission Date: 4/2/2019  Hospital Length of Stay: 8 days  Code Status: Partial Code   Attending Provider: Gaetano Nieto MD  Consulting Provider: Kassandra Howe NP  Primary Care Physician: Pollo Oneal MD  Principal Problem:Atrial fibrillation with RVR      Subjective:     Chief Complaint:   Chief Complaint   Patient presents with    Dizziness     Weakness and dizziness.  RVR at 198 with EMS.  6 mg of adenosine administered.  Tachypenic.    Tachycardia       HPI:   Principal Problem:Atrial fibrillation with RVR     HPI:  73-year-old female with HTN, HLP, severe COPD and + tobacco abuse who presented with complaint of dizziness and weakness that is been progressive over the past 2 days.  She also reports increase in shortness of breath along with palpitations.  She was recently admitted to Observation from 3/28 to 3/29/19 for a COPD exacerbation and was discharged home on Azithromycin and prednisone 40 mg daily x 5 days.  She denies any fevers or chills.  In the ER, she was noted to be in Afib with RVR, rate initially in the 190s, status post IV diltiazem push followed by infusion with rate in the 140-150s. CXR with new right upper lobe opacity, leukocytosis of 38,000. Patient with reported wheezing on exam status post IV Solu-Medrol 125 mg IV push x1.  She was placed on BiPAP.     Hospital Course:  Ms. Perez was admitted with AFib with RVR along with acute on chronic respiratory failure due to COPD exacerbation and pneumonia.  Patient was started on diltiazem infusion with inadequate control of heart rate and therefore converted to amiodarone infusion.  Anticoagulated with full-dose Lovenox and Cardiology consulted.  2D echo performed with EF of 55% and elevated PA pressures of 51 mmHg and pulmonary hypertension.  Patient had new infiltrate in the right upper lobe at presentation  concerning for pneumonia which was empirically treated with Rocephin, azithromycin and vancomycin given her allergy profile and recent admission to the hospital.  Patient also reports smoking again which likely exacerbated her COPD in addition to pneumonia.  She was supported with BiPAP and had pulse dose IV steroids which was quickly converted to prednisone.  Pulmonary following and oxygen being wean as tolerated. Blood culture positive in 1/4 bottles for GNR which has returned as Pseudomonas aeruginosa. Despite amiodarone, patient HR poorly controlled and she underwent DCCV on 4/4/19 with conversion to NSR but went back into Afib but rate controlled. Pt completed amiodarone IV load which was converted to PO on 4/5. Anticoagulation converted to Eliquis. ID consulted for bacteremia given allergy profile. Recommendation is cefepime 2g every 8 hours for 14 days. PICC line placed.         Hospital Course:  No notes on file    Interval History: uneventful night    Medications:  Continuous Infusions:  Scheduled Meds:   aluminum-magnesium hydroxide-simethicone  30 mL Oral Once    amiodarone  400 mg Oral BID    apixaban  5 mg Oral BID    aspirin  81 mg Oral Daily    ceFEPime (MAXIPIME) IVPB  2 g Intravenous Q8H    diltiaZEM  120 mg Oral Daily    famotidine (PF)  20 mg Intravenous BID    HYDROcodone-acetaminophen  1 tablet Oral Once    levalbuterol  1.25 mg Nebulization Q8H    pravastatin  20 mg Oral QHS    sodium chloride 0.9%  10 mL Intravenous Q6H    tiotropium  1 capsule Inhalation Daily     PRN Meds:acetaminophen, ALPRAZolam, benzonatate, calcium carbonate, cloNIDine, ondansetron, polyethylene glycol, promethazine (PHENERGAN) IVPB, ramelteon, Flushing PICC Protocol **AND** sodium chloride 0.9% **AND** sodium chloride 0.9%    Objective:     Vital Signs (Most Recent):  Temp: 98 °F (36.7 °C) (04/09/19 0748)  Pulse: 84 (04/09/19 0827)  Resp: 20 (04/09/19 0827)  BP: (!) 160/74 (04/09/19 0748)  SpO2: 96 %  (04/09/19 0827) Vital Signs (24h Range):  Temp:  [96.3 °F (35.7 °C)-98.3 °F (36.8 °C)] 98 °F (36.7 °C)  Pulse:  [69-84] 84  Resp:  [17-20] 20  SpO2:  [93 %-100 %] 96 %  BP: (148-174)/(67-78) 160/74     Weight: 37.8 kg (83 lb 5.3 oz)  Body mass index is 13.87 kg/m².    Review of Symptoms  Symptom Assessment (ESAS 0-10 scale)  ESAS 0 1 2 3 4 5 6 7 8 9 10   Pain x             Dyspnea x             Anxiety x             Nausea x             Depression  x             Anorexia x             Fatigue x             Insomnia x             Restlessness  x             Agitation x             Physical Exam   Constitutional: She is oriented to person, place, and time. She appears well-developed.   cachectic   Neck: Neck supple.   Cardiovascular: Normal rate and regular rhythm.   Pulmonary/Chest:   Oxygen on BNC, diminished breath sounds RLL   Abdominal: Soft. Bowel sounds are normal.   Musculoskeletal: Normal range of motion.   Neurological: She is alert and oriented to person, place, and time.   Skin: Skin is warm and dry.   Nursing note and vitals reviewed.      Significant Labs: All pertinent labs within the past 24 hours have been reviewed.  CBC:   Recent Labs   Lab 04/09/19  0535   WBC 16.04*   HGB 11.9*   HCT 35.4*   MCV 93        BMP:  Recent Labs   Lab 04/09/19  0535      *   K 3.2*   CL 90*   CO2 35*   BUN 13   CREATININE 0.6   CALCIUM 9.1     LFT:  Lab Results   Component Value Date    AST 18 04/02/2019    ALKPHOS 86 04/02/2019    BILITOT 0.5 04/02/2019     Albumin:   Albumin   Date Value Ref Range Status   04/02/2019 2.6 (L) 3.5 - 5.2 g/dL Final     Protein:   Total Protein   Date Value Ref Range Status   04/02/2019 6.5 6.0 - 8.4 g/dL Final     Lactic acid:   No results found for: LACTATE    Significant Imaging: I have reviewed all pertinent imaging results/findings within the past 24 hours.    Advanced Directives::  Living Will: Patient reports she has a Living Will but no copy on short  LaPOST:  No  Do Not Resuscitate Status:patient is now a Partial code-no chest compressions, no shocking/cardioversion    ASSESSMENT/PLAN:    Palliative encounter:    Patient in bed and states she is having a bad morning. She did not eat much this am and c/o when trying to eat it feels like it got stuck and causing discomfort in her back. She has famotodine IV ordered bid but I will order one dose of Maalox to see if it will provide any more relief and if not it may be cardiac. Asked patient about her refusal to wear telemetry and states she did not refuse. I recommended we need to put her back on it and she agreed. She may benefit from mirtazapine to assist with appetite and feelings of depression she is exhibiting due to loss of independence.    She does state she feels better overall but is down also because she knows she cannot go home when she leaves here. She talks about how active she was prior to her spouse getting down and was a . Allowed her to vent and answered questions and concerns. Emotional support provided.          -symptom managment         > 50% of 35 min visit spent in chart review, face to face discussion of goals of care,  symptom assessment, coordination of care and emotional support.    Kassandra Howe, NP  Palliative Medicine  [facility]

## 2019-04-10 NOTE — PROGRESS NOTES
[facility]  Progress Note    Patient Name: Latasha Perez  MRN: [id number]  Patient Class: IP- Inpatient   Admission Date: 4/2/2019  Length of Stay: 8 days  Attending Physician: Gaetano Nieto MD  Primary Care Provider: Pollo Oneal MD        Subjective:     Principal Problem:Atrial fibrillation with RVR    HPI:  73-year-old female with HTN, HLP, severe COPD and + tobacco abuse who presented with complaint of dizziness and weakness that is been progressive over the past 2 days.  She also reports increase in shortness of breath along with palpitations.  She was recently admitted to Observation from 3/28 to 3/29/19 for a COPD exacerbation and was discharged home on Azithromycin and prednisone 40 mg daily x 5 days.  She denies any fevers or chills.  In the ER, she was noted to be in Afib with RVR, rate initially in the 190s, status post IV diltiazem push followed by infusion with rate in the 140-150s. CXR with new right upper lobe opacity, leukocytosis of 38,000. Patient with reported wheezing on exam status post IV Solu-Medrol 125 mg IV push x1.  She was placed on BiPAP.    Hospital Course:  Ms. Perez was admitted with AFib with RVR along with acute on chronic respiratory failure due to COPD exacerbation and pneumonia.  Patient was started on diltiazem infusion with inadequate control of heart rate and therefore converted to amiodarone infusion.  Anticoagulated with full-dose Lovenox and Cardiology consulted.  2D echo performed with EF of 55% and elevated PA pressures of 51 mmHg and pulmonary hypertension.  Patient had new infiltrate in the right upper lobe at presentation concerning for pneumonia which was empirically treated with Rocephin, azithromycin and vancomycin given her allergy profile and recent admission to the hospital.  Patient also reports smoking again which likely exacerbated her COPD in addition to pneumonia.  She was supported with BiPAP and had  pulse dose IV steroids which was quickly converted to prednisone.  Pulmonary was following and oxygen was weaned as tolerated. Blood culture positive in 1/4 bottles for GNR which has returned as Pseudomonas aeruginosa.ID was consulted,source likely pneumonia, Despite amiodarone, patient HR poorly controlled and she underwent DCCV on 4/4/19 with conversion to NSR but went back into Afib but rate controlled. Pt completed amiodarone IV load which was converted to PO on 4/5. Anticoagulation converted to Eliquis.  picc line was aced,repeat blood culture negative,she should be n cefepime until 4.18.19.  She was stable on NC O 2,  palliaitive care was following,agree with partial DNR.      Interval History: stable. No edema on exam nor xray    Review of Systems   Constitutional: Negative for chills and fever.   Respiratory: Positive for cough and shortness of breath (worse with minimal exertion).    Cardiovascular: Negative for chest pain and palpitations.     Objective:     Vital Signs (Most Recent):  Temp: 97.6 °F (36.4 °C) (04/10/19 0807)  Pulse: 70 (04/10/19 1546)  Resp: 18 (04/10/19 1546)  BP: (!) 145/66 (04/10/19 0807)  SpO2: 100 % (04/10/19 1546) Vital Signs (24h Range):  Temp:  [97.4 °F (36.3 °C)-98.1 °F (36.7 °C)] 97.6 °F (36.4 °C)  Pulse:  [65-90] 70  Resp:  [17-20] 18  SpO2:  [90 %-100 %] 100 %  BP: (134-168)/(59-74) 145/66     Weight: 37.8 kg (83 lb 5.3 oz)  Body mass index is 13.87 kg/m².    Intake/Output Summary (Last 24 hours) at 4/10/2019 1653  Last data filed at 4/10/2019 1215  Gross per 24 hour   Intake 670 ml   Output 610 ml   Net 60 ml      Physical Exam   Constitutional: She is oriented to person, place, and time. She appears well-developed. No distress.   Cachectic and frail   HENT:   + Temporal wasting, on nasal cannula   Cardiovascular: Normal rate and regular rhythm.   Pulmonary/Chest: No respiratory distress. She has no rales.   Diminished breath sounds throughout, no wheeze noted with prolonged  expiratory time   Abdominal: Soft. Bowel sounds are normal. She exhibits no distension and no mass. There is no tenderness. There is no rebound and no guarding.   Musculoskeletal: Normal range of motion. She exhibits no edema.   Neurological: She is alert and oriented to person, place, and time.   Skin: Capillary refill takes less than 2 seconds. She is not diaphoretic.   Psychiatric: She has a normal mood and affect. Her behavior is normal. Thought content normal.   Nursing note and vitals reviewed.      Significant Labs: All pertinent labs within the past 24 hours have been reviewed.    Significant Imaging: I have reviewed all pertinent imaging results/findings within the past 24 hours.  I have reviewed and interpreted all pertinent imaging results/findings within the past 24 hours.    Assessment/Plan:      * Atrial fibrillation with RVR  Patient initially treated with diltiazem infusion with minimal response so stopped  s/p IV amiodarone infusion converted to PO as of today  s/p DCCV on 4/4 with NSR. Then in and out of AFib but with controlled rate  Anticoagulation converted to PO apaxiban  Cardiology input appreciated  Echo with normal EF of 55% with PA pressure of 51 mm Hg and pulmonary hypertension  Rate controlled on amiodarone and diltiazem  increased diltiazem.  NST show no acute ischemia,    Palliative care encounter  Agree for partial code,no intubation.      Bacteremia due to Pseudomonas  Due to Pseudomonas aeruginosa from pneumonia,  Antibiotics changed as discussed above  Expected stop date 4/18      Cachexia  Patient is underweight and with BMI of 13.98  Regular diet and supplement with Boost  Nutrition consulted    Pneumonia due to Pseudomonas species  New infiltrate noted in the right upper lobe that was not present when she was admitted just a few days ago for observation  Empirically treated with ceftriaxone, azithromycin and vancomycin  Blood cultures with Pseudomonas  Change antibiotics to  cefepime on 4/4  ID on board    Acute on chronic respiratory failure with hypercapnia  Now on cefepime 2 g q 8 hrs x 14 days for Pseudomonas ae bacteremia (selection by ID mainly due to allergy profile)  PICC placed  Repeat blood cultures on 4/4 NGTD  BiPAP QHS + low flow NC continuously (unable to wean off at this time)  Continued nebulizer treatments and prednisone  No edema therefore no diuretics indicated at this time  Pulmonary input appreciated  ID also on board for abx co-management  Will consult palliative care for code status,she is partial code.    COPD exacerbation  As above  Is motivated to quit smoking  Pulmonary input appreciated    Essential hypertension  Continue diltiazem for HR and BP control    Leukocytosis  Likely secondary to pneumonia and possible steroid and/or leukemoid reaction  Antibiotics as discussed above   Stop checking CBC unless clinically indicated    HLD (hyperlipidemia)  Continue pravastatin      VTE Risk Mitigation (From admission, onward)        Ordered     apixaban tablet 5 mg  2 times daily      04/04/19 1500     IP VTE HIGH RISK PATIENT  Once      04/02/19 1210     Place sequential compression device  Until discontinued      04/02/19 1210              Gaetano Nieto MD  Department of Hospital Medicine   [facility]

## 2019-04-10 NOTE — PROGRESS NOTES
Per Felicita Paige, RN, MSN, ACM-RN; Director Case Management patient can not transfer this evening.  Family did not sign paperwork.  Patient capable of signing for self however no one will be available when patient arrives this evening.  Dr Feliz notified.

## 2019-04-10 NOTE — PLAN OF CARE
Problem: Fall Injury Risk  Goal: Absence of Fall and Fall-Related Injury    Intervention: Promote Injury-Free Environment     04/10/19 1029   Optimize [place] and Functional Mobility   Environmental Safety Modification assistive device/personal items within reach;clutter free environment maintained;lighting adjusted;room near unit station;room organization consistent   Optimize Balance and Safe Activity   Safety Promotion/Fall Prevention assistive device/personal item within reach;bed alarm set;Fall Risk reviewed with patient/family;medications reviewed;side rails raised x 2;instructed to call staff for mobility         Problem: Adult Inpatient Plan of Care  Goal: Optimal Comfort and Wellbeing    Intervention: Provide Person-Centered Care     04/10/19 1029   Support Dyspnea Relief   Trust Relationship/Rapport care explained;choices provided;emotional support provided;empathic listening provided;questions answered;questions encouraged;reassurance provided;thoughts/feelings acknowledged

## 2019-04-10 NOTE — PLAN OF CARE
04/10/19 1055   Final Note   Assessment Type Final Discharge Note   Anticipated Discharge Disposition SNF   Hospital Follow Up  Appt(s) scheduled? Yes   Discharge plans and expectations educations in teach back method with documentation complete? Yes   Right Care Referral Info   Post Acute Recommendation SNF / Sub-Acute Rehab   Referral Type SNF   Facility Name [facility]

## 2019-04-10 NOTE — PROGRESS NOTES
Patient being discharged today to SNF. She states she is feeling better today. She tried to eat something again and states if she eats slow she does better.  Discussed the LaPOST document in detail and went over her wishes again.   Completed LaPOST      > 50% of 15 min visit spent in chart review, face to face discussion of goals of care,  symptom assessment, coordination of care and emotional support

## 2019-04-10 NOTE — PLAN OF CARE
[facility]     Department of Hospital Medicine     [address] [place] [address]     [phone] after hours  [phone] fax       NURSING HOME ORDERS    04/11/2019    Admit to Nursing Home:    Skilled Bed                                             Diagnoses:  Active Hospital Problems    Diagnosis  POA    *Atrial fibrillation with RVR [I48.91]  Yes    Severe malnutrition [E43]  Yes    Palliative care encounter [Z51.5]  Not Applicable    Bacteremia due to Pseudomonas [R78.81]  Yes    Acute on chronic respiratory failure with hypercapnia [J96.22]  Yes    Pneumonia due to Pseudomonas species [J15.1]  Yes    Cachexia [R64]  Yes    COPD exacerbation [J44.1]  Yes    Essential hypertension [I10]  Yes    Leukocytosis [D72.829]  Yes    HLD (hyperlipidemia) [E78.5]  Yes      Resolved Hospital Problems   No resolved problems to display.       Patient is homebound due to:  Atrial fibrillation with RVR    Allergies:  Review of patient's allergies indicates:   Allergen Reactions    Pcn [penicillins] Other (See Comments)     Fever flushing skin swelling     Biaxin [clarithromycin] Rash    Codeine Rash    Doxycycline Rash    Levaquin [levofloxacin] Rash       Vitals:  Every shift (Skilled Nursing patients)    Diet: Dental soft, aspiration precautions           Supplement with Boost plus or equivalent TID with meals     Acitivities:     - Up in a chair each morning as tolerated   - Ambulate with assistance to bathroom   - May use walker, cane, or self-propelled wheelchair   - Weight bearing: as tolerated.    LABS:  Per facility protocol    Nursing Precautions:    - Aspiration precautions:             - Total assistance with meals            -  Upright 90 degrees befor during and after meals             -  Suction at bedside          - Fall precautions per nursing home protocol   - Decubitus precautions:        -  for positioning   - Pressure reducing  foam mattress   - Turn patient every two hours. Use wedge pillows to anchor patient    CONSULTS:    Physical Therapy to evaluate and treat     Occupational Therapy to evaluate and treat       MISCELLANEOUS CARE:    Oxygen: Please keep on 4 liter O2 via NC     Routine Skin for Bedridden Patients:  Apply moisture barrier cream to all    skin folds and wet areas in perineal area daily and after baths and                           all bowel movements.     MISCELLANEOUS CARE:  Home Infusion Therapy:   SN to perform Infusion Therapy/Central Line Care.  Review Central Line Care & Central Line Flush with patient.    Administer (drug and dose): cefepime 2 g IV Q8   Last to be given: 4/18/2019                            Scrub the Hub: Prior to accessing the line, always perform a 30 second alcohol scrub  Each lumen of the central line is to be flushed at least daily with 10 mL Normal Saline and 3 mL Heparin flush (10 units/mL)  Skilled Nurse (SN) may draw blood from IV access  Blood Draw Procedure:   - Aspirate at least 5 mL of blood   - Discard   - Obtain specimen   - Change injection cap   - Flush with 20 mL Normal Saline followed by a                 3-5 mL Heparin flush (10 units/mL)  Central :   - Sterile dressing changes are done weekly and as needed.   - Use chlor-hexadine scrub to cleanse site, apply Biopatch to insertion site,       apply securement device dressing   - Injection caps are changed weekly and after EVERY lab draw.   - If sterile gauze is under dressing to control oozing,                 dressing change must be performed every 24 hours until gauze is not needed.  Discontinue PICC line after last dose of cefepime (antibiotics) given on 4/18/2019.      Medications: Discontinue all previous medication orders, if any. See new list below.     Loyda Perez   Home Medication Instructions [place]:[id number]    Printed on:04/10/19 1017   Medication Information                      acetaminophen 650 Q  6 hours prn for pain             levalbuterol nebuzlier 1.25 mg Q 8 hours              alendronate (FOSAMAX) 70 MG tablet  TAKE 1 TABLET BY MOUTH ONCE A WEEK EVERY 7 DAYS, AS DIRECTED on mondays              ALPRAZolam (XANAX) 0.25 MG tablet  Take 1 tablet (0.25 mg total) by mouth 3 (three) times daily as needed for Anxiety.             amiodarone (PACERONE) 200 MG tablet  Take one tablets (200 mg total) by mouth 2 (two) times daily.             apixaban (ELIQUIS) 5 mg Tab  Take 1 tablet (5 mg total) by mouth 2 (two) times daily.             aspirin (ECOTRIN) 81 MG EC tablet  Take 81 mg by mouth once daily.             benzonatate (TESSALON) 100 MG capsule  Take 1 capsule (100 mg total) by mouth 3 (three) times daily as needed for Cough.             calcium carbonate (CALCIUM 500 ORAL)  Take by mouth 2 (two) times daily.              cefepime in dextrose 5 % (MAXIPIME) 2 gram/50 mL PgBk  Inject 50 mLs (2 g total) into the vein every 8 (eight) hours untill 4/18/2019              diltiaZEM (CARDIZEM CD) 240 MG 24 hr capsule  Take 1 capsule (240 mg total) by mouth once daily.             fluticasone-salmeterol 250-50 mcg/dose (ADVAIR) 250-50 mcg/dose diskus inhaler  Inhale 1 puff into the lungs 2 (two) times daily. Controller             latanoprost 0.005 % ophthalmic solution  Place 1 drop into the left eye nightly.             losartan (COZAAR) 100 MG tablet  TAKE 1 TABLET ONE TIME DAILY             pravastatin (PRAVACHOL) 20 MG tablet  TAKE 1 TABLET EVERY EVENING             SPIRIVA WITH HANDIHALER 18 mcg inhalation capsule daily                Code status: Partial code (no chest compressions, please see LaPOST)    _________________________________  Gaetano Nieto MD and Susan Schuster MD  04/11/2019

## 2019-04-10 NOTE — PHYSICIAN QUERY
PT Name: Latasha Perez  MR #: [id number]    Physician Query Form - Nutrition Clarification     CDS/: Shahnaz Kapoor               Contact information:     This form is a permanent document in the medical record.     Query Date: April 10, 2019    By submitting this query, we are merely seeking further clarification of documentation.. Please utilize your independent clinical judgment when addressing the question(s) below.    The Medical record contains the following:   Indicators  Supporting Clinical Findings Location in Medical Record    % of Estimated Energy Intake over a time frame from p.o., TF, or TPN     x Weight Status over a time frame Weight Loss: 9% x 1 month     RD progress note 4/8    x Subcutaneous Fat and/or Muscle Loss Body Fat Depletion: severe depletion of orbitals, triceps and thoracic and lumbar region   Muscle Mass Depletion: severe depletion of temples, clavicle region, scapular region, interosseous muscle and lower extremities      RD progress note 4/8     Fluid Accumulation or Edema      Reduced  Strength     x Wt / BMI / Usual Body Weight   Weight: 37.8 kg (83 lb 5.3 oz)  BMI (Calculated): 13.9     RD progress note 4/8     Delayed Wound Healing / Failure to Thrive     x Acute or Chronic Illness Diagnosis: (Afib)  Relevant Medical History: COPd, HTN, HLD, hyponatremia     RD progress note 4/8     Medication     x Treatment 1. Consider appetite stimulant to aid with po intake   2. Encourage po/supplements; honor preferences as able   3. Monitor weight weekly   4. RD to monitor     RD progress note 4/8    x Other    Severe Malnutrition in the context of Chronic Illness/Injury    Cachexia   RD progress note 4/8       Discharge summary      AND / ASPEN Clinical Characteristics (October 2011)  A minimum of two characteristics is recommended for diagnosing either moderate or severe malnutrition   Mild Malnutrition Moderate Malnutrition Severe Malnutrition   Energy Intake from p.o., TF or  TPN. < 75% intake of estimated energy needs for less than 7 days < 75% intake of estimated energy needs for greater than 7 days < 50% intake of estimated energy needs for > 5 days   Weight Loss 1-2% in 1 month  5% in 3 months  7.5% in 6 months  10% in 1 year 1-2 % in 1 week  5% in 1 month  7.5% in 3 months  10% in 6 months  20% in 1 year > 2% in 1 week  > 5% in 1 month  > 7.5% in 3 months  > 10% in 6 months  > 20% in 1 year   Physical Findings     None *Mild subcutaneous fat and/or muscle loss  *Mild fluid accumulation  *Stage II decubitus  *Surgical wound or non-healing wound *Mod/severe subcutaneous fat and/or muscle loss  *Mod/severe fluid accumulation  *Stage III or IV decubitus  *Non-healing surgical wound     Provider, please specify diagnosis or diagnoses associated with above clinical findings.    [ x ] Severe Protein-Calorie Malnutrition   [  ] Cachexia   [  ] Other Nutritional Diagnosis (please specify):    [  ] Other:    [  ] Clinically Undetermined       Please document in your progress notes daily for the duration of treatment until resolved and include in your discharge summary.

## 2019-04-10 NOTE — NURSING
Report given to SALMA Gardiner. Patient resting comfortably in bed with 4 L N/C in use. No acute distress noted. 12 hour chart check completed.

## 2019-04-10 NOTE — ASSESSMENT & PLAN NOTE
Now on cefepime 2 g q 8 hrs x 14 days for Pseudomonas ae bacteremia (selection by ID mainly due to allergy profile)  PICC placed  Repeat blood cultures on 4/4 NGTD  BiPAP QHS + low flow NC continuously (unable to wean off at this time)  Continued nebulizer treatments and prednisone  No edema therefore no diuretics indicated at this time  Pulmonary input appreciated  ID also on board for abx co-management  Will consult palliative care for code status,she is partial code.

## 2019-04-10 NOTE — DISCHARGE SUMMARY
[facility] Hospital Medicine  Discharge Summary      Patient Name: Latasha Perez  MRN: [id number]  Admission Date: 4/2/2019  Hospital Length of Stay: 9 days  Discharge Date:  04/11/2019   Attending Physician: Susan Schuster MD  Discharging Provider: Susan Schuster MD  Primary Care Provider: Pollo Oneal MD      HPI:   73-year-old female with HTN, HLP, severe COPD and + tobacco abuse who presented with complaint of dizziness and weakness that is been progressive over the past 2 days.  She also reports increase in shortness of breath along with palpitations.  She was recently admitted to Observation from 3/28 to 3/29/19 for a COPD exacerbation and was discharged home on Azithromycin and prednisone 40 mg daily x 5 days.  She denies any fevers or chills.  In the ER, she was noted to be in Afib with RVR, rate initially in the 190s, status post IV diltiazem push followed by infusion with rate in the 140-150s. CXR with new right upper lobe opacity, leukocytosis of 38,000. Patient with reported wheezing on exam status post IV Solu-Medrol 125 mg IV push x1.  She was placed on BiPAP.    Procedure(s) (LRB):  Transesophageal echo (JOHNNA) intra-procedure log documentation (N/A)  Cardioversion or Defibrillation      Hospital Course:   Ms. Perez was admitted with AFib with RVR along with acute on chronic respiratory failure due to COPD exacerbation and pneumonia.  Patient was started on diltiazem infusion with inadequate control of heart rate and therefore converted to amiodarone infusion.  Anticoagulated with full-dose Lovenox and Cardiology consulted.  2D echo performed with EF of 55% and elevated PA pressures of 51 mmHg and pulmonary hypertension.  Patient had new infiltrate in the right upper lobe at presentation concerning for pneumonia which was empirically treated with Rocephin, azithromycin and vancomycin given her allergy profile and recent admission to the hospital.  Patient also reports smoking again  which likely exacerbated her COPD in addition to pneumonia.  She was supported with BiPAP and had pulse dose IV steroids which was quickly converted to prednisone.  Pulmonary was following and oxygen was weaned as tolerated. Blood culture positive in 1/4 bottles for GNR which has returned as Pseudomonas aeruginosa. ID was consulted, with likely source of pneumonia. Repeat blood culture remained negative. Despite amiodarone, patient HR poorly controlled and she underwent DCCV on 4/4/19 with conversion to NSR but went back into Afib but rate was controlled. Pt completed amiodarone IV load which was converted to PO on 4/5. Anticoagulation converted to Eliquis. Pt had PICC line placed. ID recommend cefepime 2g IV Q8 until 4/18/2019 to complete course of treatment for her pneumonia and bacteremia. Palliative care also followed and goals of care discussion done with partial DNR status with no CPR/cardiac resuscitation but agreeable to short term intubation. LaPOST was completed. Pt arranged for discharge to SNF to complete antibiotic treatment and rehab with therapy. Pt with continued baseline O2 needs at 3-4L via NC chronically at baseline.    Consults:   Consults (From admission, onward)        Status Ordering Provider     Consult to Pulmonology  Once     Provider:  Juan Pablo Brown MD    Completed NORA TYSON.     Inpatient consult to Cardiology  Once     Provider:  Manfred Figueroa MD    Completed NORA TYSON.     Inpatient consult to Infectious Diseases  Once     Provider:  Nelly Patel MD    Completed ALTAGRACIA HOLLIS U.     Inpatient consult to Palliative Care  Once     Provider:  Kassandra Howe NP    Completed JOSH PATTERSON     Inpatient consult to PICC team ([facility])  Once     Provider:  (Not yet assigned)    Completed TELMA, ALTAGRACIA U.     Inpatient consult to Pulmonology  Once     Provider:  Juan Pablo Brown MD    Completed ALTAGRACIA HOLLIS.     Inpatient consult to Social Work/Case Management   Once     Provider:  (Not yet assigned)    Acknowledged ALTAGRACIA HOLLIS            Final Active Diagnoses:    Diagnosis Date Noted POA    PRINCIPAL PROBLEM:  Atrial fibrillation with RVR [I48.91] 04/02/2019 Yes    Severe malnutrition [E43] 04/08/2019 Yes    Palliative care encounter [Z51.5] 04/08/2019 Not Applicable    Bacteremia due to Pseudomonas [R78.81] 04/04/2019 Yes    Acute on chronic respiratory failure with hypercapnia [J96.22] 04/02/2019 Yes    Pneumonia due to Pseudomonas species [J15.1] 04/02/2019 Yes    Cachexia [R64] 04/02/2019 Yes    COPD exacerbation [J44.1] 03/28/2019 Yes    Essential hypertension [I10] 04/13/2017 Yes    Leukocytosis [D72.829] 04/25/2013 Yes    HLD (hyperlipidemia) [E78.5]  Yes      Problems Resolved During this Admission:       Discharged Condition: stable    Disposition: Skilled Nursing Facility    Follow Up:  Follow-up Information     Pollo Oneal MD In 1 week.    Specialty:  Internal Medicine  Contact information:  [address] [place] [address]  Paul QUINN [id number] [phone]                 Patient Instructions:      Activity as tolerated     Significant Diagnostic Studies:   Cardiac Graphics: Echocardiogram:   2D echo with color flow doppler:   Results for orders placed or performed during the hospital encounter of 04/24/14   2D Echo w/ Color Flow Doppler   Result Value Ref Range    QEF 55     Mitral Valve Regurgitation mild     Diastolic Dysfunction No     and Transthoracic echo (TTE) complete (Cupid Only):   Results for orders placed or performed during the hospital encounter of 04/02/19   Transthoracic echo (TTE) 2D with Color Flow   Result Value Ref Range    LA WIDTH 2.64 cm    AORTIC VALVE CUSP SEPERATION 1.17 cm    PV PEAK VELOCITY 2.03 cm/s    LVIDD 2.49 (A) 3.5 - 6.0 cm    IVS 1.23 (A) 0.6 - 1.1 cm    PW 1.38 (A) 0.6 - 1.1 cm    Ao root annulus 1.28 cm    LVIDS 1.86 (A) 2.1 - 4.0 cm    FS 25 28 - 44 %    LA volume 22.14 cm3    Sinus 2.88 cm    STJ 1.91 cm    LV mass  98.12 g    LA size 2.97 cm    RVDD 2.41 cm    TAPSE 1.27 cm    RV S' 10.11 m/s    Left Ventricle Relative Wall Thickness 1.11 cm    AV mean gradient 3.85 mmHg    AV valve area 2.63 cm2    AV Velocity Ratio 0.85     AV index (prosthetic) 1.26     IVRT 0.05 msec    LVOT diameter 1.63 cm    LVOT area 2.09 cm2    LVOT peak harshad 8.8988413524 m/s    LVOT peak VTI 15.11 cm    Ao peak harshad 1.45 m/s    Ao VTI 11.96 cm    LVOT stroke volume 31.51 cm3    AV peak gradient 8.41 mmHg    MV Peak E Harshad 1.24 m/s    TR Max Harshad 3.47 m/s    LV Systolic Volume 10.53 mL    LV Systolic Volume Index 7.7 mL/m2    LV Diastolic Volume 22.18 mL    LV Diastolic Volume Index 16.21 mL/m2    LA Volume Index 16.2 mL/m2    LV Mass Index 71.7 g/m2    RA Major Axis 3.41 cm    Left Atrium Minor Axis 2.84 cm    Left Atrium Major Axis 4.00 cm    Triscuspid Valve Regurgitation Peak Gradient 48.16 mmHg    RA Width 2.10 cm    Right Atrial Pressure (from IVC) 3 mmHg    TV rest pulmonary artery pressure 51 mmHg       Pending Diagnostic Studies:     None         Medications:  Reconciled Home Medications:      Medication List      START taking these medications    ALPRAZolam 0.25 MG tablet  Commonly known as:  XANAX  Take 1 tablet (0.25 mg total) by mouth 3 (three) times daily as needed for Anxiety.     amiodarone 400 MG tablet  Commonly known as:  PACERONE  Take 0.5 tablets (200 mg total) by mouth 2 (two) times daily.     apixaban 5 mg Tab  Commonly known as:  ELIQUIS  Take 1 tablet (5 mg total) by mouth 2 (two) times daily.     benzonatate 100 MG capsule  Commonly known as:  TESSALON  Take 1 capsule (100 mg total) by mouth 3 (three) times daily as needed for Cough.     cefepime in dextrose 5 % 2 gram/50 mL Pgbk  Commonly known as:  MAXIPIME  Inject 50 mLs (2 g total) into the vein every 8 (eight) hours.     diltiaZEM 240 MG 24 hr capsule  Commonly known as:  CARDIZEM CD  Take 1 capsule (240 mg total) by mouth once daily.  Start taking on:  4/11/2019         CHANGE how you take these medications    losartan 100 MG tablet  Commonly known as:  COZAAR  TAKE 1 TABLET ONE TIME DAILY  What changed:  Another medication with the same name was removed. Continue taking this medication, and follow the directions you see here.     * VENTOLIN HFA 90 mcg/actuation inhaler  Generic drug:  albuterol  INHALE TWO PUFFS BY MOUTH EVERY 6 HOURS AS NEEDED FOR WHEEZING  What changed:  Another medication with the same name was changed. Make sure you understand how and when to take each.     * albuterol sulfate 2.5 mg/0.5 mL Nebu  Take 2.5 mg by nebulization every 6 (six) hours while awake. One Box  What changed:    · when to take this  · reasons to take this         * This list has 2 medication(s) that are the same as other medications prescribed for you. Read the directions carefully, and ask your doctor or other care provider to review them with you.            CONTINUE taking these medications    alendronate 70 MG tablet  Commonly known as:  FOSAMAX  TAKE 1 TABLET BY MOUTH ONCE A WEEK EVERY 7 DAYS, AS DIRECTED     aspirin 81 MG EC tablet  Commonly known as:  ECOTRIN  Take 81 mg by mouth once daily.     CALCIUM 500 ORAL  Take by mouth 2 (two) times daily.     fluticasone-salmeterol 250-50 mcg/dose 250-50 mcg/dose diskus inhaler  Commonly known as:  ADVAIR  Inhale 1 puff into the lungs 2 (two) times daily. Controller     latanoprost 0.005 % ophthalmic solution  Place 1 drop into the left eye nightly.     pravastatin 20 MG tablet  Commonly known as:  PRAVACHOL  TAKE 1 TABLET EVERY EVENING     SPIRIVA WITH HANDIHALER 18 mcg inhalation capsule  Generic drug:  tiotropium     TYLENOL EXTRA STRENGTH ORAL  Take by mouth as needed.        STOP taking these medications    amLODIPine 5 MG tablet  Commonly known as:  NORVASC     azithromycin 250 MG tablet  Commonly known as:  ZITHROMAX Z-IMELDA     CENTRUM SILVER ORAL     cetirizine 10 MG tablet  Commonly known as:  ZYRTEC     predniSONE 20 MG  tablet  Commonly known as:  DELTASONE            Indwelling Lines/Drains at time of discharge:   Lines/Drains/Airways     Peripherally Inserted Central Catheter Line                 PICC Double Lumen 04/06/19 2135 right basilic 3 days                Time spent on the discharge of patient: > 35 minutes  Patient was seen and examined on the date of discharge and determined to be suitable for discharge.       Susan Schuster MD  Department of Hospital Medicine  [facility]

## 2019-04-11 VITALS
HEIGHT: 65 IN | OXYGEN SATURATION: 94 % | BODY MASS INDEX: 13.73 KG/M2 | WEIGHT: 82.44 LBS | DIASTOLIC BLOOD PRESSURE: 69 MMHG | HEART RATE: 92 BPM | SYSTOLIC BLOOD PRESSURE: 129 MMHG | TEMPERATURE: 97 F | RESPIRATION RATE: 18 BRPM

## 2019-04-11 LAB — TB INDURATION 48 - 72 HR READ: 0 MM

## 2019-04-11 PROCEDURE — 25000242 PHARM REV CODE 250 ALT 637 W/ HCPCS: Mod: HCNC | Performed by: HOSPITALIST

## 2019-04-11 PROCEDURE — 25000003 PHARM REV CODE 250: Mod: HCNC | Performed by: HOSPITALIST

## 2019-04-11 PROCEDURE — 99231 SBSQ HOSP IP/OBS SF/LOW 25: CPT | Mod: HCNC,,, | Performed by: NURSE PRACTITIONER

## 2019-04-11 PROCEDURE — 25000003 PHARM REV CODE 250: Mod: HCNC | Performed by: INTERNAL MEDICINE

## 2019-04-11 PROCEDURE — S0028 INJECTION, FAMOTIDINE, 20 MG: HCPCS | Mod: HCNC | Performed by: INTERNAL MEDICINE

## 2019-04-11 PROCEDURE — 94640 AIRWAY INHALATION TREATMENT: CPT | Mod: HCNC

## 2019-04-11 PROCEDURE — 25000003 PHARM REV CODE 250: Mod: HCNC | Performed by: PHYSICIAN ASSISTANT

## 2019-04-11 PROCEDURE — 94761 N-INVAS EAR/PLS OXIMETRY MLT: CPT | Mod: HCNC

## 2019-04-11 PROCEDURE — A4216 STERILE WATER/SALINE, 10 ML: HCPCS | Mod: HCNC | Performed by: INTERNAL MEDICINE

## 2019-04-11 PROCEDURE — 99231 PR SUBSEQUENT HOSPITAL CARE,LEVL I: ICD-10-PCS | Mod: HCNC,,, | Performed by: NURSE PRACTITIONER

## 2019-04-11 PROCEDURE — 27000221 HC OXYGEN, UP TO 24 HOURS: Mod: HCNC

## 2019-04-11 PROCEDURE — 63600175 PHARM REV CODE 636 W HCPCS: Mod: HCNC | Performed by: PHYSICIAN ASSISTANT

## 2019-04-11 RX ADMIN — LEVALBUTEROL HYDROCHLORIDE 1.25 MG: 1.25 SOLUTION, CONCENTRATE RESPIRATORY (INHALATION) at 07:04

## 2019-04-11 RX ADMIN — LEVALBUTEROL HYDROCHLORIDE 1.25 MG: 1.25 SOLUTION, CONCENTRATE RESPIRATORY (INHALATION) at 12:04

## 2019-04-11 RX ADMIN — Medication 10 ML: at 12:04

## 2019-04-11 RX ADMIN — CEFEPIME 2 G: 2 INJECTION, POWDER, FOR SOLUTION INTRAVENOUS at 11:04

## 2019-04-11 RX ADMIN — CEFEPIME 2 G: 2 INJECTION, POWDER, FOR SOLUTION INTRAVENOUS at 02:04

## 2019-04-11 RX ADMIN — Medication 10 ML: at 05:04

## 2019-04-11 RX ADMIN — TIOTROPIUM BROMIDE 18 MCG: 18 CAPSULE ORAL; RESPIRATORY (INHALATION) at 07:04

## 2019-04-11 RX ADMIN — DILTIAZEM HYDROCHLORIDE 240 MG: 120 CAPSULE, COATED, EXTENDED RELEASE ORAL at 08:04

## 2019-04-11 RX ADMIN — APIXABAN 5 MG: 5 TABLET, FILM COATED ORAL at 08:04

## 2019-04-11 RX ADMIN — FAMOTIDINE 20 MG: 10 INJECTION INTRAVENOUS at 08:04

## 2019-04-11 RX ADMIN — ASPIRIN 81 MG: 81 TABLET, COATED ORAL at 08:04

## 2019-04-11 RX ADMIN — AMIODARONE HYDROCHLORIDE 400 MG: 200 TABLET ORAL at 08:04

## 2019-04-11 NOTE — PLAN OF CARE
04/11/19 1021   Final Note   Assessment Type Final Discharge Note   Anticipated Discharge Disposition SNF   Hospital Follow Up  Appt(s) scheduled? Yes   Discharge plans and expectations educations in teach back method with documentation complete? Yes   Right Care Referral Info   Post Acute Recommendation SNF / Sub-Acute Rehab   Referral Type SNF   Facility Name [facility]

## 2019-04-11 NOTE — NURSING
Note, patient up to the bedside commode;     Transportation here for transport to [facility];    Will continue to f/u;

## 2019-04-11 NOTE — PROGRESS NOTES
Patient still here this am so I went by to f/u and offer support. She is still having some SOB and anxiety and had to have a xanax last night. She reports she is doing better with eating soft foods and hopes to gain some weight  We talked about her current condition again and that if she is unable to complete therapy in SNF she may want to look at hospice. We discussed hospice in detail and she will keep this in mind. She states she is familiar with hospice and knew someone on it  but feels she is going to be able to gain some strength and be able to live independently.Emotional support provided      > 50% of 15 min visit spent in chart review, face to face discussion of goals of care,  symptom assessment, coordination of care and emotional support

## 2019-04-11 NOTE — PT/OT/SLP DISCHARGE
Physical Therapy Discharge Summary    Name: Latasha Perez  MRN: [id number]   Principal Problem: Atrial fibrillation with RVR     Patient Discharged from acute Physical Therapy on 19.  Please refer to prior PT notes for functional status.     Assessment:     Patient appropriate for care in another setting.    Objective:     GOALS:   Multidisciplinary Problems     Physical Therapy Goals     Not on file          Multidisciplinary Problems (Resolved)        Problem: Physical Therapy Goal    Goal Priority Disciplines Outcome Goal Variances Interventions   Physical Therapy Goal   (Resolved)     PT, PT/OT Outcome(s) achieved     Description:  Goals to be met by: 19     Patient will increase functional independence with mobility by performin. Supine to sit with Modified Leslie  2. Rolling to Left and Right with Modified Leslie  3. Sit to stand transfer with Modified Leslie  4. Bed to chair transfer with Modified Leslie   5. Gait  x50-100 feet with Modified Leslie using Rolling Walker and O2  6. Lower extremity exercise program 2 sets x10 reps per handout, with independence                      Reasons for Discontinuation of Therapy Services  Transfer to alternate level of care.      Plan:     Patient Discharged to: Skilled Nursing Facility.    Mary Cook, PT  2019

## 2019-04-11 NOTE — PROGRESS NOTES
Call placed to [facility], spoke to Davis to notify her that transportation will be scheduled for 11am to go to facility and inquired if room number provided on yesterday of 27A was still the same.  Davis informed TN that the pt will be going to room 26A instead.    1010- ADT 30 order entered for transport to [facility], requested  to be for 11am via w/c van.  TN provided pts nurse Mooney with call report information.

## 2019-04-11 NOTE — NURSING
Bedside report given to SALMA Gardiner. Patient sitting up in bed. Nasal cannula in place at 4L. NAD noted at this time. All safety precautions in place.  12 hour chart check comeplete

## 2019-04-11 NOTE — NURSING
Discharge Preparation:   Note that patient is awake, alert and fully oriented on O2@2l NC; Very pleasant and cooperative; She denies any pain at this time;     Patient aware that she is being discharged and in agreement with same;     Reviewed order for R PICC to remain in for continued administration of abx;     Called and gave report to ORACIO Duvall at [phone];     Will assist with bathing and dressing for transport;    Will continue to f/u;

## 2019-04-12 NOTE — PT/OT/SLP DISCHARGE
Occupational Therapy Discharge Summary    Latasha Perez  MRN: [id number]   Principal Problem: Atrial fibrillation with RVR      Patient Discharged from acute Occupational Therapy on 04/11/19.  Please refer to prior OT notes for functional status.    Assessment:      Patient appropriate for care in another setting.    Objective:     GOALS:   Multidisciplinary Problems     Occupational Therapy Goals     Not on file          Multidisciplinary Problems (Resolved)        Problem: Occupational Therapy Goal    Goal Priority Disciplines Outcome Interventions   Occupational Therapy Goal   (Resolved)     OT, PT/OT Outcome(s) achieved    Description:  Goals to be met by: 04/19/19     Patient will increase functional independence with ADLs by performing:    Feeding with Modified Harding.  UE Dressing with Modified Harding. Goal met 4/10/19  LE Dressing with Modified Harding.  Grooming while standing at sink with Modified Harding.  Toileting from toilet with Supervision for hygiene and clothing management.   Sitting at edge of bed x25 minutes with Modified Harding.  Rolling to Bilateral with Modified Harding. Goal met 4/10/19  Supine to sit with Modified Harding.  Step transfer with Modified Harding  Toilet transfer to toilet with Modified Harding.  Upper extremity exercise program x15 reps per handout, with supervision.                       Reasons for Discontinuation of Therapy Services  Transfer to alternate level of care.      Plan:     Patient Discharged to: Skilled Nursing Facility    Kendra Wong OT  4/12/2019

## 2019-04-17 ENCOUNTER — TELEPHONE (OUTPATIENT)
Dept: ADMINISTRATIVE | Facility: CLINIC | Age: 74
End: 2019-04-17

## 2019-04-17 NOTE — TELEPHONE ENCOUNTER
Home Health SOC 03/30/2019 - 05/28/2019 with [facility] ([facility]) - Dr. Cathie Taveras. Patient received SN, OT, MSW and HHA services.

## 2019-04-26 ENCOUNTER — HOSPITAL ENCOUNTER (INPATIENT)
Facility: HOSPITAL | Age: 74
LOS: 7 days | Discharge: NURSING, REHAB OR LONG-TERM CARE | DRG: 177 | End: 2019-05-03
Attending: EMERGENCY MEDICINE | Admitting: EMERGENCY MEDICINE
Payer: MEDICARE

## 2019-04-26 DIAGNOSIS — J44.9 COPD (CHRONIC OBSTRUCTIVE PULMONARY DISEASE): ICD-10-CM

## 2019-04-26 DIAGNOSIS — R91.8 OPACITY OF LUNG ON IMAGING STUDY: ICD-10-CM

## 2019-04-26 DIAGNOSIS — J15.1 PNEUMONIA DUE TO PSEUDOMONAS SPECIES, UNSPECIFIED LATERALITY, UNSPECIFIED PART OF LUNG: ICD-10-CM

## 2019-04-26 DIAGNOSIS — D72.829 LEUKOCYTOSIS, UNSPECIFIED TYPE: ICD-10-CM

## 2019-04-26 DIAGNOSIS — J18.9 PNEUMONIA OF LEFT LOWER LOBE DUE TO INFECTIOUS ORGANISM: ICD-10-CM

## 2019-04-26 DIAGNOSIS — J43.9 PULMONARY EMPHYSEMA, UNSPECIFIED EMPHYSEMA TYPE: ICD-10-CM

## 2019-04-26 DIAGNOSIS — I10 ESSENTIAL HYPERTENSION: ICD-10-CM

## 2019-04-26 DIAGNOSIS — J44.1 COPD EXACERBATION: Primary | ICD-10-CM

## 2019-04-26 DIAGNOSIS — B37.0 ORAL THRUSH: ICD-10-CM

## 2019-04-26 PROBLEM — I48.0 PAROXYSMAL ATRIAL FIBRILLATION: Status: ACTIVE | Noted: 2019-04-26

## 2019-04-26 LAB
ALBUMIN SERPL BCP-MCNC: 2 G/DL (ref 3.5–5.2)
ALLENS TEST: ABNORMAL
ALP SERPL-CCNC: 83 U/L (ref 55–135)
ALT SERPL W/O P-5'-P-CCNC: 16 U/L (ref 10–44)
ANION GAP SERPL CALC-SCNC: 11 MMOL/L (ref 8–16)
AST SERPL-CCNC: 19 U/L (ref 10–40)
BASOPHILS # BLD AUTO: 0.04 K/UL (ref 0–0.2)
BASOPHILS NFR BLD: 0.2 % (ref 0–1.9)
BILIRUB SERPL-MCNC: 0.3 MG/DL (ref 0.1–1)
BUN SERPL-MCNC: 17 MG/DL (ref 8–23)
CALCIUM SERPL-MCNC: 9 MG/DL (ref 8.7–10.5)
CHLORIDE SERPL-SCNC: 99 MMOL/L (ref 95–110)
CO2 SERPL-SCNC: 29 MMOL/L (ref 23–29)
CREAT SERPL-MCNC: 0.6 MG/DL (ref 0.5–1.4)
DELSYS: ABNORMAL
DIFFERENTIAL METHOD: ABNORMAL
EOSINOPHIL # BLD AUTO: 0.4 K/UL (ref 0–0.5)
EOSINOPHIL NFR BLD: 2.2 % (ref 0–8)
EP: 5
ERYTHROCYTE [DISTWIDTH] IN BLOOD BY AUTOMATED COUNT: 13.5 % (ref 11.5–14.5)
ERYTHROCYTE [SEDIMENTATION RATE] IN BLOOD BY WESTERGREN METHOD: 16 MM/H
EST. GFR  (AFRICAN AMERICAN): >60 ML/MIN/1.73 M^2
EST. GFR  (NON AFRICAN AMERICAN): >60 ML/MIN/1.73 M^2
ESTIMATED AVG GLUCOSE: 128 MG/DL (ref 68–131)
FIO2: 55
GLUCOSE SERPL-MCNC: 121 MG/DL (ref 70–110)
HBA1C MFR BLD HPLC: 6.1 % (ref 4–5.6)
HCO3 UR-SCNC: 32.8 MMOL/L (ref 24–28)
HCT VFR BLD AUTO: 30.1 % (ref 37–48.5)
HGB BLD-MCNC: 9.6 G/DL (ref 12–16)
IMM GRANULOCYTES # BLD AUTO: 0.11 K/UL (ref 0–0.04)
IMM GRANULOCYTES NFR BLD AUTO: 0.7 % (ref 0–0.5)
IP: 10
LACTATE SERPL-SCNC: 1.4 MMOL/L (ref 0.5–2.2)
LYMPHOCYTES # BLD AUTO: 0.6 K/UL (ref 1–4.8)
LYMPHOCYTES NFR BLD: 3.7 % (ref 18–48)
MCH RBC QN AUTO: 30.9 PG (ref 27–31)
MCHC RBC AUTO-ENTMCNC: 31.9 G/DL (ref 32–36)
MCV RBC AUTO: 97 FL (ref 82–98)
MIN VOL: 17
MODE: ABNORMAL
MONOCYTES # BLD AUTO: 0.3 K/UL (ref 0.3–1)
MONOCYTES NFR BLD: 1.9 % (ref 4–15)
NEUTROPHILS # BLD AUTO: 14.7 K/UL (ref 1.8–7.7)
NEUTROPHILS NFR BLD: 91.3 % (ref 38–73)
NRBC BLD-RTO: 0 /100 WBC
PCO2 BLDA: 53.8 MMHG (ref 35–45)
PH SMN: 7.39 [PH] (ref 7.35–7.45)
PLATELET # BLD AUTO: 313 K/UL (ref 150–350)
PMV BLD AUTO: 9.1 FL (ref 9.2–12.9)
PO2 BLDA: 59 MMHG (ref 80–100)
POC BE: 8 MMOL/L
POC SATURATED O2: 89 % (ref 95–100)
POC TCO2: 34 MMOL/L (ref 23–27)
POCT GLUCOSE: 143 MG/DL (ref 70–110)
POTASSIUM SERPL-SCNC: 3.9 MMOL/L (ref 3.5–5.1)
PROT SERPL-MCNC: 5.9 G/DL (ref 6–8.4)
RBC # BLD AUTO: 3.11 M/UL (ref 4–5.4)
SAMPLE: ABNORMAL
SITE: ABNORMAL
SODIUM SERPL-SCNC: 139 MMOL/L (ref 136–145)
SP02: 89
SPONT RATE: 27
WBC # BLD AUTO: 16.06 K/UL (ref 3.9–12.7)

## 2019-04-26 PROCEDURE — 25000242 PHARM REV CODE 250 ALT 637 W/ HCPCS: Mod: HCNC | Performed by: STUDENT IN AN ORGANIZED HEALTH CARE EDUCATION/TRAINING PROGRAM

## 2019-04-26 PROCEDURE — 27000221 HC OXYGEN, UP TO 24 HOURS: Mod: HCNC

## 2019-04-26 PROCEDURE — 82803 BLOOD GASES ANY COMBINATION: CPT | Mod: HCNC

## 2019-04-26 PROCEDURE — 99291 PR CRITICAL CARE, E/M 30-74 MINUTES: ICD-10-PCS | Mod: HCNC,,, | Performed by: EMERGENCY MEDICINE

## 2019-04-26 PROCEDURE — 27000190 HC CPAP FULL FACE MASK W/VALVE: Mod: HCNC

## 2019-04-26 PROCEDURE — 93005 ELECTROCARDIOGRAM TRACING: CPT | Mod: HCNC

## 2019-04-26 PROCEDURE — 83036 HEMOGLOBIN GLYCOSYLATED A1C: CPT | Mod: HCNC

## 2019-04-26 PROCEDURE — 87040 BLOOD CULTURE FOR BACTERIA: CPT | Mod: HCNC

## 2019-04-26 PROCEDURE — 93010 EKG 12-LEAD: ICD-10-PCS | Mod: HCNC,,, | Performed by: INTERNAL MEDICINE

## 2019-04-26 PROCEDURE — 93010 ELECTROCARDIOGRAM REPORT: CPT | Mod: HCNC,,, | Performed by: INTERNAL MEDICINE

## 2019-04-26 PROCEDURE — 99223 PR INITIAL HOSPITAL CARE,LEVL III: ICD-10-PCS | Mod: HCNC,AI,GC, | Performed by: INTERNAL MEDICINE

## 2019-04-26 PROCEDURE — 11000001 HC ACUTE MED/SURG PRIVATE ROOM: Mod: HCNC

## 2019-04-26 PROCEDURE — 80053 COMPREHEN METABOLIC PANEL: CPT | Mod: HCNC

## 2019-04-26 PROCEDURE — 63600175 PHARM REV CODE 636 W HCPCS: Mod: HCNC | Performed by: STUDENT IN AN ORGANIZED HEALTH CARE EDUCATION/TRAINING PROGRAM

## 2019-04-26 PROCEDURE — 94799 UNLISTED PULMONARY SVC/PX: CPT | Mod: HCNC

## 2019-04-26 PROCEDURE — 83605 ASSAY OF LACTIC ACID: CPT | Mod: HCNC

## 2019-04-26 PROCEDURE — 85025 COMPLETE CBC W/AUTO DIFF WBC: CPT | Mod: HCNC

## 2019-04-26 PROCEDURE — 99285 EMERGENCY DEPT VISIT HI MDM: CPT | Mod: 25,HCNC

## 2019-04-26 PROCEDURE — 99900035 HC TECH TIME PER 15 MIN (STAT): Mod: HCNC

## 2019-04-26 PROCEDURE — 96365 THER/PROPH/DIAG IV INF INIT: CPT | Mod: HCNC

## 2019-04-26 PROCEDURE — 94640 AIRWAY INHALATION TREATMENT: CPT | Mod: HCNC

## 2019-04-26 PROCEDURE — 99291 CRITICAL CARE FIRST HOUR: CPT | Mod: HCNC,,, | Performed by: EMERGENCY MEDICINE

## 2019-04-26 PROCEDURE — 25000003 PHARM REV CODE 250: Mod: HCNC | Performed by: STUDENT IN AN ORGANIZED HEALTH CARE EDUCATION/TRAINING PROGRAM

## 2019-04-26 PROCEDURE — 94660 CPAP INITIATION&MGMT: CPT | Mod: HCNC

## 2019-04-26 PROCEDURE — 63600175 PHARM REV CODE 636 W HCPCS: Mod: HCNC | Performed by: INTERNAL MEDICINE

## 2019-04-26 PROCEDURE — 94761 N-INVAS EAR/PLS OXIMETRY MLT: CPT | Mod: HCNC

## 2019-04-26 PROCEDURE — 36600 WITHDRAWAL OF ARTERIAL BLOOD: CPT | Mod: HCNC

## 2019-04-26 PROCEDURE — 99223 1ST HOSP IP/OBS HIGH 75: CPT | Mod: HCNC,AI,GC, | Performed by: INTERNAL MEDICINE

## 2019-04-26 PROCEDURE — 82962 GLUCOSE BLOOD TEST: CPT | Mod: HCNC

## 2019-04-26 PROCEDURE — 96375 TX/PRO/DX INJ NEW DRUG ADDON: CPT | Mod: HCNC

## 2019-04-26 RX ORDER — IPRATROPIUM BROMIDE 0.5 MG/2.5ML
1 SOLUTION RESPIRATORY (INHALATION)
Status: COMPLETED | OUTPATIENT
Start: 2019-04-26 | End: 2019-04-26

## 2019-04-26 RX ORDER — MEROPENEM AND SODIUM CHLORIDE 1 G/50ML
1 INJECTION, SOLUTION INTRAVENOUS EVERY 8 HOURS
Status: DISCONTINUED | OUTPATIENT
Start: 2019-04-26 | End: 2019-04-26

## 2019-04-26 RX ORDER — CEFEPIME HYDROCHLORIDE 2 G/1
2 INJECTION, POWDER, FOR SOLUTION INTRAVENOUS
Status: DISCONTINUED | OUTPATIENT
Start: 2019-04-26 | End: 2019-04-26

## 2019-04-26 RX ORDER — PRAVASTATIN SODIUM 20 MG/1
20 TABLET ORAL NIGHTLY
Status: DISCONTINUED | OUTPATIENT
Start: 2019-04-26 | End: 2019-05-03 | Stop reason: HOSPADM

## 2019-04-26 RX ORDER — SODIUM CHLORIDE 0.9 % (FLUSH) 0.9 %
10 SYRINGE (ML) INJECTION
Status: DISCONTINUED | OUTPATIENT
Start: 2019-04-26 | End: 2019-05-03 | Stop reason: HOSPADM

## 2019-04-26 RX ORDER — TIOTROPIUM BROMIDE 18 UG/1
1 CAPSULE ORAL; RESPIRATORY (INHALATION) DAILY
Status: DISCONTINUED | OUTPATIENT
Start: 2019-04-26 | End: 2019-05-03 | Stop reason: HOSPADM

## 2019-04-26 RX ORDER — CEFEPIME HYDROCHLORIDE 2 G/1
2 INJECTION, POWDER, FOR SOLUTION INTRAVENOUS
Status: COMPLETED | OUTPATIENT
Start: 2019-04-26 | End: 2019-04-26

## 2019-04-26 RX ORDER — IPRATROPIUM BROMIDE 0.5 MG/2.5ML
0.5 SOLUTION RESPIRATORY (INHALATION)
Status: DISCONTINUED | OUTPATIENT
Start: 2019-04-26 | End: 2019-04-27

## 2019-04-26 RX ORDER — MULTIVITAMIN
1 TABLET ORAL DAILY
COMMUNITY

## 2019-04-26 RX ORDER — METHYLPREDNISOLONE SOD SUCC 125 MG
125 VIAL (EA) INJECTION DAILY
Status: DISCONTINUED | OUTPATIENT
Start: 2019-04-26 | End: 2019-04-26

## 2019-04-26 RX ORDER — LATANOPROST 50 UG/ML
1 SOLUTION/ DROPS OPHTHALMIC NIGHTLY
Status: DISCONTINUED | OUTPATIENT
Start: 2019-04-26 | End: 2019-05-03 | Stop reason: HOSPADM

## 2019-04-26 RX ORDER — ASPIRIN 81 MG/1
81 TABLET ORAL DAILY
Status: DISCONTINUED | OUTPATIENT
Start: 2019-04-26 | End: 2019-05-03 | Stop reason: HOSPADM

## 2019-04-26 RX ORDER — PREDNISONE 20 MG/1
40 TABLET ORAL DAILY
Status: COMPLETED | OUTPATIENT
Start: 2019-04-26 | End: 2019-04-30

## 2019-04-26 RX ORDER — IBUPROFEN 200 MG
16 TABLET ORAL
Status: DISCONTINUED | OUTPATIENT
Start: 2019-04-26 | End: 2019-05-03 | Stop reason: HOSPADM

## 2019-04-26 RX ORDER — DILTIAZEM HYDROCHLORIDE 240 MG/1
240 CAPSULE, COATED, EXTENDED RELEASE ORAL DAILY
Status: DISCONTINUED | OUTPATIENT
Start: 2019-04-26 | End: 2019-05-03 | Stop reason: HOSPADM

## 2019-04-26 RX ORDER — LEVALBUTEROL 1.25 MG/.5ML
1.25 SOLUTION, CONCENTRATE RESPIRATORY (INHALATION)
Status: DISCONTINUED | OUTPATIENT
Start: 2019-04-26 | End: 2019-04-28

## 2019-04-26 RX ORDER — GLUCAGON 1 MG
1 KIT INJECTION
Status: DISCONTINUED | OUTPATIENT
Start: 2019-04-26 | End: 2019-05-03 | Stop reason: HOSPADM

## 2019-04-26 RX ORDER — CLOTRIMAZOLE 10 MG/1
10 LOZENGE ORAL; TOPICAL
Status: DISCONTINUED | OUTPATIENT
Start: 2019-04-26 | End: 2019-04-27

## 2019-04-26 RX ORDER — FLUTICASONE FUROATE AND VILANTEROL 100; 25 UG/1; UG/1
1 POWDER RESPIRATORY (INHALATION) DAILY
Status: DISCONTINUED | OUTPATIENT
Start: 2019-04-26 | End: 2019-05-03 | Stop reason: HOSPADM

## 2019-04-26 RX ORDER — IBUPROFEN 200 MG
24 TABLET ORAL
Status: DISCONTINUED | OUTPATIENT
Start: 2019-04-26 | End: 2019-05-03 | Stop reason: HOSPADM

## 2019-04-26 RX ORDER — CLOTRIMAZOLE 10 MG/1
10 LOZENGE ORAL; TOPICAL
Status: DISCONTINUED | OUTPATIENT
Start: 2019-04-26 | End: 2019-04-26

## 2019-04-26 RX ORDER — MEROPENEM AND SODIUM CHLORIDE 1 G/50ML
1 INJECTION, SOLUTION INTRAVENOUS EVERY 8 HOURS
Status: COMPLETED | OUTPATIENT
Start: 2019-04-26 | End: 2019-04-27

## 2019-04-26 RX ORDER — CETIRIZINE HYDROCHLORIDE 10 MG/1
10 TABLET ORAL DAILY
COMMUNITY

## 2019-04-26 RX ORDER — AMIODARONE HYDROCHLORIDE 200 MG/1
200 TABLET ORAL 2 TIMES DAILY
Status: DISCONTINUED | OUTPATIENT
Start: 2019-04-26 | End: 2019-05-03 | Stop reason: HOSPADM

## 2019-04-26 RX ORDER — ALBUTEROL SULFATE 2.5 MG/.5ML
15 SOLUTION RESPIRATORY (INHALATION)
Status: COMPLETED | OUTPATIENT
Start: 2019-04-26 | End: 2019-04-26

## 2019-04-26 RX ADMIN — LEVALBUTEROL 1.25 MG: 1.25 SOLUTION, CONCENTRATE RESPIRATORY (INHALATION) at 03:04

## 2019-04-26 RX ADMIN — LEVALBUTEROL 1.25 MG: 1.25 SOLUTION, CONCENTRATE RESPIRATORY (INHALATION) at 08:04

## 2019-04-26 RX ADMIN — CEFEPIME 2 G: 2 INJECTION, POWDER, FOR SOLUTION INTRAVENOUS at 04:04

## 2019-04-26 RX ADMIN — VANCOMYCIN HYDROCHLORIDE 500 MG: 500 INJECTION, POWDER, LYOPHILIZED, FOR SOLUTION INTRAVENOUS at 05:04

## 2019-04-26 RX ADMIN — DILTIAZEM HYDROCHLORIDE 240 MG: 240 CAPSULE, COATED, EXTENDED RELEASE ORAL at 08:04

## 2019-04-26 RX ADMIN — PRAVASTATIN SODIUM 20 MG: 20 TABLET ORAL at 08:04

## 2019-04-26 RX ADMIN — CLOTRIMAZOLE 10 MG: 10 LOZENGE ORAL; TOPICAL at 02:04

## 2019-04-26 RX ADMIN — IPRATROPIUM BROMIDE 0.5 MG: 0.5 SOLUTION RESPIRATORY (INHALATION) at 03:04

## 2019-04-26 RX ADMIN — ALBUTEROL SULFATE 15 MG: 2.5 SOLUTION RESPIRATORY (INHALATION) at 02:04

## 2019-04-26 RX ADMIN — LEVALBUTEROL 1.25 MG: 1.25 SOLUTION, CONCENTRATE RESPIRATORY (INHALATION) at 09:04

## 2019-04-26 RX ADMIN — IPRATROPIUM BROMIDE 1 MG: 0.5 SOLUTION RESPIRATORY (INHALATION) at 02:04

## 2019-04-26 RX ADMIN — LATANOPROST 1 DROP: 50 SOLUTION OPHTHALMIC at 08:04

## 2019-04-26 RX ADMIN — IPRATROPIUM BROMIDE 0.5 MG: 0.5 SOLUTION RESPIRATORY (INHALATION) at 06:04

## 2019-04-26 RX ADMIN — LEVALBUTEROL 1.25 MG: 1.25 SOLUTION, CONCENTRATE RESPIRATORY (INHALATION) at 06:04

## 2019-04-26 RX ADMIN — AMIODARONE HYDROCHLORIDE 200 MG: 200 TABLET ORAL at 08:04

## 2019-04-26 RX ADMIN — ASPIRIN 81 MG: 81 TABLET, COATED ORAL at 08:04

## 2019-04-26 RX ADMIN — IPRATROPIUM BROMIDE 0.5 MG: 0.5 SOLUTION RESPIRATORY (INHALATION) at 08:04

## 2019-04-26 RX ADMIN — APIXABAN 5 MG: 5 TABLET, FILM COATED ORAL at 08:04

## 2019-04-26 RX ADMIN — CLOTRIMAZOLE 10 MG: 10 LOZENGE ORAL; TOPICAL at 09:04

## 2019-04-26 RX ADMIN — FLUTICASONE FUROATE AND VILANTEROL TRIFENATATE 1 PUFF: 100; 25 POWDER RESPIRATORY (INHALATION) at 10:04

## 2019-04-26 RX ADMIN — TIOTROPIUM BROMIDE 18 MCG: 18 CAPSULE ORAL; RESPIRATORY (INHALATION) at 10:04

## 2019-04-26 RX ADMIN — PREDNISONE 40 MG: 20 TABLET ORAL at 10:04

## 2019-04-26 RX ADMIN — CLOTRIMAZOLE 10 MG: 10 LOZENGE ORAL; TOPICAL at 10:04

## 2019-04-26 RX ADMIN — MEROPENEM AND SODIUM CHLORIDE 1 G: 1 INJECTION, SOLUTION INTRAVENOUS at 09:04

## 2019-04-26 RX ADMIN — IPRATROPIUM BROMIDE 0.5 MG: 0.5 SOLUTION RESPIRATORY (INHALATION) at 09:04

## 2019-04-26 RX ADMIN — CLOTRIMAZOLE 10 MG: 10 LOZENGE ORAL; TOPICAL at 06:04

## 2019-04-26 RX ADMIN — Medication 10 ML: at 05:04

## 2019-04-26 NOTE — SUBJECTIVE & OBJECTIVE
Past Medical History:   Diagnosis Date    Arthritis     Carotid disease, bilateral     Cataract     Chronic hyponatremia     COPD (chronic obstructive pulmonary disease)     HLD (hyperlipidemia)     HTN (hypertension)        Past Surgical History:   Procedure Laterality Date    APPENDECTOMY      Cardioversion or Defibrillation  4/4/2019    Performed by Manfred Figueroa MD at [facility] CATH LAB    CATARACT EXTRACTION W/  INTRAOCULAR LENS IMPLANT Right 12/06/2018    Dr. Escobar    CATARACT EXTRACTION W/  INTRAOCULAR LENS IMPLANT Left 12/20/2018    DR. Escobar    CERVICAL SPINE SURGERY      DILATION AND CURETTAGE OF UTERUS      x 2    EYE SURGERY      cataract Rt    FRACTURE SURGERY      Lt leg fracture with hardware    INSERTION, IOL PROSTHESIS Left 12/20/2018    Performed by Broderick Escobar [facility] at [facility] OR    INSERTION, IOL PROSTHESIS Right 12/6/2018    Performed by [facility]obar [facility] at [facility] OR    metatarsal repair      OPEN REDUCTION INTERNAL FIXATION-TIBIAL PLATEAU Left 6/27/2014    Performed by Isak Pereira MD at [facility] OR    PHACOEMULSIFICATION, CATARACT Left 12/20/2018    Performed by [facility]obar [facility] at [facility] OR    PHACOEMULSIFICATION, CATARACT Right 12/6/2018    Performed by [facility]obar [facility] at [facility] OR    SHOULDER ARTHROSCOPY      Transesophageal echo (JOHNNA) intra-procedure log documentation N/A 4/4/2019    Performed by Manfred Figueroa MD at [facility] CATH LAB       Review of patient's allergies indicates:   Allergen Reactions    Pcn [penicillins] Other (See Comments)     Fever flushing skin swelling     Biaxin [clarithromycin] Rash    Codeine Rash    Doxycycline Rash    Levaquin [levofloxacin] Rash       Current Facility-Administered Medications on File Prior to Encounter   Medication    0.9%  NaCl infusion    0.9%  NaCl infusion    cyclopentolate 1% ophthalmic solution 1 drop    phenylephrine HCL 2.5% ophthalmic solution 1 drop     phenylephrine HCL 2.5% ophthalmic solution 1 drop    proparacaine 0.5 % ophthalmic solution 1 drop    proparacaine 0.5 % ophthalmic solution 1 drop    tropicamide 1% ophthalmic solution 1 drop    tropicamide 1% ophthalmic solution 1 drop     Current Outpatient Medications on File Prior to Encounter   Medication Sig    acetaminophen (TYLENOL EXTRA STRENGTH ORAL) Take by mouth as needed.    albuterol sulfate 2.5 mg/0.5 mL Nebu Take 2.5 mg by nebulization every 6 (six) hours while awake. One Box    alendronate (FOSAMAX) 70 MG tablet TAKE 1 TABLET BY MOUTH ONCE A WEEK EVERY 7 DAYS, AS DIRECTED    ALPRAZolam (XANAX) 0.25 MG tablet Take 1 tablet (0.25 mg total) by mouth 3 (three) times daily as needed for Anxiety.    amiodarone (PACERONE) 400 MG tablet Take 0.5 tablets (200 mg total) by mouth 2 (two) times daily.    apixaban (ELIQUIS) 5 mg Tab Take 1 tablet (5 mg total) by mouth 2 (two) times daily.    aspirin (ECOTRIN) 81 MG EC tablet Take 81 mg by mouth once daily.    diltiaZEM (CARDIZEM CD) 240 MG 24 hr capsule Take 1 capsule (240 mg total) by mouth once daily.    fluticasone-salmeterol 250-50 mcg/dose (ADVAIR) 250-50 mcg/dose diskus inhaler Inhale 1 puff into the lungs 2 (two) times daily. Controller    latanoprost 0.005 % ophthalmic solution Place 1 drop into the left eye nightly.    losartan (COZAAR) 100 MG tablet TAKE 1 TABLET ONE TIME DAILY    pravastatin (PRAVACHOL) 20 MG tablet TAKE 1 TABLET EVERY EVENING    SPIRIVA WITH HANDIHALER 18 mcg inhalation capsule     VENTOLIN HFA 90 mcg/actuation inhaler INHALE TWO PUFFS BY MOUTH EVERY 6 HOURS AS NEEDED FOR WHEEZING    calcium carbonate (CALCIUM 500 ORAL) Take by mouth 2 (two) times daily.     cefepime in dextrose 5 % (MAXIPIME) 2 gram/50 mL PgBk Inject 50 mLs (2 g total) into the vein every 8 (eight) hours.     Family History     Problem Relation (Age of Onset)    Cancer Father    Cataracts Sister    Heart disease Mother, Maternal Grandfather     No Known Problems Brother, Maternal Aunt, Maternal Uncle, Paternal Aunt, Paternal Uncle, Maternal Grandmother, Paternal Grandmother, Paternal Grandfather        Tobacco Use    Smoking status: Former Smoker     Packs/day: 1.00     Years: 45.00     Pack years: 45.00     Types: Cigarettes     Last attempt to quit: 2002     Years since quittin.2    Smokeless tobacco: Never Used   Substance and Sexual Activity    Alcohol use: No    Drug use: No    Sexual activity: Yes     Partners: Male     Review of Systems   Constitutional: Negative for chills, fatigue, fever and unexpected weight change.   HENT: Positive for sore throat. Negative for mouth sores, sneezing and trouble swallowing.    Eyes: Negative for visual disturbance.   Respiratory: Positive for cough, shortness of breath and wheezing.    Cardiovascular: Negative for chest pain, palpitations and leg swelling.   Gastrointestinal: Negative for abdominal pain, constipation, diarrhea, nausea and vomiting.   Genitourinary: Negative for difficulty urinating, dysuria and frequency.   Musculoskeletal: Negative for arthralgias and myalgias.   Skin: Negative for pallor and rash.   Neurological: Negative for weakness and headaches.   Hematological: Negative for adenopathy.     Objective:     Vital Signs (Most Recent):  Temp: 98.2 °F (36.8 °C) (19)  Pulse: 90 (19)  Resp: (!) 24 (19)  BP: (!) 144/65 (19)  SpO2: 98 %(3L NC) (19) Vital Signs (24h Range):  Temp:  [97.9 °F (36.6 °C)-98.2 °F (36.8 °C)] 98.2 °F (36.8 °C)  Pulse:  [82-91] 90  Resp:  [19-49] 24  SpO2:  [88 %-100 %] 98 %  BP: (125-158)/(57-67) 144/65     Weight: 38.6 kg (85 lb)  Body mass index is 14.14 kg/m².    Physical Exam   Constitutional: She is oriented to person, place, and time. She appears well-developed and well-nourished. No distress.   HENT:   Head: Normocephalic and atraumatic.   Nose: Nose normal.   Tongue and posterior pharyngeal  erythema. No exudate or white plaques noted   Eyes: Pupils are equal, round, and reactive to light. Conjunctivae and EOM are normal. No scleral icterus.   Neck: Normal range of motion. Neck supple.   Cardiovascular: Normal rate, regular rhythm, normal heart sounds and intact distal pulses. Exam reveals no friction rub.   No murmur heard.  Pulmonary/Chest: Effort normal. No respiratory distress. She has no wheezes. She has no rales.   On 3L NC, speaking full sentences   Abdominal: Soft. Bowel sounds are normal. She exhibits no distension and no mass. There is no tenderness. There is no guarding.   Musculoskeletal: Normal range of motion. She exhibits no edema, tenderness or deformity.   Lymphadenopathy:     She has no cervical adenopathy.   Neurological: She is alert and oriented to person, place, and time. No cranial nerve deficit. Coordination normal.   Skin: Skin is warm and dry. Capillary refill takes less than 2 seconds. No erythema. There is pallor.   Psychiatric: She has a normal mood and affect.   Nursing note and vitals reviewed.        CRANIAL NERVES     CN III, IV, VI   Pupils are equal, round, and reactive to light.  Extraocular motions are normal.        Significant Labs:   CBC:   Recent Labs   Lab 04/26/19  0403   WBC 16.06*   HGB 9.6*   HCT 30.1*        CMP:   Recent Labs   Lab 04/26/19  0403      K 3.9   CL 99   CO2 29   *   BUN 17   CREATININE 0.6   CALCIUM 9.0   PROT 5.9*   ALBUMIN 2.0*   BILITOT 0.3   ALKPHOS 83   AST 19   ALT 16   ANIONGAP 11   EGFRNONAA >60.0     POCT Glucose:   Recent Labs   Lab 04/26/19  0648   POCTGLUCOSE 143*     All pertinent labs within the past 24 hours have been reviewed.    Significant Imaging: I have reviewed all pertinent imaging results/findings within the past 24 hours.    Narrative     EXAMINATION:  XR CHEST AP PORTABLE    CLINICAL HISTORY:  Asthma;    TECHNIQUE:  Single frontal view of the chest was performed.    COMPARISON:  April 7,  2019.    FINDINGS:  Interval development of opacification at the left lung base.    Right PICC line has been removed.    Heart and lungs otherwise appear unchanged when allowing for differences in technique and positioning.      Impression       Interval development of opacification at the left lung base.  Possible pneumonia with parapneumonic effusion.    No significant change from prior study.      Electronically signed by: Eder Clarke MD  Date: 04/26/2019  Time: 02:36

## 2019-04-26 NOTE — ASSESSMENT & PLAN NOTE
- Continuing home diltiazem, patient normotensive once weaned off Bipap. Suspect hypoxia and stress were leading to increase in blood pressure.  - Continue to monitor

## 2019-04-26 NOTE — PLAN OF CARE
Patient lives alone in one story home.  is a snf resident in [facility]. Couple had no children. Patient has multiple assistive devices in home. Was receiving antibiotics and short term rehab at [facility] when she became sick. Patient's current discharge plan is to return to SNF for continuation of her therapy. Awaiting evaluation from Pt/Ot.      04/26/19 4998   Discharge Assessment   Assessment Type Discharge Planning Assessment   Confirmed/corrected address and phone number on facesheet? Yes   Assessment information obtained from? Patient   Prior to hospitilization cognitive status: Alert/Oriented   Prior to hospitalization functional status: Assistive Equipment;Needs Assistance   Current cognitive status: Alert/Oriented   Current Functional Status: Assistive Equipment;Needs Assistance   Lives With alone   Able to Return to Prior Arrangements no   Is patient able to care for self after discharge? No   Who are your caregiver(s) and their phone number(s)?   (Carlos Perez (Spouse) [phone]   Juan Em (Friend) [phone])   Patient's perception of discharge disposition home or selfcare   Readmission Within the Last 30 Days no previous admission in last 30 days   Patient currently being followed by outpatient case management? No   Patient currently receives any other outside agency services? No   Equipment Currently Used at Home bedside commode;walker, rolling;oxygen;bath bench;wheelchair   Do you have any problems affording any of your prescribed medications? No   Is the patient taking medications as prescribed? yes   Does the patient have transportation home? Yes   Transportation Anticipated family or friend will provide   Discharge Plan A Skilled Nursing Facility   Discharge Plan B Home Health;Home   DME Needed Upon Discharge  none   Patient/Family in Agreement with Plan yes

## 2019-04-26 NOTE — NURSING
Pt received from Er resp even no distress noted assessment complete, safety measures in place. Will continue to monitor

## 2019-04-26 NOTE — NURSING
In bed resting quietly, no c/o pain or dis comfort, O2 @ 2l per nasal cannula all safety measures effective

## 2019-04-26 NOTE — MEDICAL/APP STUDENT
History     Chief Complaint   Patient presents with    Shortness of Breath     pt reports waking up with sudden onset of shortness of breath, chest pressure. hx of COPD. EMS gave Albuterol/Atrovent and Solumedrol 125.  placed on c-pap.  O2 sat intially in low 60's per EMS     74 yo female with Hx of Afib (on aspirin and apixaban), COPD and HTN, who presents to the ED this morning with chief complaint of shortness of breath.    Patient was at [facility] for antibiotics for Pseudomonas pneumonia and short-term rehab.  She states that there she began to feel short of breath as soon as she woke up this morning, and was given a breathing treatment without improvement. EMS was called, gave her O2 and transferred her to the ED.  Denies chest pain, dizziness, headache, fever, chills, nausea, vomiting. She has a history of COPD (on Spiriva, Ventolin, Advair and 4L NC O2 at home) and was a long-time smoker. This is her first time in the hospital for exacerbation. Reports some tongue pain from oral thrush. Denies sore throat or rhinorrhea.      Past Medical History:   Diagnosis Date    Arthritis     Carotid disease, bilateral     Cataract     Chronic hyponatremia     COPD (chronic obstructive pulmonary disease)     HLD (hyperlipidemia)     HTN (hypertension)        Past Surgical History:   Procedure Laterality Date    APPENDECTOMY      Cardioversion or Defibrillation  4/4/2019    Performed by Manfred Figueroa MD at [facility] CATH LAB    CATARACT EXTRACTION W/  INTRAOCULAR LENS IMPLANT Right 12/06/2018    Dr. Escobar    CATARACT EXTRACTION W/  INTRAOCULAR LENS IMPLANT Left 12/20/2018    DR. Escobar    CERVICAL SPINE SURGERY      DILATION AND CURETTAGE OF UTERUS      x 2    EYE SURGERY      cataract Rt    FRACTURE SURGERY      Lt leg fracture with hardware    INSERTION, IOL PROSTHESIS Left 12/20/2018    Performed by [facility]ick [facility]obar [facility] at [facility] OR    INSERTION, IOL PROSTHESIS Right  2018    Per[facility] at [facility] OR    metatarsal repair      OPEN REDUCTION INTERNAL FIXATION-TIBIAL PLATEAU Left 2014    Performed by Isak Pereira MD at [facility] OR    PHACOEMULSIFICATION, CATARACT Left 2018    Per[facility] at [facility] OR    PHACOEMULSIFICATION, CATARACT Right 2018    Per[facility] at [facility] OR    SHOULDER ARTHROSCOPY      Transesophageal echo (JOHNNA) intra-procedure log documentation N/A 2019    Performed by Manfred [facility]eroa MD at [facility] CATH LAB       Family History   Problem Relation Age of Onset    Heart disease Mother     Cancer Father     Heart disease Maternal Grandfather     Cataracts Sister     No Known Problems Brother     No Known Problems Maternal Aunt     No Known Problems Maternal Uncle     No Known Problems Paternal Aunt     No Known Problems Paternal Uncle     No Known Problems Maternal Grandmother     No Known Problems Paternal Grandmother     No Known Problems Paternal Grandfather     Amblyopia Neg Hx     Blindness Neg Hx     Glaucoma Neg Hx     Macular degeneration Neg Hx     Retinal detachment Neg Hx     Strabismus Neg Hx     Diabetes Neg Hx     Hypertension Neg Hx     Stroke Neg Hx     Thyroid disease Neg Hx      Social History     Tobacco Use    Smoking status: Former Smoker     Packs/day: 1.00     Years: 45.00     Pack years: 45.00     Types: Cigarettes     Last attempt to quit: 2002     Years since quittin.2    Smokeless tobacco: Never Used   Substance Use Topics    Alcohol use: No    Drug use: No       Review of Systems   Constitutional: Negative.  Negative for chills, diaphoresis, fatigue and fever.   HENT: Negative.  Negative for rhinorrhea and sore throat.    Eyes: Negative.  Negative for pain.   Respiratory: Positive for cough (sputum) and shortness of breath.    Cardiovascular: Negative.    Gastrointestinal: Negative for abdominal  "pain, blood in stool, nausea and vomiting.   Endocrine: Negative.    Genitourinary: Positive for dysuria. Negative for frequency and urgency.   Musculoskeletal: Negative.    Allergic/Immunologic: Negative.    Neurological: Negative.  Negative for dizziness, syncope, speech difficulty and headaches.   Hematological: Negative.    Psychiatric/Behavioral: Negative.        Physical Exam   BP (!) 144/65   Pulse 90   Temp 98.2 °F (36.8 °C) (Oral)   Resp (!) 24   Ht 5' 5" (1.651 m)   Wt 38.6 kg (85 lb)   LMP  (LMP Unknown)   SpO2 98% Comment: 3L NC  Breastfeeding? No   BMI 14.14 kg/m²     Physical Exam    Nursing note and vitals reviewed.  Constitutional: She appears well-developed.   Ill-appearing.   HENT:   Head: Normocephalic and atraumatic.   Eyes: Pupils are equal, round, and reactive to light. Right eye exhibits no discharge. Left eye exhibits no discharge. Left conjunctiva is injected. No scleral icterus.   Neck: Normal range of motion. Neck supple. No JVD present.   Cardiovascular: Normal rate.   Pulmonary/Chest: Accessory muscle usage present. She has decreased breath sounds. She has no wheezes. She has no rhonchi. She has no rales.   Abdominal: Soft. Bowel sounds are normal. She exhibits no distension. There is no tenderness.   Neurological: She is alert and oriented to person, place, and time.   Skin: Skin is warm and dry.   Psychiatric: She has a normal mood and affect. Her behavior is normal. Judgment and thought content normal.         ED Course     Air movement improved after treatment. Continued to sat low 90's on CPAP with 55% FiO2 initially, but Respiratory was able to get patient off CPAP and onto NC.       Assessment & Plan:    Acute on chronic COPD exacerbation with hypercapnia:  - exacerbation likely precipitated by her recent pneumonia.   - RR 24 currently, no altered mental status --> no respiratory failure. Monitor ABG to see if pCO2 worsens or pO2 improves with supplemental O2.   - pCO2 " 53,  pO2 59,  pH 7.39  - currently on 3L O2 NC. Keep O2 sat between 90-93%.   - continue IV methylprednisolone 125mg then taper on PO prednisone. Duration of therapy no more than 5-7 days.  - currently on cefepime for new episode of pneumonia. Continue cefepime. Consult ID.  - Maintenance therapy with Advair (fluticasone/salmeterol) when pt becomes stable.    Afib:  - rate and rhythm controlled.   - CHADS-VAS score:  6 = 9.7% stroke risk per year.  - continue apixaban and diltiazem    HTN:  - well controlled with diltiazem. Continue diltiazem.

## 2019-04-26 NOTE — ASSESSMENT & PLAN NOTE
Patient with a.fib diagnosed earlier this month during hospitalization for pneumonia and bacteremia. S/p failed cardioversion. Now controlled on diltiazem and amiodarone.    Patient currently rate controlled, in sinus rhythm per telemetry monitor. ECG on admit poor study    - Continuing amiodarone and diltiazem PO  - Anticoagulation with eliquis

## 2019-04-26 NOTE — ED NOTES
"RT at bedside to attempt to wean pt off of cpap at this time.  O2 sat 100% at this time.  Still reports "a little" shortness of breath.  "

## 2019-04-26 NOTE — PROGRESS NOTES
Pharmacokinetic Initial Assessment: IV Vancomycin    Assessment/Plan:    No loading dose given.  Desired empiric serum trough concentration is 10 to 20 mcg/mL.  Draw vancomycin trough level 30 min prior to third dose on 4/28 at approximately 0600 am  Pharmacy will continue to follow and monitor vancomycin.      Please contact pharmacy at extension [phone] with any questions regarding this assessment.     Thank you for the consult,   Lian Stevennicholas     Patient brief summary:  Latasha Perez is a 73 y.o. female initiated on antimicrobial therapy with IV Vancomycin for treatment of suspected lower respiratory infection    Drug Allergies:   Review of patient's allergies indicates:   Allergen Reactions    Pcn [penicillins] Other (See Comments)     Fever flushing skin swelling     Biaxin [clarithromycin] Rash    Codeine Rash    Doxycycline Rash    Levaquin [levofloxacin] Rash       Actual Body Weight:   38.6 kg    Renal Function:   Estimated Creatinine Clearance: 51.6 mL/min (based on SCr of 0.6 mg/dL).,     Dialysis Method (if applicable):  None    CBC (last 72 hours):  Recent Labs   Lab Result Units 04/26/19  0403   WBC K/uL 16.06*   Hemoglobin g/dL 9.6*   Hemoglobin A1C % 6.1*   Hematocrit % 30.1*   Platelets K/uL 313   Gran% % 91.3*   Lymph% % 3.7*   Mono% % 1.9*   Eosinophil% % 2.2   Basophil% % 0.2   Differential Method  Automated       Metabolic Panel (last 72 hours):  Recent Labs   Lab Result Units 04/26/19  0403   Sodium mmol/L 139   Potassium mmol/L 3.9   Chloride mmol/L 99   CO2 mmol/L 29   Glucose mg/dL 121*   BUN, Bld mg/dL 17   Creatinine mg/dL 0.6   Albumin g/dL 2.0*   Total Bilirubin mg/dL 0.3   Alkaline Phosphatase U/L 83   AST U/L 19   ALT U/L 16       Drug levels (last 3 results):  No results for input(s): VANCOMYCINRA, VANCOMYCINPE, VANCOMYCINTR in the last 72 hours.    Microbiologic Results:  Microbiology Results (last 7 days)     Procedure Component Value Units Date/Time    Blood culture  [[id number]] Collected:  04/26/19 0642    Order Status:  Completed Specimen:  Blood from Peripheral, Antecubital, Left Updated:  04/26/19 1345     Blood Culture, Routine No Growth to date    Blood culture [[id number]] Collected:  04/26/19 0615    Order Status:  Completed Specimen:  Blood from Wrist, Right Updated:  04/26/19 1315     Blood Culture, Routine No Growth to date    Culture, Respiratory with Gram Stain [[id number]]     Order Status:  No result Specimen:  Respiratory

## 2019-04-26 NOTE — ED NOTES
Patient identifiers have been checked and are correct.  Patient noted in a hospital gown.     LOC: The patient is awake, alert, and aware of environment. The patient is oriented x 3 and speaking appropriately.   APPEARANCE: No acute distress noted. Elderly.  PSYCHOSOCIAL: Patient is calm and cooperative.   SKIN: The skin is warm, dry.  RESPIRATORY: Airway is open and patent. Bilateral chest rise and fall. Respirations are spontaneous, even and unlabored. Tachypnea noted, 24 RR. On 3L NC. Breath sounds diminished to bases.   CARDIAC: Patient has a normal rate and rhythm.   ABDOMEN: Soft and non tender to palpation. No distention noted.   NEUROLOGIC: Eyes open spontaneously. Speech clear. Tolerating saliva secretions well. Able to follow commands, demonstrating ability to actively and appropriately communicate within context of current conversation. Symmetrical facial muscles. Movement is purposeful.   MUSCULOSKELETAL: No obvious deformities noted.     Side rails up x2. Call light within reach. Updated on POC-waiting for admit bed. Will continue to monitor.

## 2019-04-26 NOTE — ED PROVIDER NOTES
Encounter Date: 4/26/2019    SCRIBE #1 NOTE: I, Zhen Seals, am scribing for, and in the presence of,  Alvaro Jesus M.D. I have scribed the following portions of the note - the Resident attestation.       History     Chief Complaint   Patient presents with    Shortness of Breath     pt reports waking up with sudden onset of shortness of breath, chest pressure. hx of COPD. EMS gave Albuterol/Atrovent and Solumedrol 125.  placed on c-pap.  O2 sat intially in low 60's per EMS     Patient is a 72yo female who presents with chief complaint of shortness of breath. Patient was at [facility] for antibiotics and short-term rehab.  She states that there she began to feel short of breath, and was given a breathing treatment without improvement of her symptoms. At that time, EMS was called to transfer patient to the ED.  States that she started feeling short of breath today.  Denies any fevers, chills, nausea, vomiting. Has a history of COPD and is a long-time smoker.  Was completing antibiotics for Pseudomonas pneumonia. Reports some tongue pain from oral thrush.        Review of patient's allergies indicates:   Allergen Reactions    Pcn [penicillins] Other (See Comments)     Fever flushing skin swelling     Biaxin [clarithromycin] Rash    Codeine Rash    Doxycycline Rash    Levaquin [levofloxacin] Rash     Past Medical History:   Diagnosis Date    Arthritis     Carotid disease, bilateral     Cataract     Chronic hyponatremia     COPD (chronic obstructive pulmonary disease)     HLD (hyperlipidemia)     HTN (hypertension)      Past Surgical History:   Procedure Laterality Date    APPENDECTOMY      Cardioversion or Defibrillation  4/4/2019    Performed by [facility]fred [facility]a [facility] at [facility] CATH LAB    CATARACT EXTRACTION W/  INTRAOCULAR LENS IMPLANT Right 12/06/2018    Dr. Escobar    CATARACT EXTRACTION W/  INTRAOCULAR LENS IMPLANT Left 12/20/2018    DR. Escobar    CERVICAL SPINE SURGERY      DILATION  AND CURETTAGE OF UTERUS      x 2    EYE SURGERY      cataract Rt    FRACTURE SURGERY      Lt leg fracture with hardware    INSERTION, IOL PROSTHESIS Left 2018    Performed by [facility]ick [facility]obar [facility] at [facility] OR    INSERTION, IOL PROSTHESIS Right 2018    Performed by [facility]ick [facility]obar MD at [facility] OR    metatarsal repair      OPEN REDUCTION INTERNAL FIXATION-TIBIAL PLATEAU Left 2014    Performed by Isak Pereira MD at [facility] OR    PHACOEMULSIFICATION, CATARACT Left 2018    Performed by Broderick Escobar MD at [facility] OR    PHACOEMULSIFICATION, CATARACT Right 2018    Performed by Bro[facility]ick [facility]obar [facility] at [facility] OR    SHOULDER ARTHROSCOPY      Transesophageal echo (JOHNNA) intra-procedure log documentation N/A 2019    Performed by Manfred Fig[facility]eroa MD at [facility] CATH LAB     Family History   Problem Relation Age of Onset    Heart disease Mother     Cancer Father     Heart disease Maternal Grandfather     Cataracts Sister     No Known Problems Brother     No Known Problems Maternal Aunt     No Known Problems Maternal Uncle     No Known Problems Paternal Aunt     No Known Problems Paternal Uncle     No Known Problems Maternal Grandmother     No Known Problems Paternal Grandmother     No Known Problems Paternal Grandfather     Amblyopia Neg Hx     Blindness Neg Hx     Glaucoma Neg Hx     Macular degeneration Neg Hx     Retinal detachment Neg Hx     Strabismus Neg Hx     Diabetes Neg Hx     Hypertension Neg Hx     Stroke Neg Hx     Thyroid disease Neg Hx      Social History     Tobacco Use    Smoking status: Former Smoker     Packs/day: 1.00     Years: 45.00     Pack years: 45.00     Types: Cigarettes     Last attempt to quit: 2002     Years since quittin.2    Smokeless tobacco: Never Used   Substance Use Topics    Alcohol use: No    Drug use: No     Review of Systems   All other systems reviewed and are negative.      Physical Exam      Initial Vitals [04/26/19 0154]   BP Pulse Resp Temp SpO2   (!) 125/57 91 (!) 44 97.9 °F (36.6 °C) (!) 88 %      MAP       --         Physical Exam    Nursing note and vitals reviewed.  Constitutional: She appears well-developed and well-nourished. She is not diaphoretic. She has a sickly appearance. She appears distressed. Face mask in place.   HENT:   Head: Normocephalic and atraumatic.   Mouth/Throat: Oropharynx is clear and moist.   Eyes: Conjunctivae and EOM are normal. No scleral icterus.   Neck: Normal range of motion. Neck supple.   Cardiovascular: Normal rate, regular rhythm, normal heart sounds and intact distal pulses.   Pulmonary/Chest: Accessory muscle usage present. Tachypnea noted. She is in respiratory distress. She has decreased breath sounds in the right middle field, the right lower field, the left middle field and the left lower field. She has wheezes in the right upper field and the left upper field.   Abdominal: Soft. Bowel sounds are normal. She exhibits no distension. There is no tenderness.   Musculoskeletal: Normal range of motion. She exhibits no edema or tenderness.   Neurological: She is alert. She has normal strength. No sensory deficit.   Skin: Skin is warm and dry. Capillary refill takes less than 2 seconds. No rash noted.         ED Course   Critical Care  Date/Time: 4/26/2019 2:00 AM  Performed by: Alvaro Jesus MD  Authorized by: Alvaro Jesus MD   Total critical care time (exclusive of procedural time) : 45 minutes  Critical care was necessary to treat or prevent imminent or life-threatening deterioration of the following conditions: respiratory failure.        Labs Reviewed   COMPREHENSIVE METABOLIC PANEL - Abnormal; Notable for the following components:       Result Value    Glucose 121 (*)     Total Protein 5.9 (*)     Albumin 2.0 (*)     All other components within normal limits   CBC W/ AUTO DIFFERENTIAL - Abnormal; Notable for the following components:    WBC 16.06  (*)     RBC 3.11 (*)     Hemoglobin 9.6 (*)     Hematocrit 30.1 (*)     MCHC 31.9 (*)     MPV 9.1 (*)     Immature Granulocytes 0.7 (*)     Gran # (ANC) 14.7 (*)     Immature Grans (Abs) 0.11 (*)     Lymph # 0.6 (*)     Gran% 91.3 (*)     Lymph% 3.7 (*)     Mono% 1.9 (*)     All other components within normal limits   HEMOGLOBIN A1C - Abnormal; Notable for the following components:    Hemoglobin A1C 6.1 (*)     All other components within normal limits    Narrative:     add on ghgb per shari schaefer md  04/26/2019  06:57    ISTAT PROCEDURE - Abnormal; Notable for the following components:    POC PCO2 53.8 (*)     POC PO2 59 (*)     POC HCO3 32.8 (*)     POC SATURATED O2 89 (*)     POC TCO2 34 (*)     All other components within normal limits   POCT GLUCOSE - Abnormal; Notable for the following components:    POCT Glucose 143 (*)     All other components within normal limits   CULTURE, RESPIRATORY   HEMOGLOBIN A1C   LACTIC ACID, PLASMA   POCT GLUCOSE, HAND-HELD DEVICE        ECG Results          EKG 12-lead (Final result)  Result time 04/26/19 11:50:34    Final result by Interface, Lab In [facility] (04/26/19 11:50:34)                 Narrative:    Test Reason : J44.9,    Vent. Rate : 092 BPM     Atrial Rate : 092 BPM     P-R Int : 144 ms          QRS Dur : 096 ms      QT Int : 374 ms       P-R-T Axes : 091 089 -89 degrees     QTc Int : 462 ms    Baseline wander  Normal sinus rhythm    Unable to comment further due to poor ECG quality  Confirmed by Enrique Vides MD ([id number]) on 4/26/2019 11:50:20 AM    Referred By: [email or web address]   SELF           Confirmed By:Enrique Vides MD                            Imaging Results          X-Ray Chest AP Portable (Final result)  Result time 04/26/19 02:36:40    Final result by Eder Clarke MD (04/26/19 02:36:40)                 Impression:      Interval development of opacification at the left lung base.  Possible pneumonia with parapneumonic effusion.    No significant change  from prior study.      Electronically signed by: Eder Clarke MD  Date:    04/26/2019  Time:    02:36             Narrative:    EXAMINATION:  XR CHEST AP PORTABLE    CLINICAL HISTORY:  Asthma;    TECHNIQUE:  Single frontal view of the chest was performed.    COMPARISON:  April 7, 2019.    FINDINGS:  Interval development of opacification at the left lung base.    Right PICC line has been removed.    Heart and lungs otherwise appear unchanged when allowing for differences in technique and positioning.                                 Medical Decision Making:   Initial Assessment:   HO-II Assessment    This is the emergent evaluation of a 73 y.o. female who presents with chief complaint of shortness of breath. Arrived on CPAP, sating 100%. No improvement in air movement per EMS after duo-neb treatment and 125mg solumedrol. ABG showing hypercapnia to 50s and hypoxia, will order continuous duo-neb treatment and re-evaluate. Continue CPAP.    Aixa Velez MD  [facility] Internal Medicine/Emergency Medicine -II  04/26/2019 1:47 AM    HO-II Update  Air movement improved after treatment. Continued to sat low 90's on CPAP with 55% FiO2 initially, but Respiratory able to get patient of CPAP and onto NC. Chart review showing history of pseudomonas PNA, currently being treated with cefepime. Will continue cefepime treatment and consult Internal Medicine for admission.  Aixa Velez MD  [facility] Internal Medicine/Emergency Medicine -II  04/26/2019 4:39 AM              Scribe Attestation:   Scribe #1: I performed the above scribed service and the documentation accurately describes the services I performed. I attest to the accuracy of the note.    Attending Attestation:   Physician Attestation Statement for Resident:  As the supervising MD   Physician Attestation Statement: I have personally seen and examined this patient.   I agree with the above history. -: Pt with COPD and respiratory distress. Will do bipap and  breathing treatments. I anticipate admission.    As the supervising MD I agree with the above PE.    As the supervising MD I agree with the above treatment, course, plan, and disposition.        Attending Critical Care:   Critical Care Times:   ==============================================================  · Total Critical Care Time - exclusive of procedural time: 30 minutes.  ==============================================================  Critical care was necessary to treat or prevent imminent or life-threatening deterioration of the following conditions: respiratory failure.                  Clinical Impression:       ICD-10-CM ICD-9-CM   1. COPD exacerbation J44.1 491.21   2. COPD (chronic obstructive pulmonary disease) J44.9 496   3. Pneumonia due to Pseudomonas species, unspecified laterality, unspecified part of lung J15.1 482.1   4. Essential hypertension I10 401.9   5. Oral thrush B37.0 112.0         Disposition:   Disposition: Admitted  Condition: Serious                        Aixa Velez MD  Resident  04/26/19 0440       Alvaro Jesus MD  04/26/19 1738       Alvaro Jesus MD  04/26/19 1738

## 2019-04-26 NOTE — ASSESSMENT & PLAN NOTE
Patient with 50-69% stenosis of left carotid artery and < 50% stenosis of right per ultrasound on 6/13/18.    - Continuing asa and statin while inpatient

## 2019-04-26 NOTE — ASSESSMENT & PLAN NOTE
Patient with history of COPD and prior pneumonia with pseudomonas bacteremia presenting with cough, worsening hypoxia, and new Left LL infiltrate on CXR. Patient recently completed a course of IV Cefipime 1 week previous however subsequently developed this pneumonia in a separate lung zone. Recent hospitalization, residing in inpatient rehab facility, and antibiotic use concerning for resistance.    SIRS Criteria: elevated WBC 16, tachypnea  qSOFA criteria: 1 (RR>22)  Lactic acid pending    Patient has a significant penicillin allergy (listed as fevers, flushing, and swelling). At this time will elect to continue vancomycin and cefipime    Plan:  Antibiotics: Vancomycin and cefipime  Supplemental O2, goal > 88%  Treating for COPD exacerbation with xopinex and ipratropium nebs q4h  Solumedrol 125mg IV daily for 5 days  Lactic acid pending

## 2019-04-26 NOTE — ED NOTES
Pt resting in bed. No acute distress noted. Breakfast tray noted at bedside, 1/2 eaten. No new complaint voiced. Remains updated on POC- waiting for admit bed. Will continue to monitor.

## 2019-04-26 NOTE — HPI
Ms. Perez is a 73 year old lady with a past medical history significant for HTN, HLD, COPD (on 3-4L NC at home), A.fib with RVR (s/p failed ablation - on Amiodarone and diltiazem for rate control and eliquis for anticoagulation) who [facility] with complaints of shortness of breath. She was recently discharged from [facility] on 4/11/19 for right upper lobe pneumonia and new onset A.fib with RVR. Blood cultures grew Pseudomonas sensitive to cefipime and she completed a course of outpatient cefipime IV q8h via a PICC line which has since been removed. The patient states her cough has remained since that hospitalization. She is unclear as to how long exactly the cough has persisted and will not clarify the color of sputum production however does state that initially it was productive of sputum however it no longer as. She denies fevers or chills. Upon EMS arrival patient was hypoxic, reported sat in the 50's on room air, she was given solumedrol and started on BiPAP. On presentation to the ED patient received duo nebs and oxygenation improved, she was weaned down off Bipap to home O2 requirement of 3L.    In regards to her atrial fib, the patient was diagnosed during hospitalization at the beginning of 4/1/19. Echo showed pulmonary hypertension, normal EF.JOHNNA was negative for thrombus so patient was cardioverted however went back into A.fib. She was controlled on Amiodarone and diltiazem and discharged on those medications. She was also started on eliquis for anticoagulation. Patient denies recurrence of palpitations or any chest discomfort. In ED patient was normal sinus rhythm.    In the ED CXR was notable for new left lower lobe consolidation. Labs were notable for elevated WBC. She received a dose of vancomycin and rocephin. Blood cultures were not collected prior to initiation of antibiotics and was admitted to hospital medicine for further evaluation of HCAP and COPD  exacerbation.

## 2019-04-27 PROBLEM — B37.0 ORAL THRUSH: Status: ACTIVE | Noted: 2019-04-27

## 2019-04-27 PROBLEM — R09.02 HYPOXIA: Status: ACTIVE | Noted: 2019-04-27

## 2019-04-27 LAB
ALBUMIN SERPL BCP-MCNC: 2 G/DL (ref 3.5–5.2)
ALP SERPL-CCNC: 74 U/L (ref 55–135)
ALT SERPL W/O P-5'-P-CCNC: 14 U/L (ref 10–44)
ANION GAP SERPL CALC-SCNC: 9 MMOL/L (ref 8–16)
AST SERPL-CCNC: 17 U/L (ref 10–40)
BILIRUB SERPL-MCNC: 0.3 MG/DL (ref 0.1–1)
BUN SERPL-MCNC: 20 MG/DL (ref 8–23)
CALCIUM SERPL-MCNC: 9.1 MG/DL (ref 8.7–10.5)
CHLORIDE SERPL-SCNC: 99 MMOL/L (ref 95–110)
CO2 SERPL-SCNC: 31 MMOL/L (ref 23–29)
CREAT SERPL-MCNC: 0.6 MG/DL (ref 0.5–1.4)
ERYTHROCYTE [DISTWIDTH] IN BLOOD BY AUTOMATED COUNT: 13.6 % (ref 11.5–14.5)
EST. GFR  (AFRICAN AMERICAN): >60 ML/MIN/1.73 M^2
EST. GFR  (NON AFRICAN AMERICAN): >60 ML/MIN/1.73 M^2
GLUCOSE SERPL-MCNC: 121 MG/DL (ref 70–110)
HCT VFR BLD AUTO: 28.5 % (ref 37–48.5)
HGB BLD-MCNC: 9.5 G/DL (ref 12–16)
MCH RBC QN AUTO: 31 PG (ref 27–31)
MCHC RBC AUTO-ENTMCNC: 33.3 G/DL (ref 32–36)
MCV RBC AUTO: 93 FL (ref 82–98)
PLATELET # BLD AUTO: 334 K/UL (ref 150–350)
PMV BLD AUTO: 9.1 FL (ref 9.2–12.9)
POTASSIUM SERPL-SCNC: 4.1 MMOL/L (ref 3.5–5.1)
PROT SERPL-MCNC: 6 G/DL (ref 6–8.4)
RBC # BLD AUTO: 3.06 M/UL (ref 4–5.4)
SODIUM SERPL-SCNC: 139 MMOL/L (ref 136–145)
WBC # BLD AUTO: 15.47 K/UL (ref 3.9–12.7)

## 2019-04-27 PROCEDURE — 99900035 HC TECH TIME PER 15 MIN (STAT): Mod: HCNC

## 2019-04-27 PROCEDURE — 85027 COMPLETE CBC AUTOMATED: CPT | Mod: HCNC

## 2019-04-27 PROCEDURE — 99223 1ST HOSP IP/OBS HIGH 75: CPT | Mod: HCNC,,, | Performed by: INTERNAL MEDICINE

## 2019-04-27 PROCEDURE — 36415 COLL VENOUS BLD VENIPUNCTURE: CPT | Mod: HCNC

## 2019-04-27 PROCEDURE — 99233 SBSQ HOSP IP/OBS HIGH 50: CPT | Mod: HCNC,GC,, | Performed by: INTERNAL MEDICINE

## 2019-04-27 PROCEDURE — 63600175 PHARM REV CODE 636 W HCPCS: Mod: HCNC | Performed by: STUDENT IN AN ORGANIZED HEALTH CARE EDUCATION/TRAINING PROGRAM

## 2019-04-27 PROCEDURE — 25000003 PHARM REV CODE 250: Mod: HCNC | Performed by: STUDENT IN AN ORGANIZED HEALTH CARE EDUCATION/TRAINING PROGRAM

## 2019-04-27 PROCEDURE — 99233 PR SUBSEQUENT HOSPITAL CARE,LEVL III: ICD-10-PCS | Mod: HCNC,GC,, | Performed by: INTERNAL MEDICINE

## 2019-04-27 PROCEDURE — 63600175 PHARM REV CODE 636 W HCPCS: Mod: HCNC | Performed by: EMERGENCY MEDICINE

## 2019-04-27 PROCEDURE — 80053 COMPREHEN METABOLIC PANEL: CPT | Mod: HCNC

## 2019-04-27 PROCEDURE — 99499 UNLISTED E&M SERVICE: CPT | Mod: HCNC,,, | Performed by: NURSE PRACTITIONER

## 2019-04-27 PROCEDURE — 99223 PR INITIAL HOSPITAL CARE,LEVL III: ICD-10-PCS | Mod: HCNC,,, | Performed by: INTERNAL MEDICINE

## 2019-04-27 PROCEDURE — 63600175 PHARM REV CODE 636 W HCPCS: Mod: HCNC | Performed by: INTERNAL MEDICINE

## 2019-04-27 PROCEDURE — 25000003 PHARM REV CODE 250: Mod: HCNC | Performed by: EMERGENCY MEDICINE

## 2019-04-27 PROCEDURE — 27000221 HC OXYGEN, UP TO 24 HOURS: Mod: HCNC

## 2019-04-27 PROCEDURE — 99499 NO LOS: ICD-10-PCS | Mod: HCNC,,, | Performed by: NURSE PRACTITIONER

## 2019-04-27 PROCEDURE — 63600175 PHARM REV CODE 636 W HCPCS: Mod: HCNC | Performed by: NURSE PRACTITIONER

## 2019-04-27 PROCEDURE — 94640 AIRWAY INHALATION TREATMENT: CPT | Mod: HCNC

## 2019-04-27 PROCEDURE — 11000001 HC ACUTE MED/SURG PRIVATE ROOM: Mod: HCNC

## 2019-04-27 PROCEDURE — 97530 THERAPEUTIC ACTIVITIES: CPT | Mod: HCNC

## 2019-04-27 PROCEDURE — 97165 OT EVAL LOW COMPLEX 30 MIN: CPT | Mod: HCNC

## 2019-04-27 PROCEDURE — 25000242 PHARM REV CODE 250 ALT 637 W/ HCPCS: Mod: HCNC | Performed by: STUDENT IN AN ORGANIZED HEALTH CARE EDUCATION/TRAINING PROGRAM

## 2019-04-27 PROCEDURE — 25000242 PHARM REV CODE 250 ALT 637 W/ HCPCS: Mod: HCNC | Performed by: INTERNAL MEDICINE

## 2019-04-27 PROCEDURE — 94761 N-INVAS EAR/PLS OXIMETRY MLT: CPT | Mod: HCNC

## 2019-04-27 RX ORDER — IPRATROPIUM BROMIDE AND ALBUTEROL SULFATE 2.5; .5 MG/3ML; MG/3ML
3 SOLUTION RESPIRATORY (INHALATION) EVERY 4 HOURS PRN
Status: DISCONTINUED | OUTPATIENT
Start: 2019-04-27 | End: 2019-05-03 | Stop reason: HOSPADM

## 2019-04-27 RX ORDER — IPRATROPIUM BROMIDE 0.5 MG/2.5ML
0.5 SOLUTION RESPIRATORY (INHALATION) EVERY 4 HOURS
Status: DISCONTINUED | OUTPATIENT
Start: 2019-04-27 | End: 2019-05-01

## 2019-04-27 RX ORDER — MEROPENEM AND SODIUM CHLORIDE 1 G/50ML
1 INJECTION, SOLUTION INTRAVENOUS
Status: DISCONTINUED | OUTPATIENT
Start: 2019-04-27 | End: 2019-05-01

## 2019-04-27 RX ADMIN — MEROPENEM AND SODIUM CHLORIDE 1 G: 1 INJECTION, SOLUTION INTRAVENOUS at 02:04

## 2019-04-27 RX ADMIN — IPRATROPIUM BROMIDE 0.5 MG: 0.5 SOLUTION RESPIRATORY (INHALATION) at 09:04

## 2019-04-27 RX ADMIN — APIXABAN 5 MG: 5 TABLET, FILM COATED ORAL at 09:04

## 2019-04-27 RX ADMIN — MEROPENEM AND SODIUM CHLORIDE 1 G: 1 INJECTION, SOLUTION INTRAVENOUS at 05:04

## 2019-04-27 RX ADMIN — PREDNISONE 40 MG: 20 TABLET ORAL at 09:04

## 2019-04-27 RX ADMIN — FLUTICASONE FUROATE AND VILANTEROL TRIFENATATE 1 PUFF: 100; 25 POWDER RESPIRATORY (INHALATION) at 10:04

## 2019-04-27 RX ADMIN — ASPIRIN 81 MG: 81 TABLET, COATED ORAL at 09:04

## 2019-04-27 RX ADMIN — PRAVASTATIN SODIUM 20 MG: 20 TABLET ORAL at 09:04

## 2019-04-27 RX ADMIN — CLOTRIMAZOLE 10 MG: 10 LOZENGE ORAL; TOPICAL at 02:04

## 2019-04-27 RX ADMIN — CLOTRIMAZOLE 10 MG: 10 LOZENGE ORAL; TOPICAL at 09:04

## 2019-04-27 RX ADMIN — MEROPENEM AND SODIUM CHLORIDE 1 G: 1 INJECTION, SOLUTION INTRAVENOUS at 10:04

## 2019-04-27 RX ADMIN — IPRATROPIUM BROMIDE 0.5 MG: 0.5 SOLUTION RESPIRATORY (INHALATION) at 07:04

## 2019-04-27 RX ADMIN — LEVALBUTEROL 1.25 MG: 1.25 SOLUTION, CONCENTRATE RESPIRATORY (INHALATION) at 09:04

## 2019-04-27 RX ADMIN — LATANOPROST 1 DROP: 50 SOLUTION OPHTHALMIC at 09:04

## 2019-04-27 RX ADMIN — DILTIAZEM HYDROCHLORIDE 240 MG: 240 CAPSULE, COATED, EXTENDED RELEASE ORAL at 09:04

## 2019-04-27 RX ADMIN — LEVALBUTEROL 1.25 MG: 1.25 SOLUTION, CONCENTRATE RESPIRATORY (INHALATION) at 10:04

## 2019-04-27 RX ADMIN — TIOTROPIUM BROMIDE 18 MCG: 18 CAPSULE ORAL; RESPIRATORY (INHALATION) at 10:04

## 2019-04-27 RX ADMIN — VANCOMYCIN HYDROCHLORIDE 500 MG: 500 INJECTION, POWDER, LYOPHILIZED, FOR SOLUTION INTRAVENOUS at 07:04

## 2019-04-27 RX ADMIN — AMIODARONE HYDROCHLORIDE 200 MG: 200 TABLET ORAL at 09:04

## 2019-04-27 RX ADMIN — CLOTRIMAZOLE 10 MG: 10 LOZENGE ORAL; TOPICAL at 05:04

## 2019-04-27 RX ADMIN — Medication 5 ML: at 05:04

## 2019-04-27 NOTE — ASSESSMENT & PLAN NOTE
73 year old woman with COPD, HTN, Afib with RVR (on amiodarone), and recent history of hospitalization for pseudomonas bacteremia (pan sensitive) and RUL PNA (s/p 14 days of IV cefepime) who presented from [facility] with sudden onset of shortness of breath.  Found to be hypoxic with reported O2 sats in 50s on room air.  CXR in ED showed new LLL consolidation concerning for PNA.  Associated leukocytosis.  No associated fevers, chills.  Received dose of vancomycin and cefepime in ED.  Currently on IV vancomycin and meropenem.  Blood cultures pending, NGTD.  Respiratory culture pending collections.        Reports severe reaction to PCN many years ago requiring hospitalization after taking PCN prescribed by dentist. Tolerates cephalosporins.  Afebrile, WBC 15.  Hemodynamically stable, no distress at time of visit.    Plan/recommendations:  1.  Continue IV vancomycin (dose by pharmacy) and meropenem 1 gram IV q 8 hours for now (re-ordered)  2.  If unable to produce expectorated sputum, consider induced sputum for respiratory culture to guide therapy and to rule out MRSA.  Ideally would get nasal swab for MRSA pcr, but this is send out.   3.  Agree with speech and swallow study.  Concern for possible aspiration events given recurrent PNA in different locations   4.  Consider CT chest for further evaluation  5.  Will follow.     Patient seen by, and plan discussed with, ID staff  Discussed with Primary Team

## 2019-04-27 NOTE — SUBJECTIVE & OBJECTIVE
Past Medical History:   Diagnosis Date    Arthritis     Carotid disease, bilateral     Cataract     Chronic hyponatremia     COPD (chronic obstructive pulmonary disease)     HLD (hyperlipidemia)     HTN (hypertension)        Past Surgical History:   Procedure Laterality Date    APPENDECTOMY      Cardioversion or Defibrillation  4/4/2019    Performed by Manfred Figueroa MD at [facility] CATH LAB    CATARACT EXTRACTION W/  INTRAOCULAR LENS IMPLANT Right 12/06/2018    Dr. Escobar    CATARACT EXTRACTION W/  INTRAOCULAR LENS IMPLANT Left 12/20/2018    DR. Escobar    CERVICAL SPINE SURGERY      DILATION AND CURETTAGE OF UTERUS      x 2    EYE SURGERY      cataract Rt    FRACTURE SURGERY      Lt leg fracture with hardware    INSERTION, IOL PROSTHESIS Left 12/20/2018    Performed by Broderick Escobar MD at [facility] OR    INSERTION, IOL PROSTHESIS Right 12/6/2018    Performed by Broderick Escobar MD at [facility] OR    metatarsal repair      OPEN REDUCTION INTERNAL FIXATION-TIBIAL PLATEAU Left 6/27/2014    Performed by Isak Pereira MD at [facility] OR    PHACOEMULSIFICATION, CATARACT Left 12/20/2018    Performed by Broderick Escobar MD at [facility] OR    PHACOEMULSIFICATION, CATARACT Right 12/6/2018    Performed by Broderick Escobar MD at [facility] OR    SHOULDER ARTHROSCOPY      Transesophageal echo (JOHNNA) intra-procedure log documentation N/A 4/4/2019    Performed by Manfred Figueroa MD at [facility] CATH LAB       Review of patient's allergies indicates:   Allergen Reactions    Pcn [penicillins] Other (See Comments)     Fever flushing skin swelling     Biaxin [clarithromycin] Rash    Codeine Rash    Doxycycline Rash    Levaquin [levofloxacin] Rash       No current facility-administered medications on file prior to encounter.      Current Outpatient Medications on File Prior to Encounter   Medication Sig    acetaminophen (TYLENOL EXTRA STRENGTH ORAL) Take 1 to 2 tablets by mouth daily as needed  for pain    aspirin (ECOTRIN) 81 MG EC tablet Take 81 mg by mouth once daily.    calcium carbonate (CALCIUM 500 ORAL) Take 1 tablet by mouth once daily.     cetirizine (ZYRTEC) 10 MG tablet Take 10 mg by mouth once daily.    fluticasone-salmeterol 250-50 mcg/dose (ADVAIR) 250-50 mcg/dose diskus inhaler Inhale 1 puff into the lungs 2 (two) times daily. Controller    latanoprost 0.005 % ophthalmic solution Place 1 drop into the left eye nightly.    losartan (COZAAR) 100 MG tablet TAKE 1 TABLET ONE TIME DAILY    multivitamin (THERAGRAN) per tablet Take 1 tablet by mouth once daily.    pravastatin (PRAVACHOL) 20 MG tablet TAKE 1 TABLET EVERY EVENING    SPIRIVA WITH HANDIHALER 18 mcg inhalation capsule Inhale 18 mcg into the lungs once daily.     VENTOLIN HFA 90 mcg/actuation inhaler INHALE TWO PUFFS BY MOUTH EVERY 6 HOURS AS NEEDED FOR WHEEZING    albuterol sulfate 2.5 mg/0.5 mL Nebu Take 2.5 mg by nebulization every 6 (six) hours while awake. One Box    alendronate (FOSAMAX) 70 MG tablet TAKE 1 TABLET BY MOUTH ONCE A WEEK EVERY 7 DAYS, AS DIRECTED    ALPRAZolam (XANAX) 0.25 MG tablet Take 1 tablet (0.25 mg total) by mouth 3 (three) times daily as needed for Anxiety.    amiodarone (PACERONE) 400 MG tablet Take 0.5 tablets (200 mg total) by mouth 2 (two) times daily.    apixaban (ELIQUIS) 5 mg Tab Take 1 tablet (5 mg total) by mouth 2 (two) times daily.    cefepime in dextrose 5 % (MAXIPIME) 2 gram/50 mL PgBk Inject 50 mLs (2 g total) into the vein every 8 (eight) hours.    diltiaZEM (CARDIZEM CD) 240 MG 24 hr capsule Take 1 capsule (240 mg total) by mouth once daily.     Family History     Problem Relation (Age of Onset)    Cancer Father    Cataracts Sister    Heart disease Mother, Maternal Grandfather    No Known Problems Brother, Maternal Aunt, Maternal Uncle, Paternal Aunt, Paternal Uncle, Maternal Grandmother, Paternal Grandmother, Paternal Grandfather        Tobacco Use    Smoking status: Former  Smoker     Packs/day: 1.00     Years: 45.00     Pack years: 45.00     Types: Cigarettes     Last attempt to quit: 2002     Years since quittin.2    Smokeless tobacco: Never Used   Substance and Sexual Activity    Alcohol use: No    Drug use: No    Sexual activity: Yes     Partners: Male     Review of Systems   Constitutional: Negative for chills, fatigue, fever and unexpected weight change.   HENT: Positive for sore throat. Negative for mouth sores, sneezing and trouble swallowing.    Eyes: Negative for visual disturbance.   Respiratory: Positive for cough, shortness of breath and wheezing.    Cardiovascular: Negative for chest pain, palpitations and leg swelling.   Gastrointestinal: Negative for abdominal pain, constipation, diarrhea, nausea and vomiting.   Genitourinary: Negative for difficulty urinating, dysuria and frequency.   Musculoskeletal: Negative for arthralgias and myalgias.   Skin: Negative for pallor and rash.   Neurological: Negative for weakness and headaches.   Hematological: Negative for adenopathy.     Objective:     Vital Signs (Most Recent):  Temp: 98 °F (36.7 °C) (19 0344)  Pulse: 76 (19 1458)  Resp: 18 (19 1408)  BP: (!) 137/58 (19 1408)  SpO2: 96 % (19 1408) Vital Signs (24h Range):  Temp:  [97.3 °F (36.3 °C)-98.4 °F (36.9 °C)] 98 °F (36.7 °C)  Pulse:  [74-87] 76  Resp:  [15-20] 18  SpO2:  [88 %-100 %] 96 %  BP: (111-137)/(58-63) 137/58     Weight: 39.2 kg (86 lb 8 oz)  Body mass index is 14.39 kg/m².    Physical Exam   Constitutional: She is oriented to person, place, and time. She appears well-developed and well-nourished. No distress.   HENT:   Head: Normocephalic and atraumatic.   Nose: Nose normal.   Tongue and posterior pharyngeal erythema. No exudate or white plaques noted   Eyes: Pupils are equal, round, and reactive to light. Conjunctivae and EOM are normal. No scleral icterus.   Neck: Normal range of motion. Neck supple.   Cardiovascular:  Normal rate, regular rhythm, normal heart sounds and intact distal pulses. Exam reveals no friction rub.   No murmur heard.  Pulmonary/Chest: Effort normal. No respiratory distress. She has no wheezes. She has no rales.   On 3L NC, speaking full sentences   Abdominal: Soft. Bowel sounds are normal. She exhibits no distension and no mass. There is no tenderness. There is no guarding.   Musculoskeletal: Normal range of motion. She exhibits no edema, tenderness or deformity.   Lymphadenopathy:     She has no cervical adenopathy.   Neurological: She is alert and oriented to person, place, and time. No cranial nerve deficit. Coordination normal.   Skin: Skin is warm and dry. Capillary refill takes less than 2 seconds. No erythema. There is pallor.   Psychiatric: She has a normal mood and affect.   Nursing note and vitals reviewed.        CRANIAL NERVES     CN III, IV, VI   Pupils are equal, round, and reactive to light.  Extraocular motions are normal.        Significant Labs:   CBC:   Recent Labs   Lab 04/26/19  0403 04/27/19  0618   WBC 16.06* 15.47*   HGB 9.6* 9.5*   HCT 30.1* 28.5*    334     CMP:   Recent Labs   Lab 04/26/19  0403 04/27/19  0618    139   K 3.9 4.1   CL 99 99   CO2 29 31*   * 121*   BUN 17 20   CREATININE 0.6 0.6   CALCIUM 9.0 9.1   PROT 5.9* 6.0   ALBUMIN 2.0* 2.0*   BILITOT 0.3 0.3   ALKPHOS 83 74   AST 19 17   ALT 16 14   ANIONGAP 11 9   EGFRNONAA >60.0 >60.0     POCT Glucose:   Recent Labs   Lab 04/26/19  0648   POCTGLUCOSE 143*     All pertinent labs within the past 24 hours have been reviewed.    Significant Imaging: I have reviewed all pertinent imaging results/findings within the past 24 hours.    Narrative     EXAMINATION:  XR CHEST AP PORTABLE    CLINICAL HISTORY:  Asthma;    TECHNIQUE:  Single frontal view of the chest was performed.    COMPARISON:  April 7, 2019.    FINDINGS:  Interval development of opacification at the left lung base.    Right PICC line has been  removed.    Heart and lungs otherwise appear unchanged when allowing for differences in technique and positioning.      Impression       Interval development of opacification at the left lung base.  Possible pneumonia with parapneumonic effusion.    No significant change from prior study.      Electronically signed by: Eder Clarke MD  Date: 04/26/2019  Time: 02:36

## 2019-04-27 NOTE — PLAN OF CARE
Problem: Adult Inpatient Plan of Care  Goal: Plan of Care Review    Recommendations    Recommendation/Intervention:     Pt with severe malnutrition.     1. Continue cardiac diet as tolerated.   2. Encourage po intake.   3. RD following.     Goals: consume >50% of all meals  Nutrition Goal Status: new

## 2019-04-27 NOTE — PT/OT/SLP EVAL
Occupational Therapy   Evaluation    Name: Latasha Perez  MRN: [id number]  Admitting Diagnosis:  Left lower lobe pneumonia      Recommendations:     Discharge Recommendations: nursing facility, skilled  Discharge Equipment Recommendations:  none  Barriers to discharge:  Decreased caregiver support    Assessment:     Latasha Perez is a 73 y.o. female with a medical diagnosis of Left lower lobe pneumonia. Pt. currently demonstrates decreased (I) with ADLs, functional mobility & t/fs as well as decreased overall strength, ROM, endurance and balance. Pt appears as very frail and is a fall risk. Pt would benefit from skilled OT services as well as SNF placement to address these deficits and to facilitate improving (I) with daily tasks. Performance deficits affecting function: weakness, impaired self care skills, impaired balance, decreased coordination, decreased safety awareness, impaired functional mobilty, impaired endurance, gait instability, decreased lower extremity function.      Rehab Prognosis: Good; patient would benefit from acute skilled OT services to address these deficits and reach maximum level of function.       Plan:     Patient to be seen 3 x/week to address the above listed problems via self-care/home management, therapeutic activities, therapeutic exercises  · Plan of Care Expires: 05/27/19  · Plan of Care Reviewed with: patient    Subjective     Occupational Profile:  Living Environment: Pt lives alone in a [facility] with a tub setup ( is 89 and in a NH).   Previous level of function: Pt was previously (I) with ADLs / driving but was admitted from [facility] where she was getting PT/OT. She had been there since ~ 4/11.  Equipment Used at Home:  wheelchair, walker, rolling, bedside commode, oxygen, bath bench  Assistance upon Discharge: TBD    Pain/Comfort:  · Pain Rating 1: 0/10    Patients cultural, spiritual, Islam conflicts given the current situation:      Objective:      Communicated with: rn prior to session.  Patient found supine with oxygen, peripheral IV upon OT entry to room.    General Precautions: Standard, fall   Orthopedic Precautions:    Braces:       Occupational Performance:    Bed Mobility:    · Patient completed Rolling/Turning to Left with  modified independence  · Patient completed Scooting/Bridging with supervision  · Patient completed Supine to Sit with supervision  · Patient completed Sit to Supine with supervision    Functional Mobility/Transfers:  · Patient completed Sit <> Stand Transfer with minimum assistance  with  rolling walker   · Functional Mobility: Pt walked ~ 40' x 2 CGA using a RW with slight instability.    Activities of Daily Living:  · Grooming: supervision for oral care seated.  · Upper Body Dressing: supervision   · Lower Body Dressing: supervision     Cognitive/Visual Perceptual:  Cognitive/Psychosocial Skills:     -       Oriented to: Person, Place, Time and Situation   -       Safety awareness/insight to disability: intact     Physical Exam:  BUE AROM/MMT: WNL    AMPAC 6 Click ADL:  AMPAC Total Score: 21    Treatment & Education:  UE ROM/MMT  Bed mobility training / assessment  Functional mobility assessment  Sit/standing balance assessment  Educated on importance of sitting OOB in bedside chair to promote increased strength, endurance & breathing.  Discussed OT POC / Post-acute plan  Education:    Patient left supine with all lines intact and call button in reach    GOALS:   Multidisciplinary Problems     Occupational Therapy Goals        Problem: Occupational Therapy Goal    Goal Priority Disciplines Outcome Interventions   Occupational Therapy Goal     OT, PT/OT Ongoing (interventions implemented as appropriate)    Description:  Goals to be met by: 5/4/19     Patient will increase functional independence with ADLs by performing:    LE Dressing with Set-up Assistance.  Grooming while standing at sink with Supervision.  Toileting from  toilet with Supervision for hygiene and clothing management.   Supine to sit with [place].  Step transfer with Stand-by Assistance  Toilet transfer to toilet with Stand-by Assistance.                      History:     Past Medical History:   Diagnosis Date    Arthritis     Carotid disease, bilateral     Cataract     Chronic hyponatremia     COPD (chronic obstructive pulmonary disease)     HLD (hyperlipidemia)     HTN (hypertension)        Past Surgical History:   Procedure Laterality Date    APPENDECTOMY      Cardioversion or Defibrillation  4/4/2019    Performed by [facility]fred [facility]a [facility] at [facility] CATH LAB    CATARACT EXTRACTION W/  INTRAOCULAR LENS IMPLANT Right 12/06/2018    Dr. Escobar    CATARACT EXTRACTION W/  INTRAOCULAR LENS IMPLANT Left 12/20/2018    DR. Escobar    CERVICAL SPINE SURGERY      DILATION AND CURETTAGE OF UTERUS      x 2    EYE SURGERY      cataract Rt    FRACTURE SURGERY      Lt leg fracture with hardware    INSERTION, IOL PROSTHESIS Left 12/20/2018    Performed by [facility]ick [facility]obar MD at [facility] OR    INSERTION, IOL PROSTHESIS Right 12/6/2018    Performed by Bro[facility]ick [facility]obar MD at [facility] OR    metatarsal repair      OPEN REDUCTION INTERNAL FIXATION-TIBIAL PLATEAU Left 6/27/2014    Performed by Isak Pereira MD at [facility] OR    PHACOEMULSIFICATION, CATARACT Left 12/20/2018    Performed by [facility]ick [facility]obar MD at [facility] OR    PHACOEMULSIFICATION, CATARACT Right 12/6/2018    Performed by [facility]ick [facility]obar MD at [facility] OR    SHOULDER ARTHROSCOPY      Transesophageal echo (JOHNNA) intra-procedure log documentation N/A 4/4/2019    Performed by Manfred Figueroa MD at [facility] CATH LAB       Time Tracking:     OT Date of Treatment: 04/27/19  OT Start Time: 1025  OT Stop Time: 1055  OT Total Time (min): 30 min    Billable Minutes:Evaluation 20  Therapeutic Activity 10    LENNY Aguilar  4/27/2019

## 2019-04-27 NOTE — HOSPITAL COURSE
She continues to improve now on 2-3L oxygen. Started on meropenem yesterday. Question of slow clinical improvement previously & history of Pseudomonas bacteremia.

## 2019-04-27 NOTE — CONSULTS
[facility]  Infectious Disease  Consult Note    Patient Name: Latasha Perez  MRN: [id number]  Admission Date: 4/26/2019  Hospital Length of Stay: 1 days  Attending Physician: Laura Billy MD  Primary Care Provider: Pollo Oneal MD     Isolation Status: No active isolations    Patient information was obtained from patient, past medical records and ER records.      Consults  Assessment/Plan:     * Left lower lobe pneumonia     73 year old woman with COPD, HTN, Afib with RVR (on amiodarone), and recent history of hospitalization for pseudomonas bacteremia (pan sensitive) and RUL PNA (s/p 14 days of IV cefepime) who [facility] of [facility].  Found to be hypoxic with reported O2 sats in 50s on room air.  CXR in ED showed new LLL consolidation concerning for PNA.  Associated leukocytosis.  No associated fevers, chills.  Received dose of vancomycin and cefepime in ED.  Currently on IV vancomycin and meropenem.  Blood cultures pending, NGTD.  Respiratory culture pending collections.        Reports severe reaction to PCN many years ago requiring hospitalization after taking PCN prescribed by dentist. Tolerates cephalosporins.  Afebrile, WBC 15.  Hemodynamically stable, no distress at time of visit.    Plan/recommendations:  1.  Continue IV vancomycin (dose by pharmacy) and meropenem 1 gram IV q 8 hours for now (re-ordered)  2.  If unable to produce expectorated sputum, consider induced sputum for respiratory culture to guide therapy and to rule out MRSA.  Ideally would get nasal swab for MRSA pcr, but this is send out.   3.  Agree with speech and swallow study.  Concern for possible aspiration events given recurrent PNA in different locations   4.  Consider CT chest for further evaluation  5.  Will follow.     Patient seen by, and plan discussed with, ID staff  Discussed with Primary Team         Thank you.   Please call for any questions or  concerns.  Monika Griffith, THELMA, ANP-C  [phone]    Subjective:     Principal Problem: Left lower lobe pneumonia    HPI: Ms. Latasha Perez is a 73 year old woman with COPD, HTN, Afib with RVR (on amiodarone), and recent history of hospitalization for pseudomonas bacteremia (pan sensitive) and RUL PNA (s/p 14 days of IV cefepime) who [facility].  Found to be hypoxic with reported O2 sats in 50s on room air.  CXR in ED showed new LLL consolidation concerning for PNA.  Associated leukocytosis.  No associated fevers, chills.  Received dose of vancomycin and cefepime in ED.  Currently on IV vancomycin and meropenem.  Blood cultures pending, NGTD.  No respiratory cultures.     At time of visit she is sitting up in chair in no distress.  Primary complaint is that she feels weak.  She denies significant cough, denies sputum production.  Denies any associated fevers or chills.      Past Medical History:   Diagnosis Date    Arthritis     Carotid disease, bilateral     Cataract     Chronic hyponatremia     COPD (chronic obstructive pulmonary disease)     HLD (hyperlipidemia)     HTN (hypertension)        Past Surgical History:   Procedure Laterality Date    APPENDECTOMY      Cardioversion or Defibrillation  4/4/2019    Performed by [facility]fred [facility]a [facility] at [facility] CATH LAB    CATARACT EXTRACTION W/  INTRAOCULAR LENS IMPLANT Right 12/06/2018    Dr. Escobar    CATARACT EXTRACTION W/  INTRAOCULAR LENS IMPLANT Left 12/20/2018    DR. Escobar    CERVICAL SPINE SURGERY      DILATION AND CURETTAGE OF UTERUS      x 2    EYE SURGERY      cataract Rt    FRACTURE SURGERY      Lt leg fracture with hardware    INSERTION, IOL PROSTHESIS Left 12/20/2018    Performed by [facility]ick [facility]obar [facility] at [facility] OR    INSERTION, IOL PROSTHESIS Right 12/6/2018    Performed by [facility] at [facility] OR    metatarsal repair      OPEN REDUCTION INTERNAL  FIXATION-TIBIAL PLATEAU Left 6/27/2014    Performed by [facility] at [facility] OR    PHACOEMULSIFICATION, CATARACT Left 12/20/2018    Performed by [facility] at [facility] OR    PHACOEMULSIFICATION, CATARACT Right 12/6/2018    Performed by [facility] at [facility] OR    SHOULDER ARTHROSCOPY      Transesophageal echo (JOHNNA) intra-procedure log documentation N/A 4/4/2019    Performed by Manfred Fig[facility]eroa MD at [facility] CATH LAB       Review of patient's allergies indicates:   Allergen Reactions    Pcn [penicillins] Other (See Comments)     Fever flushing skin swelling     Biaxin [clarithromycin] Rash    Codeine Rash    Doxycycline Rash    Levaquin [levofloxacin] Rash       Medications:  Medications Prior to Admission   Medication Sig    acetaminophen (TYLENOL EXTRA STRENGTH ORAL) Take 1 to 2 tablets by mouth daily as needed for pain    aspirin (ECOTRIN) 81 MG EC tablet Take 81 mg by mouth once daily.    calcium carbonate (CALCIUM 500 ORAL) Take 1 tablet by mouth once daily.     cetirizine (ZYRTEC) 10 MG tablet Take 10 mg by mouth once daily.    fluticasone-salmeterol 250-50 mcg/dose (ADVAIR) 250-50 mcg/dose diskus inhaler Inhale 1 puff into the lungs 2 (two) times daily. Controller    latanoprost 0.005 % ophthalmic solution Place 1 drop into the left eye nightly.    losartan (COZAAR) 100 MG tablet TAKE 1 TABLET ONE TIME DAILY    multivitamin (THERAGRAN) per tablet Take 1 tablet by mouth once daily.    pravastatin (PRAVACHOL) 20 MG tablet TAKE 1 TABLET EVERY EVENING    SPIRIVA WITH HANDIHALER 18 mcg inhalation capsule Inhale 18 mcg into the lungs once daily.     VENTOLIN HFA 90 mcg/actuation inhaler INHALE TWO PUFFS BY MOUTH EVERY 6 HOURS AS NEEDED FOR WHEEZING    albuterol sulfate 2.5 mg/0.5 mL Nebu Take 2.5 mg by nebulization every 6 (six) hours while awake. One Box    alendronate (FOSAMAX) 70 MG tablet TAKE 1 TABLET BY MOUTH ONCE A WEEK EVERY 7 DAYS, AS DIRECTED     ALPRAZolam (XANAX) 0.25 MG tablet Take 1 tablet (0.25 mg total) by mouth 3 (three) times daily as needed for Anxiety.    amiodarone (PACERONE) 400 MG tablet Take 0.5 tablets (200 mg total) by mouth 2 (two) times daily.    apixaban (ELIQUIS) 5 mg Tab Take 1 tablet (5 mg total) by mouth 2 (two) times daily.    cefepime in dextrose 5 % (MAXIPIME) 2 gram/50 mL PgBk Inject 50 mLs (2 g total) into the vein every 8 (eight) hours.    diltiaZEM (CARDIZEM CD) 240 MG 24 hr capsule Take 1 capsule (240 mg total) by mouth once daily.     Antibiotics (From admission, onward)    Start     Stop Route Frequency Ordered    04/27/19 0700  vancomycin (VANCOCIN) 500 mg in dextrose 5 % 100 mL IVPB      -- IV Every 24 hours (non-standard times) 04/26/19 0904        Antifungals (From admission, onward)    None        Antivirals (From admission, onward)    None           Immunization History   Administered Date(s) Administered    Influenza - High Dose 01/11/2013, 11/19/2014, 12/10/2015, 10/12/2018    PPD Test 04/08/2019    Pneumococcal Conjugate - 13 Valent 03/30/2016    Pneumococcal Polysaccharide - 23 Valent 04/25/2013    Tdap 01/19/2013       Family History     Problem Relation (Age of Onset)    Cancer Father    Cataracts Sister    Heart disease Mother, Maternal Grandfather    No Known Problems Brother, Maternal Aunt, Maternal Uncle, Paternal Aunt, Paternal Uncle, Maternal Grandmother, Paternal Grandmother, Paternal Grandfather        Social History     Socioeconomic History    Marital status:      Spouse name: Not on file    Number of children: Not on file    Years of education: Not on file    Highest education level: Not on file   Occupational History    Occupation: teacher   Social Needs    Financial resource strain: Not on file    Food insecurity:     Worry: Not on file     Inability: Not on file    Transportation needs:     Medical: Not on file     Non-medical: Not on file   Tobacco Use    Smoking status:  "Former Smoker     Packs/day: 1.00     Years: 45.00     Pack years: 45.00     Types: Cigarettes     Last attempt to quit: 2002     Years since quittin.2    Smokeless tobacco: Never Used   Substance and Sexual Activity    Alcohol use: No    Drug use: No    Sexual activity: Yes     Partners: Male   Lifestyle    Physical activity:     Days per week: Not on file     Minutes per session: Not on file    Stress: Not on file   Relationships    Social connections:     Talks on phone: Not on file     Gets together: Not on file     Attends Pentecostalism service: Not on file     Active member of club or organization: Not on file     Attends meetings of clubs or organizations: Not on file     Relationship status: Not on file   Other Topics Concern    Not on file   Social History Narrative    Not on file     Review of Systems   Constitutional: Positive for activity change and fatigue. Negative for chills and fever.   HENT: Negative for congestion, dental problem, sneezing, sore throat and trouble swallowing.    Eyes: Negative.    Respiratory: Positive for cough (minimal per patient.  Improved from prior), shortness of breath and wheezing.    Cardiovascular: Negative for chest pain, palpitations and leg swelling.   Gastrointestinal: Positive for constipation. Negative for abdominal pain, diarrhea, nausea and vomiting.        Complains of occasional sensation of esophageal fullness, "something stuck" after eating   Genitourinary: Negative for difficulty urinating, dysuria and frequency.   Musculoskeletal: Negative for arthralgias and myalgias.   Skin: Negative for rash and wound.   Neurological: Positive for weakness. Negative for dizziness and headaches.   Psychiatric/Behavioral: Negative for agitation, behavioral problems and confusion.     Objective:     Vital Signs (Most Recent):  Temp: 98 °F (36.7 °C) (19 0344)  Pulse: 76 (19 1458)  Resp: 18 (19 1408)  BP: (!) 137/58 (19 1408)  SpO2: 96 % " (04/27/19 1408) Vital Signs (24h Range):  Temp:  [97.3 °F (36.3 °C)-98 °F (36.7 °C)] 98 °F (36.7 °C)  Pulse:  [74-83] 76  Resp:  [15-20] 18  SpO2:  [88 %-100 %] 96 %  BP: (111-137)/(58-63) 137/58     Weight: 39.2 kg (86 lb 8 oz)  Body mass index is 14.39 kg/m².    Estimated Creatinine Clearance: 52.4 mL/min (based on SCr of 0.6 mg/dL).    Physical Exam   Constitutional: She is oriented to person, place, and time. No distress.   Frail, elderly woman   HENT:   Head: Normocephalic and atraumatic.   Mouth/Throat: No oropharyngeal exudate.   Eyes: Conjunctivae are normal. No scleral icterus.   Neck: Normal range of motion.   Cardiovascular: Normal rate and regular rhythm.   Murmur heard.  Pulmonary/Chest: Effort normal. No respiratory distress. She has no wheezes. She has no rales.   O2 nasal cannula.   Decreased breath sounds bilaterally   Abdominal: Soft. She exhibits no distension. There is no tenderness.   Musculoskeletal: She exhibits no edema.   Neurological: She is alert and oriented to person, place, and time.   Skin: Skin is warm and dry. She is not diaphoretic. There is pallor.   Psychiatric: She has a normal mood and affect. Her behavior is normal.   Vitals reviewed.      Significant Labs:   Blood Culture:   Recent Labs   Lab 04/02/19  1020 04/04/19  0633 04/04/19  0700 04/26/19  0615 04/26/19  0642   LABBLOO No growth after 5 days. No growth after 5 days. No growth after 5 days. No Growth to date  No Growth to date No Growth to date  No Growth to date     CBC:   Recent Labs   Lab 04/26/19  0403 04/27/19  0618   WBC 16.06* 15.47*   HGB 9.6* 9.5*   HCT 30.1* 28.5*    334     CMP:   Recent Labs   Lab 04/26/19  0403 04/27/19  0618    139   K 3.9 4.1   CL 99 99   CO2 29 31*   * 121*   BUN 17 20   CREATININE 0.6 0.6   CALCIUM 9.0 9.1   PROT 5.9* 6.0   ALBUMIN 2.0* 2.0*   BILITOT 0.3 0.3   ALKPHOS 83 74   AST 19 17   ALT 16 14   ANIONGAP 11 9   EGFRNONAA >60.0 >60.0     Respiratory Culture: No  results for input(s): GSRESP, RESPIRATORYC in the last 4320 hours.    Significant Imaging: I have reviewed all pertinent imaging results/findings within the past 24 hours.

## 2019-04-27 NOTE — SUBJECTIVE & OBJECTIVE
Past Medical History:   Diagnosis Date    Arthritis     Carotid disease, bilateral     Cataract     Chronic hyponatremia     COPD (chronic obstructive pulmonary disease)     HLD (hyperlipidemia)     HTN (hypertension)        Past Surgical History:   Procedure Laterality Date    APPENDECTOMY      Cardioversion or Defibrillation  4/4/2019    Performed by Manfred Fig[facility]eroa MD at [facility] CATH LAB    CATARACT EXTRACTION W/  INTRAOCULAR LENS IMPLANT Right 12/06/2018    Dr. Escobar    CATARACT EXTRACTION W/  INTRAOCULAR LENS IMPLANT Left 12/20/2018    DR. Escobar    CERVICAL SPINE SURGERY      DILATION AND CURETTAGE OF UTERUS      x 2    EYE SURGERY      cataract Rt    FRACTURE SURGERY      Lt leg fracture with hardware    INSERTION, IOL PROSTHESIS Left 12/20/2018    Performed by [facility] at [facility] OR    INSERTION, IOL PROSTHESIS Right 12/6/2018    Performed by [facility] at [facility] OR    metatarsal repair      OPEN REDUCTION INTERNAL FIXATION-TIBIAL PLATEAU Left 6/27/2014    Performed by Is[facility] Pereira [facility] at [facility] OR    PHACOEMULSIFICATION, CATARACT Left 12/20/2018    Performed by [facility] at [facility] OR    PHACOEMULSIFICATION, CATARACT Right 12/6/2018    Performed by [facility] at [facility] OR    SHOULDER ARTHROSCOPY      Transesophageal echo (JOHNNA) intra-procedure log documentation N/A 4/4/2019    Performed by Manfred Fig[facility]eroa MD at [facility] CATH LAB       Review of patient's allergies indicates:   Allergen Reactions    Pcn [penicillins] Other (See Comments)     Fever flushing skin swelling     Biaxin [clarithromycin] Rash    Codeine Rash    Doxycycline Rash    Levaquin [levofloxacin] Rash       Medications:  Medications Prior to Admission   Medication Sig    acetaminophen (TYLENOL EXTRA STRENGTH ORAL) Take 1 to 2 tablets by mouth daily as needed for pain    aspirin (ECOTRIN) 81 MG EC tablet Take 81 mg by mouth once daily.    calcium  carbonate (CALCIUM 500 ORAL) Take 1 tablet by mouth once daily.     cetirizine (ZYRTEC) 10 MG tablet Take 10 mg by mouth once daily.    fluticasone-salmeterol 250-50 mcg/dose (ADVAIR) 250-50 mcg/dose diskus inhaler Inhale 1 puff into the lungs 2 (two) times daily. Controller    latanoprost 0.005 % ophthalmic solution Place 1 drop into the left eye nightly.    losartan (COZAAR) 100 MG tablet TAKE 1 TABLET ONE TIME DAILY    multivitamin (THERAGRAN) per tablet Take 1 tablet by mouth once daily.    pravastatin (PRAVACHOL) 20 MG tablet TAKE 1 TABLET EVERY EVENING    SPIRIVA WITH HANDIHALER 18 mcg inhalation capsule Inhale 18 mcg into the lungs once daily.     VENTOLIN HFA 90 mcg/actuation inhaler INHALE TWO PUFFS BY MOUTH EVERY 6 HOURS AS NEEDED FOR WHEEZING    albuterol sulfate 2.5 mg/0.5 mL Nebu Take 2.5 mg by nebulization every 6 (six) hours while awake. One Box    alendronate (FOSAMAX) 70 MG tablet TAKE 1 TABLET BY MOUTH ONCE A WEEK EVERY 7 DAYS, AS DIRECTED    ALPRAZolam (XANAX) 0.25 MG tablet Take 1 tablet (0.25 mg total) by mouth 3 (three) times daily as needed for Anxiety.    amiodarone (PACERONE) 400 MG tablet Take 0.5 tablets (200 mg total) by mouth 2 (two) times daily.    apixaban (ELIQUIS) 5 mg Tab Take 1 tablet (5 mg total) by mouth 2 (two) times daily.    cefepime in dextrose 5 % (MAXIPIME) 2 gram/50 mL PgBk Inject 50 mLs (2 g total) into the vein every 8 (eight) hours.    diltiaZEM (CARDIZEM CD) 240 MG 24 hr capsule Take 1 capsule (240 mg total) by mouth once daily.     Antibiotics (From admission, onward)    Start     Stop Route Frequency Ordered    04/27/19 0700  vancomycin (VANCOCIN) 500 mg in dextrose 5 % 100 mL IVPB      -- IV Every 24 hours (non-standard times) 04/26/19 0904        Antifungals (From admission, onward)    None        Antivirals (From admission, onward)    None           Immunization History   Administered Date(s) Administered    Influenza - High Dose 01/11/2013,  2014, 12/10/2015, 10/12/2018    PPD Test 2019    Pneumococcal Conjugate - 13 Valent 2016    Pneumococcal Polysaccharide - 23 Valent 2013    Tdap 2013       Family History     Problem Relation (Age of Onset)    Cancer Father    Cataracts Sister    Heart disease Mother, Maternal Grandfather    No Known Problems Brother, Maternal Aunt, Maternal Uncle, Paternal Aunt, Paternal Uncle, Maternal Grandmother, Paternal Grandmother, Paternal Grandfather        Social History     Socioeconomic History    Marital status:      Spouse name: Not on file    Number of children: Not on file    Years of education: Not on file    Highest education level: Not on file   Occupational History    Occupation: teacher   Social Needs    Financial resource strain: Not on file    Food insecurity:     Worry: Not on file     Inability: Not on file    Transportation needs:     Medical: Not on file     Non-medical: Not on file   Tobacco Use    Smoking status: Former Smoker     Packs/day: 1.00     Years: 45.00     Pack years: 45.00     Types: Cigarettes     Last attempt to quit: 2002     Years since quittin.2    Smokeless tobacco: Never Used   Substance and Sexual Activity    Alcohol use: No    Drug use: No    Sexual activity: Yes     Partners: Male   Lifestyle    Physical activity:     Days per week: Not on file     Minutes per session: Not on file    Stress: Not on file   Relationships    Social connections:     Talks on phone: Not on file     Gets together: Not on file     Attends Caodaism service: Not on file     Active member of club or organization: Not on file     Attends meetings of clubs or organizations: Not on file     Relationship status: Not on file   Other Topics Concern    Not on file   Social History Narrative    Not on file     Review of Systems   Constitutional: Positive for activity change and fatigue. Negative for chills and fever.   HENT: Negative for  "congestion, dental problem, sneezing, sore throat and trouble swallowing.    Eyes: Negative.    Respiratory: Positive for cough (minimal per patient.  Improved from prior), shortness of breath and wheezing.    Cardiovascular: Negative for chest pain, palpitations and leg swelling.   Gastrointestinal: Positive for constipation. Negative for abdominal pain, diarrhea, nausea and vomiting.        Complains of occasional sensation of esophageal fullness, "something stuck" after eating   Genitourinary: Negative for difficulty urinating, dysuria and frequency.   Musculoskeletal: Negative for arthralgias and myalgias.   Skin: Negative for rash and wound.   Neurological: Positive for weakness. Negative for dizziness and headaches.   Psychiatric/Behavioral: Negative for agitation, behavioral problems and confusion.     Objective:     Vital Signs (Most Recent):  Temp: 98 °F (36.7 °C) (04/27/19 0344)  Pulse: 76 (04/27/19 1458)  Resp: 18 (04/27/19 1408)  BP: (!) 137/58 (04/27/19 1408)  SpO2: 96 % (04/27/19 1408) Vital Signs (24h Range):  Temp:  [97.3 °F (36.3 °C)-98 °F (36.7 °C)] 98 °F (36.7 °C)  Pulse:  [74-83] 76  Resp:  [15-20] 18  SpO2:  [88 %-100 %] 96 %  BP: (111-137)/(58-63) 137/58     Weight: 39.2 kg (86 lb 8 oz)  Body mass index is 14.39 kg/m².    Estimated Creatinine Clearance: 52.4 mL/min (based on SCr of 0.6 mg/dL).    Physical Exam   Constitutional: She is oriented to person, place, and time. No distress.   Frail, elderly woman   HENT:   Head: Normocephalic and atraumatic.   Mouth/Throat: No oropharyngeal exudate.   Eyes: Conjunctivae are normal. No scleral icterus.   Neck: Normal range of motion.   Cardiovascular: Normal rate and regular rhythm.   Murmur heard.  Pulmonary/Chest: Effort normal. No respiratory distress. She has no wheezes. She has no rales.   O2 nasal cannula.   Decreased breath sounds bilaterally   Abdominal: Soft. She exhibits no distension. There is no tenderness.   Musculoskeletal: She exhibits " no edema.   Neurological: She is alert and oriented to person, place, and time.   Skin: Skin is warm and dry. She is not diaphoretic. There is pallor.   Psychiatric: She has a normal mood and affect. Her behavior is normal.   Vitals reviewed.      Significant Labs:   Blood Culture:   Recent Labs   Lab 04/02/19  1020 04/04/19  0633 04/04/19  0700 04/26/19  0615 04/26/19  0642   LABBLOO No growth after 5 days. No growth after 5 days. No growth after 5 days. No Growth to date  No Growth to date No Growth to date  No Growth to date     CBC:   Recent Labs   Lab 04/26/19  0403 04/27/19  0618   WBC 16.06* 15.47*   HGB 9.6* 9.5*   HCT 30.1* 28.5*    334     CMP:   Recent Labs   Lab 04/26/19  0403 04/27/19  0618    139   K 3.9 4.1   CL 99 99   CO2 29 31*   * 121*   BUN 17 20   CREATININE 0.6 0.6   CALCIUM 9.0 9.1   PROT 5.9* 6.0   ALBUMIN 2.0* 2.0*   BILITOT 0.3 0.3   ALKPHOS 83 74   AST 19 17   ALT 16 14   ANIONGAP 11 9   EGFRNONAA >60.0 >60.0     Respiratory Culture: No results for input(s): GSRESP, RESPIRATORYC in the last 4320 hours.    Significant Imaging: I have reviewed all pertinent imaging results/findings within the past 24 hours.

## 2019-04-27 NOTE — SUBJECTIVE & OBJECTIVE
Past Medical History:   Diagnosis Date    Arthritis     Carotid disease, bilateral     Cataract     Chronic hyponatremia     COPD (chronic obstructive pulmonary disease)     HLD (hyperlipidemia)     HTN (hypertension)        Past Surgical History:   Procedure Laterality Date    APPENDECTOMY      Cardioversion or Defibrillation  4/4/2019    Performed by Manfred Figueroa MD at [facility] CATH LAB    CATARACT EXTRACTION W/  INTRAOCULAR LENS IMPLANT Right 12/06/2018    Dr. Escobar    CATARACT EXTRACTION W/  INTRAOCULAR LENS IMPLANT Left 12/20/2018    DR. Escobar    CERVICAL SPINE SURGERY      DILATION AND CURETTAGE OF UTERUS      x 2    EYE SURGERY      cataract Rt    FRACTURE SURGERY      Lt leg fracture with hardware    INSERTION, IOL PROSTHESIS Left 12/20/2018    Performed by Broderick Escobar MD at [facility] OR    INSERTION, IOL PROSTHESIS Right 12/6/2018    Performed by Broderick Escobar MD at [facility] OR    metatarsal repair      OPEN REDUCTION INTERNAL FIXATION-TIBIAL PLATEAU Left 6/27/2014    Performed by Isak Pereira MD at [facility] OR    PHACOEMULSIFICATION, CATARACT Left 12/20/2018    Performed by Broderick Escobar MD at [facility] OR    PHACOEMULSIFICATION, CATARACT Right 12/6/2018    Performed by Broderick Escobar MD at [facility] OR    SHOULDER ARTHROSCOPY      Transesophageal echo (JOHNNA) intra-procedure log documentation N/A 4/4/2019    Performed by Manfred Figueroa MD at [facility] CATH LAB       Review of patient's allergies indicates:   Allergen Reactions    Pcn [penicillins] Other (See Comments)     Fever flushing skin swelling     Biaxin [clarithromycin] Rash    Codeine Rash    Doxycycline Rash    Levaquin [levofloxacin] Rash       Medications:  Medications Prior to Admission   Medication Sig    acetaminophen (TYLENOL EXTRA STRENGTH ORAL) Take 1 to 2 tablets by mouth daily as needed for pain    aspirin (ECOTRIN) 81 MG EC tablet Take 81 mg by mouth once daily.    calcium  carbonate (CALCIUM 500 ORAL) Take 1 tablet by mouth once daily.     cetirizine (ZYRTEC) 10 MG tablet Take 10 mg by mouth once daily.    fluticasone-salmeterol 250-50 mcg/dose (ADVAIR) 250-50 mcg/dose diskus inhaler Inhale 1 puff into the lungs 2 (two) times daily. Controller    latanoprost 0.005 % ophthalmic solution Place 1 drop into the left eye nightly.    losartan (COZAAR) 100 MG tablet TAKE 1 TABLET ONE TIME DAILY    multivitamin (THERAGRAN) per tablet Take 1 tablet by mouth once daily.    pravastatin (PRAVACHOL) 20 MG tablet TAKE 1 TABLET EVERY EVENING    SPIRIVA WITH HANDIHALER 18 mcg inhalation capsule Inhale 18 mcg into the lungs once daily.     VENTOLIN HFA 90 mcg/actuation inhaler INHALE TWO PUFFS BY MOUTH EVERY 6 HOURS AS NEEDED FOR WHEEZING    albuterol sulfate 2.5 mg/0.5 mL Nebu Take 2.5 mg by nebulization every 6 (six) hours while awake. One Box    alendronate (FOSAMAX) 70 MG tablet TAKE 1 TABLET BY MOUTH ONCE A WEEK EVERY 7 DAYS, AS DIRECTED    ALPRAZolam (XANAX) 0.25 MG tablet Take 1 tablet (0.25 mg total) by mouth 3 (three) times daily as needed for Anxiety.    amiodarone (PACERONE) 400 MG tablet Take 0.5 tablets (200 mg total) by mouth 2 (two) times daily.    apixaban (ELIQUIS) 5 mg Tab Take 1 tablet (5 mg total) by mouth 2 (two) times daily.    cefepime in dextrose 5 % (MAXIPIME) 2 gram/50 mL PgBk Inject 50 mLs (2 g total) into the vein every 8 (eight) hours.    diltiaZEM (CARDIZEM CD) 240 MG 24 hr capsule Take 1 capsule (240 mg total) by mouth once daily.     Antibiotics (From admission, onward)    Start     Stop Route Frequency Ordered    04/27/19 0700  vancomycin (VANCOCIN) 500 mg in dextrose 5 % 100 mL IVPB      -- IV Every 24 hours (non-standard times) 04/26/19 0904    04/26/19 2200  meropenem-0.9% sodium chloride 1 g/50 mL IVPB      04/27 2159 IV Every 8 hours 04/26/19 1716        Antifungals (From admission, onward)    Start     Stop Route Frequency Ordered    04/26/19 1000   clotrimazole pradeep 10 mg       0959 Oral 5 times daily 19 0704        Antivirals (From admission, onward)    None           Immunization History   Administered Date(s) Administered    Influenza - High Dose 2013, 2014, 12/10/2015, 10/12/2018    PPD Test 2019    Pneumococcal Conjugate - 13 Valent 2016    Pneumococcal Polysaccharide - 23 Valent 2013    Tdap 2013       Family History     Problem Relation (Age of Onset)    Cancer Father    Cataracts Sister    Heart disease Mother, Maternal Grandfather    No Known Problems Brother, Maternal Aunt, Maternal Uncle, Paternal Aunt, Paternal Uncle, Maternal Grandmother, Paternal Grandmother, Paternal Grandfather        Social History     Socioeconomic History    Marital status:      Spouse name: Not on file    Number of children: Not on file    Years of education: Not on file    Highest education level: Not on file   Occupational History    Occupation: teacher   Social Needs    Financial resource strain: Not on file    Food insecurity:     Worry: Not on file     Inability: Not on file    Transportation needs:     Medical: Not on file     Non-medical: Not on file   Tobacco Use    Smoking status: Former Smoker     Packs/day: 1.00     Years: 45.00     Pack years: 45.00     Types: Cigarettes     Last attempt to quit: 2002     Years since quittin.2    Smokeless tobacco: Never Used   Substance and Sexual Activity    Alcohol use: No    Drug use: No    Sexual activity: Yes     Partners: Male   Lifestyle    Physical activity:     Days per week: Not on file     Minutes per session: Not on file    Stress: Not on file   Relationships    Social connections:     Talks on phone: Not on file     Gets together: Not on file     Attends Jainism service: Not on file     Active member of club or organization: Not on file     Attends meetings of clubs or organizations: Not on file     Relationship status: Not on  file   Other Topics Concern    Not on file   Social History Narrative    Not on file     Review of Systems  Objective:     Vital Signs (Most Recent):  Temp: 98 °F (36.7 °C) (04/27/19 0344)  Pulse: 79 (04/27/19 0804)  Resp: 16 (04/27/19 0804)  BP: 127/60 (04/27/19 0804)  SpO2: 95 % (04/27/19 0804) Vital Signs (24h Range):  Temp:  [97.3 °F (36.3 °C)-98.4 °F (36.9 °C)] 98 °F (36.7 °C)  Pulse:  [74-90] 79  Resp:  [15-28] 16  SpO2:  [90 %-100 %] 95 %  BP: (111-145)/(58-67) 127/60     Weight: 39.2 kg (86 lb 8 oz)  Body mass index is 14.39 kg/m².    Estimated Creatinine Clearance: 52.4 mL/min (based on SCr of 0.6 mg/dL).    Physical Exam    Significant Labs:   Blood Culture:   Recent Labs   Lab 04/02/19  1020 04/04/19  0633 04/04/19  0700 04/26/19  0615 04/26/19  0642   LABBLOO No growth after 5 days. No growth after 5 days. No growth after 5 days. No Growth to date  No Growth to date No Growth to date  No Growth to date     CBC:   Recent Labs   Lab 04/26/19  0403 04/27/19  0618   WBC 16.06* 15.47*   HGB 9.6* 9.5*   HCT 30.1* 28.5*    334     CMP:   Recent Labs   Lab 04/26/19  0403 04/27/19  0618    139   K 3.9 4.1   CL 99 99   CO2 29 31*   * 121*   BUN 17 20   CREATININE 0.6 0.6   CALCIUM 9.0 9.1   PROT 5.9* 6.0   ALBUMIN 2.0* 2.0*   BILITOT 0.3 0.3   ALKPHOS 83 74   AST 19 17   ALT 16 14   ANIONGAP 11 9   EGFRNONAA >60.0 >60.0     Respiratory Culture: No results for input(s): GSRESP, RESPIRATORYC in the last 4320 hours.    Significant Imaging: I have reviewed all pertinent imaging results/findings within the past 24 hours.

## 2019-04-27 NOTE — PLAN OF CARE
Problem: Fall Injury Risk  Goal: Absence of Fall and Fall-Related Injury  Outcome: Ongoing (interventions implemented as appropriate)  Fall precaution maintained this shift call bell in reach bed in low position no acute distress noted. Instructed pt to call for assistance. No injury this shift.    Problem: Infection  Goal: Infection Symptom Resolution  Outcome: Ongoing (interventions implemented as appropriate)  Iv abx given this shift. Pt remained afebrile

## 2019-04-27 NOTE — CONSULTS
"  [facility]  Adult Nutrition  Consult Note    SUMMARY     Recommendations    Recommendation/Intervention:     Pt with severe malnutrition.     1. Continue cardiac diet as tolerated.   2. Encourage po intake.   3. RD following.     Goals: consume >50% of all meals  Nutrition Goal Status: new  Communication of RD Recs: (POC)    Reason for Assessment    Reason For Assessment: consult  Diagnosis: ( Left lower lobe pneumonia)  Relevant Medical History: HTN, HLD, COPD (on 3-4L NC at home), a fib  General Information Comments: Last seen by RD on . Pt resting in bed with no family members at bedside. Pt reports that her appetite is on and off now and PTA. Breakfast tray on table - consumed 0-25% of meal. Does not like Boost. Denies N/V/D/C. Educated pt on Low Na diet. Discussed ways to lower Na in the diet. Spoke about salt-free seasonings and being a smarter . Pt verbalized understanding, but said it would be difficult 2/2 to her loving salt on her meals. Encouraged pt to eat fresh, unprocessed foods. Noted significant wt loss. NFPE completed. Pt with severe malnutrition.  Nutrition Discharge Planning: adequate po intake    Nutrition Risk Screen    Nutrition Risk Screen: no indicators present    Nutrition/Diet History    Spiritual, Cultural Beliefs, Sikhism Practices, Values that Affect Care: no  Factors Affecting Nutritional Intake: decreased appetite    Anthropometrics    Temp: 98 °F (36.7 °C)  Height Method: Stated  Height: 5' 5" (165.1 cm)  Height (inches): 65 in  Weight Method: Bed Scale  Weight: 39.2 kg (86 lb 8 oz)  Weight (lb): 86.5 lb  Ideal Body Weight (IBW), Female: 125 lb  % Ideal Body Weight, Female (lb): 69.2 lb  BMI (Calculated): 14.4  BMI Grade: less than 16 protein-energy malnutrition grade III  Weight Loss: unintentional  Usual Body Weight (UBW), k.6 kg(per chart review 18)  % Usual Body Weight: 92.3  % Weight Change From Usual Weight: -7.9 %   "   Lab/Procedures/Meds    Pertinent Labs Reviewed: reviewed  Pertinent Labs Comments: glucose 121  Pertinent Medications Reviewed: reviewed  Pertinent Medications Comments: aspirin, statin, prednisone    Estimated/Assessed Needs    Weight Used For Calorie Calculations: 39.2 kg (86 lb 6.7 oz)  Energy Calorie Requirements (kcal): 4678-3366 kcal/day  Energy Need Method: Kcal/kg(35-40)  Protein Requirements: 59 gm/day(1.5 gm/kg)  Weight Used For Protein Calculations: 39.2 kg (86 lb 6.7 oz)  Fluid Requirements (mL): 1 mL/kcal or per MD     RDA Method (mL): 1372    Nutrition Prescription Ordered    Current Diet Order: Cardiac    Evaluation of Received Nutrient/Fluid Intake    Comments: LBM 4/26  Tolerance: tolerating  % Intake of Estimated Energy Needs: 0 - 25 %  % Meal Intake: 0 - 25 %    Nutrition Risk    Level of Risk/Frequency of Follow-up: high(f/u 2 x wk)     Assessment and Plan    Severe malnutrition  Malnutrition in the context of Chronic Illness/Injury    Related to (etiology):  COPD, Inadequate Energy Intake    Signs and Symptoms (as evidenced by):  Energy Intake: <75% of estimated energy requirement for >3 months  Body Fat Depletion: severe depletion of triceps   Muscle Mass Depletion: severe depletion of temples, clavicle region, scapular region and lower extremities   Weight Loss: 7.9% x 5 months     Interventions:  Collaboration and Referral of Nutrition Care    Nutrition Diagnosis Status:  New             Monitor and Evaluation    Food and Nutrient Intake: energy intake, food and beverage intake  Food and Nutrient Adminstration: diet order  Physical Activity and Function: nutrition-related ADLs and IADLs  Anthropometric Measurements: weight, weight change, body mass index  Biochemical Data, Medical Tests and Procedures: electrolyte and renal panel, gastrointestinal profile, glucose/endocrine profile, inflammatory profile, lipid profile  Nutrition-Focused Physical Findings: overall appearance     Malnutrition  Assessment  Malnutrition Type: chronic illness          Weight Loss (Malnutrition): (7.9% in 5 months)  Energy Intake (Malnutrition): less than 75% for greater than or equal to 3 months   Upper Arm Region (Subcutaneous Fat Loss): severe depletion   Midway Region (Muscle Loss): severe depletion  Clavicle Bone Region (Muscle Loss): severe depletion  Clavicle and Acromion Bone Region (Muscle Loss): severe depletion  Patellar Region (Muscle Loss): severe depletion  Anterior Thigh Region (Muscle Loss): severe depletion  Posterior Calf Region (Muscle Loss): severe depletion                 Nutrition Follow-Up    RD Follow-up?: Yes

## 2019-04-27 NOTE — ASSESSMENT & PLAN NOTE
73 year old woman with COPD, HTN, Afib with RVR (on amiodarone), and recent history of hospitalization for pseudomonas bacteremia (pan sensitive) and RUL PNA (s/p 14 days of IV cefepime) [facility].  Found to be hypoxic with reported O2 sats in 50s on room air.  CXR in ED showed new LLL consolidation concerning for PNA.  Associated leukocytosis.  No associated fevers, chills.  Received dose of vancomycin and cefepime in ED.  Currently on IV vancomycin and meropenem.  Blood cultures pending, NGTD.  Respiratory culture pending collections.     Plan/recommendations:  1.

## 2019-04-27 NOTE — CONSULTS
[facility]  Infectious Disease  Consult Note    Patient Name: Latasha Perez  MRN: [id number]  Admission Date: 4/26/2019  Hospital Length of Stay: 1 days  Attending Physician: Laura Billy MD  Primary Care Provider: Pollo Oneal MD     Isolation Status: No active isolations    Inpatient consult to Infectious Diseases  Consult performed by: THELMA Stuart, ANP  Consult ordered by: Ingrid Michael MD  Reason for consult: broad spectrum antimicrobial management        ID Consult acknowledged.   Full consult and recommendations to follow today  In the interim, please call for any immediate concerns.     Thank you.   THELMA Correia, ANP-C  [phone] pager,  [phone]

## 2019-04-27 NOTE — ASSESSMENT & PLAN NOTE
Malnutrition in the context of Chronic Illness/Injury    Related to (etiology):  COPD, Inadequate Energy Intake    Signs and Symptoms (as evidenced by):  Energy Intake: <75% of estimated energy requirement for >3 months  Body Fat Depletion: severe depletion of triceps   Muscle Mass Depletion: severe depletion of temples, clavicle region, scapular region and lower extremities   Weight Loss: 7.9% x 5 months     Interventions:  Collaboration and Referral of Nutrition Care    Nutrition Diagnosis Status:  New

## 2019-04-27 NOTE — PROGRESS NOTES
[facility] Hospital Medicine  Progress Note    Patient Name: Latasha Perez  MRN: [id number]  Patient Class: IP- Inpatient   Admission Date: 4/26/2019  Length of Stay: 1 days  Attending Physician: Laura Billy MD  Primary Care Provider: Pollo Oneal MD    [facility] Medicine Team: [facility] HOSP MED 5 Ingrid Michael MD    Subjective:     Principal Problem:Left lower lobe pneumonia    HPI:  Ms. Perez is a 73 year old lady with a past medical history significant for HTN, HLD, COPD (on 3-4L NC at home), A.fib with RVR (s/p failed ablation - on Amiodarone and diltiazem for rate control and eliquis for anticoagulation) who presents to [facility] ED from [facility] with complaints of shortness of breath. She was recently discharged from [facility] on 4/11/19 for right upper lobe pneumonia and new onset A.fib with RVR. Blood cultures grew Pseudomonas sensitive to cefipime and she completed a course of outpatient cefipime IV q8h via a PICC line which has since been removed. The patient states her cough has remained since that hospitalization. She is unclear as to how long exactly the cough has persisted and will not clarify the color of sputum production however does state that initially it was productive of sputum however it no longer as. She denies fevers or chills. Upon EMS arrival patient was hypoxic, reported sat in the 50's on room air, she was given solumedrol and started on BiPAP. On presentation to the ED patient received duo nebs and oxygenation improved, she was weaned down off Bipap to home O2 requirement of 3L.    In regards to her atrial fib, the patient was diagnosed during hospitalization at the beginning of 4/1/19. Echo showed pulmonary hypertension, normal EF.JOHNNA was negative for thrombus so patient was cardioverted however went back into A.fib. She was controlled on Amiodarone and diltiazem and discharged on those medications. She was also started on eliquis for anticoagulation.  Patient denies recurrence of palpitations or any chest discomfort. In ED patient was normal sinus rhythm.    In the ED CXR was notable for new left lower lobe consolidation. Labs were notable for elevated WBC. She received a dose of vancomycin and rocephin. Blood cultures were not collected prior to initiation of antibiotics and was admitted to hospital medicine for further evaluation of HCAP and COPD exacerbation.    Hospital Course:  Interval Hx: NAEO, she continues to improve now on 2-3L oxygen. Started on meropenem yesterday. Question of slow clinical improvement previously & history of Pseudomonas bacteremia.     Past Medical History:   Diagnosis Date    Arthritis     Carotid disease, bilateral     Cataract     Chronic hyponatremia     COPD (chronic obstructive pulmonary disease)     HLD (hyperlipidemia)     HTN (hypertension)        Past Surgical History:   Procedure Laterality Date    APPENDECTOMY      Cardioversion or Defibrillation  4/4/2019    Performed by Manfred Figueroa MD at [facility] CATH LAB    CATARACT EXTRACTION W/  INTRAOCULAR LENS IMPLANT Right 12/06/2018    Dr. Escobar    CATARACT EXTRACTION W/  INTRAOCULAR LENS IMPLANT Left 12/20/2018    DR. Escobar    CERVICAL SPINE SURGERY      DILATION AND CURETTAGE OF UTERUS      x 2    EYE SURGERY      cataract Rt    FRACTURE SURGERY      Lt leg fracture with hardware    INSERTION, IOL PROSTHESIS Left 12/20/2018    Performed by Broderick Escobar MD at [facility] OR    INSERTION, IOL PROSTHESIS Right 12/6/2018    Performed by [facility]ick [facility]obar MD at [facility] OR    metatarsal repair      OPEN REDUCTION INTERNAL FIXATION-TIBIAL PLATEAU Left 6/27/2014    Performed by Isak Pereira MD at [facility] OR    PHACOEMULSIFICATION, CATARACT Left 12/20/2018    Performed by [facility]ick [facility]obar MD at [facility] OR    PHACOEMULSIFICATION, CATARACT Right 12/6/2018    Performed by [facility]ick [facility]obar MD at [facility] OR    SHOULDER ARTHROSCOPY       Transesophageal echo (JOHNNA) intra-procedure log documentation N/A 4/4/2019    Performed by [facility]fred [facility]a [facility] at [facility] CATH LAB       Review of patient's allergies indicates:   Allergen Reactions    Pcn [penicillins] Other (See Comments)     Fever flushing skin swelling     Biaxin [clarithromycin] Rash    Codeine Rash    Doxycycline Rash    Levaquin [levofloxacin] Rash       No current facility-administered medications on file prior to encounter.      Current Outpatient Medications on File Prior to Encounter   Medication Sig    acetaminophen (TYLENOL EXTRA STRENGTH ORAL) Take 1 to 2 tablets by mouth daily as needed for pain    aspirin (ECOTRIN) 81 MG EC tablet Take 81 mg by mouth once daily.    calcium carbonate (CALCIUM 500 ORAL) Take 1 tablet by mouth once daily.     cetirizine (ZYRTEC) 10 MG tablet Take 10 mg by mouth once daily.    fluticasone-salmeterol 250-50 mcg/dose (ADVAIR) 250-50 mcg/dose diskus inhaler Inhale 1 puff into the lungs 2 (two) times daily. Controller    latanoprost 0.005 % ophthalmic solution Place 1 drop into the left eye nightly.    losartan (COZAAR) 100 MG tablet TAKE 1 TABLET ONE TIME DAILY    multivitamin (THERAGRAN) per tablet Take 1 tablet by mouth once daily.    pravastatin (PRAVACHOL) 20 MG tablet TAKE 1 TABLET EVERY EVENING    SPIRIVA WITH HANDIHALER 18 mcg inhalation capsule Inhale 18 mcg into the lungs once daily.     VENTOLIN HFA 90 mcg/actuation inhaler INHALE TWO PUFFS BY MOUTH EVERY 6 HOURS AS NEEDED FOR WHEEZING    albuterol sulfate 2.5 mg/0.5 mL Nebu Take 2.5 mg by nebulization every 6 (six) hours while awake. One Box    alendronate (FOSAMAX) 70 MG tablet TAKE 1 TABLET BY MOUTH ONCE A WEEK EVERY 7 DAYS, AS DIRECTED    ALPRAZolam (XANAX) 0.25 MG tablet Take 1 tablet (0.25 mg total) by mouth 3 (three) times daily as needed for Anxiety.    amiodarone (PACERONE) 400 MG tablet Take 0.5 tablets (200 mg total) by mouth 2 (two) times daily.    apixaban (ELIQUIS)  5 mg Tab Take 1 tablet (5 mg total) by mouth 2 (two) times daily.    cefepime in dextrose 5 % (MAXIPIME) 2 gram/50 mL PgBk Inject 50 mLs (2 g total) into the vein every 8 (eight) hours.    diltiaZEM (CARDIZEM CD) 240 MG 24 hr capsule Take 1 capsule (240 mg total) by mouth once daily.     Family History     Problem Relation (Age of Onset)    Cancer Father    Cataracts Sister    Heart disease Mother, Maternal Grandfather    No Known Problems Brother, Maternal Aunt, Maternal Uncle, Paternal Aunt, Paternal Uncle, Maternal Grandmother, Paternal Grandmother, Paternal Grandfather        Tobacco Use    Smoking status: Former Smoker     Packs/day: 1.00     Years: 45.00     Pack years: 45.00     Types: Cigarettes     Last attempt to quit: 2002     Years since quittin.2    Smokeless tobacco: Never Used   Substance and Sexual Activity    Alcohol use: No    Drug use: No    Sexual activity: Yes     Partners: Male     Review of Systems   Constitutional: Negative for chills, fatigue, fever and unexpected weight change.   HENT: Positive for sore throat. Negative for mouth sores, sneezing and trouble swallowing.    Eyes: Negative for visual disturbance.   Respiratory: Positive for cough, shortness of breath and wheezing.    Cardiovascular: Negative for chest pain, palpitations and leg swelling.   Gastrointestinal: Negative for abdominal pain, constipation, diarrhea, nausea and vomiting.   Genitourinary: Negative for difficulty urinating, dysuria and frequency.   Musculoskeletal: Negative for arthralgias and myalgias.   Skin: Negative for pallor and rash.   Neurological: Negative for weakness and headaches.   Hematological: Negative for adenopathy.     Objective:     Vital Signs (Most Recent):  Temp: 98 °F (36.7 °C) (19 0344)  Pulse: 76 (19 1458)  Resp: 18 (19 1408)  BP: (!) 137/58 (19 1408)  SpO2: 96 % (19 1408) Vital Signs (24h Range):  Temp:  [97.3 °F (36.3 °C)-98.4 °F (36.9 °C)] 98 °F  (36.7 °C)  Pulse:  [74-87] 76  Resp:  [15-20] 18  SpO2:  [88 %-100 %] 96 %  BP: (111-137)/(58-63) 137/58     Weight: 39.2 kg (86 lb 8 oz)  Body mass index is 14.39 kg/m².    Physical Exam   Constitutional: She is oriented to person, place, and time. She appears well-developed and well-nourished. No distress.   HENT:   Head: Normocephalic and atraumatic.   Nose: Nose normal.   Tongue and posterior pharyngeal erythema. No exudate or white plaques noted   Eyes: Pupils are equal, round, and reactive to light. Conjunctivae and EOM are normal. No scleral icterus.   Neck: Normal range of motion. Neck supple.   Cardiovascular: Normal rate, regular rhythm, normal heart sounds and intact distal pulses. Exam reveals no friction rub.   No murmur heard.  Pulmonary/Chest: Effort normal. No respiratory distress. She has no wheezes. She has no rales.   On 3L NC, speaking full sentences   Abdominal: Soft. Bowel sounds are normal. She exhibits no distension and no mass. There is no tenderness. There is no guarding.   Musculoskeletal: Normal range of motion. She exhibits no edema, tenderness or deformity.   Lymphadenopathy:     She has no cervical adenopathy.   Neurological: She is alert and oriented to person, place, and time. No cranial nerve deficit. Coordination normal.   Skin: Skin is warm and dry. Capillary refill takes less than 2 seconds. No erythema. There is pallor.   Psychiatric: She has a normal mood and affect.   Nursing note and vitals reviewed.        CRANIAL NERVES     CN III, IV, VI   Pupils are equal, round, and reactive to light.  Extraocular motions are normal.        Significant Labs:   CBC:   Recent Labs   Lab 04/26/19  0403 04/27/19  0618   WBC 16.06* 15.47*   HGB 9.6* 9.5*   HCT 30.1* 28.5*    334     CMP:   Recent Labs   Lab 04/26/19  0403 04/27/19 0618    139   K 3.9 4.1   CL 99 99   CO2 29 31*   * 121*   BUN 17 20   CREATININE 0.6 0.6   CALCIUM 9.0 9.1   PROT 5.9* 6.0   ALBUMIN 2.0* 2.0*    BILITOT 0.3 0.3   ALKPHOS 83 74   AST 19 17   ALT 16 14   ANIONGAP 11 9   EGFRNONAA >60.0 >60.0     POCT Glucose:   Recent Labs   Lab 04/26/19  0648   POCTGLUCOSE 143*     All pertinent labs within the past 24 hours have been reviewed.    Significant Imaging: I have reviewed all pertinent imaging results/findings within the past 24 hours.    Narrative     EXAMINATION:  XR CHEST AP PORTABLE    CLINICAL HISTORY:  Asthma;    TECHNIQUE:  Single frontal view of the chest was performed.    COMPARISON:  April 7, 2019.    FINDINGS:  Interval development of opacification at the left lung base.    Right PICC line has been removed.    Heart and lungs otherwise appear unchanged when allowing for differences in technique and positioning.      Impression       Interval development of opacification at the left lung base.  Possible pneumonia with parapneumonic effusion.    No significant change from prior study.      Electronically signed by: Eder Clarke MD  Date: 04/26/2019  Time: 02:36         Assessment/Plan:      * Left lower lobe pneumonia  Patient with history of COPD and prior pneumonia with pseudomonas bacteremia presenting with cough, worsening hypoxia, and new Left LL infiltrate on CXR. Patient recently completed a course of IV Cefipime 1 week previous however subsequently developed this pneumonia in a separate lung zone. Recent hospitalization, residing in inpatient rehab facility, and antibiotic use concerning for resistance.    SIRS Criteria: elevated WBC 16, tachypnea  qSOFA criteria: 1 (RR>22)      Patient has a significant penicillin allergy (listed as fevers, flushing, and swelling). At this time will elect to continue vancomycin and cefipime    Plan:  Antibiotics: Vancomycin and cefipime stepped up to meropenem given presentation & ID following, appreciat recs  Supplemental O2, goal > 88%  Treating for COPD exacerbation with xopinex and ipratropium nebs q4h  Solumedrol 125mg IV daily for 5 days  Lactic acid  "pending      Hypoxia  Acute resp failure with hypoxia  -complete infectious picture not fitting  -CT chest pending       Oral thrush  -some oral thrush noted  -magic mouthwash PRN       Paroxysmal atrial fibrillation  Patient with a.fib diagnosed earlier this month during hospitalization for pneumonia and bacteremia. S/p failed cardioversion. Now controlled on diltiazem and amiodarone.    Patient currently rate controlled, in sinus rhythm per telemetry monitor. ECG on admit poor study    - Continuing amiodarone and diltiazem PO  - Anticoagulation with eliquis      Severe malnutrition  - See cachexia    Cachexia  - Nutrition consulted  - Boost supplementation with meals      COPD exacerbation  - 2/2 pneumonia, please see "left lower lobe pneumonia" above      Essential hypertension  - Continuing home diltiazem, patient normotensive once weaned off Bipap. Suspect hypoxia and stress were leading to increase in blood pressure.  - Continue to monitor      Leukocytosis  - Please see "Pneumonia"      Chronic hyponatremia        Carotid disease, bilateral  Patient with 50-69% stenosis of left carotid artery and < 50% stenosis of right per ultrasound on 6/13/18.    - Continuing asa and statin while inpatient      HLD (hyperlipidemia)  Patient on pravastatin 20mg at home.    - Continuing home statin while inpatient        VTE Risk Mitigation (From admission, onward)        Ordered     apixaban tablet 5 mg  2 times daily      04/26/19 0559     Place TIAN hose  Until discontinued      04/26/19 0458     IP VTE HIGH RISK PATIENT  Once      04/26/19 0457              Ingrid Michael MD  Department of Hospital Medicine   [facility]  "

## 2019-04-27 NOTE — HPI
Ms. Latasha Perez is a 73 year old woman with COPD, HTN, Afib with RVR (on amiodarone), and recent history of hospitalization for pseudomonas bacteremia (pan sensitive) and RUL PNA (s/p 14 days of IV cefepime) who presented from [facility] with sudden onset of shortness of breath.  Found to be hypoxic with reported O2 sats in 50s on room air.  CXR in ED showed new LLL consolidation concerning for PNA.  Associated leukocytosis.  No associated fevers, chills.  Received dose of vancomycin and cefepime in ED.  Currently on IV vancomycin and meropenem.  Blood cultures pending, NGTD.  No respiratory cultures.     At time of visit she is sitting up in chair in no distress.  Primary complaint is that she feels weak.  She denies significant cough, denies sputum production.  Denies any associated fevers or chills.

## 2019-04-27 NOTE — ASSESSMENT & PLAN NOTE
Patient with history of COPD and prior pneumonia with pseudomonas bacteremia presenting with cough, worsening hypoxia, and new Left LL infiltrate on CXR. Patient recently completed a course of IV Cefipime 1 week previous however subsequently developed this pneumonia in a separate lung zone. Recent hospitalization, residing in inpatient rehab facility, and antibiotic use concerning for resistance.    SIRS Criteria: elevated WBC 16, tachypnea  qSOFA criteria: 1 (RR>22)      Patient has a significant penicillin allergy (listed as fevers, flushing, and swelling). At this time will elect to continue vancomycin and cefipime    Plan:  Antibiotics: Vancomycin and cefipime stepped up to meropenem given presentation & ID following, appreciat recs  Supplemental O2, goal > 88%  Treating for COPD exacerbation with xopinex and ipratropium nebs q4h  Solumedrol 125mg IV daily for 5 days  Lactic acid pending

## 2019-04-27 NOTE — PLAN OF CARE
Problem: Occupational Therapy Goal  Goal: Occupational Therapy Goal  Goals to be met by: 5/4/19     Patient will increase functional independence with ADLs by performing:    LE Dressing with Set-up Assistance.  Grooming while standing at sink with Supervision.  Toileting from toilet with Supervision for hygiene and clothing management.   Supine to sit with Lynn.  Step transfer with Stand-by Assistance  Toilet transfer to toilet with Stand-by Assistance.    Outcome: Ongoing (interventions implemented as appropriate)  Evaluation completed and POC established.    LENNY Coy

## 2019-04-28 PROBLEM — J96.21 ACUTE ON CHRONIC RESPIRATORY FAILURE WITH HYPOXIA: Status: ACTIVE | Noted: 2019-04-27

## 2019-04-28 LAB
ALBUMIN SERPL BCP-MCNC: 2.1 G/DL (ref 3.5–5.2)
ALP SERPL-CCNC: 75 U/L (ref 55–135)
ALT SERPL W/O P-5'-P-CCNC: 18 U/L (ref 10–44)
ANION GAP SERPL CALC-SCNC: 10 MMOL/L (ref 8–16)
AST SERPL-CCNC: 23 U/L (ref 10–40)
BILIRUB SERPL-MCNC: 0.3 MG/DL (ref 0.1–1)
BUN SERPL-MCNC: 18 MG/DL (ref 8–23)
CALCIUM SERPL-MCNC: 9 MG/DL (ref 8.7–10.5)
CHLORIDE SERPL-SCNC: 101 MMOL/L (ref 95–110)
CO2 SERPL-SCNC: 28 MMOL/L (ref 23–29)
CREAT SERPL-MCNC: 0.6 MG/DL (ref 0.5–1.4)
ERYTHROCYTE [DISTWIDTH] IN BLOOD BY AUTOMATED COUNT: 13.5 % (ref 11.5–14.5)
EST. GFR  (AFRICAN AMERICAN): >60 ML/MIN/1.73 M^2
EST. GFR  (NON AFRICAN AMERICAN): >60 ML/MIN/1.73 M^2
GLUCOSE SERPL-MCNC: 83 MG/DL (ref 70–110)
HCT VFR BLD AUTO: 30.1 % (ref 37–48.5)
HGB BLD-MCNC: 9.6 G/DL (ref 12–16)
MCH RBC QN AUTO: 30.2 PG (ref 27–31)
MCHC RBC AUTO-ENTMCNC: 31.9 G/DL (ref 32–36)
MCV RBC AUTO: 95 FL (ref 82–98)
PLATELET # BLD AUTO: 356 K/UL (ref 150–350)
PMV BLD AUTO: 9 FL (ref 9.2–12.9)
POTASSIUM SERPL-SCNC: 4.4 MMOL/L (ref 3.5–5.1)
PROT SERPL-MCNC: 6.3 G/DL (ref 6–8.4)
RBC # BLD AUTO: 3.18 M/UL (ref 4–5.4)
SODIUM SERPL-SCNC: 139 MMOL/L (ref 136–145)
VANCOMYCIN TROUGH SERPL-MCNC: 2.8 UG/ML (ref 10–22)
WBC # BLD AUTO: 13.47 K/UL (ref 3.9–12.7)

## 2019-04-28 PROCEDURE — 25000003 PHARM REV CODE 250: Mod: HCNC | Performed by: EMERGENCY MEDICINE

## 2019-04-28 PROCEDURE — 11000001 HC ACUTE MED/SURG PRIVATE ROOM: Mod: HCNC

## 2019-04-28 PROCEDURE — 99232 SBSQ HOSP IP/OBS MODERATE 35: CPT | Mod: HCNC,GC,, | Performed by: INTERNAL MEDICINE

## 2019-04-28 PROCEDURE — 97161 PT EVAL LOW COMPLEX 20 MIN: CPT | Mod: HCNC

## 2019-04-28 PROCEDURE — 25000242 PHARM REV CODE 250 ALT 637 W/ HCPCS: Mod: HCNC | Performed by: INTERNAL MEDICINE

## 2019-04-28 PROCEDURE — 94640 AIRWAY INHALATION TREATMENT: CPT | Mod: HCNC

## 2019-04-28 PROCEDURE — 36415 COLL VENOUS BLD VENIPUNCTURE: CPT | Mod: HCNC

## 2019-04-28 PROCEDURE — 99232 PR SUBSEQUENT HOSPITAL CARE,LEVL II: ICD-10-PCS | Mod: HCNC,GC,, | Performed by: INTERNAL MEDICINE

## 2019-04-28 PROCEDURE — 25000003 PHARM REV CODE 250: Mod: HCNC | Performed by: STUDENT IN AN ORGANIZED HEALTH CARE EDUCATION/TRAINING PROGRAM

## 2019-04-28 PROCEDURE — 63600175 PHARM REV CODE 636 W HCPCS: Mod: HCNC | Performed by: NURSE PRACTITIONER

## 2019-04-28 PROCEDURE — 80053 COMPREHEN METABOLIC PANEL: CPT | Mod: HCNC

## 2019-04-28 PROCEDURE — 97116 GAIT TRAINING THERAPY: CPT | Mod: HCNC

## 2019-04-28 PROCEDURE — 25000242 PHARM REV CODE 250 ALT 637 W/ HCPCS: Mod: HCNC | Performed by: STUDENT IN AN ORGANIZED HEALTH CARE EDUCATION/TRAINING PROGRAM

## 2019-04-28 PROCEDURE — 80202 ASSAY OF VANCOMYCIN: CPT | Mod: HCNC

## 2019-04-28 PROCEDURE — 63600175 PHARM REV CODE 636 W HCPCS: Mod: HCNC | Performed by: STUDENT IN AN ORGANIZED HEALTH CARE EDUCATION/TRAINING PROGRAM

## 2019-04-28 PROCEDURE — 27000221 HC OXYGEN, UP TO 24 HOURS: Mod: HCNC

## 2019-04-28 PROCEDURE — 99233 SBSQ HOSP IP/OBS HIGH 50: CPT | Mod: HCNC,,, | Performed by: NURSE PRACTITIONER

## 2019-04-28 PROCEDURE — 99233 PR SUBSEQUENT HOSPITAL CARE,LEVL III: ICD-10-PCS | Mod: HCNC,,, | Performed by: NURSE PRACTITIONER

## 2019-04-28 PROCEDURE — 94761 N-INVAS EAR/PLS OXIMETRY MLT: CPT | Mod: HCNC

## 2019-04-28 PROCEDURE — 63600175 PHARM REV CODE 636 W HCPCS: Mod: HCNC | Performed by: EMERGENCY MEDICINE

## 2019-04-28 PROCEDURE — 63600175 PHARM REV CODE 636 W HCPCS: Mod: HCNC | Performed by: INTERNAL MEDICINE

## 2019-04-28 PROCEDURE — 85027 COMPLETE CBC AUTOMATED: CPT | Mod: HCNC

## 2019-04-28 RX ORDER — VANCOMYCIN HCL IN 5 % DEXTROSE 1G/250ML
1000 PLASTIC BAG, INJECTION (ML) INTRAVENOUS
Status: DISCONTINUED | OUTPATIENT
Start: 2019-04-28 | End: 2019-04-28

## 2019-04-28 RX ORDER — SODIUM CHLORIDE FOR INHALATION 3 %
4 VIAL, NEBULIZER (ML) INHALATION EVERY 8 HOURS
Status: DISCONTINUED | OUTPATIENT
Start: 2019-04-29 | End: 2019-05-03 | Stop reason: HOSPADM

## 2019-04-28 RX ORDER — GUAIFENESIN 100 MG/5ML
200 SOLUTION ORAL EVERY 8 HOURS
Status: DISCONTINUED | OUTPATIENT
Start: 2019-04-29 | End: 2019-05-03 | Stop reason: HOSPADM

## 2019-04-28 RX ORDER — LEVALBUTEROL 1.25 MG/.5ML
1.25 SOLUTION, CONCENTRATE RESPIRATORY (INHALATION)
Status: DISCONTINUED | OUTPATIENT
Start: 2019-04-28 | End: 2019-05-01

## 2019-04-28 RX ADMIN — IPRATROPIUM BROMIDE 0.5 MG: 0.5 SOLUTION RESPIRATORY (INHALATION) at 08:04

## 2019-04-28 RX ADMIN — IPRATROPIUM BROMIDE 0.5 MG: 0.5 SOLUTION RESPIRATORY (INHALATION) at 07:04

## 2019-04-28 RX ADMIN — VANCOMYCIN HYDROCHLORIDE 500 MG: 500 INJECTION, POWDER, LYOPHILIZED, FOR SOLUTION INTRAVENOUS at 09:04

## 2019-04-28 RX ADMIN — LATANOPROST 1 DROP: 50 SOLUTION OPHTHALMIC at 09:04

## 2019-04-28 RX ADMIN — FLUTICASONE FUROATE AND VILANTEROL TRIFENATATE 1 PUFF: 100; 25 POWDER RESPIRATORY (INHALATION) at 09:04

## 2019-04-28 RX ADMIN — AMIODARONE HYDROCHLORIDE 200 MG: 200 TABLET ORAL at 09:04

## 2019-04-28 RX ADMIN — ASPIRIN 81 MG: 81 TABLET, COATED ORAL at 09:04

## 2019-04-28 RX ADMIN — LEVALBUTEROL 1.25 MG: 1.25 SOLUTION, CONCENTRATE RESPIRATORY (INHALATION) at 08:04

## 2019-04-28 RX ADMIN — TIOTROPIUM BROMIDE 18 MCG: 18 CAPSULE ORAL; RESPIRATORY (INHALATION) at 09:04

## 2019-04-28 RX ADMIN — MEROPENEM AND SODIUM CHLORIDE 1 G: 1 INJECTION, SOLUTION INTRAVENOUS at 03:04

## 2019-04-28 RX ADMIN — APIXABAN 5 MG: 5 TABLET, FILM COATED ORAL at 09:04

## 2019-04-28 RX ADMIN — PRAVASTATIN SODIUM 20 MG: 20 TABLET ORAL at 09:04

## 2019-04-28 RX ADMIN — MEROPENEM AND SODIUM CHLORIDE 1 G: 1 INJECTION, SOLUTION INTRAVENOUS at 10:04

## 2019-04-28 RX ADMIN — LEVALBUTEROL 1.25 MG: 1.25 SOLUTION, CONCENTRATE RESPIRATORY (INHALATION) at 07:04

## 2019-04-28 RX ADMIN — Medication 5 ML: at 01:04

## 2019-04-28 RX ADMIN — Medication 1000 MG: at 12:04

## 2019-04-28 RX ADMIN — MEROPENEM AND SODIUM CHLORIDE 1 G: 1 INJECTION, SOLUTION INTRAVENOUS at 06:04

## 2019-04-28 RX ADMIN — DILTIAZEM HYDROCHLORIDE 240 MG: 240 CAPSULE, COATED, EXTENDED RELEASE ORAL at 09:04

## 2019-04-28 RX ADMIN — IPRATROPIUM BROMIDE 0.5 MG: 0.5 SOLUTION RESPIRATORY (INHALATION) at 04:04

## 2019-04-28 RX ADMIN — PREDNISONE 40 MG: 20 TABLET ORAL at 09:04

## 2019-04-28 RX ADMIN — LEVALBUTEROL 1.25 MG: 1.25 SOLUTION, CONCENTRATE RESPIRATORY (INHALATION) at 04:04

## 2019-04-28 RX ADMIN — IPRATROPIUM BROMIDE 0.5 MG: 0.5 SOLUTION RESPIRATORY (INHALATION) at 11:04

## 2019-04-28 RX ADMIN — LEVALBUTEROL 1.25 MG: 1.25 SOLUTION, CONCENTRATE RESPIRATORY (INHALATION) at 11:04

## 2019-04-28 NOTE — ASSESSMENT & PLAN NOTE
Patient with a.fib diagnosed earlier this month during hospitalization for pneumonia and bacteremia. S/p failed cardioversion. Now controlled on diltiazem and amiodarone.  - Continuing amiodarone and diltiazem PO  - Anticoagulation with eliquis

## 2019-04-28 NOTE — PT/OT/SLP EVAL
"Physical Therapy Evaluation  & Treatment    Patient Name:  Latasha Perez   MRN:  [id number]    Recommendations:     Discharge Recommendations: Pt would benefit from short stay SNF upon discharge, unless 24/7 assist will be available at home (pt wants to return home)   Discharge Equipment Recommendations: none   Barriers to discharge: Decreased caregiver support    Assessment:     Latasha Perez is a 73 y.o. female admitted from SNF with a medical diagnosis of left lower lobe pneumonia. She presents with the following impairments/functional limitations:  weakness, impaired endurance, impaired balance, gait instability, impaired functional mobilty, decreased safety awareness, impaired cardiopulmonary response to activity. Pt would benefit from skilled PT intervention to address listed functional deficits, reduce fall risk, and maximize (I) and safety with functional mobility. Unsure if pt will have reliable 24/7 support upon discharge, impacting safety of discharge disposition. Unless 24/7 assist is secured, pt would best benefit from transition to SNF for continued therapy prior to return home as she is deconditioned and at risk for falls.     Rehab Prognosis: Good; patient would benefit from acute skilled PT services to address these deficits and reach maximum level of function.      Recent Surgery: * No surgery found *      Plan:     During this hospitalization, patient to be seen 4 x/week to address the identified rehab impairments via gait training, therapeutic activities, therapeutic exercises, neuromuscular re-education and progress toward the following goals:    · Plan of Care Expires:  05/26/19    Subjective     Chief Complaint: "I feel weak because I've been in bed"   Patient/Family Comments/goals: "I want to get home, I'll have somebody with me."-- Pt frequently asking when she can discharge home, stating that she will have her niece and "friends" available for assist as needed. Therapist emphasized " the importance of 24/7 caregiver support 2/2 fall risk.   Pain/Comfort:  · Pain Rating 1: 0/10  · Pain Rating Post-Intervention 1: 0/10    Patients cultural, spiritual, Scientology conflicts given the current situation: no    Patient History:     Living Environment: Pt lives alone in [facility] [place] with 1 SERENA. Bathroom: tub/shower   Prior Level of Function: Pt was previously (I) with ADLs and mobility but was admitted from [facility] where she was getting PT/OT.   DME owned: wheelchair, rolling walker, BSC, oxygen, bath bench   Caregiver Assistance: limited assist; pt states that she will have temporary assist from niece and then assist from friends as needed    Objective:     Communicated with RN prior to session.  Patient found supine with oxygen, peripheral IV, telemetry  upon PT entry to room.    General Precautions: Standard, fall   Orthopedic Precautions:N/A   Braces: N/A     Exams:  · Cognitive Exam:  Patient is AOx4; able to follow commands appropriately   · Postural Exam:  Patient presented with the following abnormalities:    · -       Rounded shoulders  · -       Forward head  · Sensation:    · -       Intact  · Skin Integrity/Edema:      · -       Skin integrity: Visible skin intact and Thin  · RLE ROM: WFL  · RLE Strength: WFL  · LLE ROM: WFL  · LLE Strength: WFL  · Cardiopulmonary system: pt on 2 L 02 via nasal cannula; SOB noted upon exertion     Functional Mobility:    Bed Mobility:   · Supine > Sit: supervision      Transfers:   · Sit <> Stand Transfer: stand by assistance from EOB x 1 trial with RW; from bedside chair x 1 trial with RW                  Gait:  · Distance: 75 ft. + 10 ft. with seated rest break between trials   · Assistance level: contact guard assistance  · Assistive Device: RW  · Gait Assessment: recipricol gait pattern with decreased step length, narrow base of support; mild multi-directional instability noted   · *SOB upon exertion    Therapeutic Activities and Exercises:  Pt educated  on:  - Role of PT and POC/goals for therapy   - Safety with mobility  - Instructed to call nursing staff for assistance with mobility as needed 2/2 fall risk   - RW management    Pt verbalized understanding and expressed no further concerns/questions.    AM-PAC 6 CLICK MOBILITY  Total Score:19     Patient left up in wheelchair with transporter-- going to CT scan. RN present.     GOALS:   Multidisciplinary Problems     Physical Therapy Goals        Problem: Physical Therapy Goal    Goal Priority Disciplines Outcome Goal Variances Interventions   Physical Therapy Goal     PT, PT/OT Ongoing (interventions implemented as appropriate)     Description:  Goals to be met by: 5/10/2019    Patient will increase functional independence with mobility by performin. Sit to stand transfer with Modified [place]  2. Gait  x 200 feet with Supervision using least restrictive AD.   3. Ascend/descend 3 stairs with right handrails with Contact Guard Assistance   4. Stand for 5 minutes with Supervision using least restrictive AD while performing dynamic balance activities  5. Lower extremity exercise program x30 reps per handout, with independence                      History:     Past Medical History:   Diagnosis Date    Arthritis     Carotid disease, bilateral     Cataract     Chronic hyponatremia     COPD (chronic obstructive pulmonary disease)     HLD (hyperlipidemia)     HTN (hypertension)        Past Surgical History:   Procedure Laterality Date    APPENDECTOMY      Cardioversion or Defibrillation  2019    Performed [facility] at [facility] C[facility] LAB    CATARACT EXTRACTION W/  INTRAOCULAR LENS IMPLANT Right 2018    Dr. Escobar    CATARACT EXTRACTION W/  INTRAOCULAR LENS IMPLANT Left 2018    DR. Escobar    CERVICAL SPINE SURGERY      DILATION AND CURETTAGE OF UTERUS      x 2    EYE SURGERY      cataract Rt    FRACTURE SURGERY      Lt leg fracture with hardware    INSERTION, IOL  PROSTHESIS Left 12/20/2018    Per[facility] at [facility] OR    INSERTION, IOL PROSTHESIS Right 12/6/2018    Performed by Bro[facility]ick Escobar MD at [facility] OR    metatarsal repair      OPEN REDUCTION INTERNAL FIXATION-TIBIAL PLATEAU Left 6/27/2014    Performed by Isak Pereira MD at [facility] OR    PHACOEMULSIFICATION, CATARACT Left 12/20/2018    Performed by Broderick Escobar [facility] at [facility] OR    PHACOEMULSIFICATION, CATARACT Right 12/6/2018    Performed by Bro[facility]ick [facility]obar [facility] at [facility] OR    SHOULDER ARTHROSCOPY      Transesophageal echo (JOHNNA) intra-procedure log documentation N/A 4/4/2019    Performed by Manfred Fig[facility]eroa MD at [facility] CATH LAB       Time Tracking:     PT Received On: 04/28/19  PT Start Time: 1328     PT Stop Time: 1351  PT Total Time (min): 23 min     Billable Minutes: Evaluation 13 min and Gait Training 10 min    Laura Watts PT, DPT   4/28/2019  Pager: [phone]

## 2019-04-28 NOTE — SUBJECTIVE & OBJECTIVE
"  Interval History: Afebrile.  WBC down trending. O2 sats 88-98  No acute events overnight.  Feels better overall, but increasingly anxious about getting home.    CT of chest this afternoon.     Review of Systems   Constitutional: Positive for activity change and fatigue. Negative for chills and fever.   HENT: Negative for congestion, dental problem, sneezing, sore throat and trouble swallowing.    Eyes: Negative.    Respiratory: Positive for cough (minimal per patient.  Improved from prior), shortness of breath and wheezing.    Cardiovascular: Negative for chest pain, palpitations and leg swelling.   Gastrointestinal: Positive for constipation. Negative for abdominal pain, diarrhea, nausea and vomiting.        Complains of occasional sensation of esophageal fullness, "something stuck" after eating   Genitourinary: Negative for difficulty urinating, dysuria and frequency.   Musculoskeletal: Negative for arthralgias and myalgias.   Skin: Negative for rash and wound.   Neurological: Positive for weakness. Negative for dizziness and headaches.   Psychiatric/Behavioral: Negative for agitation, behavioral problems and confusion.     Objective:     Vital Signs (Most Recent):  Temp: 97.6 °F (36.4 °C) (04/28/19 0317)  Pulse: 75 (04/28/19 0741)  Resp: 16 (04/28/19 0741)  BP: (!) 157/70 (04/28/19 0741)  SpO2: 98 % (04/28/19 0741) Vital Signs (24h Range):  Temp:  [97.6 °F (36.4 °C)-98.2 °F (36.8 °C)] 97.6 °F (36.4 °C)  Pulse:  [68-83] 75  Resp:  [16-21] 16  SpO2:  [88 %-99 %] 98 %  BP: (132-162)/(58-80) 157/70     Weight: 39.2 kg (86 lb 8 oz)  Body mass index is 14.39 kg/m².    Estimated Creatinine Clearance: 52.4 mL/min (based on SCr of 0.6 mg/dL).    Physical Exam   Constitutional: She is oriented to person, place, and time. No distress.   Frail, elderly woman   HENT:   Head: Normocephalic and atraumatic.   Mouth/Throat: No oropharyngeal exudate.   Eyes: Conjunctivae are normal. No scleral icterus.   Neck: Normal range of " motion.   Cardiovascular: Normal rate and regular rhythm.   Murmur heard.  Pulmonary/Chest: Effort normal. No respiratory distress. She has no wheezes. She has no rales.   O2 nasal cannula.      Abdominal: Soft. She exhibits no distension. There is no tenderness.   Musculoskeletal: She exhibits no edema.   Neurological: She is alert and oriented to person, place, and time.   Skin: Skin is warm and dry. She is not diaphoretic. There is pallor.   Psychiatric: She has a normal mood and affect. Her behavior is normal.   Vitals reviewed.      Significant Labs:   Blood Culture:   Recent Labs   Lab 04/02/19  1020 04/04/19  0633 04/04/19  0700 04/26/19  0615 04/26/19  0642   LABBLOO No growth after 5 days. No growth after 5 days. No growth after 5 days. No Growth to date  No Growth to date  No Growth to date No Growth to date  No Growth to date  No Growth to date     CBC:   Recent Labs   Lab 04/27/19  0618 04/28/19  0558   WBC 15.47* 13.47*   HGB 9.5* 9.6*   HCT 28.5* 30.1*    356*     CMP:   Recent Labs   Lab 04/27/19  0618 04/28/19  0558    139   K 4.1 4.4   CL 99 101   CO2 31* 28   * 83   BUN 20 18   CREATININE 0.6 0.6   CALCIUM 9.1 9.0   PROT 6.0 6.3   ALBUMIN 2.0* 2.1*   BILITOT 0.3 0.3   ALKPHOS 74 75   AST 17 23   ALT 14 18   ANIONGAP 9 10   EGFRNONAA >60.0 >60.0     Respiratory Culture: No results for input(s): GSRESP, RESPIRATORYC in the last 4320 hours.    Significant Imaging: I have reviewed all pertinent imaging results/findings within the past 24 hours.

## 2019-04-28 NOTE — PLAN OF CARE
Problem: Physical Therapy Goal  Goal: Physical Therapy Goal  Goals to be met by: 5/10/2019    Patient will increase functional independence with mobility by performin. Sit to stand transfer with Modified Matthews  2. Gait  x 200 feet with Supervision using least restrictive AD.   3. Ascend/descend 3 stairs with right handrails with Contact Guard Assistance   4. Stand for 5 minutes with Supervision using least restrictive AD while performing dynamic balance activities  5. Lower extremity exercise program x30 reps per handout, with independence    Outcome: Ongoing (interventions implemented as appropriate)  PT evaluation completed- see note for details. Goals established and POC initiated.     Laura Watts, PT, DPT   2019  Pager: [phone]

## 2019-04-28 NOTE — PROGRESS NOTES
[facility] Hospital Medicine  Progress Note    Patient Name: Latasha Perez  MRN: [id number]  Patient Class: IP- Inpatient   Admission Date: 4/26/2019  Length of Stay: 2 days  Attending Physician: Laura Billy MD  Primary Care Provider: Pollo Oneal MD    [facility] Medicine Team: [facility] HOSP MED 5 Bhupinder Granda MD    Subjective:     Principal Problem:Left lower lobe pneumonia    HPI:  Ms. Perez is a 73 year old lady with a past medical history significant for HTN, HLD, COPD (on 3-4L NC at home), A.fib with RVR (s/p failed ablation - on Amiodarone and diltiazem for rate control and eliquis for anticoagulation) who presents to [facility] ED [facility] with complaints of shortness of breath. She was recently discharged from [facility] on 4/11/19 for right upper lobe pneumonia and new onset A.fib with RVR. Blood cultures grew Pseudomonas sensitive to cefipime and she completed a course of outpatient cefipime IV q8h via a PICC line which has since been removed. The patient states her cough has remained since that hospitalization. She is unclear as to how long exactly the cough has persisted and will not clarify the color of sputum production however does state that initially it was productive of sputum however it no longer as. She denies fevers or chills. Upon EMS arrival patient was hypoxic, reported sat in the 50's on room air, she was given solumedrol and started on BiPAP. On presentation to the ED patient received duo nebs and oxygenation improved, she was weaned down off Bipap to home O2 requirement of 3L.    In regards to her atrial fib, the patient was diagnosed during hospitalization at the beginning of 4/1/19. Echo showed pulmonary hypertension, normal EF.JOHNNA was negative for thrombus so patient was cardioverted however went back into A.fib. She was controlled on Amiodarone and diltiazem and discharged on those medications. She was also started on eliquis for  "anticoagulation. Patient denies recurrence of palpitations or any chest discomfort. In ED patient was normal sinus rhythm.    In the ED CXR was notable for new left lower lobe consolidation. Labs were notable for elevated WBC. She received a dose of vancomycin and rocephin. Blood cultures were not collected prior to initiation of antibiotics and was admitted to hospital medicine for further evaluation of HCAP and COPD exacerbation.    Hospital Course:  Interval Hx: NAEO, she continues to improve now on 2-3L oxygen. Started on meropenem yesterday. Question of slow clinical improvement previously & history of Pseudomonas bacteremia.     Interval History: Patient continues to report improvement in SOB with some improvement in chest pain.  She is focused on returning home to take care of some financial things.      Review of Systems   Constitutional: Positive for activity change and fatigue. Negative for chills and fever.   Eyes: Negative.    Respiratory: Positive for cough (minimal per patient.  Improved from prior), shortness of breath and wheezing.    Cardiovascular: Negative for chest pain, palpitations and leg swelling.   Gastrointestinal: Positive for constipation. Negative for abdominal pain, diarrhea, nausea and vomiting.        Complains of occasional sensation of esophageal fullness, "something stuck" after eating   Genitourinary: Negative for difficulty urinating, dysuria and frequency.   Musculoskeletal: Negative for arthralgias and myalgias.   Skin: Negative for rash and wound.   Neurological: Positive for weakness. Negative for dizziness and headaches.     Objective:     Vital Signs (Most Recent):  Temp: 97.4 °F (36.3 °C) (04/28/19 1318)  Pulse: 69 (04/28/19 1642)  Resp: 18 (04/28/19 1642)  BP: (!) 157/72 (04/28/19 1318)  SpO2: 97 % (04/28/19 1642) Vital Signs (24h Range):  Temp:  [97.4 °F (36.3 °C)-98.2 °F (36.8 °C)] 97.4 °F (36.3 °C)  Pulse:  [67-83] 69  Resp:  [16-21] 18  SpO2:  [93 %-99 %] 97 %  BP: " (132-162)/(65-80) 157/72     Weight: 39.2 kg (86 lb 8 oz)  Body mass index is 14.39 kg/m².    Intake/Output Summary (Last 24 hours) at 2019 1714  Last data filed at 2019 0500  Gross per 24 hour   Intake 720 ml   Output --   Net 720 ml      Physical Exam   Constitutional: She is oriented to person, place, and time. No distress.   Frail, pleasant    HENT:   Head: Normocephalic and atraumatic.   Mouth/Throat: No oropharyngeal exudate.   Eyes: Conjunctivae are normal. No scleral icterus.   Neck: Normal range of motion.   Cardiovascular: Normal rate and regular rhythm.   Murmur heard.  Pulmonary/Chest: Effort normal. No respiratory distress. She has no wheezes. She has rales.   3L NC no resp distress    breath sounds at base with some rales on L    Abdominal: Soft. She exhibits no distension. There is no tenderness.   Musculoskeletal: She exhibits no edema.   Neurological: She is alert and oriented to person, place, and time.   Skin: Skin is warm and dry. She is not diaphoretic. There is pallor.   Vitals reviewed.      Significant Labs: All pertinent labs within the past 24 hours have been reviewed.    Significant Imaging: I have reviewed all pertinent imaging results/findings within the past 24 hours.  .    Assessment/Plan:      * Left lower lobe pneumonia  Hx of severe COPD with recent hospitalization and recently treated for suspected PNA and pseudomonas infection.  Currently followed by ID.  Plan for CT chest today  - Continue Meropenem D/C vanc and plan to transition to cefepime pending CT      Acute on chronic respiratory failure with hypoxia  Acute resp failure with hypoxia  -complete infectious picture not fitting  -CT chest pending   - Treating with Vanc and meropenem with plans to de escalate today       Oral thrush  -some oral thrush noted  -magic mouthwash PRN       Paroxysmal atrial fibrillation  Patient with a.fib diagnosed earlier this month during hospitalization for pneumonia and  "bacteremia. S/p failed cardioversion. Now controlled on diltiazem and amiodarone.  - Continuing amiodarone and diltiazem PO  - Anticoagulation with eliquis      Severe malnutrition  - Boost with meals     Cachexia  - Nutrition consulted  - Boost supplementation with meals      COPD exacerbation  - 2/2 pneumonia, please see "left lower lobe pneumonia" above, clinically picture unclear       Essential hypertension  - Continuing home diltiazem, patient normotensive once weaned off Bipap. Suspect hypoxia and stress were leading to increase in blood pressure.  - Continue to monitor      Leukocytosis  - Please see "Pneumonia"      Chronic hyponatremia  Stable / now resolved.       Carotid disease, bilateral  Patient with 50-69% stenosis of left carotid artery and < 50% stenosis of right per ultrasound on 6/13/18.    - Continuing asa and statin while inpatient      HLD (hyperlipidemia)  Patient on pravastatin 20mg at home.    - Continuing home statin while inpatient        VTE Risk Mitigation (From admission, onward)        Ordered     apixaban tablet 5 mg  2 times daily      04/26/19 0559     Place TIAN hose  Until discontinued      04/26/19 0454     IP VTE HIGH RISK PATIENT  Once      04/26/19 0454          Dispo: Pending Clinical improvement and transition of abx.  Hopefully home with home health, but likely back to [facility]     Bhupinder Granda MD  Department of Hospital Medicine   [facility]  "

## 2019-04-28 NOTE — PROGRESS NOTES
Pharmacokinetic Assessment Follow Up: IV Vancomycin    Vancomycin serum concentration assessment(s):  Vanc random level of 2.8 drawn correctly and can be used to guide therapy at this time     Vancomycin Regimen Plan:    Will increase to 1000 mg q12h and repeat trough prior to 4th dose 4/29 @ 1004    Pharmacy will continue to follow and monitor vancomycin.    Please contact pharmacy at extension [phone] for questions regarding this assessment.    Thank you for the consult,   Tish Smith, PharmD  PGY-2 Internal Medicine Pharmacy Resident  EXT [phone]    Patient brief summary:  Latasha Perez is a 73 y.o. female initiated on antimicrobial therapy with IV Vancomycin for treatment of suspected lower respiratory infection    Drug Allergies:   Review of patient's allergies indicates:   Allergen Reactions    Pcn [penicillins] Other (See Comments)     Fever flushing skin swelling     Biaxin [clarithromycin] Rash    Codeine Rash    Doxycycline Rash    Levaquin [levofloxacin] Rash       Actual Body Weight:   39.2 kg    Renal Function:   Estimated Creatinine Clearance: 52.4 mL/min (based on SCr of 0.6 mg/dL).,       CBC (last 72 hours):  Recent Labs   Lab Result Units 04/26/19 0403 04/27/19 0618 04/28/19  0558   WBC K/uL 16.06* 15.47* 13.47*   Hemoglobin g/dL 9.6* 9.5* 9.6*   Hemoglobin A1C % 6.1*  --   --    Hematocrit % 30.1* 28.5* 30.1*   Platelets K/uL 313 334 356*   Gran% % 91.3*  --   --    Lymph% % 3.7*  --   --    Mono% % 1.9*  --   --    Eosinophil% % 2.2  --   --    Basophil% % 0.2  --   --    Differential Method  Automated  --   --        Metabolic Panel (last 72 hours):  Recent Labs   Lab Result Units 04/26/19  0403 04/27/19 0618 04/28/19  0558   Sodium mmol/L 139 139 139   Potassium mmol/L 3.9 4.1 4.4   Chloride mmol/L 99 99 101   CO2 mmol/L 29 31* 28   Glucose mg/dL 121* 121* 83   BUN, Bld mg/dL 17 20 18   Creatinine mg/dL 0.6 0.6 0.6   Albumin g/dL 2.0* 2.0* 2.1*   Total Bilirubin mg/dL 0.3 0.3 0.3    Alkaline Phosphatase U/L 83 74 75   AST U/L 19 17 23   ALT U/L 16 14 18       Vancomycin Administrations:  vancomycin given in the last 96 hours                   vancomycin (VANCOCIN) 500 mg in dextrose 5 % 100 mL IVPB (mg) 500 mg New Bag 04/28/19 0926     500 mg New Bag 04/27/19 0700                Drug levels (last 3 results):  Recent Labs   Lab Result Units 04/28/19  0710   Vancomycin-Trough ug/mL 2.8*       Microbiologic Results:  Microbiology Results (last 7 days)     Procedure Component Value Units Date/Time    Blood culture [[id number]] Collected:  04/26/19 0642    Order Status:  Completed Specimen:  Blood from Peripheral, Antecubital, Left Updated:  04/28/19 0812     Blood Culture, Routine No Growth to date     Blood Culture, Routine No Growth to date     Blood Culture, Routine No Growth to date    Blood culture [[id number]] Collected:  04/26/19 0615    Order Status:  Completed Specimen:  Blood from Wrist, Right Updated:  04/28/19 0812     Blood Culture, Routine No Growth to date     Blood Culture, Routine No Growth to date     Blood Culture, Routine No Growth to date    Culture, Respiratory with Gram Stain [[id number]]     Order Status:  No result Specimen:  Respiratory

## 2019-04-28 NOTE — ASSESSMENT & PLAN NOTE
Hx of severe COPD with recent hospitalization and recently treated for suspected PNA and pseudomonas infection.  Currently followed by ID.  Plan for CT chest today  - Continue Meropenem D/C vanc and plan to transition to cefepime pending CT

## 2019-04-28 NOTE — PROGRESS NOTES
[facility]  Infectious Disease  Progress Note    Patient Name: Latasha Perez  MRN: [id number]  Admission Date: 4/26/2019  Length of Stay: 2 days  Attending Physician: Laura Billy MD  Primary Care Provider: Pollo Oneal MD    Isolation Status: No active isolations  Assessment/Plan:      * Left lower lobe pneumonia     73 year old woman with COPD, HTN, Afib with RVR (on amiodarone), and recent history of hospitalization for pseudomonas bacteremia (pan sensitive) and RUL PNA (s/p 14 days of IV cefepime) who presented from [facility] with sudden onset of shortness of breath.  Found to be hypoxic with reported O2 sats in 50s on room air.  CXR in ED showed new LLL consolidation concerning for PNA.  Associated leukocytosis.  No associated fevers, chills.  Received dose of vancomycin and cefepime in ED.  Currently on IV vancomycin and meropenem.  Blood cultures pending, NGTD.  Respiratory culture pending collections.        Reports severe reaction to PCN many years ago requiring hospitalization after taking PCN prescribed by dentist. Tolerates cephalosporins.  Afebrile, WBC down trending = 13 today (down from 16).   Hemodynamically stable, no distress at time of visit.     CT chest today shows severe emphysema, bilateral pleural effusions (trace on right, small on left), and irregular opacity in RUL corresponding to RUL opacity on CXR of 4/2.   Also patchy ground glass attenuation scattered throughout, predominantly in left lower lobe and ligula which may represent aspiration vs. Infectious/non-infectious inflammatory process.    Plan/recommendations:  1.  Discontinue IV vancomycin.  Low suspicion for MRSA PNA given clinical improvement and sub-therapeutic vancomycin levels.   2.  Continue meropenem for now to continue pseudomonal and anaerobic coverage (given concern with aspiration).   Will discuss further with ID staff and consider de-escalating tomorrow.  Currently on Day 3 of  treatment.   3.  Consider induced sputum for respiratory culture to guide therapy  4.  Agree with speech and swallow study.  Concern for possible aspiration events given recurrent PNA in different locations   5.  Will follow up tomorrow with final recommendations.     Data reviewed and plan discussed with ID staff  Discussed with Primary Team       Thank you.   Please call for any questions or concerns.  THELMA Correia, ANP-C  [phone]    Subjective:     Principal Problem:Left lower lobe pneumonia    HPI: Ms. Latasha Perez is a 73 year old woman with COPD, HTN, Afib with RVR (on amiodarone), and recent history of hospitalization for pseudomonas bacteremia (pan sensitive) and RUL PNA (s/p 14 days of IV cefepime) who presented from [facility] with sudden onset of shortness of breath.  Found to be hypoxic with reported O2 sats in 50s on room air.  CXR in ED showed new LLL consolidation concerning for PNA.  Associated leukocytosis.  No associated fevers, chills.  Received dose of vancomycin and cefepime in ED.  Currently on IV vancomycin and meropenem.  Blood cultures pending, NGTD.  No respiratory cultures.     At time of visit she is sitting up in chair in no distress.  Primary complaint is that she feels weak.  She denies significant cough, denies sputum production.  Denies any associated fevers or chills.      Interval History: Afebrile.  WBC down trending. O2 sats 88-98  No acute events overnight.  Feels better overall, but increasingly anxious about getting home.    CT of chest this afternoon.     Review of Systems   Constitutional: Positive for activity change and fatigue. Negative for chills and fever.   HENT: Negative for congestion, dental problem, sneezing, sore throat and trouble swallowing.    Eyes: Negative.    Respiratory: Positive for cough (minimal per patient.  Improved from prior), shortness of breath and wheezing.    Cardiovascular: Negative for chest pain, palpitations and leg  "swelling.   Gastrointestinal: Positive for constipation. Negative for abdominal pain, diarrhea, nausea and vomiting.        Complains of occasional sensation of esophageal fullness, "something stuck" after eating   Genitourinary: Negative for difficulty urinating, dysuria and frequency.   Musculoskeletal: Negative for arthralgias and myalgias.   Skin: Negative for rash and wound.   Neurological: Positive for weakness. Negative for dizziness and headaches.   Psychiatric/Behavioral: Negative for agitation, behavioral problems and confusion.     Objective:     Vital Signs (Most Recent):  Temp: 97.6 °F (36.4 °C) (04/28/19 0317)  Pulse: 75 (04/28/19 0741)  Resp: 16 (04/28/19 0741)  BP: (!) 157/70 (04/28/19 0741)  SpO2: 98 % (04/28/19 0741) Vital Signs (24h Range):  Temp:  [97.6 °F (36.4 °C)-98.2 °F (36.8 °C)] 97.6 °F (36.4 °C)  Pulse:  [68-83] 75  Resp:  [16-21] 16  SpO2:  [88 %-99 %] 98 %  BP: (132-162)/(58-80) 157/70     Weight: 39.2 kg (86 lb 8 oz)  Body mass index is 14.39 kg/m².    Estimated Creatinine Clearance: 52.4 mL/min (based on SCr of 0.6 mg/dL).    Physical Exam   Constitutional: She is oriented to person, place, and time. No distress.   Frail, elderly woman   HENT:   Head: Normocephalic and atraumatic.   Mouth/Throat: No oropharyngeal exudate.   Eyes: Conjunctivae are normal. No scleral icterus.   Neck: Normal range of motion.   Cardiovascular: Normal rate and regular rhythm.   Murmur heard.  Pulmonary/Chest: Effort normal. No respiratory distress. She has no wheezes. She has no rales.   O2 nasal cannula.      Abdominal: Soft. She exhibits no distension. There is no tenderness.   Musculoskeletal: She exhibits no edema.   Neurological: She is alert and oriented to person, place, and time.   Skin: Skin is warm and dry. She is not diaphoretic. There is pallor.   Psychiatric: She has a normal mood and affect. Her behavior is normal.   Vitals reviewed.      Significant Labs:   Blood Culture:   Recent Labs   Lab " 04/02/19  1020 04/04/19  0633 04/04/19  0700 04/26/19  0615 04/26/19  0642   LABBLOO No growth after 5 days. No growth after 5 days. No growth after 5 days. No Growth to date  No Growth to date  No Growth to date No Growth to date  No Growth to date  No Growth to date     CBC:   Recent Labs   Lab 04/27/19  0618 04/28/19  0558   WBC 15.47* 13.47*   HGB 9.5* 9.6*   HCT 28.5* 30.1*    356*     CMP:   Recent Labs   Lab 04/27/19  0618 04/28/19  0558    139   K 4.1 4.4   CL 99 101   CO2 31* 28   * 83   BUN 20 18   CREATININE 0.6 0.6   CALCIUM 9.1 9.0   PROT 6.0 6.3   ALBUMIN 2.0* 2.1*   BILITOT 0.3 0.3   ALKPHOS 74 75   AST 17 23   ALT 14 18   ANIONGAP 9 10   EGFRNONAA >60.0 >60.0     Respiratory Culture: No results for input(s): GSRESP, RESPIRATORYC in the last 4320 hours.    Significant Imaging: I have reviewed all pertinent imaging results/findings within the past 24 hours.

## 2019-04-28 NOTE — ASSESSMENT & PLAN NOTE
Acute resp failure with hypoxia  -complete infectious picture not fitting  -CT chest pending   - Treating with Vanc and meropenem with plans to de escalate today

## 2019-04-28 NOTE — ASSESSMENT & PLAN NOTE
73 year old woman with COPD, HTN, Afib with RVR (on amiodarone), and recent history of hospitalization for pseudomonas bacteremia (pan sensitive) and RUL PNA (s/p 14 days of IV cefepime) who presented from [facility] with sudden onset of shortness of breath.  Found to be hypoxic with reported O2 sats in 50s on room air.  CXR in ED showed new LLL consolidation concerning for PNA.  Associated leukocytosis.  No associated fevers, chills.  Received dose of vancomycin and cefepime in ED.  Currently on IV vancomycin and meropenem.  Blood cultures pending, NGTD.  Respiratory culture pending collections.        Reports severe reaction to PCN many years ago requiring hospitalization after taking PCN prescribed by dentist. Tolerates cephalosporins.  Afebrile, WBC down trending = 13 today (down from 16).   Hemodynamically stable, no distress at time of visit.     CT chest today shows severe emphysema, bilateral pleural effusions (trace on right, small on left), and irregular opacity in RUL corresponding to RUL opacity on CXR of 4/2.   Also patchy ground glass attenuation scattered throughout, predominantly in left lower lobe and ligula which may represent aspiration vs. Infectious/non-infectious inflammatory process.    Plan/recommendations:  1.  Discontinue IV vancomycin.  Low suspicion for MRSA PNA given clinical improvement and sub-therapeutic vancomycin levels.   2.  Continue meropenem for now to continue pseudomonal and anaerobic coverage (given concern with aspiration).   Will discuss further with ID staff and consider de-escalating tomorrow.  Currently on Day 3 of treatment.   3.  Consider induced sputum for respiratory culture to guide therapy  4.  Agree with speech and swallow study.  Concern for possible aspiration events given recurrent PNA in different locations   5.  Will follow up tomorrow with final recommendations.     Data reviewed and plan discussed with ID staff  Discussed with Primary Team

## 2019-04-28 NOTE — PLAN OF CARE
Problem: Adult Inpatient Plan of Care  Goal: Plan of Care Review  Outcome: Ongoing (interventions implemented as appropriate)  No acute events throughout shift. AAOX4 . Bed in low position and locked.Call light in reach. Vitals and assessment completed as ordered. Pt calm and cooperative. No complains of pain. Pt free from injury or falls

## 2019-04-28 NOTE — SUBJECTIVE & OBJECTIVE
"Interval History: Patient continues to report improvement in SOB with some improvement in chest pain.  She is focused on returning home to take care of some financial things.      Review of Systems   Constitutional: Positive for activity change and fatigue. Negative for chills and fever.   Eyes: Negative.    Respiratory: Positive for cough (minimal per patient.  Improved from prior), shortness of breath and wheezing.    Cardiovascular: Negative for chest pain, palpitations and leg swelling.   Gastrointestinal: Positive for constipation. Negative for abdominal pain, diarrhea, nausea and vomiting.        Complains of occasional sensation of esophageal fullness, "something stuck" after eating   Genitourinary: Negative for difficulty urinating, dysuria and frequency.   Musculoskeletal: Negative for arthralgias and myalgias.   Skin: Negative for rash and wound.   Neurological: Positive for weakness. Negative for dizziness and headaches.     Objective:     Vital Signs (Most Recent):  Temp: 97.4 °F (36.3 °C) (04/28/19 1318)  Pulse: 69 (04/28/19 1642)  Resp: 18 (04/28/19 1642)  BP: (!) 157/72 (04/28/19 1318)  SpO2: 97 % (04/28/19 1642) Vital Signs (24h Range):  Temp:  [97.4 °F (36.3 °C)-98.2 °F (36.8 °C)] 97.4 °F (36.3 °C)  Pulse:  [67-83] 69  Resp:  [16-21] 18  SpO2:  [93 %-99 %] 97 %  BP: (132-162)/(65-80) 157/72     Weight: 39.2 kg (86 lb 8 oz)  Body mass index is 14.39 kg/m².    Intake/Output Summary (Last 24 hours) at 4/28/2019 1714  Last data filed at 4/28/2019 0500  Gross per 24 hour   Intake 720 ml   Output --   Net 720 ml      Physical Exam   Constitutional: She is oriented to person, place, and time. No distress.   Frail, pleasant    HENT:   Head: Normocephalic and atraumatic.   Mouth/Throat: No oropharyngeal exudate.   Eyes: Conjunctivae are normal. No scleral icterus.   Neck: Normal range of motion.   Cardiovascular: Normal rate and regular rhythm.   Murmur heard.  Pulmonary/Chest: Effort normal. No respiratory " distress. She has no wheezes. She has rales.   3L NC no resp distress    breath sounds at base with some rales on L    Abdominal: Soft. She exhibits no distension. There is no tenderness.   Musculoskeletal: She exhibits no edema.   Neurological: She is alert and oriented to person, place, and time.   Skin: Skin is warm and dry. She is not diaphoretic. There is pallor.   Vitals reviewed.      Significant Labs: All pertinent labs within the past 24 hours have been reviewed.    Significant Imaging: I have reviewed all pertinent imaging results/findings within the past 24 hours.  .

## 2019-04-29 PROBLEM — R91.8 OPACITY OF LUNG ON IMAGING STUDY: Status: ACTIVE | Noted: 2019-04-29

## 2019-04-29 LAB
ALBUMIN SERPL BCP-MCNC: 2.5 G/DL (ref 3.5–5.2)
ALP SERPL-CCNC: 86 U/L (ref 55–135)
ALT SERPL W/O P-5'-P-CCNC: 22 U/L (ref 10–44)
ANION GAP SERPL CALC-SCNC: 10 MMOL/L (ref 8–16)
AST SERPL-CCNC: 23 U/L (ref 10–40)
BILIRUB SERPL-MCNC: 0.3 MG/DL (ref 0.1–1)
BUN SERPL-MCNC: 16 MG/DL (ref 8–23)
CALCIUM SERPL-MCNC: 9.3 MG/DL (ref 8.7–10.5)
CHLORIDE SERPL-SCNC: 100 MMOL/L (ref 95–110)
CO2 SERPL-SCNC: 29 MMOL/L (ref 23–29)
CREAT SERPL-MCNC: 0.6 MG/DL (ref 0.5–1.4)
ERYTHROCYTE [DISTWIDTH] IN BLOOD BY AUTOMATED COUNT: 13.7 % (ref 11.5–14.5)
EST. GFR  (AFRICAN AMERICAN): >60 ML/MIN/1.73 M^2
EST. GFR  (NON AFRICAN AMERICAN): >60 ML/MIN/1.73 M^2
GLUCOSE SERPL-MCNC: 81 MG/DL (ref 70–110)
HCT VFR BLD AUTO: 33.5 % (ref 37–48.5)
HGB BLD-MCNC: 10.8 G/DL (ref 12–16)
MCH RBC QN AUTO: 30.5 PG (ref 27–31)
MCHC RBC AUTO-ENTMCNC: 32.2 G/DL (ref 32–36)
MCV RBC AUTO: 95 FL (ref 82–98)
PLATELET # BLD AUTO: 391 K/UL (ref 150–350)
PMV BLD AUTO: 9.2 FL (ref 9.2–12.9)
POTASSIUM SERPL-SCNC: 4 MMOL/L (ref 3.5–5.1)
PROCALCITONIN SERPL IA-MCNC: 0.06 NG/ML
PROT SERPL-MCNC: 6.7 G/DL (ref 6–8.4)
RBC # BLD AUTO: 3.54 M/UL (ref 4–5.4)
SODIUM SERPL-SCNC: 139 MMOL/L (ref 136–145)
WBC # BLD AUTO: 10.47 K/UL (ref 3.9–12.7)

## 2019-04-29 PROCEDURE — 11000001 HC ACUTE MED/SURG PRIVATE ROOM: Mod: HCNC

## 2019-04-29 PROCEDURE — 27000221 HC OXYGEN, UP TO 24 HOURS: Mod: HCNC

## 2019-04-29 PROCEDURE — 99223 1ST HOSP IP/OBS HIGH 75: CPT | Mod: HCNC,GC,, | Performed by: INTERNAL MEDICINE

## 2019-04-29 PROCEDURE — 85027 COMPLETE CBC AUTOMATED: CPT | Mod: HCNC

## 2019-04-29 PROCEDURE — 94761 N-INVAS EAR/PLS OXIMETRY MLT: CPT | Mod: HCNC

## 2019-04-29 PROCEDURE — 25000003 PHARM REV CODE 250: Mod: HCNC | Performed by: STUDENT IN AN ORGANIZED HEALTH CARE EDUCATION/TRAINING PROGRAM

## 2019-04-29 PROCEDURE — 99232 SBSQ HOSP IP/OBS MODERATE 35: CPT | Mod: HCNC,GC,, | Performed by: INTERNAL MEDICINE

## 2019-04-29 PROCEDURE — 84145 PROCALCITONIN (PCT): CPT | Mod: HCNC

## 2019-04-29 PROCEDURE — 97116 GAIT TRAINING THERAPY: CPT | Mod: HCNC

## 2019-04-29 PROCEDURE — 99232 SBSQ HOSP IP/OBS MODERATE 35: CPT | Mod: HCNC,,, | Performed by: PHYSICIAN ASSISTANT

## 2019-04-29 PROCEDURE — 25000242 PHARM REV CODE 250 ALT 637 W/ HCPCS: Mod: HCNC | Performed by: STUDENT IN AN ORGANIZED HEALTH CARE EDUCATION/TRAINING PROGRAM

## 2019-04-29 PROCEDURE — 36415 COLL VENOUS BLD VENIPUNCTURE: CPT | Mod: HCNC

## 2019-04-29 PROCEDURE — 94668 MNPJ CHEST WALL SBSQ: CPT | Mod: HCNC

## 2019-04-29 PROCEDURE — 99232 PR SUBSEQUENT HOSPITAL CARE,LEVL II: ICD-10-PCS | Mod: HCNC,GC,, | Performed by: INTERNAL MEDICINE

## 2019-04-29 PROCEDURE — 80053 COMPREHEN METABOLIC PANEL: CPT | Mod: HCNC

## 2019-04-29 PROCEDURE — 99900035 HC TECH TIME PER 15 MIN (STAT): Mod: HCNC

## 2019-04-29 PROCEDURE — 99223 PR INITIAL HOSPITAL CARE,LEVL III: ICD-10-PCS | Mod: HCNC,GC,, | Performed by: INTERNAL MEDICINE

## 2019-04-29 PROCEDURE — 94640 AIRWAY INHALATION TREATMENT: CPT | Mod: HCNC

## 2019-04-29 PROCEDURE — 63600175 PHARM REV CODE 636 W HCPCS: Mod: HCNC | Performed by: STUDENT IN AN ORGANIZED HEALTH CARE EDUCATION/TRAINING PROGRAM

## 2019-04-29 PROCEDURE — 99232 PR SUBSEQUENT HOSPITAL CARE,LEVL II: ICD-10-PCS | Mod: HCNC,,, | Performed by: PHYSICIAN ASSISTANT

## 2019-04-29 PROCEDURE — 63600175 PHARM REV CODE 636 W HCPCS: Mod: HCNC | Performed by: NURSE PRACTITIONER

## 2019-04-29 PROCEDURE — 92610 EVALUATE SWALLOWING FUNCTION: CPT | Mod: HCNC

## 2019-04-29 PROCEDURE — 25000242 PHARM REV CODE 250 ALT 637 W/ HCPCS: Mod: HCNC | Performed by: INTERNAL MEDICINE

## 2019-04-29 PROCEDURE — 97535 SELF CARE MNGMENT TRAINING: CPT | Mod: HCNC

## 2019-04-29 PROCEDURE — 94799 UNLISTED PULMONARY SVC/PX: CPT | Mod: HCNC

## 2019-04-29 RX ADMIN — IPRATROPIUM BROMIDE 0.5 MG: 0.5 SOLUTION RESPIRATORY (INHALATION) at 08:04

## 2019-04-29 RX ADMIN — PREDNISONE 40 MG: 20 TABLET ORAL at 09:04

## 2019-04-29 RX ADMIN — IPRATROPIUM BROMIDE 0.5 MG: 0.5 SOLUTION RESPIRATORY (INHALATION) at 04:04

## 2019-04-29 RX ADMIN — GUAIFENESIN 200 MG: 200 SOLUTION ORAL at 01:04

## 2019-04-29 RX ADMIN — LEVALBUTEROL 1.25 MG: 1.25 SOLUTION, CONCENTRATE RESPIRATORY (INHALATION) at 08:04

## 2019-04-29 RX ADMIN — DILTIAZEM HYDROCHLORIDE 240 MG: 240 CAPSULE, COATED, EXTENDED RELEASE ORAL at 09:04

## 2019-04-29 RX ADMIN — TIOTROPIUM BROMIDE 18 MCG: 18 CAPSULE ORAL; RESPIRATORY (INHALATION) at 09:04

## 2019-04-29 RX ADMIN — ASPIRIN 81 MG: 81 TABLET, COATED ORAL at 09:04

## 2019-04-29 RX ADMIN — SODIUM CHLORIDE SOLN NEBU 3% 4 ML: 3 NEBU SOLN at 04:04

## 2019-04-29 RX ADMIN — SODIUM CHLORIDE SOLN NEBU 3% 4 ML: 3 NEBU SOLN at 08:04

## 2019-04-29 RX ADMIN — MEROPENEM AND SODIUM CHLORIDE 1 G: 1 INJECTION, SOLUTION INTRAVENOUS at 10:04

## 2019-04-29 RX ADMIN — LATANOPROST 1 DROP: 50 SOLUTION OPHTHALMIC at 10:04

## 2019-04-29 RX ADMIN — IPRATROPIUM BROMIDE 0.5 MG: 0.5 SOLUTION RESPIRATORY (INHALATION) at 12:04

## 2019-04-29 RX ADMIN — AMIODARONE HYDROCHLORIDE 200 MG: 200 TABLET ORAL at 10:04

## 2019-04-29 RX ADMIN — GUAIFENESIN 200 MG: 200 SOLUTION ORAL at 09:04

## 2019-04-29 RX ADMIN — IPRATROPIUM BROMIDE 0.5 MG: 0.5 SOLUTION RESPIRATORY (INHALATION) at 03:04

## 2019-04-29 RX ADMIN — MEROPENEM AND SODIUM CHLORIDE 1 G: 1 INJECTION, SOLUTION INTRAVENOUS at 05:04

## 2019-04-29 RX ADMIN — FLUTICASONE FUROATE AND VILANTEROL TRIFENATATE 1 PUFF: 100; 25 POWDER RESPIRATORY (INHALATION) at 09:04

## 2019-04-29 RX ADMIN — MEROPENEM AND SODIUM CHLORIDE 1 G: 1 INJECTION, SOLUTION INTRAVENOUS at 03:04

## 2019-04-29 RX ADMIN — IPRATROPIUM BROMIDE 0.5 MG: 0.5 SOLUTION RESPIRATORY (INHALATION) at 01:04

## 2019-04-29 RX ADMIN — AMIODARONE HYDROCHLORIDE 200 MG: 200 TABLET ORAL at 09:04

## 2019-04-29 RX ADMIN — GUAIFENESIN 200 MG: 200 SOLUTION ORAL at 03:04

## 2019-04-29 RX ADMIN — SODIUM CHLORIDE SOLN NEBU 3% 4 ML: 3 NEBU SOLN at 12:04

## 2019-04-29 RX ADMIN — PRAVASTATIN SODIUM 20 MG: 20 TABLET ORAL at 10:04

## 2019-04-29 RX ADMIN — APIXABAN 5 MG: 5 TABLET, FILM COATED ORAL at 09:04

## 2019-04-29 RX ADMIN — LEVALBUTEROL 1.25 MG: 1.25 SOLUTION, CONCENTRATE RESPIRATORY (INHALATION) at 04:04

## 2019-04-29 RX ADMIN — LEVALBUTEROL 1.25 MG: 1.25 SOLUTION, CONCENTRATE RESPIRATORY (INHALATION) at 01:04

## 2019-04-29 RX ADMIN — APIXABAN 5 MG: 5 TABLET, FILM COATED ORAL at 10:04

## 2019-04-29 NOTE — PT/OT/SLP PROGRESS
Occupational Therapy   Treatment    Name: Latasha Perez  MRN: [id number]  Admitting Diagnosis:  Left lower lobe pneumonia       Recommendations:     Discharge Recommendations: nursing facility, skilled  Discharge Equipment Recommendations:  none  Barriers to discharge:  None    Assessment:     Latasha Perez is a 73 y.o. female with a medical diagnosis of Left lower lobe pneumonia.  She presents with improved mobility and motivation to participate (mostly cause she wants to return home) and poor quality gait. Performance deficits affecting function are weakness, impaired endurance, gait instability, impaired functional mobilty, impaired balance, decreased lower extremity function, decreased upper extremity function, impaired coordination, decreased safety awareness.     Rehab Prognosis:  Good; patient would benefit from acute skilled OT services to address these deficits and reach maximum level of function.       Plan:     Patient to be seen 3 x/week to address the above listed problems via self-care/home management, therapeutic activities, therapeutic exercises  · Plan of Care Expires: 05/27/19  · Plan of Care Reviewed with: patient    Subjective     Pain/Comfort:  · Pain Rating 1: 0/10  · Pain Rating Post-Intervention 1: 0/10    Objective:     Communicated with: RN prior to session.  Patient found up in chair with telemetry upon OT entry to room.    General Precautions: Standard, fall, aspiration   Orthopedic Precautions:N/A   Braces: N/A     Occupational Performance:     Bed Mobility:    · Patient completed Rolling/Turning to Right with modified independence  · Patient completed Scooting/Bridging with modified independence  · Patient completed Sit to Supine with modified independence     Functional Mobility/Transfers:  · Patient completed Sit <> Stand Transfer with minimum assistance  with  hand-held assist   · Patient completed Bed <> Chair Transfer using Step Transfer technique with minimum assistance with  hand-held assist  · Patient completed Toilet Transfer Step Transfer technique with minimum assistance with  hand-held assist  · Functional Mobility: Pt able to walk 10' to/from room toilet w/ Min A and HHA for balance, poor quality of gait, and safety.     Activities of Daily Living:  · Bathing: stand by assistance standing at sink to wash hands.   · Toileting: contact guard assistance seated on toilet for hygiene and clothing.       [facility] 6 Click ADL: 23    Treatment & Education:  -Pt edu on OT role/POC  -Importance of OOB activity with staff assistance  -Safety during functional t/f and mobility  - White board updated  - Multiple self care tasks/functional mobility completed-- assistance level noted above  - All questions/concerns answered within OT scope of practice        Patient left HOB elevated with all lines intact, call button in reach and RN presentEducation:      GOALS:   Multidisciplinary Problems     Occupational Therapy Goals        Problem: Occupational Therapy Goal    Goal Priority Disciplines Outcome Interventions   Occupational Therapy Goal     OT, PT/OT Ongoing (interventions implemented as appropriate)    Description:  Goals to be met by: 5/4/19     Patient will increase functional independence with ADLs by performing:    LE Dressing with Set-up Assistance.  Grooming while standing at sink with Supervision. Progressing  Toileting from toilet with Supervision for hygiene and clothing management.  Progressing  Supine to sit with [place].  Step transfer with Stand-by Assistance  Toilet transfer to toilet with Stand-by Assistance. Progressing                       Time Tracking:     OT Date of Treatment: 04/29/19  OT Start Time: 1541  OT Stop Time: 1604  OT Total Time (min): 23 min    Billable Minutes:Self Care/Home Management 23 mins    Avni Weber, OT  4/29/2019

## 2019-04-29 NOTE — HPI
Ms. Perez is a 73 year old lady with a past medical history significant for HTN, HLD, COPD (on 3-4L NC at home), A.fib with RVR (s/p failed ablation - on Amiodarone and diltiazem for rate control and eliquis for anticoagulation) who [facility] with complaints of shortness of breath. She was recently discharged from [facility] on 4/11/19 for right upper lobe pneumonia and new onset A.fib with RVR. Blood cultures grew Pseudomonas sensitive to cefipime and she completed a course of outpatient cefipime IV q8h via a PICC line which has since been removed. The patient states her cough has remained since that hospitalization. She is unclear as to how long exactly the cough has persisted and will not clarify the color of sputum production however does state that initially it was productive of sputum however it no longer as. She denies fevers or chills. Upon EMS arrival patient was hypoxic, reported sat in the 50's on room air, she was given solumedrol and started on BiPAP. On presentation to the ED patient received duo nebs and oxygenation improved, she was weaned down off Bipap to home O2 requirement of 3L.     Pulmonology was re-consulted as patient had leukocytosis after stopping antibiotic therapy.

## 2019-04-29 NOTE — ASSESSMENT & PLAN NOTE
Pt w/ pmh of COPD presents w/ shortness of breath, found to have a left lower lung pneumonia. She was started on broad spectrum abx and reports clinical improvement during her hospital course. Imaging revealed evidence of irregular opacity in RUL. Pulmonology consulted for evaluation of findings.   - CT chest w/ contrast 4/28: Irregular opacity within the anterior segment of the RUL measuring 3.9 x 2.0 cm. BL pleural effusions w/ adjacent compressive atelectasis. Mild patchy ground-glass attenuation scattered throughout lungs.   - Normally on 3-4L home oxygen, currently satting well on 2L O2  - Extensive smoking history, reports quitting a few months ago   - Reports 3-4 annual COPD exacerbations requiring steroids   - Remains afebrile. Leukocytosis resolved on abx    - Suspect imaging related to mucous plugging vs. fungal process     Recommendations:  - Start 5 day course of prednisone. Can follow up outpatient w/ pulm if symptoms do not resolve.  - Would begin gentle diuresis    - If pleural effusions do not improve, can perform thoracentesis. Thoracentesis would require holding apixaban and bridging to lovenox       - Repeat BNP pending    - Continue current abx and respiratory regiment  - No indication for bronchoscopy at this time

## 2019-04-29 NOTE — ASSESSMENT & PLAN NOTE
Hx of severe COPD with recent hospitalization and recently treated for suspected PNA and pseudomonas infection.  Currently followed by ID.  Plan for CT chest today  - Continue Meropenem D/C vanc and plan to transition to cefepime pending CT  Recurrent, with new opacity found that developed at the end of March  -Pulm consulted, appreciate recs  Recs-  - Start 5 day course of prednisone. Can follow up outpatient w/ pulm if symptoms do not resolve.  - Would begin gentle diuresis    - If pleural effusions do not improve, can perform thoracentesis. Thoracentesis would require holding apixaban and bridging to lovenox       - Repeat BNP pending    - Continue current abx and respiratory regiment  - No indication for bronchoscopy at this time

## 2019-04-29 NOTE — PT/OT/SLP PROGRESS
Physical Therapy Treatment    Patient Name:  Latasha Perez   MRN:  [id number]  Admitting Diagnosis: Left lower lobe pneumonia  Recent Surgery: * No surgery found *      Recommendations:     Discharge Recommendations:  nursing facility, skilled(Short stay)   Discharge Equipment Recommendations: none   Barriers to discharge: Decreased caregiver support    Plan:     During this hospitalization, patient to be seen 4 x/week to address the above listed problems via gait training, therapeutic activities, therapeutic exercises, neuromuscular re-education  · Plan of Care Expires:  05/26/19   Plan of Care Reviewed with: patient    This Plan of care has been discussed with the patient who was involved in its development and understands and is in agreement with the identified goals and treatment plan    Subjective     Communicated with RN (Jayme) prior to session.     Patient comments: Pt with c/o fatigue during gait trial  Pain/Comfort:  · Pain Rating 1: 0/10  · Pain Rating Post-Intervention 1: 0/10    Objective:     3 attempts for tx session:1. Respiratory therapist in room at 8:52 am; 2. PCT assisting pt onto the BSC 11:45 am    Patient found with: oxygen, telemetry    Patient found sup in bed upon PT entry to room, agreeable to treatment.  No family present in the room.    General Precautions: Standard, aspiration, fall   Orthopedic Precautions:N/A   Braces: N/A       BED MOBILITY (vc's for hand placement sequencing of task):        Rolling to the R:  NT.       Rolling to the L:  NT.        Sup > sit at the EOB:  SBA for safety, exiting on the L side.       Sit > sup:  Not performed 2* pt left seated UIC.                       SITTING AT THE EDGE OF THE BED    Assistance Level Required: close sup for safety with B UE support   Postural deviations noted: flexed trunk   Encouraged: upright posture        TRANSFERS  (vc's for hand placement, sequencing of task and safety)   Patient completed Sit <> Stand Transfer from EOB  with CGA for balance and safety with rolling walker x2 trial(s)   Patient completed Stand <> Sit Transfer to EOB/Bs chair with CGA for balance and safety with rolling walker     GAIT: in  Hallway  with supplemental O2 at 2LPM via NC   Patient ambulated: 70ft   Patient required: CGA for balance   Patient used:  Rolling Walker   Gait Pattern observed: reciprocal gait   Gait Deviation(s): Increased patience, increased time in double stance, decreased velocity of limb motion, decreased step length, decreased swing-to-stance ratio, decreased toe-to-floor clearance, decreased weight-shifting ability and instability    Comments: vc's for upright posture, appropriate breathing, decreased patience    Pt's SpO2 at 82% after gait trial and pt required approx 1 min to recover to 92% while on 2LPM via NC      EDUCATION  Patient provided with daily orientation and goals of this PT session. They were educated to call for assistance and to transfer with hospital staff only.  Also, pt was educated on the effects of prolonged immobility and the importance of performing OOB activity and exercises to promote healing and reduce recovery time    Whiteboard updated with correct mobility information. RN/PCT notified.  Pt safe to transfer OOB/BTB and amb short distances with RN/PCT: Use RW with min A of 1 person.    Patient left up in chair, with  all lines intact, call button in reach and RN notified    AM-PAC 6 CLICK MOBILITY  Turning over in bed (including adjusting bedclothes, sheets and blankets)?: 4  Sitting down on and standing up from a chair with arms (e.g., wheelchair, bedside commode, etc.): 3  Moving from lying on back to sitting on the side of the bed?: 3  Moving to and from a bed to a chair (including a wheelchair)?: 3  Need to walk in hospital room?: 3  Climbing 3-5 steps with a railing?: 2  Basic Mobility Total Score: 18     Assessment:     Latasha Perez is a 73 y.o. female admitted with a medical diagnosis of Left lower  lobe pneumonia.  She presents with the following impairments/functional limitations:  weakness, impaired endurance, impaired self care skills, impaired functional mobilty, gait instability, impaired balance, decreased lower extremity function, decreased safety awareness. requiring verbal cues for bed mob, transfers and gait for appropriate breathing and safety to prevent falls due to weakness, limited endurance and fatigue.   In light of pt's current functional level and deficits, it is anticipated that pt will need to participate in an intense rehab program consisting of PT and OT in order to achieve full rehab potential to return to previous level of function and roles.  Pt will cont to benefit from skilled PT intervention to address deficits and improve functional mobility.    Rehab Prognosis:  Good; patient would benefit from acute skilled PT services to address these deficits and reach maximum level of function.      GOALS:   Multidisciplinary Problems     Physical Therapy Goals        Problem: Physical Therapy Goal    Goal Priority Disciplines Outcome Goal Variances Interventions   Physical Therapy Goal     PT, PT/OT Ongoing (interventions implemented as appropriate)     Description:  Goals to be met by: 5/10/2019    Patient will increase functional independence with mobility by performin. Sit to stand transfer with Modified [place]  2. Gait  x 200 feet with Supervision using least restrictive AD.   3. Ascend/descend 3 stairs with right handrails with Contact Guard Assistance   4. Stand for 5 minutes with Supervision using least restrictive AD while performing dynamic balance activities  5. Lower extremity exercise program x30 reps per handout, with independence                      Time Tracking:     PT Received On: 19  PT Start Time: 1205     PT Stop Time: 1225  PT Total Time (min): 20 min     Billable Minutes: Gait Training 20    Treatment Type: Treatment  PT/PTA: PTA     PTA Visit Number:  1       Esha Beard PTA.  Pager [phone]    4/29/2019    .

## 2019-04-29 NOTE — PROGRESS NOTES
Therapy with Vancomycin complete and/or consult discontinued by provider.  Pharmacy will sign off, please re-consult as needed.    Lian Mcfarland, SeverinoD  [id number]

## 2019-04-29 NOTE — PT/OT/SLP EVAL
Speech Language Pathology Evaluation  Bedside Swallow    Patient Name:  Latasha Perez   MRN:  [id number]  Admitting Diagnosis: Left lower lobe pneumonia    Recommendations:                 General Recommendations:  Modified barium swallow study and pending findings/progress possible GI consult , dietary consult   Diet recommendations:  MBSS recommended to determine safest, least restrictive means of nutrition/hydration   Aspiration Precautions:  - small bites/sips. Do not overeat.   - Only when awake and alert  - smaller, more frequent meals t/o the day encouraged   - all PO intake at 90 degree angle  - remain upright 60 minutes+ following all PO intake  - remain elevated at 30 degree angle or higher when sleeping  - avoid exercise on empty stomach  General Precautions: Standard, aspiration, fall  Communication strategies:  provide increased time to answer and go to room if call light pushed    History:     Past Medical History:   Diagnosis Date    Arthritis     Carotid disease, bilateral     Cataract     Chronic hyponatremia     COPD (chronic obstructive pulmonary disease)     HLD (hyperlipidemia)     HTN (hypertension)        Past Surgical History:   Procedure Laterality Date    APPENDECTOMY      Cardioversion or Defibrillation  4/4/2019    Performed by [facility]fred Fig[facility]eroa [facility] at [facility] CATH LAB    CATARACT EXTRACTION W/  INTRAOCULAR LENS IMPLANT Right 12/06/2018    Dr. Escobar    CATARACT EXTRACTION W/  INTRAOCULAR LENS IMPLANT Left 12/20/2018    DR. Escobar    CERVICAL SPINE SURGERY      DILATION AND CURETTAGE OF UTERUS      x 2    EYE SURGERY      cataract Rt    FRACTURE SURGERY      Lt leg fracture with hardware    INSERTION, IOL PROSTHESIS Left 12/20/2018    Performed by Broderick [facility]obar [facility] at [facility] OR    INSERTION, IOL PROSTHESIS Right 12/6/2018    Performed by [facility] at [facility] OR    metatarsal repair      OPEN REDUCTION INTERNAL FIXATION-TIBIAL PLATEAU Left  "6/27/2014    Performed by [facility]reira [facility] at [facility] OR    PHACOEMULSIFICATION, CATARACT Left 12/20/2018    [facility] at [facility] OR    PHACOEMULSIFICATION, CATARACT Right 12/6/2018    [facility] by [facility] at [facility] OR    SHOULDER ARTHROSCOPY      Transesophageal echo (JOHNNA) intra-procedure log documentation N/A 4/4/2019    Performed by Manfred [facility]eroa MD at [facility] CATH LAB     Prior Intubation HX:  None this admission    Modified Barium Swallow: none prior at this facility     CT Chest 4/28/19: 1. Bilateral pleural effusions, trace on the right and small on the left, with adjacent compressive atelectasis associated with mild volume loss predominantly on the left, new from prior exam on 04/07/2019.    2.  Irregular opacity within the anterior segment of the right upper lobe measuring 3.9 x 2.0 cm, corresponding with the right upper lobe opacity first noted on chest radiograph 04/02/2019. Although malignancy cannot be excluded, the fact that this finding developed between 03/28/2019 and 04/02/2019 is reassuring and neoplasm felt to be unlikely.  This may represent an area of mucoid impaction, atelectasis, or infection.    3.  Mild patchy ground-glass attenuation scattered throughout, predominantly involving the left lower lobe and lingula, which may represent aspiration versus infectious/noninfectious inflammatory process.    4.  Severe emphysema.    Prior diet: regular, thin    Subjective     SLP reviewed Pt with nurse, nurse reported Pt with c/o "feeling like food takes a while to go down" with breakfast tray  Pt presents calm, cooperative  She explains, "I notice it [food feeling stuck] more with rich food, I think I just ate too fast when I had the [place] toast this morning"  Patient goals: to get home    Pain/Comfort:  · Pain Rating 1: 1/10  · Location - Orientation 1: upper  · Location 1: abdomen  · Pain Addressed 1: Distraction, Reposition, Cessation of " "Activity, Nurse notified  · Pain Rating Post-Intervention 1: 1/10    Objective:     Oral Musculature Evaluation  · Oral Musculature: WNL  · Dentition: present and adequate  · Secretion Management: adequate  · Mucosal Quality: adequate  · Mandibular Strength and Mobility: WNL  · Oral Labial Strength and Mobility: WNL  · Lingual Strength and Mobility: WNL  · Volitional Swallow: present  · Voice Prior to PO Intake: mildly strained, adequate intensity     Bedside Swallow Eval:   Consistencies Assessed:  · Thin liquids cup edge sips x2   · Pt declined additional PO trials 2/2 fullness from breakfast meal tray    Oral Phase:   · WNL    Pharyngeal Phase:   · no overt clinical signs/symptoms of aspiration    Compensatory Strategies  · None    Treatment: Pt with minimal acceptance of PO trials during initial assessment 2/2 feeling like "food takes a while to go down." She further explained that she occasionally feels like food gets "stuck" if she eats too fast or too rich of food. She reported she sometimes felt relief when she leaned forward and indicated that the burning feeling she had after breakfast was alleviated by this positioning.  She endorsed some avoidance of certain foods and shared she had most difficulty with tomato sauce. She added that the orange juice on breakfast meal tray "didn't agree with me wither."  Pt at risk of aspiration 2/2 esophageal concerns for dysphagia.  No overt S/S aspiration with trials of thin liquids; however, Pt with mildly strained vocal quality and and SLP cannot r/o aspiration at the bedside.  Pt consistently points to her sternum when referring to where she feels food gets "stuck."  Pt educated on S/S GERD, GERD precautions, and possibility of further, objective assessment to determine safest, least restrictive diet. Pt requested nurse for restroom and with partial verbal understanding of aspiration & GERD precautions. Nurse notified of request for restroom. Whiteboard current. " Findings/recs reviewed with MD team following assessment.      Assessment:     Latasha Perez is a 73 y.o. female with an SLP diagnosis of risk for aspiration 2/2  respiratory status and esophageal concerns for dysphagia.  She presents with c/o globus sensation and minimal PO acceptance at the bedside today. She might benefit from further, objective assessment of swallow fx to r/o aspiration and determine safest, least restrictive means for nutrition/hydration.     Goals:   Multidisciplinary Problems     SLP Goals        Problem: SLP Goal    Goal Priority Disciplines Outcome   SLP Goal     SLP Ongoing (interventions implemented as appropriate)   Description:  Speech Language Pathology Goals  Goals expected to be met by 5/7/19  1. Pt will tolerate regular diet with thin liquids w/o overt S/S aspiration, MOD I  2. Educate Pt and family on S/S aspiration and GERD precautions                       Plan:     · Patient to be seen:  3 x/week   · Plan of Care expires:  05/29/19  · Plan of Care reviewed with:  patient   · SLP Follow-Up:  Yes       Discharge recommendations:  nursing facility, skilled     Time Tracking:     SLP Treatment Date:   04/29/19  Speech Start Time:  0920  Speech Stop Time:  0932     Speech Total Time (min):  12 min    Billable Minutes: Eval Swallow and Oral Function 12    JAYY Anderson, [facility]-SLP  Speech-Language Pathology  Pager: [phone]    04/29/2019

## 2019-04-29 NOTE — ASSESSMENT & PLAN NOTE
Acute resp failure with hypoxia  -complete infectious picture not fitting  -CT chest pending   - Treating with Vanc and meropenem with plans to de escalate today     RESOLVED

## 2019-04-29 NOTE — PLAN OF CARE
Problem: Physical Therapy Goal  Goal: Physical Therapy Goal  Goals to be met by: 5/10/2019    Patient will increase functional independence with mobility by performin. Sit to stand transfer with Modified [place]  2. Gait  x 200 feet with Supervision using least restrictive AD.   3. Ascend/descend 3 stairs with right handrails with Contact Guard Assistance   4. Stand for 5 minutes with Supervision using least restrictive AD while performing dynamic balance activities  5. Lower extremity exercise program x30 reps per handout, with independence       Discharge Recommendations: Short Stay SNF    Pt safe to transfer OOB/BTB and amb short distances with RN/PCT: Use RW with min A of 1 person.    Goals remain appropriate.     Esha Beard, PTA.   [phone]   2019

## 2019-04-29 NOTE — PLAN OF CARE
"Problem: SLP Goal  Goal: SLP Goal  Speech Language Pathology Goals  Goals expected to be met by 5/7/19  1. Pt will tolerate regular diet with thin liquids w/o overt S/S aspiration, MOD I  2. Educate Pt and family on S/S aspiration and GERD precautions     Outcome: Ongoing (interventions implemented as appropriate)  SLP Bedside Swallow Study initiated. Pt with minimal PO intake 2/2 feeling like things "take a while to go down."  Pt with c/o globus sensation following breakfast meal tray.  REC: Modified Barium Swallow Study for further, objective assessment of swallow fx.  Pending findings, Pt might warrant GI consult 2/2 esophageal concerns for dysphagia. Findings/recommendations reviewed with Pt, Nurse and MD team.     SHAWN Anderson., [facility]-SLP  Speech-Language Pathology  Pager: [phone]  4/29/2019        "

## 2019-04-29 NOTE — SUBJECTIVE & OBJECTIVE
Past Medical History:   Diagnosis Date    Arthritis     Carotid disease, bilateral     Cataract     Chronic hyponatremia     COPD (chronic obstructive pulmonary disease)     HLD (hyperlipidemia)     HTN (hypertension)        Past Surgical History:   Procedure Laterality Date    APPENDECTOMY      Cardioversion or Defibrillation  4/4/2019    Performed by Manfred Fig[facility]eroa MD at [facility] CATH LAB    CATARACT EXTRACTION W/  INTRAOCULAR LENS IMPLANT Right 12/06/2018    Dr. Escobar    CATARACT EXTRACTION W/  INTRAOCULAR LENS IMPLANT Left 12/20/2018    DR. Escobar    CERVICAL SPINE SURGERY      DILATION AND CURETTAGE OF UTERUS      x 2    EYE SURGERY      cataract Rt    FRACTURE SURGERY      Lt leg fracture with hardware    INSERTION, IOL PROSTHESIS Left 12/20/2018    Performed by [facility]ick [facility]obar [facility] at [facility] OR    INSERTION, IOL PROSTHESIS Right 12/6/2018    Performed by [facility] at [facility] OR    metatarsal repair      OPEN REDUCTION INTERNAL FIXATION-TIBIAL PLATEAU Left 6/27/2014    Performed by Isak Pereira MD at [facility] OR    PHACOEMULSIFICATION, CATARACT Left 12/20/2018    Performed by [facility] MD at [facility] OR    PHACOEMULSIFICATION, CATARACT Right 12/6/2018    Performed by [facility] at [facility] OR    SHOULDER ARTHROSCOPY      Transesophageal echo (JOHNNA) intra-procedure log documentation N/A 4/4/2019    Performed by Manfred Figueroa MD at [facility] CATH LAB       Review of patient's allergies indicates:   Allergen Reactions    Pcn [penicillins] Other (See Comments)     Fever flushing skin swelling     Biaxin [clarithromycin] Rash    Codeine Rash    Doxycycline Rash    Levaquin [levofloxacin] Rash       Family History     Problem Relation (Age of Onset)    Cancer Father    Cataracts Sister    Heart disease Mother, Maternal Grandfather    No Known Problems Brother, Maternal Aunt, Maternal Uncle, Paternal Aunt, Paternal Uncle, Maternal Grandmother,  Paternal Grandmother, Paternal Grandfather        Tobacco Use    Smoking status: Former Smoker     Packs/day: 1.00     Years: 45.00     Pack years: 45.00     Types: Cigarettes     Last attempt to quit: 2002     Years since quittin.3    Smokeless tobacco: Never Used   Substance and Sexual Activity    Alcohol use: No    Drug use: No    Sexual activity: Yes     Partners: Male         Review of Systems   Constitutional: Positive for fatigue. Negative for chills, diaphoresis and fever.   HENT: Negative for sore throat.    Respiratory: Positive for cough and shortness of breath. Negative for wheezing.    Cardiovascular: Positive for leg swelling. Negative for chest pain.   Gastrointestinal: Negative for diarrhea, nausea and vomiting.   Genitourinary: Negative for dysuria.   Neurological: Positive for weakness. Negative for headaches.   Psychiatric/Behavioral: Negative.      Objective:     Vital Signs (Most Recent):  Temp: 98.3 °F (36.8 °C) (19 1145)  Pulse: 82 (19 1514)  Resp: 17 (19 1302)  BP: (!) 169/75 (19 1145)  SpO2: 96 % (19 1302) Vital Signs (24h Range):  Temp:  [96.5 °F (35.8 °C)-98.7 °F (37.1 °C)] 98.3 °F (36.8 °C)  Pulse:  [69-82] 82  Resp:  [17-22] 17  SpO2:  [92 %-98 %] 96 %  BP: (150-181)/(69-79) 169/75     Weight: 39.2 kg (86 lb 8 oz)  Body mass index is 14.39 kg/m².      Intake/Output Summary (Last 24 hours) at 2019 1536  Last data filed at 2019 1300  Gross per 24 hour   Intake 720 ml   Output 400 ml   Net 320 ml       Physical Exam   Constitutional: She is oriented to person, place, and time. No distress.   HENT:   Head: Normocephalic and atraumatic.   Mouth/Throat: No oropharyngeal exudate.   Eyes: Pupils are equal, round, and reactive to light. No scleral icterus.   Cardiovascular: Intact distal pulses.   Pulmonary/Chest: No respiratory distress. She has wheezes. She exhibits no tenderness.   Abdominal: Soft. Bowel sounds are normal. She exhibits no  distension.   Musculoskeletal: She exhibits edema (1+ pitting).   Neurological: She is alert and oriented to person, place, and time.   Skin: She is not diaphoretic.   Nursing note and vitals reviewed.    Vents:  Oxygen Concentration (%): 28 (04/29/19 0400)    Lines/Drains/Airways     Peripheral Intravenous Line                 Peripheral IV - Single Lumen 20 G Left Forearm -- days                Significant Labs:    CBC/Anemia Profile:  Recent Labs   Lab 04/28/19  0558 04/29/19  0557   WBC 13.47* 10.47   HGB 9.6* 10.8*   HCT 30.1* 33.5*   * 391*   MCV 95 95   RDW 13.5 13.7        Chemistries:  Recent Labs   Lab 04/28/19  0558 04/29/19  0557    139   K 4.4 4.0    100   CO2 28 29   BUN 18 16   CREATININE 0.6 0.6   CALCIUM 9.0 9.3   ALBUMIN 2.1* 2.5*   PROT 6.3 6.7   BILITOT 0.3 0.3   ALKPHOS 75 86   ALT 18 22   AST 23 23       All pertinent labs within the past 24 hours have been reviewed.    Significant Imaging:   I have reviewed and interpreted all pertinent imaging results/findings within the past 24 hours.

## 2019-04-29 NOTE — PLAN OF CARE
"CM spoke with patient regarding therapy recommendation of skilled nursing care. Patient initially refused to provide choices stating "I have to go home and pay Bills, I cant stay here. Patient reports having HH pt/ot via [facility] in the past and insists this is the highest level of care she will accept. Reviewed therapy/nursing concerns regarding safety at home alone. Patient reports her niece is visiting later and she will consider going to SNF pending a discussion with her family. Will follow-up in AM.  "

## 2019-04-29 NOTE — SUBJECTIVE & OBJECTIVE
"Interval History: Patient continues to report improvement in SOB on 2L oxygen & stable.   She is focused on returning home to take care of some financial things.      Review of Systems   Constitutional: Positive for activity change and fatigue. Negative for chills and fever.   Eyes: Negative.    Respiratory: Positive for cough (minimal per patient.  Improved from prior), shortness of breath and wheezing.    Cardiovascular: Negative for chest pain, palpitations and leg swelling.   Gastrointestinal: Positive for constipation. Negative for abdominal pain, diarrhea, nausea and vomiting.        Complains of occasional sensation of esophageal fullness, "something stuck" after eating   Genitourinary: Negative for difficulty urinating, dysuria and frequency.   Musculoskeletal: Negative for arthralgias and myalgias.   Skin: Negative for rash and wound.   Neurological: Positive for weakness. Negative for dizziness and headaches.     Objective:     Vital Signs (Most Recent):  Temp: 98 °F (36.7 °C) (04/29/19 1628)  Pulse: 76 (04/29/19 1628)  Resp: 18 (04/29/19 1628)  BP: (!) 152/72 (04/29/19 1628)  SpO2: 95 % (04/29/19 1628) Vital Signs (24h Range):  Temp:  [96.5 °F (35.8 °C)-98.7 °F (37.1 °C)] 98 °F (36.7 °C)  Pulse:  [69-82] 76  Resp:  [17-22] 18  SpO2:  [92 %-98 %] 95 %  BP: (150-181)/(69-79) 152/72     Weight: 39.2 kg (86 lb 8 oz)  Body mass index is 14.39 kg/m².    Intake/Output Summary (Last 24 hours) at 4/29/2019 1632  Last data filed at 4/29/2019 1300  Gross per 24 hour   Intake 720 ml   Output 400 ml   Net 320 ml      Physical Exam   Constitutional: She is oriented to person, place, and time. No distress.   Frail, pleasant    HENT:   Head: Normocephalic and atraumatic.   Mouth/Throat: No oropharyngeal exudate.   Eyes: Conjunctivae are normal. No scleral icterus.   Neck: Normal range of motion.   Cardiovascular: Normal rate and regular rhythm.   Murmur heard.  Pulmonary/Chest: Effort normal. No respiratory distress. She " has no wheezes. She has rales.   3L NC no resp distress    breath sounds at base with some rales on L    Abdominal: Soft. She exhibits no distension. There is no tenderness.   Musculoskeletal: She exhibits no edema.   Neurological: She is alert and oriented to person, place, and time.   Skin: Skin is warm and dry. She is not diaphoretic. There is pallor.   Vitals reviewed.      Significant Labs: All pertinent labs within the past 24 hours have been reviewed.    Significant Imaging: I have reviewed all pertinent imaging results/findings within the past 24 hours.  .

## 2019-04-29 NOTE — PLAN OF CARE
Problem: Occupational Therapy Goal  Goal: Occupational Therapy Goal  Goals to be met by: 5/4/19     Patient will increase functional independence with ADLs by performing:    LE Dressing with Set-up Assistance.  Grooming while standing at sink with Supervision. Progressing  Toileting from toilet with Supervision for hygiene and clothing management.  Progressing  Supine to sit with [place].  Step transfer with Stand-by Assistance  Toilet transfer to toilet with Stand-by Assistance. Progressing     Outcome: Ongoing (interventions implemented as appropriate)  Goals are still appropriate, continue w/ OT POC.

## 2019-04-29 NOTE — CONSULTS
[facility]  Pulmonology  Consult Note    Patient Name: Latasha Perez  MRN: [id number]  Admission Date: 4/26/2019  Hospital Length of Stay: 3 days  Code Status: Partial Code  Attending Physician: Laura Billy MD  Primary Care Provider: Pollo Oneal MD   Principal Problem: Left lower lobe pneumonia    Inpatient consult to Pulmonology  Consult performed by: Bhupinder Levi MD  Consult ordered by: Ingrid Michael MD        Subjective:     HPI:  Ms. Perez is a 73 year old lady with a past medical history significant for HTN, HLD, COPD (on 3-4L NC at home), A.fib with RVR (s/p failed ablation - on Amiodarone and diltiazem for rate control and eliquis for anticoagulation) who [facility] with complaints of shortness of breath. She was recently discharged from [facility] on 4/11/19 for right upper lobe pneumonia and new onset A.fib with RVR. Blood cultures grew Pseudomonas sensitive to cefipime and she completed a course of outpatient cefipime IV q8h via a PICC line which has since been removed. The patient states her cough has remained since that hospitalization. She is unclear as to how long exactly the cough has persisted and will not clarify the color of sputum production however does state that initially it was productive of sputum however it no longer as. She denies fevers or chills. Upon EMS arrival patient was hypoxic, reported sat in the 50's on room air, she was given solumedrol and started on BiPAP. On presentation to the ED patient received duo nebs and oxygenation improved, she was weaned down off Bipap to home O2 requirement of 3L.     Pulmonology consulted for RUL opacity, concerning for mucous plug vs. Fungal involvement.        Past Medical History:   Diagnosis Date    Arthritis     Carotid disease, bilateral     Cataract     Chronic hyponatremia     COPD (chronic obstructive pulmonary disease)     HLD (hyperlipidemia)     HTN  (hypertension)        Past Surgical History:   Procedure Laterality Date    APPENDECTOMY      Cardioversion or Defibrillation  4/4/2019    Performed by Manfred Figueroa MD at [facility] CATH LAB    CATARACT EXTRACTION W/  INTRAOCULAR LENS IMPLANT Right 12/06/2018    Dr. Escobar    CATARACT EXTRACTION W/  INTRAOCULAR LENS IMPLANT Left 12/20/2018    DR. Escobar    CERVICAL SPINE SURGERY      DILATION AND CURETTAGE OF UTERUS      x 2    EYE SURGERY      cataract Rt    FRACTURE SURGERY      Lt leg fracture with hardware    INSERTION, IOL PROSTHESIS Left 12/20/2018    Performed by Broderick Escobar MD at [facility] OR    INSERTION, IOL PROSTHESIS Right 12/6/2018    Performed by Broderick Escobar MD at [facility] OR    metatarsal repair      OPEN REDUCTION INTERNAL FIXATION-TIBIAL PLATEAU Left 6/27/2014    Performed by Isak Pereira MD at [facility] OR    PHACOEMULSIFICATION, CATARACT Left 12/20/2018    Performed by Broderick Escobar MD at [facility] OR    PHACOEMULSIFICATION, CATARACT Right 12/6/2018    Performed by Broderick Escobar MD at [facility] OR    SHOULDER ARTHROSCOPY      Transesophageal echo (JOHNNA) intra-procedure log documentation N/A 4/4/2019    Performed by Manfred Figueroa MD at [facility] CATH LAB       Review of patient's allergies indicates:   Allergen Reactions    Pcn [penicillins] Other (See Comments)     Fever flushing skin swelling     Biaxin [clarithromycin] Rash    Codeine Rash    Doxycycline Rash    Levaquin [levofloxacin] Rash       Family History     Problem Relation (Age of Onset)    Cancer Father    Cataracts Sister    Heart disease Mother, Maternal Grandfather    No Known Problems Brother, Maternal Aunt, Maternal Uncle, Paternal Aunt, Paternal Uncle, Maternal Grandmother, Paternal Grandmother, Paternal Grandfather        Tobacco Use    Smoking status: Former Smoker     Packs/day: 1.00     Years: 45.00     Pack years: 45.00     Types: Cigarettes     Last attempt to quit:  2002     Years since quittin.3    Smokeless tobacco: Never Used   Substance and Sexual Activity    Alcohol use: No    Drug use: No    Sexual activity: Yes     Partners: Male         Review of Systems   Constitutional: Positive for fatigue. Negative for chills, diaphoresis and fever.   HENT: Negative for sore throat.    Respiratory: Positive for cough and shortness of breath. Negative for wheezing.    Cardiovascular: Positive for leg swelling. Negative for chest pain.   Gastrointestinal: Negative for diarrhea, nausea and vomiting.   Genitourinary: Negative for dysuria.   Neurological: Positive for weakness. Negative for headaches.   Psychiatric/Behavioral: Negative.      Objective:     Vital Signs (Most Recent):  Temp: 98.3 °F (36.8 °C) (19 1145)  Pulse: 82 (19 1514)  Resp: 17 (19 1302)  BP: (!) 169/75 (19 1145)  SpO2: 96 % (19 1302) Vital Signs (24h Range):  Temp:  [96.5 °F (35.8 °C)-98.7 °F (37.1 °C)] 98.3 °F (36.8 °C)  Pulse:  [69-82] 82  Resp:  [17-22] 17  SpO2:  [92 %-98 %] 96 %  BP: (150-181)/(69-79) 169/75     Weight: 39.2 kg (86 lb 8 oz)  Body mass index is 14.39 kg/m².      Intake/Output Summary (Last 24 hours) at 2019 1536  Last data filed at 2019 1300  Gross per 24 hour   Intake 720 ml   Output 400 ml   Net 320 ml       Physical Exam   Constitutional: She is oriented to person, place, and time. No distress.   HENT:   Head: Normocephalic and atraumatic.   Mouth/Throat: No oropharyngeal exudate.   Eyes: Pupils are equal, round, and reactive to light. No scleral icterus.   Cardiovascular: Intact distal pulses.   Pulmonary/Chest: No respiratory distress. She has wheezes. She exhibits no tenderness.   Abdominal: Soft. Bowel sounds are normal. She exhibits no distension.   Musculoskeletal: She exhibits edema (1+ pitting).   Neurological: She is alert and oriented to person, place, and time.   Skin: She is not diaphoretic.   Nursing note and vitals  reviewed.    Vents:  Oxygen Concentration (%): 28 (04/29/19 0400)    Lines/Drains/Airways     Peripheral Intravenous Line                 Peripheral IV - Single Lumen 20 G Left Forearm -- days                Significant Labs:    CBC/Anemia Profile:  Recent Labs   Lab 04/28/19  0558 04/29/19  0557   WBC 13.47* 10.47   HGB 9.6* 10.8*   HCT 30.1* 33.5*   * 391*   MCV 95 95   RDW 13.5 13.7        Chemistries:  Recent Labs   Lab 04/28/19  0558 04/29/19  0557    139   K 4.4 4.0    100   CO2 28 29   BUN 18 16   CREATININE 0.6 0.6   CALCIUM 9.0 9.3   ALBUMIN 2.1* 2.5*   PROT 6.3 6.7   BILITOT 0.3 0.3   ALKPHOS 75 86   ALT 18 22   AST 23 23       All pertinent labs within the past 24 hours have been reviewed.    Significant Imaging:   I have reviewed and interpreted all pertinent imaging results/findings within the past 24 hours.    Assessment/Plan:     Opacity of lung on imaging study  Pt w/ pmh of COPD presents w/ shortness of breath, found to have a left lower lung pneumonia. She was started on broad spectrum abx and reports clinical improvement during her hospital course. Imaging revealed evidence of irregular opacity in RUL. Pulmonology consulted for evaluation of findings.   - CT chest w/ contrast 4/28: Irregular opacity within the anterior segment of the RUL measuring 3.9 x 2.0 cm. BL pleural effusions w/ adjacent compressive atelectasis. Mild patchy ground-glass attenuation scattered throughout lungs.   - Normally on 3-4L home oxygen, currently satting well on 2L O2  - Extensive smoking history, reports quitting a few months ago   - Reports 3-4 annual COPD exacerbations requiring steroids   - Remains afebrile. Leukocytosis resolved on abx    - Suspect imaging related to mucous plugging vs. fungal process     Recommendations:  - Complete 5 day course of prednisone. Can follow up outpatient w/ pulm if symptoms do not resolve.  - Would begin gentle diuresis    - If pleural effusions do not improve,  can perform thoracentesis. Thoracentesis would require holding apixaban and bridging to lovenox       - Repeat BNP pending    - Continue current abx and respiratory regiment  - No indication for bronchoscopy at this time           Thank you for your consult. I will follow-up with patient. Please contact us if you have any additional questions.     Bhupinder Levi MD  Pulmonology  [facility]

## 2019-04-29 NOTE — PROGRESS NOTES
[facility] Hospital Medicine  Progress Note    Patient Name: Latasha Perez  MRN: [id number]  Patient Class: IP- Inpatient   Admission Date: 4/26/2019  Length of Stay: 3 days  Attending Physician: Laura Billy MD  Primary Care Provider: Pollo Oneal MD    [facility] Medicine Team: [facility] HOSP MED 5 Ingrid Michael MD    Subjective:     Principal Problem:Left lower lobe pneumonia    HPI:  Ms. Perez is a 73 year old lady with a past medical history significant for HTN, HLD, COPD (on 3-4L NC at home), A.fib with RVR (s/p failed ablation - on Amiodarone and diltiazem for rate control and eliquis for anticoagulation) who presents to [facility] with complaints of shortness of breath. She was recently discharged from [facility] on 4/11/19 for right upper lobe pneumonia and new onset A.fib with RVR. Blood cultures grew Pseudomonas sensitive to cefipime and she completed a course of outpatient cefipime IV q8h via a PICC line which has since been removed. The patient states her cough has remained since that hospitalization. She is unclear as to how long exactly the cough has persisted and will not clarify the color of sputum production however does state that initially it was productive of sputum however it no longer as. She denies fevers or chills. Upon EMS arrival patient was hypoxic, reported sat in the 50's on room air, she was given solumedrol and started on BiPAP. On presentation to the ED patient received duo nebs and oxygenation improved, she was weaned down off Bipap to home O2 requirement of 3L.    In regards to her atrial fib, the patient was diagnosed during hospitalization at the beginning of 4/1/19. Echo showed pulmonary hypertension, normal EF.JOHNNA was negative for thrombus so patient was cardioverted however went back into A.fib. She was controlled on Amiodarone and diltiazem and discharged on those medications. She was also started on eliquis for anticoagulation.  "Patient denies recurrence of palpitations or any chest discomfort. In ED patient was normal sinus rhythm.    In the ED CXR was notable for new left lower lobe consolidation. Labs were notable for elevated WBC. She received a dose of vancomycin and rocephin. Blood cultures were not collected prior to initiation of antibiotics and was admitted to hospital medicine for further evaluation of HCAP and COPD exacerbation.    Hospital Course:  Interval Hx: NAEO, she continues to improve now on 2-3L oxygen. Started on meropenem yesterday. Question of slow clinical improvement previously & history of Pseudomonas bacteremia.     Interval History: Patient continues to report improvement in SOB on 2L oxygen & stable.   She is focused on returning home to take care of some financial things.      Review of Systems   Constitutional: Positive for activity change and fatigue. Negative for chills and fever.   Eyes: Negative.    Respiratory: Positive for cough (minimal per patient.  Improved from prior), shortness of breath and wheezing.    Cardiovascular: Negative for chest pain, palpitations and leg swelling.   Gastrointestinal: Positive for constipation. Negative for abdominal pain, diarrhea, nausea and vomiting.        Complains of occasional sensation of esophageal fullness, "something stuck" after eating   Genitourinary: Negative for difficulty urinating, dysuria and frequency.   Musculoskeletal: Negative for arthralgias and myalgias.   Skin: Negative for rash and wound.   Neurological: Positive for weakness. Negative for dizziness and headaches.     Objective:     Vital Signs (Most Recent):  Temp: 98 °F (36.7 °C) (04/29/19 1628)  Pulse: 76 (04/29/19 1628)  Resp: 18 (04/29/19 1628)  BP: (!) 152/72 (04/29/19 1628)  SpO2: 95 % (04/29/19 1628) Vital Signs (24h Range):  Temp:  [96.5 °F (35.8 °C)-98.7 °F (37.1 °C)] 98 °F (36.7 °C)  Pulse:  [69-82] 76  Resp:  [17-22] 18  SpO2:  [92 %-98 %] 95 %  BP: (150-181)/(69-79) 152/72     Weight: " 39.2 kg (86 lb 8 oz)  Body mass index is 14.39 kg/m².    Intake/Output Summary (Last 24 hours) at 2019 1632  Last data filed at 2019 1300  Gross per 24 hour   Intake 720 ml   Output 400 ml   Net 320 ml      Physical Exam   Constitutional: She is oriented to person, place, and time. No distress.   Frail, pleasant    HENT:   Head: Normocephalic and atraumatic.   Mouth/Throat: No oropharyngeal exudate.   Eyes: Conjunctivae are normal. No scleral icterus.   Neck: Normal range of motion.   Cardiovascular: Normal rate and regular rhythm.   Murmur heard.  Pulmonary/Chest: Effort normal. No respiratory distress. She has no wheezes. She has rales.   3L NC no resp distress    breath sounds at base with some rales on L    Abdominal: Soft. She exhibits no distension. There is no tenderness.   Musculoskeletal: She exhibits no edema.   Neurological: She is alert and oriented to person, place, and time.   Skin: Skin is warm and dry. She is not diaphoretic. There is pallor.   Vitals reviewed.      Significant Labs: All pertinent labs within the past 24 hours have been reviewed.    Significant Imaging: I have reviewed all pertinent imaging results/findings within the past 24 hours.  .    Assessment/Plan:      * Left lower lobe pneumonia  Hx of severe COPD with recent hospitalization and recently treated for suspected PNA and pseudomonas infection.  Currently followed by ID.  Plan for CT chest today  - Continue Meropenem D/C vanc and plan to transition to cefepime pending CT  Recurrent, with new opacity found that developed at the end of March  -Pulm consulted, appreciate recs  Recs-  - Start 5 day course of prednisone. Can follow up outpatient w/ pulm if symptoms do not resolve.  - Would begin gentle diuresis    - If pleural effusions do not improve, can perform thoracentesis. Thoracentesis would require holding apixaban and bridging to lovenox       - Repeat BNP pending    - Continue current abx and respiratory  "regiment  - No indication for bronchoscopy at this time     Acute on chronic respiratory failure with hypoxia  Acute resp failure with hypoxia  -complete infectious picture not fitting  -CT chest pending   - Treating with Vanc and meropenem with plans to de escalate today     RESOLVED      Oral thrush  -some oral thrush noted  -magic mouthwash PRN       Paroxysmal atrial fibrillation  Patient with a.fib diagnosed earlier this month during hospitalization for pneumonia and bacteremia. S/p failed cardioversion. Now controlled on diltiazem and amiodarone.  - Continuing amiodarone and diltiazem PO  - Anticoagulation with eliquis      Severe malnutrition  - Boost with meals     Cachexia  - Nutrition consulted  - Boost supplementation with meals      COPD exacerbation  - 2/2 pneumonia, please see "left lower lobe pneumonia" above, clinically picture unclear       Essential hypertension  - Continuing home diltiazem, patient normotensive once weaned off Bipap. Suspect hypoxia and stress were leading to increase in blood pressure.  - Continue to monitor      Leukocytosis  - Please see "Pneumonia"      Chronic hyponatremia  Stable / now resolved.       Carotid disease, bilateral  Patient with 50-69% stenosis of left carotid artery and < 50% stenosis of right per ultrasound on 6/13/18.    - Continuing asa and statin while inpatient      HLD (hyperlipidemia)  Patient on pravastatin 20mg at home.    - Continuing home statin while inpatient        VTE Risk Mitigation (From admission, onward)        Ordered     apixaban tablet 5 mg  2 times daily      04/26/19 0559     Place TIAN hose  Until discontinued      04/26/19 0457     IP VTE HIGH RISK PATIENT  Once      04/26/19 045              Ingrid Michael MD  Department of Hospital Medicine   [facility]  "

## 2019-04-29 NOTE — PLAN OF CARE
Problem: Fall Injury Risk  Goal: Absence of Fall and Fall-Related Injury  Outcome: Ongoing (interventions implemented as appropriate)  Patient remains free from falling or injuries.    Problem: ARDS (Acute Respiratory Distress Syndrome)  Goal: Effective Oxygenation  Outcome: Ongoing (interventions implemented as appropriate)  Patient remains on continuous oxygen at 2L/min. O2 sats have been greater than 95%. Patient requires assistance getting out of bed.

## 2019-04-30 PROBLEM — J18.9 RECURRENT PNEUMONIA: Status: ACTIVE | Noted: 2019-04-30

## 2019-04-30 LAB
ALBUMIN SERPL BCP-MCNC: 2.2 G/DL (ref 3.5–5.2)
ALP SERPL-CCNC: 76 U/L (ref 55–135)
ALT SERPL W/O P-5'-P-CCNC: 20 U/L (ref 10–44)
ANION GAP SERPL CALC-SCNC: 7 MMOL/L (ref 8–16)
ANION GAP SERPL CALC-SCNC: 8 MMOL/L (ref 8–16)
AST SERPL-CCNC: 21 U/L (ref 10–40)
BILIRUB SERPL-MCNC: 0.3 MG/DL (ref 0.1–1)
BUN SERPL-MCNC: 15 MG/DL (ref 8–23)
BUN SERPL-MCNC: 16 MG/DL (ref 8–23)
CALCIUM SERPL-MCNC: 8.3 MG/DL (ref 8.7–10.5)
CALCIUM SERPL-MCNC: 9 MG/DL (ref 8.7–10.5)
CHLORIDE SERPL-SCNC: 96 MMOL/L (ref 95–110)
CHLORIDE SERPL-SCNC: 99 MMOL/L (ref 95–110)
CO2 SERPL-SCNC: 30 MMOL/L (ref 23–29)
CO2 SERPL-SCNC: 32 MMOL/L (ref 23–29)
CREAT SERPL-MCNC: 0.5 MG/DL (ref 0.5–1.4)
CREAT SERPL-MCNC: 0.7 MG/DL (ref 0.5–1.4)
ERYTHROCYTE [DISTWIDTH] IN BLOOD BY AUTOMATED COUNT: 13.7 % (ref 11.5–14.5)
EST. GFR  (AFRICAN AMERICAN): >60 ML/MIN/1.73 M^2
EST. GFR  (AFRICAN AMERICAN): >60 ML/MIN/1.73 M^2
EST. GFR  (NON AFRICAN AMERICAN): >60 ML/MIN/1.73 M^2
EST. GFR  (NON AFRICAN AMERICAN): >60 ML/MIN/1.73 M^2
GLUCOSE SERPL-MCNC: 103 MG/DL (ref 70–110)
GLUCOSE SERPL-MCNC: 98 MG/DL (ref 70–110)
HCT VFR BLD AUTO: 26.1 % (ref 37–48.5)
HGB BLD-MCNC: 8.8 G/DL (ref 12–16)
MCH RBC QN AUTO: 31.1 PG (ref 27–31)
MCHC RBC AUTO-ENTMCNC: 33.7 G/DL (ref 32–36)
MCV RBC AUTO: 92 FL (ref 82–98)
PLATELET # BLD AUTO: 334 K/UL (ref 150–350)
PMV BLD AUTO: 9.1 FL (ref 9.2–12.9)
POTASSIUM SERPL-SCNC: 3.3 MMOL/L (ref 3.5–5.1)
POTASSIUM SERPL-SCNC: 4 MMOL/L (ref 3.5–5.1)
PROT SERPL-MCNC: 5.7 G/DL (ref 6–8.4)
RBC # BLD AUTO: 2.83 M/UL (ref 4–5.4)
SODIUM SERPL-SCNC: 135 MMOL/L (ref 136–145)
SODIUM SERPL-SCNC: 137 MMOL/L (ref 136–145)
WBC # BLD AUTO: 10.49 K/UL (ref 3.9–12.7)

## 2019-04-30 PROCEDURE — 25000242 PHARM REV CODE 250 ALT 637 W/ HCPCS: Mod: HCNC | Performed by: STUDENT IN AN ORGANIZED HEALTH CARE EDUCATION/TRAINING PROGRAM

## 2019-04-30 PROCEDURE — 80048 BASIC METABOLIC PNL TOTAL CA: CPT | Mod: HCNC

## 2019-04-30 PROCEDURE — 27000221 HC OXYGEN, UP TO 24 HOURS: Mod: HCNC

## 2019-04-30 PROCEDURE — 63600175 PHARM REV CODE 636 W HCPCS: Mod: HCNC | Performed by: NURSE PRACTITIONER

## 2019-04-30 PROCEDURE — 87449 NOS EACH ORGANISM AG IA: CPT | Mod: HCNC

## 2019-04-30 PROCEDURE — 87305 ASPERGILLUS AG IA: CPT | Mod: HCNC

## 2019-04-30 PROCEDURE — 86403 PARTICLE AGGLUT ANTBDY SCRN: CPT | Mod: HCNC

## 2019-04-30 PROCEDURE — 25000003 PHARM REV CODE 250: Mod: HCNC | Performed by: INTERNAL MEDICINE

## 2019-04-30 PROCEDURE — 99232 PR SUBSEQUENT HOSPITAL CARE,LEVL II: ICD-10-PCS | Mod: HCNC,,, | Performed by: PHYSICIAN ASSISTANT

## 2019-04-30 PROCEDURE — 85027 COMPLETE CBC AUTOMATED: CPT | Mod: HCNC

## 2019-04-30 PROCEDURE — 25000003 PHARM REV CODE 250: Mod: HCNC | Performed by: STUDENT IN AN ORGANIZED HEALTH CARE EDUCATION/TRAINING PROGRAM

## 2019-04-30 PROCEDURE — 36415 COLL VENOUS BLD VENIPUNCTURE: CPT | Mod: HCNC

## 2019-04-30 PROCEDURE — 63600175 PHARM REV CODE 636 W HCPCS: Mod: HCNC | Performed by: STUDENT IN AN ORGANIZED HEALTH CARE EDUCATION/TRAINING PROGRAM

## 2019-04-30 PROCEDURE — 94640 AIRWAY INHALATION TREATMENT: CPT | Mod: HCNC

## 2019-04-30 PROCEDURE — 87385 HISTOPLASMA CAPSUL AG IA: CPT | Mod: HCNC

## 2019-04-30 PROCEDURE — 94668 MNPJ CHEST WALL SBSQ: CPT | Mod: HCNC

## 2019-04-30 PROCEDURE — 25000242 PHARM REV CODE 250 ALT 637 W/ HCPCS: Mod: HCNC | Performed by: INTERNAL MEDICINE

## 2019-04-30 PROCEDURE — 87385 HISTOPLASMA CAPSUL AG IA: CPT | Mod: 91,HCNC

## 2019-04-30 PROCEDURE — 11000001 HC ACUTE MED/SURG PRIVATE ROOM: Mod: HCNC

## 2019-04-30 PROCEDURE — 92611 MOTION FLUOROSCOPY/SWALLOW: CPT | Mod: HCNC

## 2019-04-30 PROCEDURE — 86635 COCCIDIOIDES ANTIBODY: CPT | Mod: HCNC

## 2019-04-30 PROCEDURE — 80053 COMPREHEN METABOLIC PANEL: CPT | Mod: HCNC

## 2019-04-30 PROCEDURE — 99232 SBSQ HOSP IP/OBS MODERATE 35: CPT | Mod: HCNC,,, | Performed by: PHYSICIAN ASSISTANT

## 2019-04-30 PROCEDURE — 94761 N-INVAS EAR/PLS OXIMETRY MLT: CPT | Mod: HCNC

## 2019-04-30 RX ORDER — POTASSIUM CHLORIDE 20 MEQ/1
40 TABLET, EXTENDED RELEASE ORAL ONCE
Status: COMPLETED | OUTPATIENT
Start: 2019-04-30 | End: 2019-04-30

## 2019-04-30 RX ORDER — FUROSEMIDE 40 MG/1
40 TABLET ORAL DAILY
Status: DISCONTINUED | OUTPATIENT
Start: 2019-04-30 | End: 2019-05-03 | Stop reason: HOSPADM

## 2019-04-30 RX ORDER — AMOXICILLIN 250 MG
1 CAPSULE ORAL DAILY PRN
Status: DISCONTINUED | OUTPATIENT
Start: 2019-04-30 | End: 2019-05-03 | Stop reason: HOSPADM

## 2019-04-30 RX ORDER — LOSARTAN POTASSIUM 50 MG/1
50 TABLET ORAL DAILY
Status: DISCONTINUED | OUTPATIENT
Start: 2019-04-30 | End: 2019-05-03 | Stop reason: HOSPADM

## 2019-04-30 RX ORDER — RAMELTEON 8 MG/1
8 TABLET ORAL NIGHTLY PRN
Status: DISCONTINUED | OUTPATIENT
Start: 2019-04-30 | End: 2019-05-03 | Stop reason: HOSPADM

## 2019-04-30 RX ORDER — POTASSIUM CHLORIDE 20 MEQ/1
40 TABLET, EXTENDED RELEASE ORAL
Status: DISPENSED | OUTPATIENT
Start: 2019-04-30 | End: 2019-04-30

## 2019-04-30 RX ORDER — LIDOCAINE HYDROCHLORIDE 10 MG/ML
10 INJECTION INFILTRATION; PERINEURAL ONCE
Status: DISCONTINUED | OUTPATIENT
Start: 2019-04-30 | End: 2019-05-03 | Stop reason: HOSPADM

## 2019-04-30 RX ORDER — POLYETHYLENE GLYCOL 3350 17 G/17G
17 POWDER, FOR SOLUTION ORAL DAILY
Status: DISCONTINUED | OUTPATIENT
Start: 2019-05-01 | End: 2019-05-03 | Stop reason: HOSPADM

## 2019-04-30 RX ADMIN — PRAVASTATIN SODIUM 20 MG: 20 TABLET ORAL at 09:04

## 2019-04-30 RX ADMIN — IPRATROPIUM BROMIDE 0.5 MG: 0.5 SOLUTION RESPIRATORY (INHALATION) at 12:04

## 2019-04-30 RX ADMIN — LEVALBUTEROL 1.25 MG: 1.25 SOLUTION, CONCENTRATE RESPIRATORY (INHALATION) at 08:04

## 2019-04-30 RX ADMIN — ASPIRIN 81 MG: 81 TABLET, COATED ORAL at 09:04

## 2019-04-30 RX ADMIN — STANDARDIZED SENNA CONCENTRATE AND DOCUSATE SODIUM 1 TABLET: 8.6; 5 TABLET, FILM COATED ORAL at 09:04

## 2019-04-30 RX ADMIN — LEVALBUTEROL 1.25 MG: 1.25 SOLUTION, CONCENTRATE RESPIRATORY (INHALATION) at 12:04

## 2019-04-30 RX ADMIN — POTASSIUM CHLORIDE 40 MEQ: 1500 TABLET, EXTENDED RELEASE ORAL at 09:04

## 2019-04-30 RX ADMIN — IPRATROPIUM BROMIDE 0.5 MG: 0.5 SOLUTION RESPIRATORY (INHALATION) at 03:04

## 2019-04-30 RX ADMIN — GUAIFENESIN 200 MG: 200 SOLUTION ORAL at 09:04

## 2019-04-30 RX ADMIN — APIXABAN 5 MG: 5 TABLET, FILM COATED ORAL at 09:04

## 2019-04-30 RX ADMIN — SODIUM CHLORIDE SOLN NEBU 3% 4 ML: 3 NEBU SOLN at 12:04

## 2019-04-30 RX ADMIN — IPRATROPIUM BROMIDE 0.5 MG: 0.5 SOLUTION RESPIRATORY (INHALATION) at 08:04

## 2019-04-30 RX ADMIN — FLUTICASONE FUROATE AND VILANTEROL TRIFENATATE 1 PUFF: 100; 25 POWDER RESPIRATORY (INHALATION) at 09:04

## 2019-04-30 RX ADMIN — PREDNISONE 40 MG: 20 TABLET ORAL at 09:04

## 2019-04-30 RX ADMIN — GUAIFENESIN 200 MG: 200 SOLUTION ORAL at 12:04

## 2019-04-30 RX ADMIN — DILTIAZEM HYDROCHLORIDE 240 MG: 240 CAPSULE, COATED, EXTENDED RELEASE ORAL at 09:04

## 2019-04-30 RX ADMIN — AMIODARONE HYDROCHLORIDE 200 MG: 200 TABLET ORAL at 09:04

## 2019-04-30 RX ADMIN — RAMELTEON 8 MG: 8 TABLET, FILM COATED ORAL at 09:04

## 2019-04-30 RX ADMIN — SODIUM CHLORIDE SOLN NEBU 3% 4 ML: 3 NEBU SOLN at 08:04

## 2019-04-30 RX ADMIN — FUROSEMIDE 40 MG: 40 TABLET ORAL at 01:04

## 2019-04-30 RX ADMIN — LEVALBUTEROL 1.25 MG: 1.25 SOLUTION, CONCENTRATE RESPIRATORY (INHALATION) at 04:04

## 2019-04-30 RX ADMIN — TIOTROPIUM BROMIDE 18 MCG: 18 CAPSULE ORAL; RESPIRATORY (INHALATION) at 09:04

## 2019-04-30 RX ADMIN — LATANOPROST 1 DROP: 50 SOLUTION OPHTHALMIC at 09:04

## 2019-04-30 RX ADMIN — IPRATROPIUM BROMIDE 0.5 MG: 0.5 SOLUTION RESPIRATORY (INHALATION) at 04:04

## 2019-04-30 RX ADMIN — MEROPENEM AND SODIUM CHLORIDE 1 G: 1 INJECTION, SOLUTION INTRAVENOUS at 02:04

## 2019-04-30 RX ADMIN — MEROPENEM AND SODIUM CHLORIDE 1 G: 1 INJECTION, SOLUTION INTRAVENOUS at 10:04

## 2019-04-30 RX ADMIN — GUAIFENESIN 200 MG: 200 SOLUTION ORAL at 04:04

## 2019-04-30 RX ADMIN — LOSARTAN POTASSIUM 50 MG: 50 TABLET, FILM COATED ORAL at 09:04

## 2019-04-30 RX ADMIN — POTASSIUM CHLORIDE 40 MEQ: 1500 TABLET, EXTENDED RELEASE ORAL at 02:04

## 2019-04-30 RX ADMIN — MEROPENEM AND SODIUM CHLORIDE 1 G: 1 INJECTION, SOLUTION INTRAVENOUS at 06:04

## 2019-04-30 RX ADMIN — SODIUM CHLORIDE SOLN NEBU 3% 4 ML: 3 NEBU SOLN at 04:04

## 2019-04-30 NOTE — SUBJECTIVE & OBJECTIVE
Interval History: No AEON. AFebrile and WBC WNL.  Feels much better.  SOB at baseline.  The patient denies any recent fever, chills, or sweats.      Review of Systems   Constitutional: Negative for activity change, chills, diaphoresis and fever.   Respiratory: Positive for shortness of breath. Negative for cough and wheezing.    Cardiovascular: Negative for chest pain.   Gastrointestinal: Negative for abdominal pain, constipation, diarrhea, nausea and vomiting.   Genitourinary: Negative for dysuria, frequency and urgency.   Neurological: Negative for dizziness.   Hematological: Does not bruise/bleed easily.     Objective:     Vital Signs (Most Recent):  Temp: 98 °F (36.7 °C) (04/29/19 1628)  Pulse: 100 (04/29/19 2026)  Resp: 18 (04/29/19 2026)  BP: (!) 152/72 (04/29/19 1628)  SpO2: (!) 94 % (04/29/19 2026) Vital Signs (24h Range):  Temp:  [97.6 °F (36.4 °C)-98.7 °F (37.1 °C)] 98 °F (36.7 °C)  Pulse:  [] 100  Resp:  [17-22] 18  SpO2:  [92 %-98 %] 94 %  BP: (152-181)/(72-79) 152/72     Weight: 39.2 kg (86 lb 8 oz)  Body mass index is 14.39 kg/m².    Estimated Creatinine Clearance: 52.4 mL/min (based on SCr of 0.6 mg/dL).    Physical Exam   Constitutional: She is oriented to person, place, and time. No distress.   Frail, elderly woman   HENT:   Head: Normocephalic and atraumatic.   Mouth/Throat: No oropharyngeal exudate.   Eyes: Conjunctivae are normal. No scleral icterus.   Neck: Normal range of motion.   Cardiovascular: Normal rate and regular rhythm.   Murmur heard.  Pulmonary/Chest: Effort normal. No respiratory distress. She has no wheezes. She has no rales.   O2 nasal cannula.      Abdominal: Soft. She exhibits no distension. There is no tenderness.   Musculoskeletal: She exhibits no edema.   Neurological: She is alert and oriented to person, place, and time.   Skin: Skin is warm and dry. She is not diaphoretic. There is pallor.   Psychiatric: She has a normal mood and affect. Her behavior is normal.    Vitals reviewed.      Significant Labs:   Blood Culture:   Recent Labs   Lab 04/02/19  1020 04/04/19  0633 04/04/19  0700 04/26/19  0615 04/26/19  0642   LABBLOO No growth after 5 days. No growth after 5 days. No growth after 5 days. No Growth to date  No Growth to date  No Growth to date  No Growth to date No Growth to date  No Growth to date  No Growth to date  No Growth to date     CBC:   Recent Labs   Lab 04/28/19  0558 04/29/19  0557   WBC 13.47* 10.47   HGB 9.6* 10.8*   HCT 30.1* 33.5*   * 391*     CMP:   Recent Labs   Lab 04/28/19  0558 04/29/19  0557    139   K 4.4 4.0    100   CO2 28 29   GLU 83 81   BUN 18 16   CREATININE 0.6 0.6   CALCIUM 9.0 9.3   PROT 6.3 6.7   ALBUMIN 2.1* 2.5*   BILITOT 0.3 0.3   ALKPHOS 75 86   AST 23 23   ALT 18 22   ANIONGAP 10 10   EGFRNONAA >60.0 >60.0     Respiratory Culture: No results for input(s): GSRESP, RESPIRATORYC in the last 4320 hours.  Urine Culture: No results for input(s): LABURIN in the last 4320 hours.  Urine Studies: No results for input(s): COLORU, APPEARANCEUA, PHUR, SPECGRAV, PROTEINUA, GLUCUA, KETONESU, BILIRUBINUA, OCCULTUA, NITRITE, UROBILINOGEN, LEUKOCYTESUR, RBCUA, WBCUA, BACTERIA, SQUAMEPITHEL, HYALINECASTS in the last 4320 hours.    Invalid input(s): WRIGHTSUR  Wound Culture: No results for input(s): LABAERO in the last 4320 hours.    Significant Imaging: I have reviewed all pertinent imaging results/findings within the past 24 hours.   CT Chest Without Contrast [[id number]] (Abnormal) Resulted: 04/28/19 1507   Order Status: Completed Updated: 04/28/19 1510   Narrative:     EXAMINATION:  CT CHEST WITHOUT CONTRAST    CLINICAL HISTORY:  SOB, pulmonary edema suspected;    TECHNIQUE:  Low dose axial images, sagittal and coronal reformations were obtained from the thoracic inlet to the lung bases. Contrast was not administered.    COMPARISON:  Chest radiograph 04/26/2019, 04/07/2018 04/06/2019, 04/03/2019, 04/02/2019 03/28/2019  10/06/2016, 04/15/2014    FINDINGS:  Base of Neck:  0.8 cm hypodensity within the left thyroid lobe.    Thoracic soft tissues:  Coarse calcifications within the left breast tissue.    Aorta: Left-sided aortic arch with normal branching pattern, caliber, contour, and course with moderate atherosclerotic calcifications throughout its course.  Calcification of the aortic valve and annulus.    Heart: Normal size.  Abundant coronary artery calcific atherosclerosis in a multivessel distribution.    Pericardium: Trace pericardial fluid, likely physiologic.    Pulmonary vasculature: Pulmonary arteries distribute normally. There are four pulmonary veins.    Marcy/Mediastinum:  No lymph node enlargement on this noncontrast CT. Hilar contours are unremarkable.    Airways:  Trachea is midline and proximal airways are patent.  Secretions noted within the trachea and proximal airways.    Lungs/Pleura:    -bilateral pleural effusions, trace on the right and small on the left.  There is adjacent compressive atelectasis on the left with mild volume loss, new since 04/07/2019.    -irregular opacity within the anterior segment of the right upper lobe measuring 3.9 x 2.0 cm (axial series 2, image 34), likely corresponding to the right upper lobe opacity first noted on chest radiograph 04/02/2019.  Although malignancy cannot be excluded, the fact that this finding developed between 03/28/2019 and 04/02/2019 is reassuring and neoplasm felt to be unlikely.    -mild atelectasis along the left major fissure.    -additional patchy mild ground-glass attenuation involving the medial segment of the right middle lobe, lingula, posterior segment of left upper lobe, and much of the superior segment of the left lower lobe.    -mild bronchiectasis.    -severe centrilobular emphysematous changes with a biapical predominance.    -No pneumothorax.    Esophagus: Normal in course and caliber.    Upper abdomen: No acute intra-abdominal  abnormality.    Bones:  Degenerative changes of the visualized osseous structures without acute fracture or lytic or sclerotic lesions.   Impression:       1. Bilateral pleural effusions, trace on the right and small on the left, with adjacent compressive atelectasis associated with mild volume loss predominantly on the left, new from prior exam on 04/07/2019.    2.  Irregular opacity within the anterior segment of the right upper lobe measuring 3.9 x 2.0 cm, corresponding with the right upper lobe opacity first noted on chest radiograph 04/02/2019. Although malignancy cannot be excluded, the fact that this finding developed between 03/28/2019 and 04/02/2019 is reassuring and neoplasm felt to be unlikely.  This may represent an area of mucoid impaction, atelectasis, or infection.    3.  Mild patchy ground-glass attenuation scattered throughout, predominantly involving the left lower lobe and lingula, which may represent aspiration versus infectious/noninfectious inflammatory process.    4.  Severe emphysema.    This report was flagged in Epic as abnormal.    Electronically signed by resident: Jeny Rodriguez  Date: 04/28/2019  Time: 14:10    Electronically signed by: Rose Atwood MD  Date: 04/28/2019  Time: 15:07   X-Ray Chest AP Portable [[id number]] Resulted: 04/26/19 0236   Order Status: Completed Updated: 04/26/19 0239   Narrative:     EXAMINATION:  XR CHEST AP PORTABLE    CLINICAL HISTORY:  Asthma;    TECHNIQUE:  Single frontal view of the chest was performed.    COMPARISON:  April 7, 2019.    FINDINGS:  Interval development of opacification at the left lung base.    Right PICC line has been removed.    Heart and lungs otherwise appear unchanged when allowing for differences in technique and positioning.   Impression:       Interval development of opacification at the left lung base.  Possible pneumonia with parapneumonic effusion.    No significant change from prior study.      Electronically signed by:  Eder Clarke MD  Date: 04/26/2019  Time: 02:36

## 2019-04-30 NOTE — PROGRESS NOTES
"[facility]  Adult Nutrition  Progress Note    SUMMARY       Recommendations    Recommendation/Intervention: 1.) Continue cardiac diet. 2.) Suggest Boost Plus TID.   Goals: 1.) Pt to consume/tolerate >75% EEN and EPN.   Nutrition Goal Status: progressing towards goal  Communication of RD Recs: (POC)    Reason for Assessment    Reason For Assessment: RD follow-up  Diagnosis: (pneumonia)  Relevant Medical History: HTN, HLD, COPD (on 3-4L NC at home), a fib  General Information Comments: Pt sitting up in chair reports fair appetite. Chart reveals pt consuming ~100% of meals. No Boost ordered. No GI distress stated. NFPE completed . Pt continues with severe malnutrition. Pt had no further questions regarding low Na diet recommendations. Encouraged appropriate PO intake. Wt remains stable.   Nutrition Discharge Planning: [place] PO intake to meet nutritional needs.     Nutrition Risk Screen    Nutrition Risk Screen: no indicators present    Nutrition/Diet History    Spiritual, Cultural Beliefs, Judaism Practices, Values that Affect Care: no  Factors Affecting Nutritional Intake: decreased appetite    Anthropometrics    Temp: 97.8 °F (36.6 °C)  Height Method: Stated  Height: 5' 5" (165.1 cm)  Height (inches): 65 in  Weight Method: Bed Scale  Weight: 39.2 kg (86 lb 8 oz)  Weight (lb): 86.5 lb  Ideal Body Weight (IBW), Female: 125 lb  % Ideal Body Weight, Female (lb): 69.2 lb  BMI (Calculated): 14.4  BMI Grade: less than 16 protein-energy malnutrition grade III  Weight Loss: unintentional  Usual Body Weight (UBW), k.6 kg(per chart review 18)  % Usual Body Weight: 92.3  % Weight Change From Usual Weight: -7.9 %       Lab/Procedures/Meds    Pertinent Labs Reviewed: reviewed  Pertinent Labs Comments: K 3.3, Ca 8.3  Pertinent Medications Reviewed: reviewed  Pertinent Medications Comments: aspirin, lasix, potassium chloride, statin      Estimated/Assessed Needs    Weight Used For Calorie " Calculations: 39.2 kg (86 lb 6.7 oz)  Energy Calorie Requirements (kcal): 7627-0233 kcal/day  Energy Need Method: Kcal/kg(35-40)  Protein Requirements: 59 gm/day(1.5 gm/kg)  Weight Used For Protein Calculations: 39.2 kg (86 lb 6.7 oz)  Fluid Requirements (mL): 1 mL/kcal or per MD     RDA Method (mL): 1372         Nutrition Prescription Ordered    Current Diet Order: cardiac    Evaluation of Received Nutrient/Fluid Intake    I/O: +1.28L since admit  Comments: LBM 4/28  Tolerance: tolerating  % Intake of Estimated Energy Needs: 50 - 75 %  % Meal Intake: 75 - 100 %    Nutrition Risk    Level of Risk/Frequency of Follow-up: moderate     Assessment and Plan  Severe protein-calorie malnutrition  Malnutrition in the context of Chronic Illness/Injury     Related to (etiology):  COPD, Inadequate Energy Intake     Signs and Symptoms (as evidenced by):  Energy Intake: <75% of estimated energy requirement for >3 months  Body Fat Depletion: severe depletion of triceps   Muscle Mass Depletion: severe depletion of temples, clavicle region, scapular region and lower extremities   Weight Loss: 7.9% x 5 months      Interventions:  Collaboration and Referral of Nutrition Care     Nutrition Diagnosis Status:  Continues           Monitor and Evaluation    Food and Nutrient Intake: energy intake, food and beverage intake  Food and Nutrient Adminstration: diet order  Physical Activity and Function: nutrition-related ADLs and IADLs  Anthropometric Measurements: weight, weight change, body mass index  Biochemical Data, Medical Tests and Procedures: electrolyte and renal panel, gastrointestinal profile, glucose/endocrine profile  Nutrition-Focused Physical Findings: overall appearance     Malnutrition Assessment  Malnutrition Type: chronic illness          Weight Loss (Malnutrition): (7.9% in 5 months)  Energy Intake (Malnutrition): less than 75% for greater than or equal to 3 months   Upper Arm Region (Subcutaneous Fat Loss): severe  depletion   Advent Region (Muscle Loss): severe depletion  Clavicle Bone Region (Muscle Loss): severe depletion  Clavicle and Acromion Bone Region (Muscle Loss): severe depletion  Patellar Region (Muscle Loss): severe depletion  Anterior Thigh Region (Muscle Loss): severe depletion  Posterior Calf Region (Muscle Loss): severe depletion                 Nutrition Follow-Up    RD Follow-up?: Yes

## 2019-04-30 NOTE — SUBJECTIVE & OBJECTIVE
Interval History: Satting well this morning on 1L. Pt reports symptomatic improvement. Plan for thoracentesis today.     Objective:     Vital Signs (Most Recent):  Temp: 97.4 °F (36.3 °C) (19 0609)  Pulse: 68 (19 0704)  Resp: 18 (19 0609)  BP: (!) 172/73 (19 0700)  SpO2: 95 % (19 0609) Vital Signs (24h Range):  Temp:  [97.4 °F (36.3 °C)-98.3 °F (36.8 °C)] 97.4 °F (36.3 °C)  Pulse:  [] 68  Resp:  [17-21] 18  SpO2:  [92 %-98 %] 95 %  BP: (152-181)/(72-77) 172/73     Weight: 39.2 kg (86 lb 8 oz)  Body mass index is 14.39 kg/m².      Intake/Output Summary (Last 24 hours) at 2019 0751  Last data filed at 2019 1300  Gross per 24 hour   Intake 480 ml   Output 400 ml   Net 80 ml       Physical Exam   Constitutional: She is oriented to person, place, and time. No distress.   Frail, pleasant    HENT:   Head: Normocephalic and atraumatic.   Mouth/Throat: No oropharyngeal exudate.   Eyes: Conjunctivae are normal. No scleral icterus.   Neck: Normal range of motion.   Cardiovascular: Normal rate and regular rhythm.   Murmur heard.  Pulmonary/Chest: Effort normal. No respiratory distress. She has no wheezes. She has rales.   1LNC no resp distress    breath sounds at base with some rales on L    Abdominal: Soft. She exhibits no distension. There is no tenderness.   Musculoskeletal: She exhibits edema (1+ pitting BL LE).   Neurological: She is alert and oriented to person, place, and time.   Skin: Skin is warm and dry. She is not diaphoretic. There is pallor.   Nursing note and vitals reviewed.    Vents:  Oxygen Concentration (%): 28 (19 0330)    Lines/Drains/Airways     Peripheral Intravenous Line                 Peripheral IV - Single Lumen 19 2230 20 G Anterior;Left;Proximal Forearm less than 1 day                Significant Labs:    CBC/Anemia Profile:  Recent Labs   Lab 19  0557 19  0249   WBC 10.47 10.49   HGB 10.8* 8.8*   HCT 33.5* 26.1*   *  334   MCV 95 92   RDW 13.7 13.7        Chemistries:  Recent Labs   Lab 04/29/19  0557 04/30/19  0249    137   K 4.0 3.3*    99   CO2 29 30*   BUN 16 16   CREATININE 0.6 0.5   CALCIUM 9.3 8.3*   ALBUMIN 2.5* 2.2*   PROT 6.7 5.7*   BILITOT 0.3 0.3   ALKPHOS 86 76   ALT 22 20   AST 23 21       All pertinent labs within the past 24 hours have been reviewed.    Significant Imaging:  I have reviewed and interpreted all pertinent imaging results/findings within the past 24 hours.

## 2019-04-30 NOTE — ASSESSMENT & PLAN NOTE
Pt w/ pmh of COPD presents w/ shortness of breath, found to have a left lower lung pneumonia. She was started on broad spectrum abx and reports clinical improvement during her hospital course. Imaging revealed evidence of irregular opacity in RUL. Pulmonology consulted for evaluation of findings.   - CT chest w/ contrast 4/28: Irregular opacity within the anterior segment of the RUL measuring 3.9 x 2.0 cm. BL pleural effusions w/ adjacent compressive atelectasis. Mild patchy ground-glass attenuation scattered throughout lungs.   - Normally on 3-4L home oxygen, currently satting well on 2L O2  - Extensive smoking history, reports quitting a few months ago   - Reports 3-4 annual COPD exacerbations requiring steroids   - Remains afebrile. Leukocytosis resolved on abx    - Suspect imaging related to mucous plugging vs. fungal process     Recommendations:  - Will perform thoracentesis today  - Follow cultures and cytology  - Complete 5 day course of prednisone. Can follow up outpatient w/ pulm if symptoms do not resolve.  - Would begin gentle diuresis    - Continue current abx and respiratory regiment

## 2019-04-30 NOTE — PROCEDURES
Modified Barium Swallow  Discharge    Patient Name:  Latasha Perez   MRN:  [id number]      Recommendations:     Recommendations:                General Recommendations:  Follow-up not indicated  Diet recommendations:  Regular, Thin   Aspiration Precautions: Small bites/sips and Standard aspiration precautions   General Precautions: Standard, aspiration, fall  Communication strategies:  none    Referral     Reason for Referral  Patient was referred for a Modified Barium Swallow Study to assess the efficiency of his/her swallow function, rule out aspiration and make recommendations regarding safe dietary consistencies, effective compensatory strategies, and safe eating environment.     Diagnosis: Left lower lobe pneumonia       History:     Past Medical History:   Diagnosis Date    Arthritis     Carotid disease, bilateral     Cataract     Chronic hyponatremia     COPD (chronic obstructive pulmonary disease)     HLD (hyperlipidemia)     HTN (hypertension)        Objective:     Current Respiratory Status: 04/30/19    Alert: yes    Cooperative: yes    Follows Directions: yes    Visualization  · Patient was seen in the lateral view    Oral Peripheral Examination  · Oral Musculature: WNL  · Dentition: present and adequate  · Secretion Management: adequate  · Mucosal Quality: adequate  · Mandibular Strength and Mobility: WNL  · Oral Labial Strength and Mobility: WNL  · Lingual Strength and Mobility: WNL  · Volitional Swallow: present  · Voice Prior to PO Intake: clear, adequate intensity     Consistencies Assessed  · Thin x6 oz cup/straw (single and consecutive sips)  · Solids x1/4 cracker    Oral Preparation/Oral Phase  · WFL- Pt with adequate bolus acceptance, containment, control and timely A-P transfer across consistencies     Pharyngeal Phase   Pt essentially timely swallow initiation for age  Pt with adequate hyolaryngeal elevation and excursion patterns  Pt with complete epiglottic inversion patterns  Pt with  adequate airway protection without penetration or aspiration with thin liquids via cup/straw and solid trial  There was trace residue with cracker trial only which was cleared with a liquid wash of thin via straw.       Cervical Esophageal Phase  · UES appeared to accommodate all bolus types without stasis or retrograde movement observed     Assessment:     Impressions  ·  Patient with oral and pharyngeal phases of swallowing deemed WNL    Prognosis: Excellent    Barriers:  · Fatigue    Plan  No St f/u    Education  Results were discussed with patient.    Goals:   Multidisciplinary Problems     SLP Goals        Problem: SLP Goal    Goal Priority Disciplines Outcome   SLP Goal     SLP Ongoing (interventions implemented as appropriate)   Description:  Speech Language Pathology Goals  Goals expected to be met by 5/7/19  1. Pt will tolerate regular diet with thin liquids w/o overt S/S aspiration, MOD I  2. Educate Pt and family on S/S aspiration and GERD precautions                       Plan:     · Plan of Care expires:  05/29/19  · Plan of Care reviewed with:  patient        Discharge recommendations:  nursing facility, skilled     Time Tracking:   SLP Treatment Date:   04/30/19  Speech Start Time:  0948  Speech Stop Time:  1002     Speech Total Time (min):  14 min    May Maloney CCC-SLP  04/30/2019

## 2019-04-30 NOTE — PLAN OF CARE
Spoke with patient at length about SNF recommendation and potential of needing IV antibiotics at home per Dr. Michael. Patient had been resistant to idea of SNF placement but has now decided to allow CM to place referrals. Spoke with Niece daiana, family very happy patient agreed to placement. Patient has no children, spouse is in a nursing Home and family lives out of town so she has no home support. [facility], [facility], [facility] and [facility]. Will continue to follow for discharge needs.

## 2019-04-30 NOTE — PLAN OF CARE
Problem: Adult Inpatient Plan of Care  Goal: Plan of Care Review  Recommendations     Recommendation/Intervention: 1.) Continue cardiac diet. 2.) Suggest Boost Plus TID.   Goals: 1.) Pt to consume/tolerate >75% EEN and EPN.   Nutrition Goal Status: progressing towards goal  Communication of RD Recs: (POC)        Severe protein-calorie malnutrition  Malnutrition in the context of Chronic Illness/Injury     Related to (etiology):  COPD, Inadequate Energy Intake     Signs and Symptoms (as evidenced by):  Energy Intake: <75% of estimated energy requirement for >3 months  Body Fat Depletion: severe depletion of triceps   Muscle Mass Depletion: severe depletion of temples, clavicle region, scapular region and lower extremities   Weight Loss: 7.9% x 5 months      Interventions:  Collaboration and Referral of Nutrition Care     Nutrition Diagnosis Status:  Continues

## 2019-04-30 NOTE — PLAN OF CARE
Problem: Adult Inpatient Plan of Care  Goal: Plan of Care Review  Outcome: Ongoing (interventions implemented as appropriate)  Pt is alert. Pt asks repetitive questions, requires reorientation. No reports of difficulty swallowing.Unable to obtain sputum culture. Pt shows no s/s of distress or discomfort. Safety measures met. Free from falls and injury. Denies any pain. Able to verbalize all needs. Ambulates to bedside commode with standby assistance.Has adequate food and fluid intake. Bed locked and placed in lowest position. Call light within reach.

## 2019-04-30 NOTE — PLAN OF CARE
Patient with <5mm pocket of fluid on inspiration on bedside ultrasound.  Relative to CT Chest performed, suspect effusion has decreased in volume.  Do not feel sampling is warranted or safe given the clinical context.  Follow up with Pulmonary in 6 weeks with repeat CT Chest.  Follow up arranged in my clinic.  Complete course of abx for HAP.  Will sign off, call with questions.    Tom Villarreal MD  [employer] Pulmonary/Critical [facility] Fellow [facility]

## 2019-04-30 NOTE — PROGRESS NOTES
[facility]  Pulmonology  Progress Note    Patient Name: Latasha Perez  MRN: [id number]  Admission Date: 2019  Hospital Length of Stay: 4 days  Code Status: Partial Code  Attending Provider: Laura Billy MD  Primary Care Provider: Pollo Oneal MD   Principal Problem: Left lower lobe pneumonia    Subjective:     Interval History: Satting well this morning on 1L. Pt reports symptomatic improvement. Plan for thoracentesis today.     Objective:     Vital Signs (Most Recent):  Temp: 97.4 °F (36.3 °C) (19 0609)  Pulse: 68 (19 0704)  Resp: 18 (19 0609)  BP: (!) 172/73 (19 0700)  SpO2: 95 % (19 0609) Vital Signs (24h Range):  Temp:  [97.4 °F (36.3 °C)-98.3 °F (36.8 °C)] 97.4 °F (36.3 °C)  Pulse:  [] 68  Resp:  [17-21] 18  SpO2:  [92 %-98 %] 95 %  BP: (152-181)/(72-77) 172/73     Weight: 39.2 kg (86 lb 8 oz)  Body mass index is 14.39 kg/m².      Intake/Output Summary (Last 24 hours) at 2019 0751  Last data filed at 2019 1300  Gross per 24 hour   Intake 480 ml   Output 400 ml   Net 80 ml       Physical Exam   Constitutional: She is oriented to person, place, and time. No distress.   Frail, pleasant    HENT:   Head: Normocephalic and atraumatic.   Mouth/Throat: No oropharyngeal exudate.   Eyes: Conjunctivae are normal. No scleral icterus.   Neck: Normal range of motion.   Cardiovascular: Normal rate and regular rhythm.   Murmur heard.  Pulmonary/Chest: Effort normal. No respiratory distress. She has no wheezes. She has rales.   1LNC no resp distress    breath sounds at base with some rales on L    Abdominal: Soft. She exhibits no distension. There is no tenderness.   Musculoskeletal: She exhibits edema (1+ pitting BL LE).   Neurological: She is alert and oriented to person, place, and time.   Skin: Skin is warm and dry. She is not diaphoretic. There is pallor.   Nursing note and vitals reviewed.    Vents:  Oxygen Concentration (%): 28  (04/30/19 2690)    Lines/Drains/Airways     Peripheral Intravenous Line                 Peripheral IV - Single Lumen 04/29/19 2230 20 G Anterior;Left;Proximal Forearm less than 1 day                Significant Labs:    CBC/Anemia Profile:  Recent Labs   Lab 04/29/19  0557 04/30/19  0249   WBC 10.47 10.49   HGB 10.8* 8.8*   HCT 33.5* 26.1*   * 334   MCV 95 92   RDW 13.7 13.7        Chemistries:  Recent Labs   Lab 04/29/19  0557 04/30/19  0249    137   K 4.0 3.3*    99   CO2 29 30*   BUN 16 16   CREATININE 0.6 0.5   CALCIUM 9.3 8.3*   ALBUMIN 2.5* 2.2*   PROT 6.7 5.7*   BILITOT 0.3 0.3   ALKPHOS 86 76   ALT 22 20   AST 23 21       All pertinent labs within the past 24 hours have been reviewed.    Significant Imaging:  I have reviewed and interpreted all pertinent imaging results/findings within the past 24 hours.    Assessment/Plan:     Opacity of lung on imaging study  Pt w/ pmh of COPD presents w/ shortness of breath, found to have a left lower lung pneumonia. She was started on broad spectrum abx and reports clinical improvement during her hospital course. Imaging revealed evidence of irregular opacity in RUL. Pulmonology consulted for evaluation of findings.   - CT chest w/ contrast 4/28: Irregular opacity within the anterior segment of the RUL measuring 3.9 x 2.0 cm. BL pleural effusions w/ adjacent compressive atelectasis. Mild patchy ground-glass attenuation scattered throughout lungs.   - Normally on 3-4L home oxygen, currently satting well on 2L O2  - Extensive smoking history, reports quitting a few months ago   - Reports 3-4 annual COPD exacerbations requiring steroids   - Remains afebrile. Leukocytosis resolved on abx    - Suspect imaging related to mucous plugging vs. fungal process     Recommendations:  - Will perform thoracentesis today  - Follow cultures and cytology  - Complete 5 day course of prednisone. Can follow up outpatient w/ pulm if symptoms do not resolve.  - Would begin  gentle diuresis    - Continue current abx and respiratory regiment       Bhupinder Levi MD  Pulmonology  [facility]

## 2019-04-30 NOTE — ASSESSMENT & PLAN NOTE
73 year old woman with COPD, HTN, Afib with RVR (on amiodarone), and recent history of hospitalization for pseudomonas bacteremia (pan sensitive) and RUL PNA (s/p 14 days of IV cefepime) who presented from [facility] with sudden onset of shortness of breath.  Found to be hypoxic with reported O2 sats in 50s on room air.  CXR in ED showed new LLL consolidation concerning for PNA.  Associated leukocytosis.  No associated fevers, chills.  Received dose of vancomycin and cefepime in ED.  Currently on IV vancomycin and meropenem.  Blood cultures pending, NGTD.  Respiratory culture pending collections.        Reports severe reaction to PCN many years ago requiring hospitalization after taking PCN prescribed by dentist. Tolerates cephalosporins.  Afebrile, WBC down trending = 13 today (down from 16).   Hemodynamically stable, no distress at time of visit.     CT chest today shows severe emphysema, bilateral pleural effusions (trace on right, small on left), and irregular opacity in RUL corresponding to RUL opacity on CXR of 4/2.   Also patchy ground glass attenuation scattered throughout, predominantly in left lower lobe and ligula which may represent aspiration vs. Infectious/non-infectious inflammatory process.     Passed MBS.  Pulm saw and attempted thoracentesis for effusion to no avail and did not rec bronch.  [employer] CT findings cf mucus plug vs fungal..  Back at baseline from a respiratory standpoint. Procalcitonin WNL.    Plan/recommendations:  1.   Continue meropenem for now to continue pseudomonal and anaerobic coverage (given concern with aspiration).   Currently on Day 5 of meropenem.   2. Sent sputum for respiratory culture to guide therapy  3.  Rec pulmonary consult for eval of RUL findings on CT - pulm feels fungal vs mucus plug and no indication for bronchoscopy at this time.  4.  Suspect patient has had adequate abx at 5d - rec dc tomorrow and monitor  5.  Fungal studies sent  6. Needs Pulm and ID  fu as outpt - need fu on fungal studies.  7. Will sing off    Pllan discussed with ID staff  Discussed with Primary Team

## 2019-04-30 NOTE — PLAN OF CARE
Problem: SLP Goal  Goal: SLP Goal  Speech Language Pathology Goals  Goals expected to be met by 5/7/19  1. Pt will tolerate regular diet with thin liquids w/o overt S/S aspiration, MOD I  2. Educate Pt and family on S/S aspiration and GERD precautions      MBSS completed. Oral/pharyngeal swallow deemed WFL. No further ST recommended.   May Maloney CCC-SLP  4/30/2019

## 2019-05-01 LAB
ALBUMIN SERPL BCP-MCNC: 2.4 G/DL (ref 3.5–5.2)
ALP SERPL-CCNC: 74 U/L (ref 55–135)
ALT SERPL W/O P-5'-P-CCNC: 20 U/L (ref 10–44)
ANION GAP SERPL CALC-SCNC: 8 MMOL/L (ref 8–16)
AST SERPL-CCNC: 21 U/L (ref 10–40)
BACTERIA BLD CULT: NORMAL
BACTERIA BLD CULT: NORMAL
BILIRUB SERPL-MCNC: 0.3 MG/DL (ref 0.1–1)
BNP SERPL-MCNC: 89 PG/ML (ref 0–99)
BUN SERPL-MCNC: 15 MG/DL (ref 8–23)
CALCIUM SERPL-MCNC: 8.6 MG/DL (ref 8.7–10.5)
CHLORIDE SERPL-SCNC: 97 MMOL/L (ref 95–110)
CO2 SERPL-SCNC: 31 MMOL/L (ref 23–29)
CREAT SERPL-MCNC: 0.7 MG/DL (ref 0.5–1.4)
CRYPTOC AG SER QL LA: NEGATIVE
ERYTHROCYTE [DISTWIDTH] IN BLOOD BY AUTOMATED COUNT: 13.9 % (ref 11.5–14.5)
EST. GFR  (AFRICAN AMERICAN): >60 ML/MIN/1.73 M^2
EST. GFR  (NON AFRICAN AMERICAN): >60 ML/MIN/1.73 M^2
GLUCOSE SERPL-MCNC: 108 MG/DL (ref 70–110)
HCT VFR BLD AUTO: 29.9 % (ref 37–48.5)
HGB BLD-MCNC: 9.9 G/DL (ref 12–16)
MCH RBC QN AUTO: 31.3 PG (ref 27–31)
MCHC RBC AUTO-ENTMCNC: 33.1 G/DL (ref 32–36)
MCV RBC AUTO: 95 FL (ref 82–98)
PLATELET # BLD AUTO: 350 K/UL (ref 150–350)
PMV BLD AUTO: 9 FL (ref 9.2–12.9)
POTASSIUM SERPL-SCNC: 4.1 MMOL/L (ref 3.5–5.1)
PROT SERPL-MCNC: 5.8 G/DL (ref 6–8.4)
RBC # BLD AUTO: 3.16 M/UL (ref 4–5.4)
SODIUM SERPL-SCNC: 136 MMOL/L (ref 136–145)
WBC # BLD AUTO: 12.12 K/UL (ref 3.9–12.7)

## 2019-05-01 PROCEDURE — 25000003 PHARM REV CODE 250: Mod: HCNC | Performed by: STUDENT IN AN ORGANIZED HEALTH CARE EDUCATION/TRAINING PROGRAM

## 2019-05-01 PROCEDURE — 94640 AIRWAY INHALATION TREATMENT: CPT | Mod: HCNC

## 2019-05-01 PROCEDURE — 94799 UNLISTED PULMONARY SVC/PX: CPT | Mod: HCNC

## 2019-05-01 PROCEDURE — 25000242 PHARM REV CODE 250 ALT 637 W/ HCPCS: Mod: HCNC | Performed by: STUDENT IN AN ORGANIZED HEALTH CARE EDUCATION/TRAINING PROGRAM

## 2019-05-01 PROCEDURE — 99232 PR SUBSEQUENT HOSPITAL CARE,LEVL II: ICD-10-PCS | Mod: HCNC,GC,, | Performed by: INTERNAL MEDICINE

## 2019-05-01 PROCEDURE — 99232 SBSQ HOSP IP/OBS MODERATE 35: CPT | Mod: HCNC,GC,, | Performed by: INTERNAL MEDICINE

## 2019-05-01 PROCEDURE — 97116 GAIT TRAINING THERAPY: CPT | Mod: HCNC

## 2019-05-01 PROCEDURE — 97530 THERAPEUTIC ACTIVITIES: CPT | Mod: HCNC

## 2019-05-01 PROCEDURE — 97535 SELF CARE MNGMENT TRAINING: CPT | Mod: HCNC

## 2019-05-01 PROCEDURE — 83880 ASSAY OF NATRIURETIC PEPTIDE: CPT | Mod: HCNC

## 2019-05-01 PROCEDURE — 80202 ASSAY OF VANCOMYCIN: CPT | Mod: HCNC

## 2019-05-01 PROCEDURE — 25000003 PHARM REV CODE 250: Mod: HCNC | Performed by: INTERNAL MEDICINE

## 2019-05-01 PROCEDURE — 27000221 HC OXYGEN, UP TO 24 HOURS: Mod: HCNC

## 2019-05-01 PROCEDURE — 11000001 HC ACUTE MED/SURG PRIVATE ROOM: Mod: HCNC

## 2019-05-01 PROCEDURE — 94668 MNPJ CHEST WALL SBSQ: CPT | Mod: HCNC

## 2019-05-01 PROCEDURE — 36415 COLL VENOUS BLD VENIPUNCTURE: CPT | Mod: HCNC

## 2019-05-01 PROCEDURE — 80053 COMPREHEN METABOLIC PANEL: CPT | Mod: HCNC

## 2019-05-01 PROCEDURE — 25000242 PHARM REV CODE 250 ALT 637 W/ HCPCS: Mod: HCNC | Performed by: INTERNAL MEDICINE

## 2019-05-01 PROCEDURE — 85027 COMPLETE CBC AUTOMATED: CPT | Mod: HCNC

## 2019-05-01 PROCEDURE — 94761 N-INVAS EAR/PLS OXIMETRY MLT: CPT | Mod: HCNC

## 2019-05-01 PROCEDURE — 63600175 PHARM REV CODE 636 W HCPCS: Mod: HCNC | Performed by: NURSE PRACTITIONER

## 2019-05-01 PROCEDURE — A4216 STERILE WATER/SALINE, 10 ML: HCPCS | Mod: HCNC | Performed by: STUDENT IN AN ORGANIZED HEALTH CARE EDUCATION/TRAINING PROGRAM

## 2019-05-01 RX ORDER — DIPHENHYDRAMINE HCL 25 MG
25 CAPSULE ORAL EVERY 6 HOURS PRN
Status: DISCONTINUED | OUTPATIENT
Start: 2019-05-01 | End: 2019-05-03 | Stop reason: HOSPADM

## 2019-05-01 RX ORDER — BISACODYL 10 MG
10 SUPPOSITORY, RECTAL RECTAL ONCE
Status: COMPLETED | OUTPATIENT
Start: 2019-05-01 | End: 2019-05-01

## 2019-05-01 RX ADMIN — Medication 10 ML: at 09:05

## 2019-05-01 RX ADMIN — FUROSEMIDE 40 MG: 40 TABLET ORAL at 09:05

## 2019-05-01 RX ADMIN — LOSARTAN POTASSIUM 50 MG: 50 TABLET, FILM COATED ORAL at 10:05

## 2019-05-01 RX ADMIN — LATANOPROST 1 DROP: 50 SOLUTION OPHTHALMIC at 09:05

## 2019-05-01 RX ADMIN — GUAIFENESIN 200 MG: 200 SOLUTION ORAL at 04:05

## 2019-05-01 RX ADMIN — IPRATROPIUM BROMIDE 0.5 MG: 0.5 SOLUTION RESPIRATORY (INHALATION) at 07:05

## 2019-05-01 RX ADMIN — LEVALBUTEROL 1.25 MG: 1.25 SOLUTION, CONCENTRATE RESPIRATORY (INHALATION) at 07:05

## 2019-05-01 RX ADMIN — IPRATROPIUM BROMIDE 0.5 MG: 0.5 SOLUTION RESPIRATORY (INHALATION) at 04:05

## 2019-05-01 RX ADMIN — TIOTROPIUM BROMIDE 18 MCG: 18 CAPSULE ORAL; RESPIRATORY (INHALATION) at 02:05

## 2019-05-01 RX ADMIN — GUAIFENESIN 200 MG: 200 SOLUTION ORAL at 09:05

## 2019-05-01 RX ADMIN — IPRATROPIUM BROMIDE 0.5 MG: 0.5 SOLUTION RESPIRATORY (INHALATION) at 12:05

## 2019-05-01 RX ADMIN — AMIODARONE HYDROCHLORIDE 200 MG: 200 TABLET ORAL at 09:05

## 2019-05-01 RX ADMIN — APIXABAN 5 MG: 5 TABLET, FILM COATED ORAL at 09:05

## 2019-05-01 RX ADMIN — FLUTICASONE FUROATE AND VILANTEROL TRIFENATATE 1 PUFF: 100; 25 POWDER RESPIRATORY (INHALATION) at 09:05

## 2019-05-01 RX ADMIN — SODIUM CHLORIDE SOLN NEBU 3% 4 ML: 3 NEBU SOLN at 07:05

## 2019-05-01 RX ADMIN — MEROPENEM AND SODIUM CHLORIDE 1 G: 1 INJECTION, SOLUTION INTRAVENOUS at 05:05

## 2019-05-01 RX ADMIN — SODIUM CHLORIDE SOLN NEBU 3% 4 ML: 3 NEBU SOLN at 04:05

## 2019-05-01 RX ADMIN — ASPIRIN 81 MG: 81 TABLET, COATED ORAL at 09:05

## 2019-05-01 RX ADMIN — SODIUM CHLORIDE SOLN NEBU 3% 4 ML: 3 NEBU SOLN at 12:05

## 2019-05-01 RX ADMIN — BISACODYL 10 MG: 10 SUPPOSITORY RECTAL at 04:05

## 2019-05-01 RX ADMIN — PRAVASTATIN SODIUM 20 MG: 20 TABLET ORAL at 09:05

## 2019-05-01 RX ADMIN — POLYETHYLENE GLYCOL 3350 17 G: 17 POWDER, FOR SOLUTION ORAL at 09:05

## 2019-05-01 NOTE — SUBJECTIVE & OBJECTIVE
"Interval History: Patient continues to report improvement in SOB on 2L oxygen & stable. Thora attempted by Pulm yesterday but pocket too small. Patient passed MBSS. Discontinued meropenem today.     Review of Systems   Constitutional: Positive for fatigue. Negative for chills and fever.   Eyes: Negative.    Respiratory: Positive for cough (minimal per patient.  Improved from prior), shortness of breath and wheezing.    Cardiovascular: Negative for chest pain, palpitations and leg swelling.   Gastrointestinal: Positive for constipation. Negative for abdominal pain, diarrhea, nausea and vomiting.        Complains of occasional sensation of esophageal fullness, "something stuck" after eating   Genitourinary: Negative for difficulty urinating, dysuria and frequency.   Musculoskeletal: Negative for arthralgias and myalgias.   Skin: Negative for rash and wound.   Neurological: Positive for weakness. Negative for dizziness and headaches.     Objective:     Vital Signs (Most Recent):  Temp: 97.7 °F (36.5 °C) (05/01/19 1645)  Pulse: 79 (05/01/19 1645)  Resp: 16 (05/01/19 1645)  BP: (!) 109/58 (05/01/19 1645)  SpO2: 98 % (05/01/19 1645) Vital Signs (24h Range):  Temp:  [97.3 °F (36.3 °C)-98 °F (36.7 °C)] 97.7 °F (36.5 °C)  Pulse:  [66-80] 79  Resp:  [16-20] 16  SpO2:  [93 %-99 %] 98 %  BP: (109-146)/(58-68) 109/58     Weight: 39.2 kg (86 lb 8 oz)  Body mass index is 14.39 kg/m².    Intake/Output Summary (Last 24 hours) at 5/1/2019 1700  Last data filed at 5/1/2019 1600  Gross per 24 hour   Intake --   Output 600 ml   Net -600 ml      Physical Exam   Constitutional: She is oriented to person, place, and time. No distress.   Frail, pleasant    HENT:   Head: Normocephalic and atraumatic.   Mouth/Throat: No oropharyngeal exudate.   Eyes: Conjunctivae are normal. No scleral icterus.   Neck: Normal range of motion.   Cardiovascular: Normal rate and regular rhythm.   Murmur heard.  Pulmonary/Chest: Effort normal. No respiratory " distress. She has no wheezes. She has rales.   3L NC no resp distress    breath sounds at base with some rales on L    Abdominal: Soft. She exhibits no distension. There is no tenderness.   Musculoskeletal: She exhibits no edema.   Neurological: She is alert and oriented to person, place, and time.   Skin: Skin is warm and dry. She is not diaphoretic. There is pallor.   Vitals reviewed.      Significant Labs: All pertinent labs within the past 24 hours have been reviewed.    Significant Imaging: I have reviewed all pertinent imaging results/findings within the past 24 hours.  .

## 2019-05-01 NOTE — PLAN OF CARE
Problem: Occupational Therapy Goal  Goal: Occupational Therapy Goal  Goals to be met by: 5/4/19     Patient will increase functional independence with ADLs by performing:    LE Dressing with Set-up Assistance.  Grooming while standing at sink with Supervision. Progressing  Toileting from toilet with Supervision for hygiene and clothing management.  Progressing  Supine to sit with [place].  Step transfer with Stand-by Assistance  Toilet transfer to toilet with Stand-by Assistance. Progressing      Outcome: Ongoing (interventions implemented as appropriate)  Continue OT plan of care.

## 2019-05-01 NOTE — SUBJECTIVE & OBJECTIVE
"Interval History: Patient continues to report improvement in SOB on 2L oxygen & stable. Thora attempted by Pulm today but pocket too small. Patient to undergo MBSS.   Review of Systems   Constitutional: Positive for activity change and fatigue. Negative for chills and fever.   Eyes: Negative.    Respiratory: Positive for cough (minimal per patient.  Improved from prior), shortness of breath and wheezing.    Cardiovascular: Negative for chest pain, palpitations and leg swelling.   Gastrointestinal: Positive for constipation. Negative for abdominal pain, diarrhea, nausea and vomiting.        Complains of occasional sensation of esophageal fullness, "something stuck" after eating   Genitourinary: Negative for difficulty urinating, dysuria and frequency.   Musculoskeletal: Negative for arthralgias and myalgias.   Skin: Negative for rash and wound.   Neurological: Positive for weakness. Negative for dizziness and headaches.     Objective:     Vital Signs (Most Recent):  Temp: 98.3 °F (36.8 °C) (04/30/19 1612)  Pulse: 70 (04/30/19 2040)  Resp: 18 (04/30/19 2040)  BP: 134/67 (04/30/19 1612)  SpO2: 97 % (04/30/19 2034) Vital Signs (24h Range):  Temp:  [97.4 °F (36.3 °C)-98.3 °F (36.8 °C)] 98.3 °F (36.8 °C)  Pulse:  [55-78] 70  Resp:  [15-18] 18  SpO2:  [92 %-98 %] 97 %  BP: (134-173)/(67-77) 134/67     Weight: 39.2 kg (86 lb 8 oz)  Body mass index is 14.39 kg/m².    Intake/Output Summary (Last 24 hours) at 4/30/2019 2054  Last data filed at 4/30/2019 1700  Gross per 24 hour   Intake 840 ml   Output 700 ml   Net 140 ml      Physical Exam   Constitutional: She is oriented to person, place, and time. No distress.   Frail, pleasant    HENT:   Head: Normocephalic and atraumatic.   Mouth/Throat: No oropharyngeal exudate.   Eyes: Conjunctivae are normal. No scleral icterus.   Neck: Normal range of motion.   Cardiovascular: Normal rate and regular rhythm.   Murmur heard.  Pulmonary/Chest: Effort normal. No respiratory distress. She " has no wheezes. She has rales.   3L NC no resp distress    breath sounds at base with some rales on L    Abdominal: Soft. She exhibits no distension. There is no tenderness.   Musculoskeletal: She exhibits no edema.   Neurological: She is alert and oriented to person, place, and time.   Skin: Skin is warm and dry. She is not diaphoretic. There is pallor.   Vitals reviewed.      Significant Labs: All pertinent labs within the past 24 hours have been reviewed.    Significant Imaging: I have reviewed all pertinent imaging results/findings within the past 24 hours.  .

## 2019-05-01 NOTE — ASSESSMENT & PLAN NOTE
Appreciate ID recs-  1.   Continue meropenem for now to continue pseudomonal and anaerobic coverage (given concern with aspiration).   Currently on Day 5 of meropenem.   2. Sent sputum for respiratory culture to guide therapy  3.  Rec pulmonary consult for eval of RUL findings on CT - pulm feels fungal vs mucus plug and no indication for bronchoscopy at this time.  4.  Suspect patient has had adequate abx at 5d - rec dc tomorrow and monitor  5.  Fungal studies sent  6. Needs Pulm and ID fu as outpt - need fu on fungal studies.    Appreciate pulm recs-  Patient with <5mm pocket of fluid on inspiration on bedside ultrasound.  Relative to CT Chest performed, suspecting effusion has decreased in volume.  Did not feel sampling is warranted or safe given the clinical context.    Follow up with Pulmonary in 6 weeks with repeat CT Chest.  Follow up arranged my clinic.      Question of aspiration PNA- patient passed MBSS, appreciate SLP recs

## 2019-05-01 NOTE — ASSESSMENT & PLAN NOTE
Appreciate ID recs-  1.   Continue meropenem for now to continue pseudomonal and anaerobic coverage (given concern with aspiration).   Currently on Day 5 of meropenem.   2. Sent sputum for respiratory culture to guide therapy  3.  Rec pulmonary consult for eval of RUL findings on CT - pulm feels fungal vs mucus plug and no indication for bronchoscopy at this time.  4.  Suspect patient has had adequate abx at 5d - rec dc tomorrow and monitor  5.  Fungal studies sent  6. Needs Pulm and ID fu as outpt - need fu on fungal studies.    Appreciate pulm recs-  Patient with <5mm pocket of fluid on inspiration on bedside ultrasound.  Relative to CT Chest performed, suspecting effusion has decreased in volume.  Did not feel sampling is warranted or safe given the clinical context.    Follow up with Pulmonary in 6 weeks with repeat CT Chest.  Follow up arranged my clinic.      Question of aspiration PNA- patient passed MBSS, appreciate SLP rec s

## 2019-05-01 NOTE — ASSESSMENT & PLAN NOTE
Patient with a.fib diagnosed earlier this month during hospitalization for pneumonia and bacteremia. S/p failed cardioversion. Now controlled on diltiazem and amiodarone.  - Continuing amiodarone and diltiazem PO  - Anticoagulation with eliquis  -to be followed up in cardiology clinic to discuss amiodarone dosing

## 2019-05-01 NOTE — PT/OT/SLP PROGRESS
"Physical Therapy Treatment    Patient Name:  Latasha Perez   MRN:  [id number]  Admitting Diagnosis: Left lower lobe pneumonia  Recent Surgery: * No surgery found *      Recommendations:     Discharge Recommendations:  nursing facility, skilled (Pt could go home with 24/7 assistance from family available)  Discharge Equipment Recommendations: none   Barriers to discharge: Decreased caregiver support    Plan:     During this hospitalization, patient to be seen 4 x/week to address the above listed problems via gait training, therapeutic activities, therapeutic exercises, neuromuscular re-education  · Plan of Care Expires:  05/26/19   Plan of Care Reviewed with: patient    This Plan of care has been discussed with the patient who was involved in its development and understands and is in agreement with the identified goals and treatment plan    Subjective     Communicated with RN (Catalina) prior to session.     Patient comments: "The doctor said they would give me an enema"  RN was notified  Pain/Comfort:  · Pain Rating 1: 0/10  · Pain Rating Post-Intervention 1: 0/10    Objective:     Patient found with: pulse ox (continuous), telemetry, oxygen(2 LPM via NC)    Patient found sup in bed upon PT entry to room, agreeable to treatment.  No family present in the room.    General Precautions: Standard, aspiration, fall   Orthopedic Precautions:N/A   Braces: N/A       BED MOBILITY (vc's for hand placement sequencing of task):        Rolling to the R:  NT.       Rolling to the L:  Close sup.        Sup > sit at the EOB:  Close sup for safety (HOB flat, no use of bedrail).       Sit > sup:  Close sup for safety.                     SITTING AT THE EDGE OF THE BED    Assistance Level Required: sup for safety with B UE support   Postural deviations noted: flexed trunk, PPT, rounded shoulders   Encouraged: upright posture        Pt's SpO2 monitored once sitting at the EOB: 77% while on 2 LPM of O2 via NC.  RN approves for O2 to be " increased to 3LPM for activity    TRANSFERS  (vc's for hand placement, sequencing of task and safety)   Patient completed Sit <> Stand Transfer from EOB with CGA for balance with rolling walker x2 trial(s)   Patient completed Stand <> Sit Transfer to EOB with CGA for balance with rolling walker      GAIT: in hallway  with supplemental O2 at 3LPM via NC   Patient ambulated: 90ft   Patient required: CGA/min A for balance and mgt of AD   Patient used:  Rolling Walker   Gait Pattern observed: reciprocal gait   Gait Deviation(s): Increased patience, decreased step length, decreased stride length, decreased toe-to-floor clearance, decreased weight-shifting ability and FFP, instability    Comments: vc's for upright posture, placement of AD, appropriate breathing and patience    EDUCATION  Patient provided with daily orientation and goals of this PT session. They were educated to call for assistance and to transfer with hospital staff only.  Also, pt was educated on the effects of prolonged immobility and the importance of performing OOB activity and exercises to promote healing and reduce recovery time    Whiteboard updated with correct mobility information. RN/PCT notified.  Pt safe to transfer OOB/BTB with RN/PCT: Use RW/no AD with min A of 1 person.    Patient left supine, with  all lines intact, call button in reach, bed alarm on and RN notified    AM-PAC 6 CLICK MOBILITY  Turning over in bed (including adjusting bedclothes, sheets and blankets)?: 4  Sitting down on and standing up from a chair with arms (e.g., wheelchair, bedside commode, etc.): 3  Moving from lying on back to sitting on the side of the bed?: 3  Moving to and from a bed to a chair (including a wheelchair)?: 3  Need to walk in hospital room?: 3  Climbing 3-5 steps with a railing?: 2  Basic Mobility Total Score: 18     Assessment:     Latasha Perez is a 73 y.o. female admitted with a medical diagnosis of Left lower lobe pneumonia.  She presents with  the following impairments/functional limitations:  weakness, impaired endurance, impaired sensation, impaired self care skills, impaired functional mobilty, gait instability, impaired balance, decreased coordination, decreased lower extremity function, decreased safety awareness, impaired coordination, impaired fine motor, impaired cardiopulmonary response to activity. Requiring verbal cues for bed mob, transfers and gait to prevent falls due to instability.   In light of pt's current functional level and deficits, it is anticipated that pt will need to participate in an intense rehab program consisting of PT and OT in order to achieve full rehab potential to return to previous level of function and roles.  Pt remains motivated to participate in PT session and will cont to benefit from skilled PT intervention.      Rehab Prognosis:  Good; patient would benefit from acute skilled PT services to address these deficits and reach maximum level of function.      GOALS:   Multidisciplinary Problems     Physical Therapy Goals        Problem: Physical Therapy Goal    Goal Priority Disciplines Outcome Goal Variances Interventions   Physical Therapy Goal     PT, PT/OT Ongoing (interventions implemented as appropriate)     Description:  Goals to be met by: 5/10/2019    Patient will increase functional independence with mobility by performin. Sit to stand transfer with Modified [place]  2. Gait  x 200 feet with Supervision using least restrictive AD.   3. Ascend/descend 3 stairs with right handrails with Contact Guard Assistance   4. Stand for 5 minutes with Supervision using least restrictive AD while performing dynamic balance activities  5. Lower extremity exercise program x30 reps per handout, with independence                      Time Tracking:     PT Received On: 19  PT Start Time: 1324     PT Stop Time: 1359  PT Total Time (min): 35 min     Billable Minutes: Gait Training 15 and Therapeutic Activity  20    Treatment Type: Treatment  PT/PTA: PTA     PTA Visit Number: 2       Esha Beard PTA.  Pager [phone]    5/1/2019    .

## 2019-05-01 NOTE — PLAN OF CARE
SW received a phone call from [facility] and they are interested in pt. Pt has no one to sign her into facility so [facility] Megan at [phone] wants to speak with her. Pt said she wants a referral sent to where her spouse is at [facility] so SW emailed [facility].    Lian Gallegos, [facility]  [phone]

## 2019-05-01 NOTE — PLAN OF CARE
Referral placed to [facility] for SNF per Patient's request. Will follow in [facility] for response.

## 2019-05-01 NOTE — PROGRESS NOTES
[facility] Hospital Medicine  Progress Note    Patient Name: Latasha Perez  MRN: [id number]  Patient Class: IP- Inpatient   Admission Date: 4/26/2019  Length of Stay: 4 days  Attending Physician: Laura Billy MD  Primary Care Provider: Pollo Oneal MD    [facility] Medicine Team: [facility] MED 5 Ingrid Michael MD    Subjective:     Principal Problem:Left lower lobe pneumonia    HPI:  Ms. Perez is a 73 year old lady with a past medical history significant for HTN, HLD, COPD (on 3-4L NC at home), A.fib with RVR (s/p failed ablation - on Amiodarone and diltiazem for rate control and eliquis for anticoagulation) who presents [facility] with complaints of shortness of breath. She was recently discharged from [facility] on 4/11/19 for right upper lobe pneumonia and new onset A.fib with RVR. Blood cultures grew Pseudomonas sensitive to cefipime and she completed a course of outpatient cefipime IV q8h via a PICC line which has since been removed. The patient states her cough has remained since that hospitalization. She is unclear as to how long exactly the cough has persisted and will not clarify the color of sputum production however does state that initially it was productive of sputum however it no longer as. She denies fevers or chills. Upon EMS arrival patient was hypoxic, reported sat in the 50's on room air, she was given solumedrol and started on BiPAP. On presentation to the ED patient received duo nebs and oxygenation improved, she was weaned down off Bipap to home O2 requirement of 3L.    In regards to her atrial fib, the patient was diagnosed during hospitalization at the beginning of 4/1/19. Echo showed pulmonary hypertension, normal EF.JOHNNA was negative for thrombus so patient was cardioverted however went back into A.fib. She was controlled on Amiodarone and diltiazem and discharged on those medications. She was also started on eliquis for anticoagulation.  "Patient denies recurrence of palpitations or any chest discomfort. In ED patient was normal sinus rhythm.    In the ED CXR was notable for new left lower lobe consolidation. Labs were notable for elevated WBC. She received a dose of vancomycin and rocephin. Blood cultures were not collected prior to initiation of antibiotics and was admitted to hospital medicine for further evaluation of HCAP and COPD exacerbation.    Hospital Course:  Interval Hx: NAEO, she continues to improve now on 2-3L oxygen. Started on meropenem yesterday. Question of slow clinical improvement previously & history of Pseudomonas bacteremia.     Interval History: Patient continues to report improvement in SOB on 2L oxygen & stable. Thora attempted by Pulm today but pocket too small. Patient to undergo MBSS.   Review of Systems   Constitutional: Positive for activity change and fatigue. Negative for chills and fever.   Eyes: Negative.    Respiratory: Positive for cough (minimal per patient.  Improved from prior), shortness of breath and wheezing.    Cardiovascular: Negative for chest pain, palpitations and leg swelling.   Gastrointestinal: Positive for constipation. Negative for abdominal pain, diarrhea, nausea and vomiting.        Complains of occasional sensation of esophageal fullness, "something stuck" after eating   Genitourinary: Negative for difficulty urinating, dysuria and frequency.   Musculoskeletal: Negative for arthralgias and myalgias.   Skin: Negative for rash and wound.   Neurological: Positive for weakness. Negative for dizziness and headaches.     Objective:     Vital Signs (Most Recent):  Temp: 98.3 °F (36.8 °C) (04/30/19 1612)  Pulse: 70 (04/30/19 2040)  Resp: 18 (04/30/19 2040)  BP: 134/67 (04/30/19 1612)  SpO2: 97 % (04/30/19 2034) Vital Signs (24h Range):  Temp:  [97.4 °F (36.3 °C)-98.3 °F (36.8 °C)] 98.3 °F (36.8 °C)  Pulse:  [55-78] 70  Resp:  [15-18] 18  SpO2:  [92 %-98 %] 97 %  BP: (134-173)/(67-77) 134/67     Weight: " 39.2 kg (86 lb 8 oz)  Body mass index is 14.39 kg/m².    Intake/Output Summary (Last 24 hours) at 2019  Last data filed at 2019 1700  Gross per 24 hour   Intake 840 ml   Output 700 ml   Net 140 ml      Physical Exam   Constitutional: She is oriented to person, place, and time. No distress.   Frail, pleasant    HENT:   Head: Normocephalic and atraumatic.   Mouth/Throat: No oropharyngeal exudate.   Eyes: Conjunctivae are normal. No scleral icterus.   Neck: Normal range of motion.   Cardiovascular: Normal rate and regular rhythm.   Murmur heard.  Pulmonary/Chest: Effort normal. No respiratory distress. She has no wheezes. She has rales.   3L NC no resp distress    breath sounds at base with some rales on L    Abdominal: Soft. She exhibits no distension. There is no tenderness.   Musculoskeletal: She exhibits no edema.   Neurological: She is alert and oriented to person, place, and time.   Skin: Skin is warm and dry. She is not diaphoretic. There is pallor.   Vitals reviewed.      Significant Labs: All pertinent labs within the past 24 hours have been reviewed.    Significant Imaging: I have reviewed all pertinent imaging results/findings within the past 24 hours.  .    Assessment/Plan:      * Left lower lobe pneumonia  Hx of severe COPD with recent hospitalization and recently treated for suspected PNA and pseudomonas infection.  Currently followed by ID.  Plan for CT chest today  - Continue Meropenem D/C vanc and plan to transition to cefepime pending CT  Recurrent, with new opacity found that developed at the end of March  -Pulm consulted, appreciate recs  -addition of gentle diuresis with lasix 20mg QD   -thora attempted not enough fluid  - Repeat BNP pending        Recurrent pneumonia   Appreciate ID recs-  1.   Continue meropenem for now to continue pseudomonal and anaerobic coverage (given concern with aspiration).   Currently on Day 5 of meropenem.   2. Sent sputum for respiratory culture  "to guide therapy  3.  Rec pulmonary consult for eval of RUL findings on CT - pulm feels fungal vs mucus plug and no indication for bronchoscopy at this time.  4.  Suspect patient has had adequate abx at 5d - rec dc tomorrow and monitor  5.  Fungal studies sent  6. Needs Pulm and ID fu as outpt - need fu on fungal studies.    Appreciate pulm recs-  Patient with <5mm pocket of fluid on inspiration on bedside ultrasound.  Relative to CT Chest performed, suspecting effusion has decreased in volume.  D id not feel sampling is warranted or safe given the clinical context.    Follow up with Pulmonary in 6 weeks with repeat CT Chest.  Follow up arranged my clinic.      Question of aspiration PNA- patient passed MBSS, appreciate SLP recs       Acute on chronic respiratory failure with hypoxia  Acute resp failure with hypoxia  -complete infectious picture not fitting  -CT chest pending   - Treating with Vanc and meropenem with plans to de escalate today     RESOLVED      Oral thrush  -some oral thrush noted  -magic mouthwash PRN       Paroxysmal atrial fibrillation  Patient with a.fib diagnosed earlier this month during hospitalization for pneumonia and bacteremia. S/p failed cardioversion. Now controlled on diltiazem and amiodarone.  - Continuing amiodarone and diltiazem PO  - Anticoagulation with eliquis      Severe malnutrition  - Boost with meals     Cachexia  - Nutrition consulted  - Boost supplementation with meals      COPD exacerbation  - 2/2 pneumonia, please see "left lower lobe pneumonia" above, clinically picture unclear       Essential hypertension  - Continuing home diltiazem, patient normotensive once weaned off Bipap. Suspect hypoxia and stress were leading to increase in blood pressure.  - Continue to monitor      Leukocytosis  - Please see "Pneumonia"      Chronic hyponatremia  Stable / now resolved.       Carotid disease, bilateral  Patient with 50-69% stenosis of left carotid artery and < 50% stenosis of " right per ultrasound on 6/13/18.    - Continuing asa and statin while inpatient      HLD (hyperlipidemia)  Patient on pravastatin 20mg at home.    - Continuing home statin while inpatient        VTE Risk Mitigation (From admission, onward)        Ordered     apixaban tablet 5 mg  2 times daily      04/26/19 0559     Place TIAN hose  Until discontinued      04/26/19 0454     IP VTE HIGH RISK PATIENT  Once      04/26/19 0454              Ingrid Michael MD  Department of Hospital Medicine   [facility]

## 2019-05-01 NOTE — PT/OT/SLP PROGRESS
"Occupational Therapy   Treatment    Name: Latasha Perez  MRN: [id number]  Admitting Diagnosis:  Left lower lobe pneumonia       Recommendations:     Discharge Recommendations: nursing facility, skilled  Discharge Equipment Recommendations:  none  Barriers to discharge:  Decreased caregiver support    Assessment:     Latasha Perez is a 73 y.o. female with a medical diagnosis of Left lower lobe pneumonia.  She presents with performance deficits including weakness, impaired endurance, impaired self care skills, impaired functional mobilty, gait instability, impaired balance, decreased lower extremity function, impaired cardiopulmonary response to activity, decreased safety awareness. Pt would continue to benefit from OT to increase functional independence and safety. Recommend SNF upon D/C as pt is unsafe to return home at current functional level.    Rehab Prognosis:  Good; patient would benefit from acute skilled OT services to address these deficits and reach maximum level of function.       Plan:     Patient to be seen 3 x/week to address the above listed problems via self-care/home management, therapeutic activities, therapeutic exercises  · Plan of Care Expires: 05/27/19  · Plan of Care Reviewed with: patient    Subjective     Pain/Comfort:  · Pain Rating 1: 0/10  · Pain Rating Post-Intervention 1: 0/10    Objective:     Communicated with: RN prior to session. Patient found with HOB elevated with telemetry, bed alarm, peripheral IV, oxygen upon OT entry to room.  Pt reports "I want to go home tomorrow or Friday."    General Precautions: Standard, fall, aspiration   Orthopedic Precautions:N/A   Braces: N/A     Occupational Performance:     Bed Mobility:    · Patient completed Supine to Sit with contact guard assistance with HOB elevated    Functional Mobility/Transfers:  · Patient completed Sit <> Stand Transfer with CGA from EOB and Min A from toilet with hand-held assist  · Functional Mobility: To bathroom " and back to bedside chair ~20 ft with Min A hand-held assist and 3L O2 in tow-- pt unsteady, c/o L LE weakness, reaching for furniture for stability    Activities of Daily Living:  · Grooming: in long-sitting with PCT with set-up assistance for oral hygiene  · Lower Body Dressing: SBA in long-sitting to don socks  · Toileting: CGA to maintain balance while completing standing hygiene      [facility] 6 Click ADL: 21    Treatment & Education:  Pt educated on safety with mobility and ADLs and benefits of short-stay SNF prior to returning home-- will require ongoing education  Pt requesting LE therex and completed seated LE therex hip/knee flex/ext and calf raises    Patient left up in chair with all lines intact, call button in reach and RN notifiedEducation:      GOALS:   Multidisciplinary Problems     Occupational Therapy Goals        Problem: Occupational Therapy Goal    Goal Priority Disciplines Outcome Interventions   Occupational Therapy Goal     OT, PT/OT Ongoing (interventions implemented as appropriate)    Description:  Goals to be met by: 5/4/19     Patient will increase functional independence with ADLs by performing:    LE Dressing with Set-up Assistance.  Grooming while standing at sink with Supervision. Progressing  Toileting from toilet with Supervision for hygiene and clothing management.  Progressing  Supine to sit with [place].  Step transfer with Stand-by Assistance  Toilet transfer to toilet with Stand-by Assistance. Progressing                       Time Tracking:     OT Date of Treatment: 05/01/19  OT Start Time: 1000  OT Stop Time: 1023  OT Total Time (min): 23 min    Billable Minutes:Self Care/Home Management 11  Therapeutic Activity 12    LENNY Bates  5/1/2019

## 2019-05-01 NOTE — PROGRESS NOTES
[facility] Hospital Medicine  Progress Note    Patient Name: Latasha Perez  MRN: [id number]  Patient Class: IP- Inpatient   Admission Date: 4/26/2019  Length of Stay: 5 days  Attending Physician: Laura Billy MD  Primary Care Provider: Pollo Oneal MD    [facility] Medicine Team: [facility] HOSP MED 5 Ingrid Michael MD    Subjective:     Principal Problem:Left lower lobe pneumonia    HPI:  Ms. Perez is a 73 year old lady with a past medical history significant for HTN, HLD, COPD (on 3-4L NC at home), A.fib with RVR (s/p failed ablation - on Amiodarone and diltiazem for rate control and eliquis for anticoagulation) who presents to [facility] ED [facility] with complaints of shortness of breath. She was recently discharged from [facility] on 4/11/19 for right upper lobe pneumonia and new onset A.fib with RVR. Blood cultures grew Pseudomonas sensitive to cefipime and she completed a course of outpatient cefipime IV q8h via a PICC line which has since been removed. The patient states her cough has remained since that hospitalization. She is unclear as to how long exactly the cough has persisted and will not clarify the color of sputum production however does state that initially it was productive of sputum however it no longer as. She denies fevers or chills. Upon EMS arrival patient was hypoxic, reported sat in the 50's on room air, she was given solumedrol and started on BiPAP. On presentation to the ED patient received duo nebs and oxygenation improved, she was weaned down off Bipap to home O2 requirement of 3L.    In regards to her atrial fib, the patient was diagnosed during hospitalization at the beginning of 4/1/19. Echo showed pulmonary hypertension, normal EF.JOHNNA was negative for thrombus so patient was cardioverted however went back into A.fib. She was controlled on Amiodarone and diltiazem and discharged on those medications. She was also started on eliquis for anticoagulation.  "Patient denies recurrence of palpitations or any chest discomfort. In ED patient was normal sinus rhythm.    In the ED CXR was notable for new left lower lobe consolidation. Labs were notable for elevated WBC. She received a dose of vancomycin and rocephin. Blood cultures were not collected prior to initiation of antibiotics and was admitted to hospital medicine for further evaluation of HCAP and COPD exacerbation.    Hospital Course:  She continues to improve now on 2-3L oxygen. Started on meropenem yesterday. Question of slow clinical improvement previously & history of Pseudomonas bacteremia.     Interval History: Patient continues to report improvement in SOB on 2L oxygen & stable. Thora attempted by Pulm yesterday but pocket too small. Patient passed MBSS. Discontinued meropenem today.     Review of Systems   Constitutional: Positive for fatigue. Negative for chills and fever.   Eyes: Negative.    Respiratory: Positive for cough (minimal per patient.  Improved from prior), shortness of breath and wheezing.    Cardiovascular: Negative for chest pain, palpitations and leg swelling.   Gastrointestinal: Positive for constipation. Negative for abdominal pain, diarrhea, nausea and vomiting.        Complains of occasional sensation of esophageal fullness, "something stuck" after eating   Genitourinary: Negative for difficulty urinating, dysuria and frequency.   Musculoskeletal: Negative for arthralgias and myalgias.   Skin: Negative for rash and wound.   Neurological: Positive for weakness. Negative for dizziness and headaches.     Objective:     Vital Signs (Most Recent):  Temp: 97.7 °F (36.5 °C) (05/01/19 1645)  Pulse: 79 (05/01/19 1645)  Resp: 16 (05/01/19 1645)  BP: (!) 109/58 (05/01/19 1645)  SpO2: 98 % (05/01/19 1645) Vital Signs (24h Range):  Temp:  [97.3 °F (36.3 °C)-98 °F (36.7 °C)] 97.7 °F (36.5 °C)  Pulse:  [66-80] 79  Resp:  [16-20] 16  SpO2:  [93 %-99 %] 98 %  BP: (109-146)/(58-68) 109/58     Weight: 39.2 " kg (86 lb 8 oz)  Body mass index is 14.39 kg/m².    Intake/Output Summary (Last 24 hours) at 2019 1700  Last data filed at 2019 1600  Gross per 24 hour   Intake --   Output 600 ml   Net -600 ml      Physical Exam   Constitutional: She is oriented to person, place, and time. No distress.   Frail, pleasant    HENT:   Head: Normocephalic and atraumatic.   Mouth/Throat: No oropharyngeal exudate.   Eyes: Conjunctivae are normal. No scleral icterus.   Neck: Normal range of motion.   Cardiovascular: Normal rate and regular rhythm.   Murmur heard.  Pulmonary/Chest: Effort normal. No respiratory distress. She has no wheezes. She has rales.   3L NC no resp distress    breath sounds at base with some rales on L    Abdominal: Soft. She exhibits no distension. There is no tenderness.   Musculoskeletal: She exhibits no edema.   Neurological: She is alert and oriented to person, place, and time.   Skin: Skin is warm and dry. She is not diaphoretic. There is pallor.   Vitals reviewed.      Significant Labs: All pertinent labs within the past 24 hours have been reviewed.    Significant Imaging: I have reviewed all pertinent imaging results/findings within the past 24 hours.  .    Assessment/Plan:      * Left lower lobe pneumonia  Hx of severe COPD with recent hospitalization and recently treated for suspected PNA and pseudomonas infection.  Currently followed by ID.  Plan for CT chest today  - Continue Meropenem D/C vanc and plan to transition to cefepime pending CT  Recurrent, with new opacity found that developed at the end of March  -Pulm consulted, appreciate recs  -addition of gentle diuresis with lasix 20mg QD   -thora attempted not enough fluid  - Repeat BNP pending        Recurrent pneumonia   Appreciate ID recs-  1.   Continue meropenem for now to continue pseudomonal and anaerobic coverage (given concern with aspiration).   Currently on Day 5 of meropenem.   2. Sent sputum for respiratory culture to guide  "therapy  3.  Rec pulmonary consult for eval of RUL findings on CT - pulm feels fungal vs mucus plug and no indication for bronchoscopy at this time.  4.  Suspect patient has had adequate abx at 5d - rec dc tomorrow and monitor  5.  Fungal studies sent  6. Needs Pulm and ID fu as outpt - need fu on fungal studies.    Appreciate pulm recs-  Patient with <5mm pocket of fluid on inspiration on bedside ultrasound.  Relative to CT Chest performed, suspecting effusion has decreased in volume.  Did not feel sampling is warranted or safe given the clinical context.    Follow up with Pulmonary in 6 weeks with repeat CT Chest.  Follow up arranged my clinic.      Question of aspiration PNA- patient passed MBSS, appreciate SLP rec s       Acute on chronic respiratory failure with hypoxia  Acute resp failure with hypoxia  -complete infectious picture not fitting  -CT chest pending   - Treating with Vanc and meropenem with plans to de escalate today     RESOLVED      Oral thrush  -some oral thrush noted  -magic mouthwash PRN       Paroxysmal atrial fibrillation  Patient with a.fib diagnosed earlier this month during hospitalization for pneumonia and bacteremia. S/p failed cardioversion. Now controlled on diltiazem and amiodarone.  - Continuing amiodarone and diltiazem PO  - Anticoagulation with eliquis  -to be followed up in cardiology clinic to discuss amiodarone dosing     Severe malnutrition  - Boost with meals     Cachexia  - Nutrition consulted  - Boost supplementation with meals      COPD exacerbation  - 2/2 pneumonia, please see "left lower lobe pneumonia" above, clinically picture unclear       Essential hypertension  - Continuing home diltiazem, patient normotensive once weaned off Bipap. Suspect hypoxia and stress were leading to increase in blood pressure.  - Continue to monitor      Leukocytosis  - Please see "Pneumonia"      Chronic hyponatremia  Stable / now resolved.       Carotid disease, bilateral  Patient with " 50-69% stenosis of left carotid artery and < 50% stenosis of right per ultrasound on 6/13/18.    - Continuing asa and statin while inpatient      HLD (hyperlipidemia)  Patient on pravastatin 20mg at home.    - Continuing home statin while inpatient        VTE Risk Mitigation (From admission, onward)        Ordered     apixaban tablet 5 mg  2 times daily      04/26/19 0559     Place TIAN hose  Until discontinued      04/26/19 0454     IP VTE HIGH RISK PATIENT  Once      04/26/19 0454          Ingrid Michael MD  Department of Hospital Medicine   [facility]

## 2019-05-01 NOTE — PLAN OF CARE
Problem: Fall Injury Risk  Goal: Absence of Fall and Fall-Related Injury  Outcome: Ongoing (interventions implemented as appropriate)  Patient require minimal assistance with getting onto bedside commode. No falls or injuries. Patient had c/o not having a bm in the last past 5 days.  And not sleeping well at night. Ramelteon administered last night patient stated she slept better.

## 2019-05-01 NOTE — ASSESSMENT & PLAN NOTE
Hx of severe COPD with recent hospitalization and recently treated for suspected PNA and pseudomonas infection.  Currently followed by ID.  Plan for CT chest today  - Continue Meropenem D/C vanc and plan to transition to cefepime pending CT  Recurrent, with new opacity found that developed at the end of March  -Pulm consulted, appreciate recs  -addition of gentle diuresis with lasix 20mg QD   -thora attempted not enough fluid  - Repeat BNP pending

## 2019-05-01 NOTE — PLAN OF CARE
Problem: Adult Inpatient Plan of Care  Goal: Plan of Care Review  Outcome: Ongoing (interventions implemented as appropriate)  Pt. free of any falls/injuries. V/s Monitored per order. Pt. encourged to turn Q2 hours to decrease skin break down. Pt. Given suppository and had bowel movement. Pt. Stable. WCTM

## 2019-05-01 NOTE — PROGRESS NOTES
[facility]  Infectious Disease  Progress Note    Patient Name: Latasha Perez  MRN: [id number]  Admission Date: 4/26/2019  Length of Stay: 4 days  Attending Physician: Laura Billy MD  Primary Care Provider: Pollo Oneal MD    Isolation Status: No active isolations  Assessment/Plan:      * Left lower lobe pneumonia     73 year old woman with COPD, HTN, Afib with RVR (on amiodarone), and recent history of hospitalization for pseudomonas bacteremia (pan sensitive) and RUL PNA (s/p 14 days of IV cefepime) who [facility] with sudden onset of shortness of breath.  Found to be hypoxic with reported O2 sats in 50s on room air.  CXR in ED showed new LLL consolidation concerning for PNA.  Associated leukocytosis.  No associated fevers, chills.  Received dose of vancomycin and cefepime in ED.  Currently on IV vancomycin and meropenem.  Blood cultures pending, NGTD.  Respiratory culture pending collections.        Reports severe reaction to PCN many years ago requiring hospitalization after taking PCN prescribed by dentist. Tolerates cephalosporins.  Afebrile, WBC down trending = 13 today (down from 16).   Hemodynamically stable, no distress at time of visit.     CT chest today shows severe emphysema, bilateral pleural effusions (trace on right, small on left), and irregular opacity in RUL corresponding to RUL opacity on CXR of 4/2.   Also patchy ground glass attenuation scattered throughout, predominantly in left lower lobe and ligula which may represent aspiration vs. Infectious/non-infectious inflammatory process.     Passed MBS.  Pulm saw and attempted thoracentesis for effusion to no avail and did not rec bronch.  [place] CT findings cf mucus plug vs fungal..  Back at baseline from a respiratory standpoint. Procalcitonin WNL.    Plan/recommendations:  1.   Continue meropenem for now to continue pseudomonal and anaerobic coverage (given concern with aspiration).    Currently on Day 5 of meropenem.   2. Sent sputum for respiratory culture to guide therapy  3.  Rec pulmonary consult for eval of RUL findings on CT - pulm feels fungal vs mucus plug and no indication for bronchoscopy at this time.  4.  Suspect patient has had adequate abx at 5d - rec dc tomorrow and monitor  5.  Fungal studies sent  6. Needs Pulm and ID fu as outpt - need fu on fungal studies.  7. Will sing off    Pllan discussed with ID staff  Discussed with Primary Team           Anticipated Disposition: tbd    Thank you for your consult. I will sign off. Please contact us if you have any additional questions.    MADDIE Lawson  Infectious Disease  [facility]    Subjective:     Principal Problem:Left lower lobe pneumonia    HPI: Ms. Latasha Perez is a 73 year old woman with COPD, HTN, Afib with RVR (on amiodarone), and recent history of hospitalization for pseudomonas bacteremia (pan sensitive) and RUL PNA (s/p 14 days of IV cefepime) who presented from [facility] with sudden onset of shortness of breath.  Found to be hypoxic with reported O2 sats in 50s on room air.  CXR in ED showed new LLL consolidation concerning for PNA.  Associated leukocytosis.  No associated fevers, chills.  Received dose of vancomycin and cefepime in ED.  Currently on IV vancomycin and meropenem.  Blood cultures pending, NGTD.  No respiratory cultures.     At time of visit she is sitting up in chair in no distress.  Primary complaint is that she feels weak.  She denies significant cough, denies sputum production.  Denies any associated fevers or chills.    Interval History: No AEON. AFebrile and WBC WNL.  Feels much better.  SOB at baseline.  The patient denies any recent fever, chills, or sweats.      Review of Systems   Constitutional: Negative for activity change, chills, diaphoresis and fever.   Respiratory: Positive for shortness of breath. Negative for cough and wheezing.    Cardiovascular:  Negative for chest pain.   Gastrointestinal: Negative for abdominal pain, constipation, diarrhea, nausea and vomiting.   Genitourinary: Negative for dysuria, frequency and urgency.   Neurological: Negative for dizziness.   Hematological: Does not bruise/bleed easily.     Objective:     Vital Signs (Most Recent):  Temp: 98 °F (36.7 °C) (04/29/19 1628)  Pulse: 100 (04/29/19 2026)  Resp: 18 (04/29/19 2026)  BP: (!) 152/72 (04/29/19 1628)  SpO2: (!) 94 % (04/29/19 2026) Vital Signs (24h Range):  Temp:  [97.6 °F (36.4 °C)-98.7 °F (37.1 °C)] 98 °F (36.7 °C)  Pulse:  [] 100  Resp:  [17-22] 18  SpO2:  [92 %-98 %] 94 %  BP: (152-181)/(72-79) 152/72     Weight: 39.2 kg (86 lb 8 oz)  Body mass index is 14.39 kg/m².    Estimated Creatinine Clearance: 52.4 mL/min (based on SCr of 0.6 mg/dL).    Physical Exam   Constitutional: She is oriented to person, place, and time. No distress.   Frail, elderly woman   HENT:   Head: Normocephalic and atraumatic.   Mouth/Throat: No oropharyngeal exudate.   Eyes: Conjunctivae are normal. No scleral icterus.   Neck: Normal range of motion.   Cardiovascular: Normal rate and regular rhythm.   Murmur heard.  Pulmonary/Chest: Effort normal. No respiratory distress. She has no wheezes. She has no rales.   O2 nasal cannula.      Abdominal: Soft. She exhibits no distension. There is no tenderness.   Musculoskeletal: She exhibits no edema.   Neurological: She is alert and oriented to person, place, and time.   Skin: Skin is warm and dry. She is not diaphoretic. There is pallor.   Psychiatric: She has a normal mood and affect. Her behavior is normal.   Vitals reviewed.      Significant Labs:   Blood Culture:   Recent Labs   Lab 04/02/19  1020 04/04/19  0633 04/04/19  0700 04/26/19  0615 04/26/19  0642   LABBLOO No growth after 5 days. No growth after 5 days. No growth after 5 days. No Growth to date  No Growth to date  No Growth to date  No Growth to date No Growth to date  No Growth to date   No Growth to date  No Growth to date     CBC:   Recent Labs   Lab 04/28/19  0558 04/29/19  0557   WBC 13.47* 10.47   HGB 9.6* 10.8*   HCT 30.1* 33.5*   * 391*     CMP:   Recent Labs   Lab 04/28/19  0558 04/29/19  0557    139   K 4.4 4.0    100   CO2 28 29   GLU 83 81   BUN 18 16   CREATININE 0.6 0.6   CALCIUM 9.0 9.3   PROT 6.3 6.7   ALBUMIN 2.1* 2.5*   BILITOT 0.3 0.3   ALKPHOS 75 86   AST 23 23   ALT 18 22   ANIONGAP 10 10   EGFRNONAA >60.0 >60.0     Respiratory Culture: No results for input(s): GSRESP, RESPIRATORYC in the last 4320 hours.  Urine Culture: No results for input(s): LABURIN in the last 4320 hours.  Urine Studies: No results for input(s): COLORU, APPEARANCEUA, PHUR, SPECGRAV, PROTEINUA, GLUCUA, KETONESU, BILIRUBINUA, OCCULTUA, NITRITE, UROBILINOGEN, LEUKOCYTESUR, RBCUA, WBCUA, BACTERIA, SQUAMEPITHEL, HYALINECASTS in the last 4320 hours.    Invalid input(s): WRIGHTSUR  Wound Culture: No results for input(s): LABAERO in the last 4320 hours.    Significant Imaging: I have reviewed all pertinent imaging results/findings within the past 24 hours.   CT Chest Without Contrast [[id number]] (Abnormal) Resulted: 04/28/19 1507   Order Status: Completed Updated: 04/28/19 1510   Narrative:     EXAMINATION:  CT CHEST WITHOUT CONTRAST    CLINICAL HISTORY:  SOB, pulmonary edema suspected;    TECHNIQUE:  Low dose axial images, sagittal and coronal reformations were obtained from the thoracic inlet to the lung bases. Contrast was not administered.    COMPARISON:  Chest radiograph 04/26/2019, 04/07/2018 04/06/2019, 04/03/2019, 04/02/2019 03/28/2019 10/06/2016, 04/15/2014    FINDINGS:  Base of Neck:  0.8 cm hypodensity within the left thyroid lobe.    Thoracic soft tissues:  Coarse calcifications within the left breast tissue.    Aorta: Left-sided aortic arch with normal branching pattern, caliber, contour, and course with moderate atherosclerotic calcifications throughout its course.  Calcification of  the aortic valve and annulus.    Heart: Normal size.  Abundant coronary artery calcific atherosclerosis in a multivessel distribution.    Pericardium: Trace pericardial fluid, likely physiologic.    Pulmonary vasculature: Pulmonary arteries distribute normally. There are four pulmonary veins.    Marcy/Mediastinum:  No lymph node enlargement on this noncontrast CT. Hilar contours are unremarkable.    Airways:  Trachea is midline and proximal airways are patent.  Secretions noted within the trachea and proximal airways.    Lungs/Pleura:    -bilateral pleural effusions, trace on the right and small on the left.  There is adjacent compressive atelectasis on the left with mild volume loss, new since 04/07/2019.    -irregular opacity within the anterior segment of the right upper lobe measuring 3.9 x 2.0 cm (axial series 2, image 34), likely corresponding to the right upper lobe opacity first noted on chest radiograph 04/02/2019.  Although malignancy cannot be excluded, the fact that this finding developed between 03/28/2019 and 04/02/2019 is reassuring and neoplasm felt to be unlikely.    -mild atelectasis along the left major fissure.    -additional patchy mild ground-glass attenuation involving the medial segment of the right middle lobe, lingula, posterior segment of left upper lobe, and much of the superior segment of the left lower lobe.    -mild bronchiectasis.    -severe centrilobular emphysematous changes with a biapical predominance.    -No pneumothorax.    Esophagus: Normal in course and caliber.    Upper abdomen: No acute intra-abdominal abnormality.    Bones:  Degenerative changes of the visualized osseous structures without acute fracture or lytic or sclerotic lesions.   Impression:       1. Bilateral pleural effusions, trace on the right and small on the left, with adjacent compressive atelectasis associated with mild volume loss predominantly on the left, new from prior exam on 04/07/2019.    2.  Irregular  opacity within the anterior segment of the right upper lobe measuring 3.9 x 2.0 cm, corresponding with the right upper lobe opacity first noted on chest radiograph 04/02/2019. Although malignancy cannot be excluded, the fact that this finding developed between 03/28/2019 and 04/02/2019 is reassuring and neoplasm felt to be unlikely.  This may represent an area of mucoid impaction, atelectasis, or infection.    3.  Mild patchy ground-glass attenuation scattered throughout, predominantly involving the left lower lobe and lingula, which may represent aspiration versus infectious/noninfectious inflammatory process.    4.  Severe emphysema.    This report was flagged in Epic as abnormal.    Electronically signed by resident: Jeny Rodriguez  Date: 04/28/2019  Time: 14:10    Electronically signed by: Rose Atwood MD  Date: 04/28/2019  Time: 15:07   X-Ray Chest AP Portable [[id number]] Resulted: 04/26/19 0236   Order Status: Completed Updated: 04/26/19 0239   Narrative:     EXAMINATION:  XR CHEST AP PORTABLE    CLINICAL HISTORY:  Asthma;    TECHNIQUE:  Single frontal view of the chest was performed.    COMPARISON:  April 7, 2019.    FINDINGS:  Interval development of opacification at the left lung base.    Right PICC line has been removed.    Heart and lungs otherwise appear unchanged when allowing for differences in technique and positioning.   Impression:       Interval development of opacification at the left lung base.  Possible pneumonia with parapneumonic effusion.    No significant change from prior study.      Electronically signed by: Eder Clarke MD  Date: 04/26/2019  Time: 02:36

## 2019-05-01 NOTE — PLAN OF CARE
Problem: Physical Therapy Goal  Goal: Physical Therapy Goal  Goals to be met by: 5/10/2019    Patient will increase functional independence with mobility by performin. Sit to stand transfer with Modified [place]  2. Gait  x 200 feet with Supervision using least restrictive AD.   3. Ascend/descend 3 stairs with right handrails with Contact Guard Assistance   4. Stand for 5 minutes with Supervision using least restrictive AD while performing dynamic balance activities  5. Lower extremity exercise program x30 reps per handout, with independence       Discharge Recommendations: SS SNF    Pt safe to transfer OOB/BTB with RN/PCT: Use RW/no AD with min A of 1 person.    Goals remain appropriate.     Esha Beard, PTA.   [phone]   2019

## 2019-05-02 LAB
ALBUMIN SERPL BCP-MCNC: 2.3 G/DL (ref 3.5–5.2)
ALP SERPL-CCNC: 76 U/L (ref 55–135)
ALT SERPL W/O P-5'-P-CCNC: 24 U/L (ref 10–44)
ANION GAP SERPL CALC-SCNC: 8 MMOL/L (ref 8–16)
AST SERPL-CCNC: 25 U/L (ref 10–40)
BASOPHILS # BLD AUTO: 0.05 K/UL (ref 0–0.2)
BASOPHILS NFR BLD: 0.3 % (ref 0–1.9)
BILIRUB SERPL-MCNC: 0.4 MG/DL (ref 0.1–1)
BUN SERPL-MCNC: 20 MG/DL (ref 8–23)
CALCIUM SERPL-MCNC: 8.7 MG/DL (ref 8.7–10.5)
CHLORIDE SERPL-SCNC: 100 MMOL/L (ref 95–110)
CO2 SERPL-SCNC: 30 MMOL/L (ref 23–29)
CREAT SERPL-MCNC: 0.7 MG/DL (ref 0.5–1.4)
DIFFERENTIAL METHOD: ABNORMAL
EOSINOPHIL # BLD AUTO: 2 K/UL (ref 0–0.5)
EOSINOPHIL NFR BLD: 10.1 % (ref 0–8)
ERYTHROCYTE [DISTWIDTH] IN BLOOD BY AUTOMATED COUNT: 14.2 % (ref 11.5–14.5)
ERYTHROCYTE [DISTWIDTH] IN BLOOD BY AUTOMATED COUNT: 14.2 % (ref 11.5–14.5)
EST. GFR  (AFRICAN AMERICAN): >60 ML/MIN/1.73 M^2
EST. GFR  (NON AFRICAN AMERICAN): >60 ML/MIN/1.73 M^2
GALACTOMANNAN AG SERPL IA-ACNC: <0.5 INDEX
GLUCOSE SERPL-MCNC: 100 MG/DL (ref 70–110)
HCT VFR BLD AUTO: 28.6 % (ref 37–48.5)
HCT VFR BLD AUTO: 28.7 % (ref 37–48.5)
HGB BLD-MCNC: 10.4 G/DL (ref 12–16)
HGB BLD-MCNC: 9.5 G/DL (ref 12–16)
IMM GRANULOCYTES # BLD AUTO: 0.31 K/UL (ref 0–0.04)
IMM GRANULOCYTES NFR BLD AUTO: 1.6 % (ref 0–0.5)
LYMPHOCYTES # BLD AUTO: 1.5 K/UL (ref 1–4.8)
LYMPHOCYTES NFR BLD: 7.7 % (ref 18–48)
MCH RBC QN AUTO: 31.4 PG (ref 27–31)
MCH RBC QN AUTO: 31.6 PG (ref 27–31)
MCHC RBC AUTO-ENTMCNC: 33.2 G/DL (ref 32–36)
MCHC RBC AUTO-ENTMCNC: 36.2 G/DL (ref 32–36)
MCV RBC AUTO: 87 FL (ref 82–98)
MCV RBC AUTO: 94 FL (ref 82–98)
MONOCYTES # BLD AUTO: 1.1 K/UL (ref 0.3–1)
MONOCYTES NFR BLD: 5.5 % (ref 4–15)
NEUTROPHILS # BLD AUTO: 14.7 K/UL (ref 1.8–7.7)
NEUTROPHILS NFR BLD: 74.8 % (ref 38–73)
NRBC BLD-RTO: 0 /100 WBC
PLATELET # BLD AUTO: 303 K/UL (ref 150–350)
PLATELET # BLD AUTO: 321 K/UL (ref 150–350)
PMV BLD AUTO: 9.1 FL (ref 9.2–12.9)
PMV BLD AUTO: 9.3 FL (ref 9.2–12.9)
POTASSIUM SERPL-SCNC: 4.6 MMOL/L (ref 3.5–5.1)
PROT SERPL-MCNC: 5.5 G/DL (ref 6–8.4)
RBC # BLD AUTO: 3.03 M/UL (ref 4–5.4)
RBC # BLD AUTO: 3.29 M/UL (ref 4–5.4)
SODIUM SERPL-SCNC: 138 MMOL/L (ref 136–145)
VANCOMYCIN TROUGH SERPL-MCNC: <1.1 UG/ML (ref 10–22)
WBC # BLD AUTO: 14.86 K/UL (ref 3.9–12.7)
WBC # BLD AUTO: 19.62 K/UL (ref 3.9–12.7)

## 2019-05-02 PROCEDURE — 25000003 PHARM REV CODE 250: Mod: HCNC | Performed by: STUDENT IN AN ORGANIZED HEALTH CARE EDUCATION/TRAINING PROGRAM

## 2019-05-02 PROCEDURE — 25000242 PHARM REV CODE 250 ALT 637 W/ HCPCS: Mod: HCNC | Performed by: STUDENT IN AN ORGANIZED HEALTH CARE EDUCATION/TRAINING PROGRAM

## 2019-05-02 PROCEDURE — 27000221 HC OXYGEN, UP TO 24 HOURS: Mod: HCNC

## 2019-05-02 PROCEDURE — 99232 PR SUBSEQUENT HOSPITAL CARE,LEVL II: ICD-10-PCS | Mod: HCNC,GC,, | Performed by: INTERNAL MEDICINE

## 2019-05-02 PROCEDURE — 85025 COMPLETE CBC W/AUTO DIFF WBC: CPT | Mod: HCNC

## 2019-05-02 PROCEDURE — 80053 COMPREHEN METABOLIC PANEL: CPT | Mod: HCNC

## 2019-05-02 PROCEDURE — 99232 SBSQ HOSP IP/OBS MODERATE 35: CPT | Mod: HCNC,GC,, | Performed by: INTERNAL MEDICINE

## 2019-05-02 PROCEDURE — 85027 COMPLETE CBC AUTOMATED: CPT | Mod: HCNC

## 2019-05-02 PROCEDURE — 94640 AIRWAY INHALATION TREATMENT: CPT | Mod: HCNC

## 2019-05-02 PROCEDURE — 81003 URINALYSIS AUTO W/O SCOPE: CPT | Mod: HCNC

## 2019-05-02 PROCEDURE — 94668 MNPJ CHEST WALL SBSQ: CPT | Mod: HCNC

## 2019-05-02 PROCEDURE — 11000001 HC ACUTE MED/SURG PRIVATE ROOM: Mod: HCNC

## 2019-05-02 PROCEDURE — 87040 BLOOD CULTURE FOR BACTERIA: CPT | Mod: HCNC

## 2019-05-02 PROCEDURE — 36415 COLL VENOUS BLD VENIPUNCTURE: CPT | Mod: HCNC

## 2019-05-02 PROCEDURE — 94761 N-INVAS EAR/PLS OXIMETRY MLT: CPT | Mod: HCNC

## 2019-05-02 PROCEDURE — 99900035 HC TECH TIME PER 15 MIN (STAT): Mod: HCNC

## 2019-05-02 RX ADMIN — FLUTICASONE FUROATE AND VILANTEROL TRIFENATATE 1 PUFF: 100; 25 POWDER RESPIRATORY (INHALATION) at 08:05

## 2019-05-02 RX ADMIN — DILTIAZEM HYDROCHLORIDE 240 MG: 240 CAPSULE, COATED, EXTENDED RELEASE ORAL at 08:05

## 2019-05-02 RX ADMIN — GUAIFENESIN 200 MG: 200 SOLUTION ORAL at 09:05

## 2019-05-02 RX ADMIN — AMIODARONE HYDROCHLORIDE 200 MG: 200 TABLET ORAL at 09:05

## 2019-05-02 RX ADMIN — TIOTROPIUM BROMIDE 18 MCG: 18 CAPSULE ORAL; RESPIRATORY (INHALATION) at 08:05

## 2019-05-02 RX ADMIN — AMIODARONE HYDROCHLORIDE 200 MG: 200 TABLET ORAL at 08:05

## 2019-05-02 RX ADMIN — POLYETHYLENE GLYCOL 3350 17 G: 17 POWDER, FOR SOLUTION ORAL at 08:05

## 2019-05-02 RX ADMIN — APIXABAN 5 MG: 5 TABLET, FILM COATED ORAL at 08:05

## 2019-05-02 RX ADMIN — GUAIFENESIN 200 MG: 200 SOLUTION ORAL at 04:05

## 2019-05-02 RX ADMIN — LATANOPROST 1 DROP: 50 SOLUTION OPHTHALMIC at 09:05

## 2019-05-02 RX ADMIN — SODIUM CHLORIDE SOLN NEBU 3% 4 ML: 3 NEBU SOLN at 04:05

## 2019-05-02 RX ADMIN — ASPIRIN 81 MG: 81 TABLET, COATED ORAL at 08:05

## 2019-05-02 RX ADMIN — APIXABAN 5 MG: 5 TABLET, FILM COATED ORAL at 09:05

## 2019-05-02 RX ADMIN — SODIUM CHLORIDE SOLN NEBU 3% 4 ML: 3 NEBU SOLN at 09:05

## 2019-05-02 RX ADMIN — FUROSEMIDE 40 MG: 40 TABLET ORAL at 08:05

## 2019-05-02 RX ADMIN — SODIUM CHLORIDE SOLN NEBU 3% 4 ML: 3 NEBU SOLN at 12:05

## 2019-05-02 RX ADMIN — LOSARTAN POTASSIUM 50 MG: 50 TABLET, FILM COATED ORAL at 08:05

## 2019-05-02 RX ADMIN — PRAVASTATIN SODIUM 20 MG: 20 TABLET ORAL at 09:05

## 2019-05-02 NOTE — NURSING
Pt discharge to skilled facility. Pt transported via [facility] by wc with all belongings. No apparent distress or discomfort

## 2019-05-02 NOTE — SUBJECTIVE & OBJECTIVE
"Interval History: Patient continues to report improvement in SOB on 2L oxygen & stable. Leukocytosis today after discontinuing meropenem. No diarrhea.      Review of Systems   Constitutional: Positive for fatigue. Negative for chills and fever.   Eyes: Negative.    Respiratory: Positive for cough (minimal per patient.  Improved from prior), shortness of breath and wheezing.    Cardiovascular: Negative for chest pain, palpitations and leg swelling.   Gastrointestinal: Positive for constipation. Negative for abdominal pain, diarrhea, nausea and vomiting.        Complains of occasional sensation of esophageal fullness, "something stuck" after eating   Genitourinary: Negative for difficulty urinating, dysuria and frequency.   Musculoskeletal: Negative for arthralgias and myalgias.   Skin: Negative for rash and wound.   Neurological: Positive for weakness. Negative for dizziness and headaches.     Objective:     Vital Signs (Most Recent):  Temp: 98.1 °F (36.7 °C) (05/02/19 1131)  Pulse: 70 (05/02/19 1607)  Resp: 18 (05/02/19 1607)  BP: (!) 162/72 (05/02/19 1131)  SpO2: 98 % (05/02/19 1607) Vital Signs (24h Range):  Temp:  [97.6 °F (36.4 °C)-98.4 °F (36.9 °C)] 98.1 °F (36.7 °C)  Pulse:  [70-82] 70  Resp:  [12-21] 18  SpO2:  [93 %-100 %] 98 %  BP: (122-162)/(55-72) 162/72     Weight: 39.2 kg (86 lb 8 oz)  Body mass index is 14.39 kg/m².    Intake/Output Summary (Last 24 hours) at 5/2/2019 1704  Last data filed at 5/2/2019 0600  Gross per 24 hour   Intake 75 ml   Output 300 ml   Net -225 ml      Physical Exam   Constitutional: She is oriented to person, place, and time. No distress.   Frail, pleasant    HENT:   Head: Normocephalic and atraumatic.   Mouth/Throat: No oropharyngeal exudate.   Eyes: Conjunctivae are normal. No scleral icterus.   Neck: Normal range of motion.   Cardiovascular: Normal rate and regular rhythm.   Murmur heard.  Pulmonary/Chest: Effort normal. No respiratory distress. She has no wheezes. She has " rales.   3L NC no resp distress    breath sounds at base with some rales on L    Abdominal: Soft. She exhibits no distension. There is no tenderness.   Musculoskeletal: She exhibits no edema.   Neurological: She is alert and oriented to person, place, and time.   Skin: Skin is warm and dry. She is not diaphoretic. There is pallor.   Vitals reviewed.      Significant Labs: All pertinent labs within the past 24 hours have been reviewed.    Significant Imaging: I have reviewed all pertinent imaging results/findings within the past 24 hours.  .

## 2019-05-02 NOTE — PLAN OF CARE
PATRIZIA notified [facility] to let her know immediatly when they get insurance auth so pt can be d/c.    PATRIZIA sent [facility] and PASSR.    Lian Gallegos, [facility]  [phone]

## 2019-05-02 NOTE — NURSING
Unable to obtain urine specimen from patient. In and out cath was attempted by nurse. No urine return. Patient seems to be forgetting that nurse and doctor informed her more than twice that urine needs to be collected.

## 2019-05-02 NOTE — PLAN OF CARE
Problem: Adult Inpatient Plan of Care  Goal: Patient-Specific Goal (Individualization)  Outcome: Ongoing (interventions implemented as appropriate)     05/02/19 0630   OTHER   Anxieties, Fears or Concerns Pt is having some anxiety about being discharged to rehab as she gets a resp infection when she goes there.   Individualized Care Needs To not disturn her when she is sleeping.   Uneventful evening. Slept throughout the night. Received RT tx's as ordered. Will continue to monitor.

## 2019-05-02 NOTE — PROGRESS NOTES
[facility] Hospital Medicine  Progress Note    Patient Name: Latasha Perez  MRN: [id number]  Patient Class: IP- Inpatient   Admission Date: 4/26/2019  Length of Stay: 6 days  Attending Physician: Laura Billy MD  Primary Care Provider: Pollo Oneal MD    [facility] Medicine Team: [facility] HOSP MED 5 Ingrid Michael MD    Subjective:     Principal Problem:Left lower lobe pneumonia    HPI:  Ms. Perez is a 73 year old lady with a past medical history significant for HTN, HLD, COPD (on 3-4L NC at home), A.fib with RVR (s/p failed ablation - on Amiodarone and diltiazem for rate control and eliquis for anticoagulation) who presents to [facility] with complaints of shortness of breath. She was recently discharged from [facility] on 4/11/19 for right upper lobe pneumonia and new onset A.fib with RVR. Blood cultures grew Pseudomonas sensitive to cefipime and she completed a course of outpatient cefipime IV q8h via a PICC line which has since been removed. The patient states her cough has remained since that hospitalization. She is unclear as to how long exactly the cough has persisted and will not clarify the color of sputum production however does state that initially it was productive of sputum however it no longer as. She denies fevers or chills. Upon EMS arrival patient was hypoxic, reported sat in the 50's on room air, she was given solumedrol and started on BiPAP. On presentation to the ED patient received duo nebs and oxygenation improved, she was weaned down off Bipap to home O2 requirement of 3L.    In regards to her atrial fib, the patient was diagnosed during hospitalization at the beginning of 4/1/19. Echo showed pulmonary hypertension, normal EF.JOHNNA was negative for thrombus so patient was cardioverted however went back into A.fib. She was controlled on Amiodarone and diltiazem and discharged on those medications. She was also started on eliquis for anticoagulation.  "Patient denies recurrence of palpitations or any chest discomfort. In ED patient was normal sinus rhythm.    In the ED CXR was notable for new left lower lobe consolidation. Labs were notable for elevated WBC. She received a dose of vancomycin and rocephin. Blood cultures were not collected prior to initiation of antibiotics and was admitted to hospital medicine for further evaluation of HCAP and COPD exacerbation.    Hospital Course:  She continues to improve now on 2-3L oxygen. Started on meropenem yesterday. Question of slow clinical improvement previously & history of Pseudomonas bacteremia.     Interval History: Patient continues to report improvement in SOB on 2L oxygen & stable. Leukocytosis today after discontinuing meropenem. No diarrhea.      Review of Systems   Constitutional: Positive for fatigue. Negative for chills and fever.   Eyes: Negative.    Respiratory: Positive for cough (minimal per patient.  Improved from prior), shortness of breath and wheezing.    Cardiovascular: Negative for chest pain, palpitations and leg swelling.   Gastrointestinal: Positive for constipation. Negative for abdominal pain, diarrhea, nausea and vomiting.        Complains of occasional sensation of esophageal fullness, "something stuck" after eating   Genitourinary: Negative for difficulty urinating, dysuria and frequency.   Musculoskeletal: Negative for arthralgias and myalgias.   Skin: Negative for rash and wound.   Neurological: Positive for weakness. Negative for dizziness and headaches.     Objective:     Vital Signs (Most Recent):  Temp: 98.1 °F (36.7 °C) (05/02/19 1131)  Pulse: 70 (05/02/19 1607)  Resp: 18 (05/02/19 1607)  BP: (!) 162/72 (05/02/19 1131)  SpO2: 98 % (05/02/19 1607) Vital Signs (24h Range):  Temp:  [97.6 °F (36.4 °C)-98.4 °F (36.9 °C)] 98.1 °F (36.7 °C)  Pulse:  [70-82] 70  Resp:  [12-21] 18  SpO2:  [93 %-100 %] 98 %  BP: (122-162)/(55-72) 162/72     Weight: 39.2 kg (86 lb 8 oz)  Body mass index is " 14.39 kg/m².    Intake/Output Summary (Last 24 hours) at 2019 1704  Last data filed at 2019 0600  Gross per 24 hour   Intake 75 ml   Output 300 ml   Net -225 ml      Physical Exam   Constitutional: She is oriented to person, place, and time. No distress.   Frail, pleasant    HENT:   Head: Normocephalic and atraumatic.   Mouth/Throat: No oropharyngeal exudate.   Eyes: Conjunctivae are normal. No scleral icterus.   Neck: Normal range of motion.   Cardiovascular: Normal rate and regular rhythm.   Murmur heard.  Pulmonary/Chest: Effort normal. No respiratory distress. She has no wheezes. She has rales.   3L NC no resp distress    breath sounds at base with some rales on L    Abdominal: Soft. She exhibits no distension. There is no tenderness.   Musculoskeletal: She exhibits no edema.   Neurological: She is alert and oriented to person, place, and time.   Skin: Skin is warm and dry. She is not diaphoretic. There is pallor.   Vitals reviewed.      Significant Labs: All pertinent labs within the past 24 hours have been reviewed.    Significant Imaging: I have reviewed all pertinent imaging results/findings within the past 24 hours.  .    Assessment/Plan:      * Left lower lobe pneumonia  Hx of severe COPD with recent hospitalization and recently treated for suspected PNA and pseudomonas infection.  Currently followed by ID.  Plan for CT chest today  - Continue Meropenem D/C vanc and plan to transition to cefepime pending CT  Recurrent, with new opacity found that developed at the end of March  -Pulm consulted, appreciate recs  -addition of gentle diuresis with lasix 20mg QD   -thora attempted not enough fluid  - Repeat BNP pending        Recurrent pneumonia   Appreciate ID recs-  1.   Continue meropenem for now to continue pseudomonal and anaerobic coverage (given concern with aspiration).   Currently on Day 5 of meropenem.   2. Sent sputum for respiratory culture to guide therapy  3.  Rec pulmonary consult  "for eval of RUL findings on CT - pulm feels fungal vs mucus plug and no indication for bronchoscopy at this time.  4.  Suspect patient has had adequate abx at 5d - rec dc tomorrow and monitor  5.  Fungal studies sent  6. Needs Pulm and ID fu as outpt - need fu on fungal studies.    Appreciate pulm recs-  Patient with <5mm pocket of fluid on inspiration on bedside ultrasound.  Relative to CT Chest performed, suspecting effusion has decreased in volume.  Did not feel sampling is warranted or safe given the clinical context.    Follow up with Pulmonary in 6 weeks with repeat CT Chest.  Follow up arranged my clinic.      Question of aspiration PNA- patient passed MBSS, appreciate SLP rec s       Acute on chronic respiratory failure with hypoxia  Acute resp failure with hypoxia  -complete infectious picture not fitting  -CT chest pending   - Treating with Vanc and meropenem with plans to de escalate today     RESOLVED      Oral thrush  -some oral thrush noted  -magic mouthwash PRN       Paroxysmal atrial fibrillation  Patient with a.fib diagnosed earlier this month during hospitalization for pneumonia and bacteremia. S/p failed cardioversion. Now controlled on diltiazem and amiodarone.  - Continuing amiodarone and diltiazem PO  - Anticoagulation with eliquis  -to be followed up in cardiology clinic to discuss amiodarone dosing     Severe malnutrition  - Boost with meals     Cachexia  - Nutrition consulted  - Boost supplementation with meals      COPD exacerbation  - 2/2 pneumonia, please see "left lower lobe pneumonia" above, clinically picture unclear       Essential hypertension  - Continuing home diltiazem, patient normotensive once weaned off Bipap. Suspect hypoxia and stress were leading to increase in blood pressure.  - Continue to monitor      Leukocytosis  - Please see "Pneumonia"  -UA pending    Chronic hyponatremia  Stable / now resolved.       Carotid disease, bilateral  Patient with 50-69% stenosis of left " carotid artery and < 50% stenosis of right per ultrasound on 6/13/18.    - Continuing asa and statin while inpatient      HLD (hyperlipidemia)  Patient on pravastatin 20mg at home.    - Continuing home statin while inpatient        VTE Risk Mitigation (From admission, onward)        Ordered     apixaban tablet 5 mg  2 times daily      04/26/19 0559     Place TIAN hose  Until discontinued      04/26/19 0454     IP VTE HIGH RISK PATIENT  Once      04/26/19 0454              Ingrid Michael MD  Department of Hospital Medicine   [facility]

## 2019-05-02 NOTE — PLAN OF CARE
Problem: Adult Inpatient Plan of Care  Goal: Plan of Care Review  Outcome: Ongoing (interventions implemented as appropriate)  Patient is extremely forgetful. Requires frequent reorientation by staff. Pt shows no s/s of distress or discomfort. Safety measures met. Free from falls and injury. Denies any pain. Able to verbalize all needs. Ambulates with assistance x1. Has adequate food and fluid intake. Bed locked and placed in lowest position. Call light within reach.

## 2019-05-03 VITALS
HEIGHT: 65 IN | WEIGHT: 86.5 LBS | BODY MASS INDEX: 14.41 KG/M2 | SYSTOLIC BLOOD PRESSURE: 134 MMHG | HEART RATE: 71 BPM | OXYGEN SATURATION: 95 % | TEMPERATURE: 98 F | DIASTOLIC BLOOD PRESSURE: 63 MMHG | RESPIRATION RATE: 18 BRPM

## 2019-05-03 PROBLEM — J96.21 ACUTE ON CHRONIC RESPIRATORY FAILURE WITH HYPOXIA: Status: RESOLVED | Noted: 2019-04-27 | Resolved: 2019-05-03

## 2019-05-03 LAB
ALBUMIN SERPL BCP-MCNC: 2.1 G/DL (ref 3.5–5.2)
ALP SERPL-CCNC: 74 U/L (ref 55–135)
ALT SERPL W/O P-5'-P-CCNC: 26 U/L (ref 10–44)
ANION GAP SERPL CALC-SCNC: 7 MMOL/L (ref 8–16)
AST SERPL-CCNC: 28 U/L (ref 10–40)
BILIRUB SERPL-MCNC: 0.3 MG/DL (ref 0.1–1)
BILIRUB UR QL STRIP: NEGATIVE
BUN SERPL-MCNC: 24 MG/DL (ref 8–23)
CALCIUM SERPL-MCNC: 8.4 MG/DL (ref 8.7–10.5)
CHLORIDE SERPL-SCNC: 98 MMOL/L (ref 95–110)
CLARITY UR REFRACT.AUTO: CLEAR
CO2 SERPL-SCNC: 33 MMOL/L (ref 23–29)
COLOR UR AUTO: YELLOW
CREAT SERPL-MCNC: 0.6 MG/DL (ref 0.5–1.4)
ERYTHROCYTE [DISTWIDTH] IN BLOOD BY AUTOMATED COUNT: 14.3 % (ref 11.5–14.5)
EST. GFR  (AFRICAN AMERICAN): >60 ML/MIN/1.73 M^2
EST. GFR  (NON AFRICAN AMERICAN): >60 ML/MIN/1.73 M^2
GLUCOSE SERPL-MCNC: 85 MG/DL (ref 70–110)
GLUCOSE UR QL STRIP: NEGATIVE
HCT VFR BLD AUTO: 26.3 % (ref 37–48.5)
HGB BLD-MCNC: 8.7 G/DL (ref 12–16)
HGB UR QL STRIP: NEGATIVE
HISTOPLASMA AG INTERP.: NEGATIVE
HISTOPLASMA AG VALUE: 0 NG/ML
HISTOPLASMA ANTIGEN URINE: NEGATIVE
HISTOPLASMA RESULT: NORMAL NG/ML
KETONES UR QL STRIP: NEGATIVE
LEUKOCYTE ESTERASE UR QL STRIP: NEGATIVE
MCH RBC QN AUTO: 31.4 PG (ref 27–31)
MCHC RBC AUTO-ENTMCNC: 33.1 G/DL (ref 32–36)
MCV RBC AUTO: 95 FL (ref 82–98)
NITRITE UR QL STRIP: NEGATIVE
PH UR STRIP: 6 [PH] (ref 5–8)
PLATELET # BLD AUTO: 293 K/UL (ref 150–350)
PMV BLD AUTO: 9.2 FL (ref 9.2–12.9)
POTASSIUM SERPL-SCNC: 3.2 MMOL/L (ref 3.5–5.1)
PROT SERPL-MCNC: 5.2 G/DL (ref 6–8.4)
PROT UR QL STRIP: NEGATIVE
RBC # BLD AUTO: 2.77 M/UL (ref 4–5.4)
SODIUM SERPL-SCNC: 138 MMOL/L (ref 136–145)
SP GR UR STRIP: 1.01 (ref 1–1.03)
URN SPEC COLLECT METH UR: NORMAL
WBC # BLD AUTO: 15.18 K/UL (ref 3.9–12.7)

## 2019-05-03 PROCEDURE — 36415 COLL VENOUS BLD VENIPUNCTURE: CPT | Mod: HCNC

## 2019-05-03 PROCEDURE — 99239 PR HOSPITAL DISCHARGE DAY,>30 MIN: ICD-10-PCS | Mod: HCNC,GC,, | Performed by: INTERNAL MEDICINE

## 2019-05-03 PROCEDURE — 25000003 PHARM REV CODE 250: Mod: HCNC | Performed by: STUDENT IN AN ORGANIZED HEALTH CARE EDUCATION/TRAINING PROGRAM

## 2019-05-03 PROCEDURE — 94668 MNPJ CHEST WALL SBSQ: CPT | Mod: HCNC

## 2019-05-03 PROCEDURE — 99233 SBSQ HOSP IP/OBS HIGH 50: CPT | Mod: HCNC,,, | Performed by: NURSE PRACTITIONER

## 2019-05-03 PROCEDURE — 94761 N-INVAS EAR/PLS OXIMETRY MLT: CPT | Mod: HCNC

## 2019-05-03 PROCEDURE — 99900035 HC TECH TIME PER 15 MIN (STAT): Mod: HCNC

## 2019-05-03 PROCEDURE — 99233 PR SUBSEQUENT HOSPITAL CARE,LEVL III: ICD-10-PCS | Mod: HCNC,GC,, | Performed by: INTERNAL MEDICINE

## 2019-05-03 PROCEDURE — 80053 COMPREHEN METABOLIC PANEL: CPT | Mod: HCNC

## 2019-05-03 PROCEDURE — 85027 COMPLETE CBC AUTOMATED: CPT | Mod: HCNC

## 2019-05-03 PROCEDURE — 94640 AIRWAY INHALATION TREATMENT: CPT | Mod: HCNC

## 2019-05-03 PROCEDURE — 99239 HOSP IP/OBS DSCHRG MGMT >30: CPT | Mod: HCNC,GC,, | Performed by: INTERNAL MEDICINE

## 2019-05-03 PROCEDURE — 25000242 PHARM REV CODE 250 ALT 637 W/ HCPCS: Mod: HCNC | Performed by: STUDENT IN AN ORGANIZED HEALTH CARE EDUCATION/TRAINING PROGRAM

## 2019-05-03 PROCEDURE — 94799 UNLISTED PULMONARY SVC/PX: CPT | Mod: HCNC

## 2019-05-03 PROCEDURE — 27000221 HC OXYGEN, UP TO 24 HOURS: Mod: HCNC

## 2019-05-03 PROCEDURE — 99233 SBSQ HOSP IP/OBS HIGH 50: CPT | Mod: HCNC,GC,, | Performed by: INTERNAL MEDICINE

## 2019-05-03 PROCEDURE — 99233 PR SUBSEQUENT HOSPITAL CARE,LEVL III: ICD-10-PCS | Mod: HCNC,,, | Performed by: NURSE PRACTITIONER

## 2019-05-03 RX ORDER — FUROSEMIDE 40 MG/1
40 TABLET ORAL DAILY PRN
Qty: 30 TABLET | Refills: 11
Start: 2019-05-03 | End: 2020-05-02

## 2019-05-03 RX ORDER — LOSARTAN POTASSIUM 100 MG/1
50 TABLET ORAL DAILY
Qty: 15 TABLET | Refills: 0
Start: 2019-05-03

## 2019-05-03 RX ORDER — POTASSIUM CHLORIDE 20 MEQ/1
40 TABLET, EXTENDED RELEASE ORAL
Status: COMPLETED | OUTPATIENT
Start: 2019-05-03 | End: 2019-05-03

## 2019-05-03 RX ADMIN — POTASSIUM CHLORIDE 40 MEQ: 1500 TABLET, EXTENDED RELEASE ORAL at 09:05

## 2019-05-03 RX ADMIN — AMIODARONE HYDROCHLORIDE 200 MG: 200 TABLET ORAL at 08:05

## 2019-05-03 RX ADMIN — FLUTICASONE FUROATE AND VILANTEROL TRIFENATATE 1 PUFF: 100; 25 POWDER RESPIRATORY (INHALATION) at 08:05

## 2019-05-03 RX ADMIN — ASPIRIN 81 MG: 81 TABLET, COATED ORAL at 08:05

## 2019-05-03 RX ADMIN — SODIUM CHLORIDE SOLN NEBU 3% 4 ML: 3 NEBU SOLN at 09:05

## 2019-05-03 RX ADMIN — GUAIFENESIN 200 MG: 200 SOLUTION ORAL at 08:05

## 2019-05-03 RX ADMIN — POLYETHYLENE GLYCOL 3350 17 G: 17 POWDER, FOR SOLUTION ORAL at 09:05

## 2019-05-03 RX ADMIN — SODIUM CHLORIDE SOLN NEBU 3% 4 ML: 3 NEBU SOLN at 12:05

## 2019-05-03 RX ADMIN — POTASSIUM CHLORIDE 40 MEQ: 1500 TABLET, EXTENDED RELEASE ORAL at 01:05

## 2019-05-03 RX ADMIN — DILTIAZEM HYDROCHLORIDE 240 MG: 240 CAPSULE, COATED, EXTENDED RELEASE ORAL at 08:05

## 2019-05-03 RX ADMIN — APIXABAN 5 MG: 5 TABLET, FILM COATED ORAL at 08:05

## 2019-05-03 RX ADMIN — IPRATROPIUM BROMIDE AND ALBUTEROL SULFATE 3 ML: .5; 3 SOLUTION RESPIRATORY (INHALATION) at 12:05

## 2019-05-03 RX ADMIN — LOSARTAN POTASSIUM 50 MG: 50 TABLET, FILM COATED ORAL at 08:05

## 2019-05-03 RX ADMIN — FUROSEMIDE 40 MG: 40 TABLET ORAL at 08:05

## 2019-05-03 RX ADMIN — TIOTROPIUM BROMIDE 18 MCG: 18 CAPSULE ORAL; RESPIRATORY (INHALATION) at 08:05

## 2019-05-03 NOTE — SUBJECTIVE & OBJECTIVE
Interval History: ID reconsulted prior to d/c regarding leukocytosis.  Patient afebrile.  WBC trending down 19>>15.      Review of Systems   Constitutional: Negative for activity change, appetite change, chills, diaphoresis, fatigue and fever.   HENT: Negative for congestion, ear pain, sore throat and trouble swallowing.    Eyes: Negative.    Respiratory: Positive for shortness of breath (at baseline ). Negative for cough and wheezing.    Cardiovascular: Negative for chest pain, palpitations and leg swelling.   Gastrointestinal: Positive for constipation. Negative for abdominal pain, diarrhea, nausea and vomiting.   Genitourinary: Negative for difficulty urinating, dysuria, frequency, hematuria and urgency.   Musculoskeletal: Negative for arthralgias, joint swelling and myalgias.   Skin: Negative for rash and wound.   Neurological: Negative for dizziness and headaches.   Hematological: Does not bruise/bleed easily.   Psychiatric/Behavioral: Negative for agitation, behavioral problems and confusion.     Objective:     Vital Signs (Most Recent):  Temp: 97.8 °F (36.6 °C) (05/03/19 1214)  Pulse: 71 (05/03/19 1508)  Resp: 18 (05/03/19 1214)  BP: 134/63 (05/03/19 1214)  SpO2: 95 % (05/03/19 1214) Vital Signs (24h Range):  Temp:  [97.7 °F (36.5 °C)-98.3 °F (36.8 °C)] 97.8 °F (36.6 °C)  Pulse:  [66-82] 71  Resp:  [16-20] 18  SpO2:  [94 %-99 %] 95 %  BP: (124-147)/(56-66) 134/63     Weight: 39.2 kg (86 lb 8 oz)  Body mass index is 14.39 kg/m².    Estimated Creatinine Clearance: 52.4 mL/min (based on SCr of 0.6 mg/dL).    Physical Exam   Constitutional: She is oriented to person, place, and time. No distress.   Frail, elderly woman   HENT:   Head: Normocephalic and atraumatic.   Mouth/Throat: No oropharyngeal exudate.   White patches buccal mucosa     Eyes: Conjunctivae are normal. No scleral icterus.   Neck: Normal range of motion. Neck supple.   Cardiovascular: Normal rate and regular rhythm. Exam reveals no friction rub.    Murmur heard.  Pulmonary/Chest: Effort normal. No respiratory distress. She has no wheezes. She has no rales.   O2 nasal cannula.      Abdominal: Soft. She exhibits no distension. There is no tenderness. There is no guarding.   Musculoskeletal: She exhibits no edema or tenderness.   Lymphadenopathy:     She has no cervical adenopathy.   Neurological: She is alert and oriented to person, place, and time.   Skin: Skin is warm and dry. She is not diaphoretic. There is pallor.   Psychiatric: She has a normal mood and affect. Her behavior is normal. Thought content normal.   Vitals reviewed.      Significant Labs:   Blood Culture:   Recent Labs   Lab 04/04/19  0700 04/26/19  0615 04/26/19  0642 05/02/19  1519 05/02/19  1525   LABBLOO No growth after 5 days. No growth after 5 days. No growth after 5 days. No Growth to date  No Growth to date No Growth to date  No Growth to date     CBC:   Recent Labs   Lab 05/02/19  0548 05/02/19  1104 05/03/19  0253   WBC 14.86* 19.62* 15.18*   HGB 9.5* 10.4* 8.7*   HCT 28.6* 28.7* 26.3*    303 293     CMP:   Recent Labs   Lab 05/02/19  0548 05/03/19  0253    138   K 4.6 3.2*    98   CO2 30* 33*    85   BUN 20 24*   CREATININE 0.7 0.6   CALCIUM 8.7 8.4*   PROT 5.5* 5.2*   ALBUMIN 2.3* 2.1*   BILITOT 0.4 0.3   ALKPHOS 76 74   AST 25 28   ALT 24 26   ANIONGAP 8 7*   EGFRNONAA >60.0 >60.0     Procalcitonin: No results for input(s): PROCAL in the last 48 hours.  Respiratory Culture: No results for input(s): GSRESP, RESPIRATORYC in the last 4320 hours.  Urine Culture: No results for input(s): LABURIN in the last 4320 hours.  Urine Studies:   Recent Labs   Lab 05/02/19  2340   COLORU Yellow   APPEARANCEUA Clear   PHUR 6.0   SPECGRAV 1.015   PROTEINUA Negative   GLUCUA Negative   KETONESU Negative   BILIRUBINUA Negative   OCCULTUA Negative   NITRITE Negative   LEUKOCYTESUR Negative       Significant Imaging: I have reviewed all pertinent imaging results/findings  within the past 24 hours.

## 2019-05-03 NOTE — PLAN OF CARE
Problem: Adult Inpatient Plan of Care  Goal: Patient-Specific Goal (Individualization)  Outcome: Ongoing (interventions implemented as appropriate)     04/26/19 1122 05/02/19 0630   OTHER   Anxieties, Fears or Concerns  --  Pt is having some anxiety about being discharged to rehab as she gets a resp infection when she goes there.   Individualized Care Needs  --  To not disturn her when she is sleeping.   Patient-Specific Goal (Individualization)   Patient-Specific Goals (Include Timeframe) return to home  --    Uneventful evening. U/A specimen collected and sent to lab. Slept all night. No resp distress observed.

## 2019-05-03 NOTE — SUBJECTIVE & OBJECTIVE
Interval History: Patient continues to report improvement in SOB on 2L oxygen & stable. Leukocytosis today after discontinuing meropenem. No diarrhea.      Review of Systems   Constitutional: Positive for fatigue. Negative for chills and fever.   Eyes: Negative.    Respiratory: Positive for cough (minimal per patient.  Improved from prior), shortness of breath and wheezing.    Cardiovascular: Negative for chest pain, palpitations and leg swelling.   Gastrointestinal: Positive for constipation. Negative for abdominal pain, diarrhea, nausea and vomiting.        Some complaints of chest pain after eating or swallowing medications   Genitourinary: Negative for difficulty urinating, dysuria and frequency.   Musculoskeletal: Negative for arthralgias and myalgias.   Skin: Negative for rash and wound.   Neurological: Positive for weakness. Negative for dizziness and headaches.     Objective:     Vital Signs (Most Recent):  Temp: 98.3 °F (36.8 °C) (05/03/19 0747)  Pulse: 75 (05/03/19 1049)  Resp: 16 (05/03/19 0910)  BP: 138/66 (05/03/19 0747)  SpO2: (!) 94 % (05/03/19 0910) Vital Signs (24h Range):  Temp:  [97.7 °F (36.5 °C)-98.3 °F (36.8 °C)] 98.3 °F (36.8 °C)  Pulse:  [66-82] 75  Resp:  [12-20] 16  SpO2:  [94 %-100 %] 94 %  BP: (124-162)/(56-72) 138/66     Weight: 39.2 kg (86 lb 8 oz)  Body mass index is 14.39 kg/m².    Intake/Output Summary (Last 24 hours) at 5/3/2019 1100  Last data filed at 5/3/2019 0849  Gross per 24 hour   Intake 750 ml   Output 120 ml   Net 630 ml      Physical Exam   Constitutional: She is oriented to person, place, and time. No distress.   Frail, pleasant    HENT:   Head: Normocephalic and atraumatic.   Mouth/Throat: No oropharyngeal exudate.   Eyes: Conjunctivae are normal. No scleral icterus.   Neck: Normal range of motion.   Cardiovascular: Normal rate and regular rhythm.   Murmur heard.  Pulmonary/Chest: Effort normal. No respiratory distress. She has no wheezes. She has rales.   3L NC no resp  distress    breath sounds at base with some rales on L    Abdominal: Soft. She exhibits no distension. There is no tenderness.   Musculoskeletal: She exhibits no edema.   Neurological: She is alert and oriented to person, place, and time.   Skin: Skin is warm and dry. She is not diaphoretic. There is pallor.   Vitals reviewed.      Significant Labs: All pertinent labs within the past 24 hours have been reviewed.    Significant Imaging: I have reviewed all pertinent imaging results/findings within the past 24 hours.  .

## 2019-05-03 NOTE — PLAN OF CARE
Rosalinda from [place] called and can officially     05/03/19 6238   Post-Acute Status   Post-Acute Authorization Placement  ([facility])   Post-Acute Placement Status Set-up Complete    take pt and pt can d/c. [facility] floor RN can call report to Himanshu NARAYANAN at [phone]. PATRIZIA scheduled EMS for 3pm.     EMS call Center is [phone]    IVETT Harry  [phone]

## 2019-05-03 NOTE — CONSULTS
[facility]  Infectious Disease  Consult Note    Patient Name: Latasha Perez  MRN: [id number]  Admission Date: 4/26/2019  Hospital Length of Stay: 7 days  Attending Physician: Laura Billy MD  Primary Care Provider: Pollo Oneal MD     Isolation Status: No active isolations        Inpatient consult to Infectious Diseases  Consult performed by: THELMA Stuart, ANP  Consult ordered by: Rajiv Vargas MD  Reason for consult: Elevated white cell count with no particular source of infection. Leukocytosis has been slowly resolving. Initially planned to discharge patient, however we are wondering if further work up should be done here      ID Consult acknowledged.   Patient known to ID.  Recommendations to follow.   In the interim, please call for any immediate concerns.     Thank you.   Please call for any questions or concerns.  THELMA Correia, ANP-C  [phone] pager,  [phone]

## 2019-05-03 NOTE — PLAN OF CARE
Pt will transfer to [facility]. Scheduled an appt with I&D on Sandhya 3, 2019 @ 1130am with Dr. Power        05/03/19 3979   Final Note   Assessment Type Final Discharge Note   Anticipated Discharge Disposition SNF

## 2019-05-03 NOTE — MEDICAL/APP STUDENT
Subjective:       Patient ID: Latasha Perez is a 73 y.o. female.    Chief Complaint: Shortness of Breath (pt reports waking up with sudden onset of shortness of breath, chest pressure. hx of COPD. EMS gave Albuterol/Atrovent and Solumedrol 125.  placed on c-pap.  O2 sat intially in low 60's per EMS)    Shortness of Breath   Pertinent negatives include no abdominal pain, chest pain, fever, leg swelling, sore throat or vomiting.     Review of Systems   Constitutional: Positive for fatigue. Negative for chills, diaphoresis and fever.   HENT: Negative for sinus pain and sore throat.    Respiratory: Positive for cough, chest tightness (Left-sided on deep inspiration) and shortness of breath.    Cardiovascular: Negative for chest pain and leg swelling.   Gastrointestinal: Negative for abdominal pain, nausea and vomiting.   Endocrine: Positive for cold intolerance.   Neurological: Positive for weakness (subjective bilateral lower limb weakness).   Hematological: Bruises/bleeds easily.       Objective:      Physical Exam   Constitutional: She appears cachectic. She has a sickly appearance. No distress. Nasal cannula in place.   Cardiovascular: S1 normal. An irregularly irregular rhythm present.   Pulses:       Radial pulses are 1+ on the right side, and 1+ on the left side.   Pulmonary/Chest: No respiratory distress. She has decreased breath sounds in the right upper field and the right middle field. She has rales in the left middle field and the left lower field. Chest wall is dull to percussion (LL zone).   Abdominal: Soft. She exhibits no distension. There is no tenderness.   Musculoskeletal:        Right hip: She exhibits decreased strength.        Left hip: She exhibits decreased strength.   Generalized, bilateral weakness of lower limbs in all areas. Muscle atrophy. Power 2/5 in all muscle groups for extension/flexion hip-ankle   Neurological: She is alert. She displays atrophy. GCS eye subscore is 4. GCS verbal  subscore is 5. GCS motor subscore is 6.   Skin: Skin is warm. Capillary refill takes 2 to 3 seconds. There is pallor.   Psychiatric: She has a normal mood and affect.       Assessment:       1. COPD exacerbation    2. COPD (chronic obstructive pulmonary disease)    3. Pneumonia due to Pseudomonas species, unspecified laterality, unspecified part of lung    4. Essential hypertension    5. Oral thrush    6. Pulmonary emphysema, unspecified emphysema type    7. Leukocytosis, unspecified type    8. Pneumonia of left lower lobe due to infectious organism        Plan:       ***

## 2019-05-03 NOTE — PLAN OF CARE
[facility]     Department of Hospital Medicine     [address] [place] [address]     [phone] after hours  [phone] fax       NURSING HOME ORDERS    05/03/2019    Admit to Nursing Home: Skilled Bed                                              Diagnoses:  Active Hospital Problems    Diagnosis  POA    *Left lower lobe pneumonia [J18.1]  Yes    Recurrent pneumonia [J18.9]  Unknown    Opacity of lung on imaging study [R91.8]  Clinically Undetermined    Oral thrush [B37.0]  Unknown    Acute on chronic respiratory failure with hypoxia [J96.21]  Yes    Paroxysmal atrial fibrillation [I48.0]  Unknown    Severe malnutrition [E43]  Yes    Cachexia [R64]  Yes    COPD exacerbation [J44.1]  Yes    Essential hypertension [I10]  Yes    Leukocytosis [D72.829]  Yes    HLD (hyperlipidemia) [E78.5]  Yes    Carotid disease, bilateral [I77.9]  Yes    Chronic hyponatremia [E87.1]  Yes      Resolved Hospital Problems   No resolved problems to display.       Patient is homebound due to:  Left lower lobe pneumonia    Allergies:  Review of patient's allergies indicates:   Allergen Reactions    Meropenem Itching and Rash     Rash started to appear around Day 3 of therapy.     Pcn [penicillins] Other (See Comments)     Fever flushing skin swelling     Biaxin [clarithromycin] Rash    Codeine Rash    Doxycycline Rash    Levaquin [levofloxacin] Rash       Vitals:       Every shift (Skilled Nursing patients)    Diet: Cardiac diet   Supplement:  1 can every three times a day with meals                         Type:  House      Acitivities:    - Up in a chair each morning as tolerated   - Ambulate with assistance to bathroom   - Scheduled walks once each shift (every 8 hours)   - May use walker, cane, or self-propelled wheelchair   - Weight bearing: as tolerated    LABS:  Per facility protocol   CMP, CBC each month for 3 months   PT-INR each week for 1 month then  monthly   Pre-albumin each month for 3 months   TSH every year    Nursing Precautions:      - Aspiration precautions:             - Total assistance with meals            -  Upright 90 degrees befor during and after meals             -  Suction at bedside          - Fall precautions per nursing home protocol   - Decubitus precautions:        -  for positioning   - Pressure reducing foam mattress   - Turn patient every two hours. Use wedge pillows to anchor patient    CONSULTS:        Physical Therapy to evaluate and treat     Occupational Therapy to evaluate and treat     Nutrition to evaluate and recommend diet      MISCELLANEOUS CARE:       Routine Skin for Bedridden Patients:  Apply moisture barrier cream to all    skin folds and wet areas in perineal area daily and after baths and                           all bowel movements.                      Medications: Discontinue all previous medication orders, if any. See new list below.     Loyda Perez   [place] Medication Instructions EASTON:[id number]    Printed on:05/03/19 9336   Medication Information                      acetaminophen (TYLENOL EXTRA STRENGTH ORAL)  Take 1 to 2 tablets by mouth daily as needed for pain             albuterol sulfate 2.5 mg/0.5 mL Nebu  Take 2.5 mg by nebulization every 6 (six) hours while awake. One Box             alendronate (FOSAMAX) 70 MG tablet  TAKE 1 TABLET BY MOUTH ONCE A WEEK EVERY 7 DAYS, AS DIRECTED             amiodarone (PACERONE) 400 MG tablet  Take 0.5 tablets (200 mg total) by mouth 2 (two) times daily.             apixaban (ELIQUIS) 5 mg Tab  Take 1 tablet (5 mg total) by mouth 2 (two) times daily.             aspirin (ECOTRIN) 81 MG EC tablet  Take 81 mg by mouth once daily.             calcium carbonate (CALCIUM 500 ORAL)  Take 1 tablet by mouth once daily.              cetirizine (ZYRTEC) 10 MG tablet  Take 10 mg by mouth once daily.             diltiaZEM (CARDIZEM CD) 240 MG 24 hr capsule  Take 1 capsule  (240 mg total) by mouth once daily.             fluticasone-salmeterol 250-50 mcg/dose (ADVAIR) 250-50 mcg/dose diskus inhaler  Inhale 1 puff into the lungs 2 (two) times daily. Controller             furosemide (LASIX) 40 MG tablet  Take 1 tablet (40 mg total) by mouth daily as needed (Weigh yourself daily. Please take 1 tablet if you gain 3-5 lbs in one day. Call your doctor.).             latanoprost 0.005 % ophthalmic solution  Place 1 drop into the left eye nightly.             losartan (COZAAR) 100 MG tablet  Take 0.5 tablets (50 mg total) by mouth once daily. TAKE 1 TABLET ONE TIME DAILY             multivitamin (THERAGRAN) per tablet  Take 1 tablet by mouth once daily.             pravastatin (PRAVACHOL) 20 MG tablet  TAKE 1 TABLET EVERY EVENING             SPIRIVA WITH HANDIHALER 18 mcg inhalation capsule  Inhale 18 mcg into the lungs once daily.              VENTOLIN HFA 90 mcg/actuation inhaler  INHALE TWO PUFFS BY MOUTH EVERY 6 HOURS AS NEEDED FOR WHEEZING                       _________________________________  Rajiv Vargas MD  05/03/2019

## 2019-05-03 NOTE — CONSULTS
[facility]  Pulmonology  Consult Note    Patient Name: Latasha Perez  MRN: [id number]  Admission Date: 4/26/2019  Hospital Length of Stay: 7 days  Code Status: Partial Code  Attending Physician: Laura Billy MD  Primary Care Provider: Pollo Oneal MD   Principal Problem: Left lower lobe pneumonia    Inpatient consult to Pulmonology  Consult performed by: Elvis Newton MD  Consult ordered by: Ingrid Michael MD        Subjective:     HPI:  Ms. Perez is a 73 year old lady with a past medical history significant for HTN, HLD, COPD (on 3-4L NC at home), A.fib with RVR (s/p failed ablation - on Amiodarone and diltiazem for rate control and eliquis for anticoagulation) who [facility] with complaints of shortness of breath. She was recently discharged from [facility] on 4/11/19 for right upper lobe pneumonia and new onset A.fib with RVR. Blood cultures grew Pseudomonas sensitive to cefipime and she completed a course of outpatient cefipime IV q8h via a PICC line which has since been removed. The patient states her cough has remained since that hospitalization. She is unclear as to how long exactly the cough has persisted and will not clarify the color of sputum production however does state that initially it was productive of sputum however it no longer as. She denies fevers or chills. Upon EMS arrival patient was hypoxic, reported sat in the 50's on room air, she was given solumedrol and started on BiPAP. On presentation to the ED patient received duo nebs and oxygenation improved, she was weaned down off Bipap to home O2 requirement of 3L.     Pulmonology was re-consulted as patient had leukocytosis after stopping antibiotic therapy.         Review of Systems   Constitutional: Positive for malaise/fatigue.   Respiratory: Positive for cough and shortness of breath. Negative for sputum production and wheezing.    Cardiovascular: Negative for chest  pain and orthopnea.   Neurological: Positive for weakness.         Objective:     Vital Signs (Most Recent):  Temp: 98.3 °F (36.8 °C) (19 0747)  Pulse: 75 (19 1049)  Resp: 16 (19 0910)  BP: 138/66 (19 0747)  SpO2: (!) 94 % (19 0910) Vital Signs (24h Range):  Temp:  [97.7 °F (36.5 °C)-98.3 °F (36.8 °C)] 98.3 °F (36.8 °C)  Pulse:  [66-82] 75  Resp:  [12-20] 16  SpO2:  [94 %-100 %] 94 %  BP: (124-162)/(56-72) 138/66     Weight: 39.2 kg (86 lb 8 oz)  Body mass index is 14.39 kg/m².      Intake/Output Summary (Last 24 hours) at 5/3/2019 1119  Last data filed at 5/3/2019 0849  Gross per 24 hour   Intake 750 ml   Output 120 ml   Net 630 ml       Physical Exam   Constitutional: She is oriented to person, place, and time. No distress.   Frail, pleasant    HENT:   Head: Normocephalic and atraumatic.   Mouth/Throat: No oropharyngeal exudate.   Eyes: Conjunctivae are normal. No scleral icterus.   Neck: Normal range of motion.   Cardiovascular: Normal rate and regular rhythm.   Murmur heard.  Pulmonary/Chest: Effort normal. No respiratory distress. She has no wheezes. She has rales.   1LNC no resp distress    breath sounds at base with some rales on L    Abdominal: Soft. She exhibits no distension. There is no tenderness.   Musculoskeletal: She exhibits edema (1+ pitting BL LE).   Neurological: She is alert and oriented to person, place, and time.   Skin: Skin is warm and dry. She is not diaphoretic. There is pallor.   Nursing note and vitals reviewed.    Vents:  Oxygen Concentration (%): 28 (19 0444)    Lines/Drains/Airways     Peripheral Intravenous Line                 Peripheral IV - Single Lumen 19 2230 20 G Anterior;Left;Proximal Forearm 3 days                Significant Labs:    CBC/Anemia Profile:  Recent Labs   Lab 19  0548 19  1104 19  0253   WBC 14.86* 19.62* 15.18*   HGB 9.5* 10.4* 8.7*   HCT 28.6* 28.7* 26.3*    303 293   MCV 94 87 95   RDW  14.2 14.2 14.3        Chemistries:  Recent Labs   Lab 05/02/19  0548 05/03/19  0253    138   K 4.6 3.2*    98   CO2 30* 33*   BUN 20 24*   CREATININE 0.7 0.6   CALCIUM 8.7 8.4*   ALBUMIN 2.3* 2.1*   PROT 5.5* 5.2*   BILITOT 0.4 0.3   ALKPHOS 76 74   ALT 24 26   AST 25 28       All pertinent labs within the past 24 hours have been reviewed.    Significant Imaging:  I have reviewed and interpreted all pertinent imaging results/findings within the past 24 hours.        Assessment/Plan:     Opacity of lung on imaging study  Pt w/ pmh of COPD presents w/ shortness of breath, found to have a left lower lung pneumonia. CT chest w/ contrast 4/28: Irregular opacity within the anterior segment of the RUL measuring 3.9 x 2.0 cm. BL pleural effusions w/ adjacent compressive atelectasis. Mild patchy ground-glass attenuation scattered throughout lungs.     - Normally on 3-4L home oxygen, currently saturating >92% on less than home O2  - Extensive smoking history, reports quitting a few months ago   - Finished course of antibiotics and had rebound leukocytosis with improvement overnight without intervention  - Patient does not have large enough fluid accumulation to safely perform thoracentesis on ultrasound    Recommendations:  - patient is clinically and radiographically improved  - would not consider her safe for bedside thoracentesis based on ultrasound findings  - continue Breo and tiotropium daily  - continue to work with PT/OT and improve strength   - incentive spirometry q2h              Thank you for your consult. Patient case and plan of care discussed with Dr. Lancaster. I will sign off. Please contact us if you have any additional questions.     Elvis Newton MD  Pulmonology  [facility]

## 2019-05-03 NOTE — ASSESSMENT & PLAN NOTE
Pt w/ pmh of COPD presents w/ shortness of breath, found to have a left lower lung pneumonia. CT chest w/ contrast 4/28: Irregular opacity within the anterior segment of the RUL measuring 3.9 x 2.0 cm. BL pleural effusions w/ adjacent compressive atelectasis. Mild patchy ground-glass attenuation scattered throughout lungs.     - Normally on 3-4L home oxygen, currently saturating >92% on less than home O2  - Extensive smoking history, reports quitting a few months ago   - Finished course of antibiotics and had rebound leukocytosis with improvement overnight without intervention  - Patient does not have large enough fluid accumulation to safely perform thoracentesis on ultrasound    Recommendations:  - patient is clinically and radiographically improved  - would not consider her safe for bedside thoracentesis based on ultrasound findings  - continue Breo and tiotropium daily  - continue to work with PT/OT and improve strength   - incentive spirometry q2h

## 2019-05-03 NOTE — NURSING
Urine hat was placed in toilet by nurse. Informed by PCT that patient urinated in toilet instead of hat

## 2019-05-03 NOTE — SUBJECTIVE & OBJECTIVE
Review of Systems   Constitutional: Positive for malaise/fatigue.   Respiratory: Positive for cough and shortness of breath. Negative for sputum production and wheezing.    Cardiovascular: Negative for chest pain and orthopnea.   Neurological: Positive for weakness.         Objective:     Vital Signs (Most Recent):  Temp: 98.3 °F (36.8 °C) (19 0747)  Pulse: 75 (19 1049)  Resp: 16 (19 0910)  BP: 138/66 (19 0747)  SpO2: (!) 94 % (19 0910) Vital Signs (24h Range):  Temp:  [97.7 °F (36.5 °C)-98.3 °F (36.8 °C)] 98.3 °F (36.8 °C)  Pulse:  [66-82] 75  Resp:  [12-20] 16  SpO2:  [94 %-100 %] 94 %  BP: (124-162)/(56-72) 138/66     Weight: 39.2 kg (86 lb 8 oz)  Body mass index is 14.39 kg/m².      Intake/Output Summary (Last 24 hours) at 5/3/2019 1119  Last data filed at 5/3/2019 0849  Gross per 24 hour   Intake 750 ml   Output 120 ml   Net 630 ml       Physical Exam   Constitutional: She is oriented to person, place, and time. No distress.   Frail, pleasant    HENT:   Head: Normocephalic and atraumatic.   Mouth/Throat: No oropharyngeal exudate.   Eyes: Conjunctivae are normal. No scleral icterus.   Neck: Normal range of motion.   Cardiovascular: Normal rate and regular rhythm.   Murmur heard.  Pulmonary/Chest: Effort normal. No respiratory distress. She has no wheezes. She has rales.   1LNC no resp distress    breath sounds at base with some rales on L    Abdominal: Soft. She exhibits no distension. There is no tenderness.   Musculoskeletal: She exhibits edema (1+ pitting BL LE).   Neurological: She is alert and oriented to person, place, and time.   Skin: Skin is warm and dry. She is not diaphoretic. There is pallor.   Nursing note and vitals reviewed.    Vents:  Oxygen Concentration (%): 28 (19 0444)    Lines/Drains/Airways     Peripheral Intravenous Line                 Peripheral IV - Single Lumen 19 2230 20 G Anterior;Left;Proximal Forearm 3 days                 Significant Labs:    CBC/Anemia Profile:  Recent Labs   Lab 05/02/19  0548 05/02/19  1104 05/03/19  0253   WBC 14.86* 19.62* 15.18*   HGB 9.5* 10.4* 8.7*   HCT 28.6* 28.7* 26.3*    303 293   MCV 94 87 95   RDW 14.2 14.2 14.3        Chemistries:  Recent Labs   Lab 05/02/19  0548 05/03/19  0253    138   K 4.6 3.2*    98   CO2 30* 33*   BUN 20 24*   CREATININE 0.7 0.6   CALCIUM 8.7 8.4*   ALBUMIN 2.3* 2.1*   PROT 5.5* 5.2*   BILITOT 0.4 0.3   ALKPHOS 76 74   ALT 24 26   AST 25 28       All pertinent labs within the past 24 hours have been reviewed.    Significant Imaging:  I have reviewed and interpreted all pertinent imaging results/findings within the past 24 hours.

## 2019-05-03 NOTE — PT/OT/SLP PROGRESS
Physical Therapy  Pt Not Seen    Patient Name:  Latasha Perez   MRN:  [id number]    3:18 pm  Patient not seen today secondary to  Other (Comment)(RN (Venecia) reports pt being d/c'd). Will follow-up on next scheduled visit.    Esha Beard, PTA  5/3/2019

## 2019-05-03 NOTE — PROGRESS NOTES
[facility] Hospital Medicine  Progress Note    Patient Name: Latasha Perez  MRN: [id number]  Patient Class: IP- Inpatient   Admission Date: 4/26/2019  Length of Stay: 7 days  Attending Physician: Laura Billy MD  Primary Care Provider: Pollo Oneal MD    [place] [facility] Medicine Team: [facility] 5 Rajiv Vargas MD    Subjective:     Principal Problem:Left lower lobe pneumonia    HPI:  Ms. Perez is a 73 year old lady with a past medical history significant for HTN, HLD, COPD (on 3-4L NC at home), A.fib with RVR (s/p failed ablation - on Amiodarone and diltiazem for rate control and eliquis for anticoagulation) who [place] [facility] [place] [facility] with complaints of shortness of breath. She was recently discharged from [facility] on 4/11/19 for right upper lobe pneumonia and new onset A.fib with RVR. Blood cultures grew Pseudomonas sensitive to cefipime and she completed a course of outpatient cefipime IV q8h via a PICC line which has since been removed. The patient states her cough has remained since that hospitalization. She is unclear as to how long exactly the cough has persisted and will not clarify the color of sputum production however does state that initially it was productive of sputum however it no longer as. She denies fevers or chills. Upon EMS arrival patient was hypoxic, reported sat in the 50's on room air, she was given solumedrol and started on BiPAP. On presentation to the ED patient received duo nebs and oxygenation improved, she was weaned down off Bipap to home O2 requirement of 3L.    In regards to her atrial fib, the patient was diagnosed during hospitalization at the beginning of 4/1/19. Echo showed pulmonary hypertension, normal EF.JOHNNA was negative for thrombus so patient was cardioverted however went back into A.fib. She was controlled on Amiodarone and diltiazem and discharged on those medications. She was also started on eliquis for  anticoagulation. Patient denies recurrence of palpitations or any chest discomfort. In ED patient was normal sinus rhythm.    In the ED CXR was notable for new left lower lobe consolidation. Labs were notable for elevated WBC. She received a dose of vancomycin and rocephin. Blood cultures were not collected prior to initiation of antibiotics and was admitted to hospital medicine for further evaluation of HCAP and COPD exacerbation.    Hospital Course:  She continues to improve now on 2-3L oxygen. Started on meropenem yesterday. Question of slow clinical improvement previously & history of Pseudomonas bacteremia.     Interval History: Patient continues to report improvement in SOB on 2L oxygen & stable. Leukocytosis today after discontinuing meropenem. No diarrhea.      Review of Systems   Constitutional: Positive for fatigue. Negative for chills and fever.   Eyes: Negative.    Respiratory: Positive for cough (minimal per patient.  Improved from prior), shortness of breath and wheezing.    Cardiovascular: Negative for chest pain, palpitations and leg swelling.   Gastrointestinal: Positive for constipation. Negative for abdominal pain, diarrhea, nausea and vomiting.        Some complaints of chest pain after eating or swallowing medications   Genitourinary: Negative for difficulty urinating, dysuria and frequency.   Musculoskeletal: Negative for arthralgias and myalgias.   Skin: Negative for rash and wound.   Neurological: Positive for weakness. Negative for dizziness and headaches.     Objective:     Vital Signs (Most Recent):  Temp: 98.3 °F (36.8 °C) (05/03/19 0747)  Pulse: 75 (05/03/19 1049)  Resp: 16 (05/03/19 0910)  BP: 138/66 (05/03/19 0747)  SpO2: (!) 94 % (05/03/19 0910) Vital Signs (24h Range):  Temp:  [97.7 °F (36.5 °C)-98.3 °F (36.8 °C)] 98.3 °F (36.8 °C)  Pulse:  [66-82] 75  Resp:  [12-20] 16  SpO2:  [94 %-100 %] 94 %  BP: (124-162)/(56-72) 138/66     Weight: 39.2 kg (86 lb 8 oz)  Body mass index is 14.39  kg/m².    Intake/Output Summary (Last 24 hours) at 5/3/2019 1100  Last data filed at 5/3/2019 0849  Gross per 24 hour   Intake 750 ml   Output 120 ml   Net 630 ml      Physical Exam   Constitutional: She is oriented to person, place, and time. No distress.   Frail, pleasant    HENT:   Head: Normocephalic and atraumatic.   Mouth/Throat: No oropharyngeal exudate.   Eyes: Conjunctivae are normal. No scleral icterus.   Neck: Normal range of motion.   Cardiovascular: Normal rate and regular rhythm.   Murmur heard.  Pulmonary/Chest: Effort normal. No respiratory distress. She has no wheezes. She has rales.   3L NC no resp distress    breath sounds at base with some rales on L    Abdominal: Soft. She exhibits no distension. There is no tenderness.   Musculoskeletal: She exhibits no edema.   Neurological: She is alert and oriented to person, place, and time.   Skin: Skin is warm and dry. She is not diaphoretic. There is pallor.   Vitals reviewed.      Significant Labs: All pertinent labs within the past 24 hours have been reviewed.    Significant Imaging: I have reviewed all pertinent imaging results/findings within the past 24 hours.  .    Assessment/Plan:      * Left lower lobe pneumonia  Hx of severe COPD with recent hospitalization and recently treated for suspected PNA and pseudomonas infection.  Currently followed by ID.  Plan for CT chest today  - Continue Meropenem D/C vanc and plan to transition to cefepime pending CT  Recurrent, with new opacity found that developed at the end of March  -Pulm consulted, appreciate recs  -addition of gentle diuresis with lasix 20mg QD   -thora attempted not enough fluid  - Repeat BNP pending        Recurrent pneumonia   Appreciate ID recs-  1.   Continue meropenem for now to continue pseudomonal and anaerobic coverage (given concern with aspiration).   Currently on Day 5 of meropenem.   2. Sent sputum for respiratory culture to guide therapy  3.  Rec pulmonary consult for  "eval of RUL findings on CT - pulm feels fungal vs mucus plug and no indication for bronchoscopy at this time.  4.  Suspect patient has had adequate abx at 5d - rec dc tomorrow and monitor  5.  Fungal studies sent  6. Needs Pulm and ID fu as outpt - need fu on fungal studies.    Appreciate pulm recs-  Patient with <5mm pocket of fluid on inspiration on bedside ultrasound.  Relative to CT Chest performed, suspecting effusion has decreased in volume.  Did not feel sampling is warranted or safe given the clinical context.    Follow up with Pulmonary in 6 weeks with repeat CT Chest.  Follow up arranged my clinic.      Question of aspiration PNA- patient passed MBSS, appreciate SLP rec s       Oral thrush  -some oral thrush noted  -magic mouthwash PRN       Paroxysmal atrial fibrillation  Patient with a.fib diagnosed earlier this month during hospitalization for pneumonia and bacteremia. S/p failed cardioversion. Now controlled on diltiazem and amiodarone.  - Continuing amiodarone and diltiazem PO  - Anticoagulation with eliquis  -to be followed up in cardiology clinic to discuss amiodarone dosing     Severe malnutrition  - Boost with meals     Cachexia  - Nutrition consulted  - Boost supplementation with meals      COPD exacerbation  - 2/2 pneumonia, please see "left lower lobe pneumonia" above, clinically picture unclear       Essential hypertension  - Continuing home diltiazem, patient normotensive once weaned off Bipap. Suspect hypoxia and stress were leading to increase in blood pressure.  - Continue to monitor      Leukocytosis  - Please see "Pneumonia"  -UA pending    Carotid disease, bilateral  Patient with 50-69% stenosis of left carotid artery and < 50% stenosis of right per ultrasound on 6/13/18.    - Continuing asa and statin while inpatient      HLD (hyperlipidemia)  Patient on pravastatin 20mg at home.    - Continuing home statin while inpatient        VTE Risk Mitigation (From admission, onward)        " Ordered     apixaban tablet 5 mg  2 times daily      04/26/19 0559     Place TIAN hose  Until discontinued      04/26/19 0454     IP VTE HIGH RISK PATIENT  Once      04/26/19 0454              Rajiv Vargas MD  Department of Hospital Medicine   [facility]

## 2019-05-04 LAB
BLASTOMYCES AG INTERP: NEGATIVE
BLASTOMYCES AG RESULT: NORMAL NG/ML
BLASTOMYCES AG SPECIMEN: NORMAL

## 2019-05-04 NOTE — PROGRESS NOTES
[facility]  Infectious Disease  Progress Note    Patient Name: Latasha Perez  MRN: [id number]  Admission Date: 4/26/2019   Length of Stay: 7 days  Attending Physician: No att. providers found  Primary Care Provider: Pollo Oneal MD    Isolation Status: No active isolations  Assessment/Plan:      * Left lower lobe pneumonia     73 year old woman with COPD, HTN, Afib with RVR (on amiodarone), and recent history of hospitalization for pseudomonas bacteremia (pan sensitive) and RUL PNA (s/p 14 days of IV cefepime) who presented from [facility] with sudden onset of shortness of breath.  Found to be hypoxic with reported O2 sats in 50s on room air.  CXR in ED showed new LLL consolidation concerning for PNA with an associated leukocytosis.  No associated fevers, chills. Blood cultures negative.  ID consulted initially on admission. Patient was never able to produce sputum for culture.  Passed MBSS.   A CT of chest on 4/28 showed severe emphysema, bilateral pleural effusions (trace on right, small on left), and irregular opacity in RUL corresponding to RUL opacity on CXR of 4/2.   Pulmonary medicine was consulted at that time and suspected imaging related to mucous plugging vs fungal process.  Did not recommend bronchoscopy or thoracentesis, and recommended outpatient follow up if symptoms did not resolve.  ID ordered fungal serologies and recommended outpatient follow up.      Patient completed  5 days of meropenem, clinically improved, and leukocytosis resolved, but recurred after stopping antibiotics.  ID now re-consulted for evaluation.      WBC transiently went up after antibiotics discontinued, but today is again trending down without intervention.  Patient is clinically stable - appears much improved from admission.   No fevers, chills.  Denies any increase in SOB or cough.  No dysuria.  No diarrhea.  No complaints except longstanding feelings of esophageal fullness with eating and  thrush.   Repeat CXR today shows significant interval improvement.   Pulmonology was also re-consulted and notes improvement and recommends outpatient follow up.      Plan/recommendations:  1.   No further antibiotics recommended.   2.   Follow up with ID as outpatient for follow up on fungal studies  3.   Follow up with Pulmonary medicine as planned with repeat imaging.   4.   Will sign off.  Please call or re-consult as needed    Pllan discussed with ID staff  Discussed with Primary Team             Subjective:     Principal Problem:Left lower lobe pneumonia    HPI: Ms. Latasha Perez is a 73 year old woman with COPD, HTN, Afib with RVR (on amiodarone), and recent history of hospitalization for pseudomonas bacteremia (pan sensitive) and RUL PNA (s/p 14 days of IV cefepime) who presented from [facility] with sudden onset of shortness of breath.  Found to be hypoxic with reported O2 sats in 50s on room air.  CXR in ED showed new LLL consolidation concerning for PNA.  Associated leukocytosis.  No associated fevers, chills.  Received dose of vancomycin and cefepime in ED.  Currently on IV vancomycin and meropenem.  Blood cultures pending, NGTD.  No respiratory cultures.     At time of visit she is sitting up in chair in no distress.  Primary complaint is that she feels weak.  She denies significant cough, denies sputum production.  Denies any associated fevers or chills.      Interval History: ID reconsulted prior to d/c regarding leukocytosis.  Patient afebrile.  WBC trending down 19>>15.      Review of Systems   Constitutional: Negative for activity change, appetite change, chills, diaphoresis, fatigue and fever.   HENT: Negative for congestion, ear pain, sore throat and trouble swallowing.    Eyes: Negative.    Respiratory: Positive for shortness of breath (at baseline ). Negative for cough and wheezing.    Cardiovascular: Negative for chest pain, palpitations and leg swelling.   Gastrointestinal:  Positive for constipation. Negative for abdominal pain, diarrhea, nausea and vomiting.   Genitourinary: Negative for difficulty urinating, dysuria, frequency, hematuria and urgency.   Musculoskeletal: Negative for arthralgias, joint swelling and myalgias.   Skin: Negative for rash and wound.   Neurological: Negative for dizziness and headaches.   Hematological: Does not bruise/bleed easily.   Psychiatric/Behavioral: Negative for agitation, behavioral problems and confusion.     Objective:     Vital Signs (Most Recent):  Temp: 97.8 °F (36.6 °C) (05/03/19 1214)  Pulse: 71 (05/03/19 1508)  Resp: 18 (05/03/19 1214)  BP: 134/63 (05/03/19 1214)  SpO2: 95 % (05/03/19 1214) Vital Signs (24h Range):  Temp:  [97.7 °F (36.5 °C)-98.3 °F (36.8 °C)] 97.8 °F (36.6 °C)  Pulse:  [66-82] 71  Resp:  [16-20] 18  SpO2:  [94 %-99 %] 95 %  BP: (124-147)/(56-66) 134/63     Weight: 39.2 kg (86 lb 8 oz)  Body mass index is 14.39 kg/m².    Estimated Creatinine Clearance: 52.4 mL/min (based on SCr of 0.6 mg/dL).    Physical Exam   Constitutional: She is oriented to person, place, and time. No distress.   Frail, elderly woman   HENT:   Head: Normocephalic and atraumatic.   Mouth/Throat: No oropharyngeal exudate.   White patches buccal mucosa     Eyes: Conjunctivae are normal. No scleral icterus.   Neck: Normal range of motion. Neck supple.   Cardiovascular: Normal rate and regular rhythm. Exam reveals no friction rub.   Murmur heard.  Pulmonary/Chest: Effort normal. No respiratory distress. She has no wheezes. She has no rales.   O2 nasal cannula.      Abdominal: Soft. She exhibits no distension. There is no tenderness. There is no guarding.   Musculoskeletal: She exhibits no edema or tenderness.   Lymphadenopathy:     She has no cervical adenopathy.   Neurological: She is alert and oriented to person, place, and time.   Skin: Skin is warm and dry. She is not diaphoretic. There is pallor.   Psychiatric: She has a normal mood and affect. Her  behavior is normal. Thought content normal.   Vitals reviewed.      Significant Labs:   Blood Culture:   Recent Labs   Lab 04/04/19  0700 04/26/19  0615 04/26/19  0642 05/02/19  1519 05/02/19  1525   LABBLOO No growth after 5 days. No growth after 5 days. No growth after 5 days. No Growth to date  No Growth to date No Growth to date  No Growth to date     CBC:   Recent Labs   Lab 05/02/19  0548 05/02/19  1104 05/03/19  0253   WBC 14.86* 19.62* 15.18*   HGB 9.5* 10.4* 8.7*   HCT 28.6* 28.7* 26.3*    303 293     CMP:   Recent Labs   Lab 05/02/19  0548 05/03/19  0253    138   K 4.6 3.2*    98   CO2 30* 33*    85   BUN 20 24*   CREATININE 0.7 0.6   CALCIUM 8.7 8.4*   PROT 5.5* 5.2*   ALBUMIN 2.3* 2.1*   BILITOT 0.4 0.3   ALKPHOS 76 74   AST 25 28   ALT 24 26   ANIONGAP 8 7*   EGFRNONAA >60.0 >60.0     Procalcitonin: No results for input(s): PROCAL in the last 48 hours.  Respiratory Culture: No results for input(s): GSRESP, RESPIRATORYC in the last 4320 hours.  Urine Culture: No results for input(s): LABURIN in the last 4320 hours.  Urine Studies:   Recent Labs   Lab 05/02/19  2340   COLORU Yellow   APPEARANCEUA Clear   PHUR 6.0   SPECGRAV 1.015   PROTEINUA Negative   GLUCUA Negative   KETONESU Negative   BILIRUBINUA Negative   OCCULTUA Negative   NITRITE Negative   LEUKOCYTESUR Negative       Significant Imaging: I have reviewed all pertinent imaging results/findings within the past 24 hours.

## 2019-05-04 NOTE — ASSESSMENT & PLAN NOTE
73 year old woman with COPD, HTN, Afib with RVR (on amiodarone), and recent history of hospitalization for pseudomonas bacteremia (pan sensitive) and RUL PNA (s/p 14 days of IV cefepime) who presented from [facility] with sudden onset of shortness of breath.  Found to be hypoxic with reported O2 sats in 50s on room air.  CXR in ED showed new LLL consolidation concerning for PNA with an associated leukocytosis.  No associated fevers, chills. Blood cultures negative.  ID consulted initially on admission. Patient was never able to produce sputum for culture.  Passed MBSS.   A CT of chest on 4/28 showed severe emphysema, bilateral pleural effusions (trace on right, small on left), and irregular opacity in RUL corresponding to RUL opacity on CXR of 4/2.   Pulmonary medicine was consulted at that time and suspected imaging related to mucous plugging vs fungal process.  Did not recommend bronchoscopy or thoracentesis, and recommended outpatient follow up if symptoms did not resolve.  ID ordered fungal serologies and recommended outpatient follow up.      Patient completed  5 days of meropenem, clinically improved, and leukocytosis resolved, but recurred after stopping antibiotics.  ID now re-consulted for evaluation.      WBC transiently went up after antibiotics discontinued, but today is again trending down without intervention.  Patient is clinically stable - appears much improved from admission.   No fevers, chills.  Denies any increase in SOB or cough.  No dysuria.  No diarrhea.  No complaints except longstanding feelings of esophageal fullness with eating and thrush.   Repeat CXR today shows significant interval improvement.   Pulmonology was also re-consulted and notes improvement and recommends outpatient follow up.      Plan/recommendations:  1.   No further antibiotics recommended.   2.   Follow up with ID as outpatient for follow up on fungal studies  3.   Follow up with Pulmonary medicine as planned  with repeat imaging.   4.   Will sign off.  Please call or re-consult as needed    Pllan discussed with ID staff  Discussed with Primary Team

## 2019-05-06 NOTE — PT/OT/SLP DISCHARGE
Occupational Therapy Discharge Summary    Latasha Perez  MRN: [id number]   Principal Problem: Left lower lobe pneumonia      Patient Discharged from acute Occupational Therapy on 5/3/19.  Please refer to prior OT note dated 5/1/19 for functional status.    Assessment:      Patient appropriate for care in another setting.    Objective:     GOALS:   Multidisciplinary Problems     Occupational Therapy Goals        Problem: Occupational Therapy Goal    Goal Priority Disciplines Outcome Interventions   Occupational Therapy Goal     OT, PT/OT Ongoing (interventions implemented as appropriate)    Description:  Goals to be met by: 5/4/19     Patient will increase functional independence with ADLs by performing:    LE Dressing with Set-up Assistance.  Grooming while standing at sink with Supervision. Progressing  Toileting from toilet with Supervision for hygiene and clothing management.  Progressing  Supine to sit with [place].  Step transfer with Stand-by Assistance  Toilet transfer to toilet with Stand-by Assistance. Progressing                       Reasons for Discontinuation of Therapy Services  Transfer to alternate level of care.      Plan:     Patient Discharged to: Skilled Nursing Facility    LENNY Bates  5/6/2019

## 2019-05-07 LAB
BACTERIA BLD CULT: NORMAL
BACTERIA BLD CULT: NORMAL

## 2019-05-08 LAB
ASPERGILLUS AB SER QL ID: DETECTED
B DERMAT AB SER QL ID: ABNORMAL
C IMMITIS AB SER QL ID: DETECTED
H CAPSUL AB SER QL ID: ABNORMAL

## 2019-05-10 ENCOUNTER — HOSPITAL ENCOUNTER (EMERGENCY)
Facility: HOSPITAL | Age: 74
Discharge: HOME | End: 2019-05-11
Attending: EMERGENCY MEDICINE
Payer: MEDICARE

## 2019-05-10 DIAGNOSIS — J44.9 CHRONIC OBSTRUCTIVE PULMONARY DISEASE, UNSPECIFIED COPD TYPE: Primary | ICD-10-CM

## 2019-05-10 DIAGNOSIS — R06.00 DYSPNEA: ICD-10-CM

## 2019-05-10 PROCEDURE — 96360 HYDRATION IV INFUSION INIT: CPT | Mod: HCNC

## 2019-05-10 PROCEDURE — 99285 EMERGENCY DEPT VISIT HI MDM: CPT | Mod: 25,HCNC

## 2019-05-11 VITALS
TEMPERATURE: 99 F | WEIGHT: 86.44 LBS | HEART RATE: 80 BPM | HEIGHT: 65 IN | BODY MASS INDEX: 14.4 KG/M2 | OXYGEN SATURATION: 99 % | RESPIRATION RATE: 20 BRPM | SYSTOLIC BLOOD PRESSURE: 130 MMHG | DIASTOLIC BLOOD PRESSURE: 58 MMHG

## 2019-05-11 LAB
ALBUMIN SERPL BCP-MCNC: 2.2 G/DL (ref 3.5–5.2)
ALLENS TEST: ABNORMAL
ALP SERPL-CCNC: 88 U/L (ref 55–135)
ALT SERPL W/O P-5'-P-CCNC: 42 U/L (ref 10–44)
ANION GAP SERPL CALC-SCNC: 8 MMOL/L (ref 8–16)
AST SERPL-CCNC: 33 U/L (ref 10–40)
BASOPHILS NFR BLD: 0 % (ref 0–1.9)
BILIRUB SERPL-MCNC: 0.3 MG/DL (ref 0.1–1)
BNP SERPL-MCNC: 142 PG/ML (ref 0–99)
BUN SERPL-MCNC: 19 MG/DL (ref 8–23)
CALCIUM SERPL-MCNC: 9.1 MG/DL (ref 8.7–10.5)
CHLORIDE SERPL-SCNC: 101 MMOL/L (ref 95–110)
CO2 SERPL-SCNC: 29 MMOL/L (ref 23–29)
CREAT SERPL-MCNC: 0.6 MG/DL (ref 0.5–1.4)
DELSYS: ABNORMAL
DIFFERENTIAL METHOD: ABNORMAL
EOSINOPHIL NFR BLD: 15 % (ref 0–8)
ERYTHROCYTE [DISTWIDTH] IN BLOOD BY AUTOMATED COUNT: 15.6 % (ref 11.5–14.5)
EST. GFR  (AFRICAN AMERICAN): >60 ML/MIN/1.73 M^2
EST. GFR  (NON AFRICAN AMERICAN): >60 ML/MIN/1.73 M^2
GLUCOSE SERPL-MCNC: 110 MG/DL (ref 70–110)
HCO3 UR-SCNC: 27.7 MMOL/L (ref 24–28)
HCT VFR BLD AUTO: 26.5 % (ref 37–48.5)
HGB BLD-MCNC: 8.5 G/DL (ref 12–16)
LACTATE SERPL-SCNC: 1 MMOL/L (ref 0.5–2.2)
LYMPHOCYTES NFR BLD: 5 % (ref 18–48)
MCH RBC QN AUTO: 31 PG (ref 27–31)
MCHC RBC AUTO-ENTMCNC: 32.1 G/DL (ref 32–36)
MCV RBC AUTO: 97 FL (ref 82–98)
MONOCYTES NFR BLD: 7 % (ref 4–15)
NEUTROPHILS NFR BLD: 73 % (ref 38–73)
PCO2 BLDA: 45.2 MMHG (ref 35–45)
PH SMN: 7.39 [PH] (ref 7.35–7.45)
PLATELET # BLD AUTO: 244 K/UL (ref 150–350)
PMV BLD AUTO: 9.5 FL (ref 9.2–12.9)
PO2 BLDA: 67 MMHG (ref 80–100)
POC BE: 3 MMOL/L
POC SATURATED O2: 93 % (ref 95–100)
POC TCO2: 29 MMOL/L (ref 23–27)
POTASSIUM SERPL-SCNC: 3.6 MMOL/L (ref 3.5–5.1)
PROCALCITONIN SERPL IA-MCNC: 0.18 NG/ML
PROT SERPL-MCNC: 6 G/DL (ref 6–8.4)
RBC # BLD AUTO: 2.74 M/UL (ref 4–5.4)
SAMPLE: ABNORMAL
SITE: ABNORMAL
SODIUM SERPL-SCNC: 138 MMOL/L (ref 136–145)
TROPONIN I SERPL DL<=0.01 NG/ML-MCNC: 0.01 NG/ML (ref 0–0.03)
WBC # BLD AUTO: 13.8 K/UL (ref 3.9–12.7)

## 2019-05-11 PROCEDURE — 93010 EKG 12-LEAD: ICD-10-PCS | Mod: HCNC,,, | Performed by: STUDENT IN AN ORGANIZED HEALTH CARE EDUCATION/TRAINING PROGRAM

## 2019-05-11 PROCEDURE — 93005 ELECTROCARDIOGRAM TRACING: CPT | Mod: HCNC

## 2019-05-11 PROCEDURE — 94640 AIRWAY INHALATION TREATMENT: CPT | Mod: HCNC

## 2019-05-11 PROCEDURE — 84484 ASSAY OF TROPONIN QUANT: CPT | Mod: HCNC

## 2019-05-11 PROCEDURE — 99900035 HC TECH TIME PER 15 MIN (STAT): Mod: HCNC

## 2019-05-11 PROCEDURE — 83605 ASSAY OF LACTIC ACID: CPT | Mod: HCNC

## 2019-05-11 PROCEDURE — 84145 PROCALCITONIN (PCT): CPT | Mod: HCNC

## 2019-05-11 PROCEDURE — 25000003 PHARM REV CODE 250: Mod: HCNC | Performed by: EMERGENCY MEDICINE

## 2019-05-11 PROCEDURE — 85027 COMPLETE CBC AUTOMATED: CPT | Mod: HCNC

## 2019-05-11 PROCEDURE — 25000242 PHARM REV CODE 250 ALT 637 W/ HCPCS: Mod: HCNC | Performed by: EMERGENCY MEDICINE

## 2019-05-11 PROCEDURE — 87040 BLOOD CULTURE FOR BACTERIA: CPT | Mod: 59,HCNC

## 2019-05-11 PROCEDURE — 82803 BLOOD GASES ANY COMBINATION: CPT | Mod: HCNC

## 2019-05-11 PROCEDURE — 80053 COMPREHEN METABOLIC PANEL: CPT | Mod: HCNC

## 2019-05-11 PROCEDURE — 36600 WITHDRAWAL OF ARTERIAL BLOOD: CPT | Mod: HCNC

## 2019-05-11 PROCEDURE — 93010 ELECTROCARDIOGRAM REPORT: CPT | Mod: HCNC,,, | Performed by: STUDENT IN AN ORGANIZED HEALTH CARE EDUCATION/TRAINING PROGRAM

## 2019-05-11 PROCEDURE — 85007 BL SMEAR W/DIFF WBC COUNT: CPT | Mod: HCNC

## 2019-05-11 PROCEDURE — 83880 ASSAY OF NATRIURETIC PEPTIDE: CPT | Mod: HCNC

## 2019-05-11 RX ORDER — IPRATROPIUM BROMIDE AND ALBUTEROL SULFATE 2.5; .5 MG/3ML; MG/3ML
3 SOLUTION RESPIRATORY (INHALATION)
Status: COMPLETED | OUTPATIENT
Start: 2019-05-11 | End: 2019-05-11

## 2019-05-11 RX ADMIN — IPRATROPIUM BROMIDE AND ALBUTEROL SULFATE 3 ML: .5; 3 SOLUTION RESPIRATORY (INHALATION) at 02:05

## 2019-05-11 RX ADMIN — SODIUM CHLORIDE 500 ML: 0.9 INJECTION, SOLUTION INTRAVENOUS at 12:05

## 2019-05-11 NOTE — ED NOTES
Report received from iraj thorne patient awake alert oriented at this time patient waiting for spd to go back to rehab facility patient in no acute distress vss nurse will continue to monitor

## 2019-05-11 NOTE — ED PROVIDER NOTES
Encounter Date: 5/10/2019    SCRIBE #1 NOTE: I, Sharla Coughlin, am scribing for, and in the presence of,  Dr. Merchant. I have scribed the entire note.       History     Chief Complaint   Patient presents with    Shortness of Breath     Pt currently denies SOB or chest pain. Pt reports EMS was called out because SOB woke pt from sleep. Reports was on oxygen machine at nursing home at time of episode. reports on ems arrival and switching to tank SOB resolved. EMS reports that on nursing home equipment pt was 82% on 5 L, Reports on 4 L on EMS tank pt sat 100%.      Latasha Perez is a 73 y.o. female who  has a past medical history of Arthritis, Carotid disease, bilateral, Cataract, Chronic hyponatremia, COPD (chronic obstructive pulmonary disease), HLD (hyperlipidemia), and HTN (hypertension).    The patient presents to the ED due to shortness of breath. She reports onset of symptoms was last night. As per nursing staff at facility the patient was woke from sleep with difficulty breathing. EMS note the patient was found to have an oxygen saturation of 82%. The patient wears 2 liters of oxygen at rehab facility. She is at the facility following admission at [facility] due to A.fib, COPD and pneumonia. EMS placed the patient on 4 liters of oxygen with oxygen saturation to 100%. The patient states her breathing is currently back to normal. At baseline the patient has difficulty breathing. The patient denies any chest pain, palpitations, nausea, vomiting, fever or chills.     The history is provided by the patient and the EMS personnel.     Review of patient's allergies indicates:   Allergen Reactions    Meropenem Itching and Rash     Rash started to appear around Day 3 of therapy.     Pcn [penicillins] Other (See Comments)     Fever flushing skin swelling     Biaxin [clarithromycin] Rash    Codeine Rash    Doxycycline Rash    Levaquin [levofloxacin] Rash     Past Medical History:   Diagnosis Date     Arthritis     Carotid disease, bilateral     Cataract     Chronic hyponatremia     COPD (chronic obstructive pulmonary disease)     HLD (hyperlipidemia)     HTN (hypertension)      Past Surgical History:   Procedure Laterality Date    APPENDECTOMY      Cardioversion or Defibrillation  4/4/2019    Performed by Manfred Figueroa [facility] at [facility] CATH LAB    CATARACT EXTRACTION W/  INTRAOCULAR LENS IMPLANT Right 12/06/2018    Dr. Escobar    CATARACT EXTRACTION W/  INTRAOCULAR LENS IMPLANT Left 12/20/2018    DR. Escobar    CERVICAL SPINE SURGERY      DILATION AND CURETTAGE OF UTERUS      x 2    EYE SURGERY      cataract Rt    FRACTURE SURGERY      Lt leg fracture with hardware    INSERTION, IOL PROSTHESIS Left 12/20/2018    Performed by Broderick Escobar [facility] at [facility] OR    INSERTION, IOL PROSTHESIS Right 12/6/2018    Performed by Bro[facility]ick [facility]obar MD at [facility] OR    metatarsal repair      OPEN REDUCTION INTERNAL FIXATION-TIBIAL PLATEAU Left 6/27/2014    Performed by Isak Pereira MD at [facility] OR    PHACOEMULSIFICATION, CATARACT Left 12/20/2018    Performed by [facility]ick Escobar [facility] at [facility] OR    PHACOEMULSIFICATION, CATARACT Right 12/6/2018    Performed by [facility]ick [facility]obar [facility] at [facility] OR    SHOULDER ARTHROSCOPY      Transesophageal echo (JOHNNA) intra-procedure log documentation N/A 4/4/2019    Performed by Manfred Figueroa MD at [facility] CATH LAB     Family History   Problem Relation Age of Onset    Heart disease Mother     Cancer Father     Heart disease Maternal Grandfather     Cataracts Sister     No Known Problems Brother     No Known Problems Maternal Aunt     No Known Problems Maternal Uncle     No Known Problems Paternal Aunt     No Known Problems Paternal Uncle     No Known Problems Maternal Grandmother     No Known Problems Paternal Grandmother     No Known Problems Paternal Grandfather     Amblyopia Neg Hx     Blindness Neg Hx     Glaucoma Neg Hx     Macular  degeneration Neg Hx     Retinal detachment Neg Hx     Strabismus Neg Hx     Diabetes Neg Hx     Hypertension Neg Hx     Stroke Neg Hx     Thyroid disease Neg Hx      Social History     Tobacco Use    Smoking status: Former Smoker     Packs/day: 1.00     Years: 45.00     Pack years: 45.00     Types: Cigarettes     Last attempt to quit: 2002     Years since quittin.3    Smokeless tobacco: Never Used   Substance Use Topics    Alcohol use: No    Drug use: No     Review of Systems   Constitutional: Negative for chills and fever.   HENT: Negative for congestion, rhinorrhea and sore throat.    Eyes: Negative for redness and visual disturbance.   Respiratory: Positive for shortness of breath. Negative for cough and wheezing.    Cardiovascular: Negative for chest pain and palpitations.   Gastrointestinal: Negative for abdominal pain, diarrhea, nausea and vomiting.   Genitourinary: Negative for dysuria and hematuria.   Musculoskeletal: Negative for back pain, myalgias and neck pain.   Skin: Negative for rash.   Neurological: Negative for dizziness, weakness and light-headedness.   Psychiatric/Behavioral: Negative for confusion.   All other systems reviewed and are negative.      Physical Exam     Initial Vitals [05/10/19 2336]   BP Pulse Resp Temp SpO2   (!) 122/52 78 (!) 24 98.2 °F (36.8 °C) 99 %      MAP       --         Physical Exam    Nursing note and vitals reviewed.  Constitutional: She appears well-developed and well-nourished. She is not diaphoretic. She appears distressed (mild).   HENT:   Head: Normocephalic and atraumatic.   Mouth/Throat: Oropharynx is clear and moist.   Eyes: Conjunctivae and EOM are normal. Pupils are equal, round, and reactive to light.   Neck: Normal range of motion. Neck supple.   Cardiovascular: Normal rate, regular rhythm and normal heart sounds. Exam reveals no gallop and no friction rub.    No murmur heard.  Pulmonary/Chest: Breath sounds normal. Tachypnea (mild)  noted. She has no wheezes. She has no rhonchi. She has no rales.   Abdominal: Soft. Bowel sounds are normal. There is no tenderness. There is no rebound and no guarding.   Musculoskeletal: Normal range of motion. She exhibits edema. She exhibits no tenderness.   1+ bilateral pre-tibial edema   Lymphadenopathy:     She has no cervical adenopathy.   Neurological: She is alert and oriented to person, place, and time. She has normal strength.   Skin: Skin is warm and dry. Capillary refill takes less than 2 seconds. No rash noted.         ED Course   Procedures  Labs Reviewed - No data to display  EKG Readings: (Independently Interpreted)   Normal sinus rhythm with a rate of 77. No ST or T wave changes. No STEMI       Imaging Results          X-Ray Chest AP Portable (Final result)  Result time 05/11/19 01:15:42    Final result by Megan Howell MD (05/11/19 01:15:42)                 Impression:      Pulmonary emphysema with unchanged linear opacity seen within the right upper lobe.  No acute cardiopulmonary process identified.      Electronically signed by: Megan Howell MD  Date:    05/11/2019  Time:    01:15             Narrative:    EXAMINATION:  XR CHEST AP PORTABLE    CLINICAL HISTORY:  Sepsis;    TECHNIQUE:  Single frontal view of the chest was performed.    COMPARISON:  05/02/2019. Recent CT chest from 04/28/2019.    FINDINGS:  Heart is normal in size.  Lungs are hyperinflated with flattening of the diaphragms with emphysematous changes seen.  Unchanged irregular linear opacity is seen within the right upper lobe.  No evidence of new focal consolidative process, pneumothorax, or significant pleural effusion.  No acute osseous abnormality identified.                                 Medical Decision Making:   Independently Interpreted Test(s):   I have ordered and independently interpreted EKG Reading(s) - see prior notes  Clinical Tests:   Lab Tests: Ordered and Reviewed  Radiological Study: Ordered and  Reviewed  Medical Tests: Ordered and Reviewed  ED Management:  3:15 AM  O2 sats in the mid to high 90's on 3 liters N/C O2. No wheezing. Patient improved. It is likely that oxygen delivery system that patient was using at the nursing home was not working properly. Will discharge to home and recommend close follow-up with PCP. Patient voices understanding and agrees with plan.                    ED Course as of May 11 0314   Sat May 11, 2019   0216 Patient has oxygen saturation range in mid 90's to 100 on room air. Anticipate discharge soon. No evidence of recurrent pneumonia      [SP]      ED Course User Index  [SP] Sharla Coughlin     Clinical Impression:     1. Chronic obstructive pulmonary disease, unspecified COPD type    2. Dyspnea           I, Dr. Jay Merchant, personally performed the services described in this documentation. All medical record entries made by the scribe were at my direction and in my presence.  I have reviewed the chart and agree that the record reflects my personal performance and is accurate and complete                     Jay Merchant MD  05/11/19 [id number]

## 2019-05-14 ENCOUNTER — HOSPITAL ENCOUNTER (INPATIENT)
Facility: HOSPITAL | Age: 74
LOS: 5 days | Discharge: HOME HEALTH | DRG: 291 | End: 2019-05-19
Attending: EMERGENCY MEDICINE | Admitting: INTERNAL MEDICINE
Payer: MEDICARE

## 2019-05-14 DIAGNOSIS — T46.2X5A: ICD-10-CM

## 2019-05-14 DIAGNOSIS — R06.02 SOB (SHORTNESS OF BREATH): ICD-10-CM

## 2019-05-14 DIAGNOSIS — R09.02 HYPOXIA: ICD-10-CM

## 2019-05-14 DIAGNOSIS — R94.31 PROLONGED Q-T INTERVAL ON ECG: ICD-10-CM

## 2019-05-14 DIAGNOSIS — J18.9 PNEUMONIA DUE TO INFECTIOUS ORGANISM, UNSPECIFIED LATERALITY, UNSPECIFIED PART OF LUNG: ICD-10-CM

## 2019-05-14 DIAGNOSIS — J81.0 ACUTE PULMONARY EDEMA: Primary | ICD-10-CM

## 2019-05-14 LAB
ALBUMIN SERPL BCP-MCNC: 2.3 G/DL (ref 3.5–5.2)
ALLENS TEST: ABNORMAL
ALLENS TEST: ABNORMAL
ALP SERPL-CCNC: 96 U/L (ref 55–135)
ALT SERPL W/O P-5'-P-CCNC: 37 U/L (ref 10–44)
ANION GAP SERPL CALC-SCNC: 11 MMOL/L (ref 8–16)
AST SERPL-CCNC: 28 U/L (ref 10–40)
BASOPHILS # BLD AUTO: 0.04 K/UL (ref 0–0.2)
BASOPHILS NFR BLD: 0.3 % (ref 0–1.9)
BILIRUB SERPL-MCNC: 0.3 MG/DL (ref 0.1–1)
BNP SERPL-MCNC: 122 PG/ML (ref 0–99)
BUN SERPL-MCNC: 19 MG/DL (ref 8–23)
CALCIUM SERPL-MCNC: 8.9 MG/DL (ref 8.7–10.5)
CHLORIDE SERPL-SCNC: 101 MMOL/L (ref 95–110)
CO2 SERPL-SCNC: 28 MMOL/L (ref 23–29)
CREAT SERPL-MCNC: 0.7 MG/DL (ref 0.5–1.4)
DELSYS: ABNORMAL
DELSYS: ABNORMAL
DIFFERENTIAL METHOD: ABNORMAL
EOSINOPHIL # BLD AUTO: 1.7 K/UL (ref 0–0.5)
EOSINOPHIL NFR BLD: 11.8 % (ref 0–8)
EP: 5
EP: 5
ERYTHROCYTE [DISTWIDTH] IN BLOOD BY AUTOMATED COUNT: 15.4 % (ref 11.5–14.5)
ERYTHROCYTE [SEDIMENTATION RATE] IN BLOOD BY WESTERGREN METHOD: 12 MM/H
ERYTHROCYTE [SEDIMENTATION RATE] IN BLOOD BY WESTERGREN METHOD: 12 MM/H
EST. GFR  (AFRICAN AMERICAN): >60 ML/MIN/1.73 M^2
EST. GFR  (NON AFRICAN AMERICAN): >60 ML/MIN/1.73 M^2
FIO2: 35
FIO2: 40
GLUCOSE SERPL-MCNC: 127 MG/DL (ref 70–110)
HCO3 UR-SCNC: 32.5 MMOL/L (ref 24–28)
HCO3 UR-SCNC: 32.7 MMOL/L (ref 24–28)
HCT VFR BLD AUTO: 28.1 % (ref 37–48.5)
HGB BLD-MCNC: 9.1 G/DL (ref 12–16)
IMM GRANULOCYTES # BLD AUTO: 0.06 K/UL (ref 0–0.04)
IMM GRANULOCYTES NFR BLD AUTO: 0.4 % (ref 0–0.5)
IP: 10
IP: 12
LACTATE SERPL-SCNC: 1.1 MMOL/L (ref 0.5–2.2)
LDH SERPL L TO P-CCNC: 1.03 MMOL/L (ref 0.5–2.2)
LYMPHOCYTES # BLD AUTO: 0.8 K/UL (ref 1–4.8)
LYMPHOCYTES NFR BLD: 5.6 % (ref 18–48)
MCH RBC QN AUTO: 31.1 PG (ref 27–31)
MCHC RBC AUTO-ENTMCNC: 32.4 G/DL (ref 32–36)
MCV RBC AUTO: 96 FL (ref 82–98)
MODE: ABNORMAL
MODE: ABNORMAL
MONOCYTES # BLD AUTO: 0.9 K/UL (ref 0.3–1)
MONOCYTES NFR BLD: 6.3 % (ref 4–15)
NEUTROPHILS # BLD AUTO: 11.1 K/UL (ref 1.8–7.7)
NEUTROPHILS NFR BLD: 75.6 % (ref 38–73)
NRBC BLD-RTO: 0 /100 WBC
PCO2 BLDA: 58.8 MMHG (ref 35–45)
PCO2 BLDA: 60.1 MMHG (ref 35–45)
PH SMN: 7.34 [PH] (ref 7.35–7.45)
PH SMN: 7.35 [PH] (ref 7.35–7.45)
PLATELET # BLD AUTO: 293 K/UL (ref 150–350)
PMV BLD AUTO: 9.6 FL (ref 9.2–12.9)
PO2 BLDA: 29 MMHG (ref 40–60)
PO2 BLDA: 29 MMHG (ref 40–60)
POC BE: 7 MMOL/L
POC BE: 7 MMOL/L
POC SATURATED O2: 49 % (ref 95–100)
POC SATURATED O2: 50 % (ref 95–100)
POC TCO2: 34 MMOL/L (ref 24–29)
POC TCO2: 34 MMOL/L (ref 24–29)
POTASSIUM SERPL-SCNC: 3.5 MMOL/L (ref 3.5–5.1)
PROT SERPL-MCNC: 6.4 G/DL (ref 6–8.4)
RBC # BLD AUTO: 2.93 M/UL (ref 4–5.4)
SAMPLE: ABNORMAL
SAMPLE: ABNORMAL
SITE: ABNORMAL
SITE: ABNORMAL
SODIUM SERPL-SCNC: 140 MMOL/L (ref 136–145)
TROPONIN I SERPL DL<=0.01 NG/ML-MCNC: <0.006 NG/ML (ref 0–0.03)
WBC # BLD AUTO: 14.71 K/UL (ref 3.9–12.7)

## 2019-05-14 PROCEDURE — 87040 BLOOD CULTURE FOR BACTERIA: CPT | Mod: HCNC

## 2019-05-14 PROCEDURE — 96375 TX/PRO/DX INJ NEW DRUG ADDON: CPT | Mod: HCNC

## 2019-05-14 PROCEDURE — 94660 CPAP INITIATION&MGMT: CPT | Mod: HCNC

## 2019-05-14 PROCEDURE — 93005 ELECTROCARDIOGRAM TRACING: CPT | Mod: HCNC

## 2019-05-14 PROCEDURE — 99291 CRITICAL CARE FIRST HOUR: CPT | Mod: 25,HCNC

## 2019-05-14 PROCEDURE — 94761 N-INVAS EAR/PLS OXIMETRY MLT: CPT | Mod: HCNC

## 2019-05-14 PROCEDURE — 99291 PR CRITICAL CARE, E/M 30-74 MINUTES: ICD-10-PCS | Mod: ,,, | Performed by: EMERGENCY MEDICINE

## 2019-05-14 PROCEDURE — 27000221 HC OXYGEN, UP TO 24 HOURS: Mod: HCNC

## 2019-05-14 PROCEDURE — 83605 ASSAY OF LACTIC ACID: CPT | Mod: HCNC

## 2019-05-14 PROCEDURE — 99900035 HC TECH TIME PER 15 MIN (STAT): Mod: HCNC

## 2019-05-14 PROCEDURE — 12000002 HC ACUTE/MED SURGE SEMI-PRIVATE ROOM: Mod: HCNC

## 2019-05-14 PROCEDURE — 80053 COMPREHEN METABOLIC PANEL: CPT | Mod: HCNC

## 2019-05-14 PROCEDURE — 93010 EKG 12-LEAD: ICD-10-PCS | Mod: HCNC,,, | Performed by: INTERNAL MEDICINE

## 2019-05-14 PROCEDURE — 84484 ASSAY OF TROPONIN QUANT: CPT | Mod: HCNC

## 2019-05-14 PROCEDURE — 96374 THER/PROPH/DIAG INJ IV PUSH: CPT | Mod: HCNC

## 2019-05-14 PROCEDURE — 83880 ASSAY OF NATRIURETIC PEPTIDE: CPT | Mod: HCNC

## 2019-05-14 PROCEDURE — 81001 URINALYSIS AUTO W/SCOPE: CPT | Mod: HCNC

## 2019-05-14 PROCEDURE — 99291 CRITICAL CARE FIRST HOUR: CPT | Mod: ,,, | Performed by: EMERGENCY MEDICINE

## 2019-05-14 PROCEDURE — 85025 COMPLETE CBC W/AUTO DIFF WBC: CPT | Mod: HCNC

## 2019-05-14 PROCEDURE — 93010 ELECTROCARDIOGRAM REPORT: CPT | Mod: HCNC,,, | Performed by: INTERNAL MEDICINE

## 2019-05-14 PROCEDURE — 82803 BLOOD GASES ANY COMBINATION: CPT | Mod: HCNC

## 2019-05-14 PROCEDURE — 27000190 HC CPAP FULL FACE MASK W/VALVE: Mod: HCNC

## 2019-05-14 PROCEDURE — 63600175 PHARM REV CODE 636 W HCPCS: Mod: HCNC | Performed by: PHYSICIAN ASSISTANT

## 2019-05-14 RX ORDER — CEFEPIME HYDROCHLORIDE 2 G/1
2 INJECTION, POWDER, FOR SOLUTION INTRAVENOUS
Status: COMPLETED | OUTPATIENT
Start: 2019-05-14 | End: 2019-05-14

## 2019-05-14 RX ORDER — FUROSEMIDE 10 MG/ML
80 INJECTION INTRAMUSCULAR; INTRAVENOUS
Status: COMPLETED | OUTPATIENT
Start: 2019-05-14 | End: 2019-05-14

## 2019-05-14 RX ADMIN — CEFEPIME 2 G: 2 INJECTION, POWDER, FOR SOLUTION INTRAVENOUS at 11:05

## 2019-05-14 RX ADMIN — FUROSEMIDE 80 MG: 10 INJECTION, SOLUTION INTRAMUSCULAR; INTRAVENOUS at 11:05

## 2019-05-15 PROBLEM — E43 SEVERE PROTEIN-CALORIE MALNUTRITION: Status: ACTIVE | Noted: 2019-05-15

## 2019-05-15 PROBLEM — J96.21 ACUTE ON CHRONIC RESPIRATORY FAILURE WITH HYPOXIA AND HYPERCAPNIA: Status: ACTIVE | Noted: 2019-04-02

## 2019-05-15 PROBLEM — E44.0 MODERATE PROTEIN-CALORIE MALNUTRITION: Status: ACTIVE | Noted: 2019-05-15

## 2019-05-15 PROBLEM — J81.0 ACUTE PULMONARY EDEMA: Status: ACTIVE | Noted: 2019-05-15

## 2019-05-15 LAB
ALLENS TEST: ABNORMAL
ANION GAP SERPL CALC-SCNC: 12 MMOL/L (ref 8–16)
BACTERIA #/AREA URNS AUTO: NORMAL /HPF
BILIRUB UR QL STRIP: NEGATIVE
BUN SERPL-MCNC: 18 MG/DL (ref 8–23)
CALCIUM SERPL-MCNC: 8.5 MG/DL (ref 8.7–10.5)
CAOX CRY UR QL COMP ASSIST: NORMAL
CHLORIDE SERPL-SCNC: 99 MMOL/L (ref 95–110)
CLARITY UR REFRACT.AUTO: CLEAR
CO2 SERPL-SCNC: 32 MMOL/L (ref 23–29)
COLOR UR AUTO: ABNORMAL
CREAT SERPL-MCNC: 0.6 MG/DL (ref 0.5–1.4)
DELSYS: ABNORMAL
ERYTHROCYTE [DISTWIDTH] IN BLOOD BY AUTOMATED COUNT: 15.2 % (ref 11.5–14.5)
EST. GFR  (AFRICAN AMERICAN): >60 ML/MIN/1.73 M^2
EST. GFR  (NON AFRICAN AMERICAN): >60 ML/MIN/1.73 M^2
FIO2: 32
FLOW: 3
GLUCOSE SERPL-MCNC: 114 MG/DL (ref 70–110)
GLUCOSE UR QL STRIP: NEGATIVE
HCO3 UR-SCNC: 34.5 MMOL/L (ref 24–28)
HCT VFR BLD AUTO: 26.1 % (ref 37–48.5)
HGB BLD-MCNC: 8.4 G/DL (ref 12–16)
HGB UR QL STRIP: NEGATIVE
HYALINE CASTS UR QL AUTO: 0 /LPF
KETONES UR QL STRIP: NEGATIVE
LEUKOCYTE ESTERASE UR QL STRIP: NEGATIVE
MAGNESIUM SERPL-MCNC: 1.2 MG/DL (ref 1.6–2.6)
MCH RBC QN AUTO: 30.8 PG (ref 27–31)
MCHC RBC AUTO-ENTMCNC: 32.2 G/DL (ref 32–36)
MCV RBC AUTO: 96 FL (ref 82–98)
MICROSCOPIC COMMENT: NORMAL
MODE: ABNORMAL
NITRITE UR QL STRIP: NEGATIVE
PCO2 BLDA: 49.1 MMHG (ref 35–45)
PH SMN: 7.46 [PH] (ref 7.35–7.45)
PH UR STRIP: 6 [PH] (ref 5–8)
PHOSPHATE SERPL-MCNC: 3.3 MG/DL (ref 2.7–4.5)
PLATELET # BLD AUTO: 282 K/UL (ref 150–350)
PMV BLD AUTO: 9.4 FL (ref 9.2–12.9)
PO2 BLDA: 74 MMHG (ref 80–100)
POC BE: 11 MMOL/L
POC SATURATED O2: 95 % (ref 95–100)
POC TCO2: 36 MMOL/L (ref 23–27)
POTASSIUM SERPL-SCNC: 3 MMOL/L (ref 3.5–5.1)
PROCALCITONIN SERPL IA-MCNC: 0.26 NG/ML
PROT UR QL STRIP: ABNORMAL
RBC # BLD AUTO: 2.73 M/UL (ref 4–5.4)
RBC #/AREA URNS AUTO: 1 /HPF (ref 0–4)
SAMPLE: ABNORMAL
SITE: ABNORMAL
SODIUM SERPL-SCNC: 143 MMOL/L (ref 136–145)
SP GR UR STRIP: 1.02 (ref 1–1.03)
SQUAMOUS #/AREA URNS AUTO: 0 /HPF
URN SPEC COLLECT METH UR: ABNORMAL
WBC # BLD AUTO: 13.41 K/UL (ref 3.9–12.7)
WBC #/AREA URNS AUTO: 2 /HPF (ref 0–5)

## 2019-05-15 PROCEDURE — 85027 COMPLETE CBC AUTOMATED: CPT | Mod: HCNC

## 2019-05-15 PROCEDURE — 87040 BLOOD CULTURE FOR BACTERIA: CPT | Mod: HCNC

## 2019-05-15 PROCEDURE — 84100 ASSAY OF PHOSPHORUS: CPT | Mod: HCNC

## 2019-05-15 PROCEDURE — 36600 WITHDRAWAL OF ARTERIAL BLOOD: CPT | Mod: HCNC

## 2019-05-15 PROCEDURE — 94640 AIRWAY INHALATION TREATMENT: CPT | Mod: HCNC

## 2019-05-15 PROCEDURE — 25000003 PHARM REV CODE 250: Mod: HCNC | Performed by: HOSPITALIST

## 2019-05-15 PROCEDURE — 63600175 PHARM REV CODE 636 W HCPCS: Mod: HCNC | Performed by: STUDENT IN AN ORGANIZED HEALTH CARE EDUCATION/TRAINING PROGRAM

## 2019-05-15 PROCEDURE — 82803 BLOOD GASES ANY COMBINATION: CPT | Mod: HCNC

## 2019-05-15 PROCEDURE — 84145 PROCALCITONIN (PCT): CPT | Mod: HCNC

## 2019-05-15 PROCEDURE — 99223 1ST HOSP IP/OBS HIGH 75: CPT | Mod: HCNC,AI,GC, | Performed by: INTERNAL MEDICINE

## 2019-05-15 PROCEDURE — 25000242 PHARM REV CODE 250 ALT 637 W/ HCPCS: Mod: HCNC | Performed by: HOSPITALIST

## 2019-05-15 PROCEDURE — 63600175 PHARM REV CODE 636 W HCPCS: Mod: HCNC | Performed by: HOSPITALIST

## 2019-05-15 PROCEDURE — 27000221 HC OXYGEN, UP TO 24 HOURS: Mod: HCNC

## 2019-05-15 PROCEDURE — 25000242 PHARM REV CODE 250 ALT 637 W/ HCPCS: Mod: HCNC | Performed by: STUDENT IN AN ORGANIZED HEALTH CARE EDUCATION/TRAINING PROGRAM

## 2019-05-15 PROCEDURE — 20600001 HC STEP DOWN PRIVATE ROOM: Mod: HCNC

## 2019-05-15 PROCEDURE — 99900035 HC TECH TIME PER 15 MIN (STAT): Mod: HCNC

## 2019-05-15 PROCEDURE — 83735 ASSAY OF MAGNESIUM: CPT | Mod: HCNC

## 2019-05-15 PROCEDURE — 25000003 PHARM REV CODE 250: Mod: HCNC | Performed by: STUDENT IN AN ORGANIZED HEALTH CARE EDUCATION/TRAINING PROGRAM

## 2019-05-15 PROCEDURE — 80048 BASIC METABOLIC PNL TOTAL CA: CPT | Mod: HCNC

## 2019-05-15 PROCEDURE — 99223 PR INITIAL HOSPITAL CARE,LEVL III: ICD-10-PCS | Mod: HCNC,AI,GC, | Performed by: INTERNAL MEDICINE

## 2019-05-15 PROCEDURE — 94761 N-INVAS EAR/PLS OXIMETRY MLT: CPT | Mod: HCNC

## 2019-05-15 RX ORDER — SODIUM CHLORIDE 0.9 % (FLUSH) 0.9 %
10 SYRINGE (ML) INJECTION
Status: DISCONTINUED | OUTPATIENT
Start: 2019-05-15 | End: 2019-05-19 | Stop reason: HOSPADM

## 2019-05-15 RX ORDER — IBUPROFEN 200 MG
24 TABLET ORAL
Status: DISCONTINUED | OUTPATIENT
Start: 2019-05-15 | End: 2019-05-19 | Stop reason: HOSPADM

## 2019-05-15 RX ORDER — VANCOMYCIN HCL IN 5 % DEXTROSE 1.25 G/25
1250 PLASTIC BAG, INJECTION (ML) INTRAVENOUS ONCE
Status: COMPLETED | OUTPATIENT
Start: 2019-05-15 | End: 2019-05-15

## 2019-05-15 RX ORDER — LACTULOSE 10 G/15ML
15 SOLUTION ORAL 2 TIMES DAILY
Status: DISCONTINUED | OUTPATIENT
Start: 2019-05-15 | End: 2019-05-19 | Stop reason: HOSPADM

## 2019-05-15 RX ORDER — POTASSIUM CHLORIDE 750 MG/1
50 CAPSULE, EXTENDED RELEASE ORAL DAILY
Status: DISCONTINUED | OUTPATIENT
Start: 2019-05-15 | End: 2019-05-16

## 2019-05-15 RX ORDER — MAGNESIUM SULFATE HEPTAHYDRATE 40 MG/ML
2 INJECTION, SOLUTION INTRAVENOUS ONCE
Status: COMPLETED | OUTPATIENT
Start: 2019-05-15 | End: 2019-05-15

## 2019-05-15 RX ORDER — IBUPROFEN 200 MG
16 TABLET ORAL
Status: DISCONTINUED | OUTPATIENT
Start: 2019-05-15 | End: 2019-05-19 | Stop reason: HOSPADM

## 2019-05-15 RX ORDER — LEVALBUTEROL INHALATION SOLUTION 1.25 MG/3ML
1 SOLUTION RESPIRATORY (INHALATION) EVERY 8 HOURS
COMMUNITY

## 2019-05-15 RX ORDER — ACETAMINOPHEN 325 MG/1
650 TABLET ORAL EVERY 4 HOURS PRN
Status: DISCONTINUED | OUTPATIENT
Start: 2019-05-15 | End: 2019-05-19 | Stop reason: HOSPADM

## 2019-05-15 RX ORDER — ALPRAZOLAM 0.25 MG/1
0.25 TABLET ORAL 3 TIMES DAILY PRN
Status: ON HOLD | COMMUNITY
End: 2019-05-19 | Stop reason: HOSPADM

## 2019-05-15 RX ORDER — SODIUM CHLORIDE 0.9 % (FLUSH) 0.9 %
10 SYRINGE (ML) INJECTION
Status: CANCELLED | OUTPATIENT
Start: 2019-05-15

## 2019-05-15 RX ORDER — IPRATROPIUM BROMIDE AND ALBUTEROL SULFATE 2.5; .5 MG/3ML; MG/3ML
3 SOLUTION RESPIRATORY (INHALATION)
Status: DISCONTINUED | OUTPATIENT
Start: 2019-05-15 | End: 2019-05-16

## 2019-05-15 RX ORDER — AMIODARONE HYDROCHLORIDE 200 MG/1
200 TABLET ORAL 2 TIMES DAILY
Status: DISCONTINUED | OUTPATIENT
Start: 2019-05-15 | End: 2019-05-17

## 2019-05-15 RX ORDER — ALBUTEROL SULFATE 90 UG/1
2 AEROSOL, METERED RESPIRATORY (INHALATION) EVERY 6 HOURS PRN
Status: DISCONTINUED | OUTPATIENT
Start: 2019-05-15 | End: 2019-05-15

## 2019-05-15 RX ORDER — ACETAMINOPHEN 325 MG/1
650 TABLET ORAL EVERY 6 HOURS PRN
COMMUNITY

## 2019-05-15 RX ORDER — PRAVASTATIN SODIUM 20 MG/1
20 TABLET ORAL NIGHTLY
Status: DISCONTINUED | OUTPATIENT
Start: 2019-05-15 | End: 2019-05-19 | Stop reason: HOSPADM

## 2019-05-15 RX ORDER — DILTIAZEM HYDROCHLORIDE 120 MG/1
240 CAPSULE, COATED, EXTENDED RELEASE ORAL DAILY
Status: DISCONTINUED | OUTPATIENT
Start: 2019-05-15 | End: 2019-05-19 | Stop reason: HOSPADM

## 2019-05-15 RX ORDER — LOSARTAN POTASSIUM 50 MG/1
50 TABLET ORAL DAILY
Status: DISCONTINUED | OUTPATIENT
Start: 2019-05-15 | End: 2019-05-19 | Stop reason: HOSPADM

## 2019-05-15 RX ORDER — FLUTICASONE FUROATE AND VILANTEROL 100; 25 UG/1; UG/1
1 POWDER RESPIRATORY (INHALATION) DAILY
Status: DISCONTINUED | OUTPATIENT
Start: 2019-05-15 | End: 2019-05-19 | Stop reason: HOSPADM

## 2019-05-15 RX ORDER — GLUCAGON 1 MG
1 KIT INJECTION
Status: DISCONTINUED | OUTPATIENT
Start: 2019-05-15 | End: 2019-05-19 | Stop reason: HOSPADM

## 2019-05-15 RX ORDER — TIOTROPIUM BROMIDE 18 UG/1
18 CAPSULE ORAL; RESPIRATORY (INHALATION) DAILY
Status: DISCONTINUED | OUTPATIENT
Start: 2019-05-15 | End: 2019-05-19 | Stop reason: HOSPADM

## 2019-05-15 RX ADMIN — IPRATROPIUM BROMIDE AND ALBUTEROL SULFATE 3 ML: .5; 3 SOLUTION RESPIRATORY (INHALATION) at 10:05

## 2019-05-15 RX ADMIN — POTASSIUM CHLORIDE 50 MEQ: 750 CAPSULE, EXTENDED RELEASE ORAL at 08:05

## 2019-05-15 RX ADMIN — IPRATROPIUM BROMIDE AND ALBUTEROL SULFATE 3 ML: .5; 3 SOLUTION RESPIRATORY (INHALATION) at 01:05

## 2019-05-15 RX ADMIN — PIPERACILLIN AND TAZOBACTAM 4.5 G: 4; .5 INJECTION, POWDER, LYOPHILIZED, FOR SOLUTION INTRAVENOUS; PARENTERAL at 01:05

## 2019-05-15 RX ADMIN — LOSARTAN POTASSIUM 50 MG: 50 TABLET, FILM COATED ORAL at 08:05

## 2019-05-15 RX ADMIN — Medication 1250 MG: at 07:05

## 2019-05-15 RX ADMIN — MAGNESIUM SULFATE IN WATER 2 G: 40 INJECTION, SOLUTION INTRAVENOUS at 08:05

## 2019-05-15 RX ADMIN — Medication 1 SPRAY: at 11:05

## 2019-05-15 RX ADMIN — AMIODARONE HYDROCHLORIDE 200 MG: 200 TABLET ORAL at 09:05

## 2019-05-15 RX ADMIN — PIPERACILLIN AND TAZOBACTAM 4.5 G: 4; .5 INJECTION, POWDER, LYOPHILIZED, FOR SOLUTION INTRAVENOUS; PARENTERAL at 05:05

## 2019-05-15 RX ADMIN — PRAVASTATIN SODIUM 20 MG: 20 TABLET ORAL at 04:05

## 2019-05-15 RX ADMIN — AMIODARONE HYDROCHLORIDE 200 MG: 200 TABLET ORAL at 08:05

## 2019-05-15 RX ADMIN — APIXABAN 5 MG: 5 TABLET, FILM COATED ORAL at 09:05

## 2019-05-15 RX ADMIN — PRAVASTATIN SODIUM 20 MG: 20 TABLET ORAL at 09:05

## 2019-05-15 RX ADMIN — APIXABAN 5 MG: 5 TABLET, FILM COATED ORAL at 08:05

## 2019-05-15 RX ADMIN — ALBUTEROL SULFATE 2 PUFF: 90 AEROSOL, METERED RESPIRATORY (INHALATION) at 08:05

## 2019-05-15 RX ADMIN — DILTIAZEM HYDROCHLORIDE 240 MG: 120 CAPSULE, COATED, EXTENDED RELEASE ORAL at 08:05

## 2019-05-15 RX ADMIN — LACTULOSE 15 G: 20 SOLUTION ORAL at 01:05

## 2019-05-15 RX ADMIN — FLUTICASONE FUROATE AND VILANTEROL TRIFENATATE 1 PUFF: 100; 25 POWDER RESPIRATORY (INHALATION) at 10:05

## 2019-05-15 RX ADMIN — PIPERACILLIN AND TAZOBACTAM 4.5 G: 4; .5 INJECTION, POWDER, LYOPHILIZED, FOR SOLUTION INTRAVENOUS; PARENTERAL at 09:05

## 2019-05-15 NOTE — PLAN OF CARE
Problem: Adult Inpatient Plan of Care  Goal: Plan of Care Review  Patient has been AAOX4, productive cough noted with progressive respiratory treatments. O2 NC at 4L in place. Patient IV sites intact without any redness or swelling noted. Safety has been maintained with siderails up x2, call light in patients reach. Will continue to monitor patient.

## 2019-05-15 NOTE — H&P
[facility] Medicine  History & Physical    Patient Name: Latasha Perez  MRN: [id number]  Admission Date: 5/14/2019  Attending Physician: Brittany Johnson MD   Primary Care Provider: Pollo Oneal MD    [facility] Medicine Team: [facility] HOSP MED 2 Angy Mckinney MD     Patient information was obtained from patient, past medical records and ER records.     Subjective:     Principal Problem:Acute on chronic respiratory failure with hypoxia and hypercapnia    Chief Complaint:   Chief Complaint   Patient presents with    Shortness of Breath     SOB onset tonight, initial o2 sat 70% on 3 L NC home oxygenplaced on CPAP per EMS o2 sat 94%, pt recently dx with pnuemonia.         HPI: 74 yo F with PMH COPD (3-4 L home O2), tobacco abuse, HTN, HLD, h/o AFib RVR (s/p cardioversion 4/5 but returned to Afib, on Amio, Eliquis), h/o pseudomonal pneumonia and bacteremia who presents with SOB from SNF. She had presented to the ER 5 days prior with hypoxic respiratory failure which was attributed to oxygen delivery system probably not working properly at her facility because her respiratory failure resolved with supplemental oxygen via NC.  She had no consolidation on CXR at that time.    She returned to the ER on 5/14 because shortness of breath returned.   Upon EMS arrival to her facility, the patient was tachypneic, hypoxic in the 60s on room air. She arrived on CPAP/BiPAP.  VBG on arrival was 7.35/59.  CXR revealed opacity in R L apex and bibiasilar airspace disease concerning of PNA or aspiration.  BNP was 122.  She was given a dose of cefepime and lasix by the ER.      She was recently admitted April 2019 for acute on chronic respiratory failure 2/2 COPD exacerbation and pseudomonal pneumonia with bacteremia (1/4 blood cultures).  She was discharged on cefepime 2g q8 until 4/18/19.  Hospital course at that time was complicated by Afib RVR, s/p cardioversion, but returned to AFib, discharged also  on amiodarone, diltiazem, and Eliquis.      She describes she got a one time dose of penicillin once during a dental procedure and had generalized swelling but no respiratory compromise.    She wishes to be full code    Past Medical History:   Diagnosis Date    Arthritis     Carotid disease, bilateral     Cataract     Chronic hyponatremia     COPD (chronic obstructive pulmonary disease)     HLD (hyperlipidemia)     HTN (hypertension)        Past Surgical History:   Procedure Laterality Date    APPENDECTOMY      Cardioversion or Defibrillation  4/4/2019    Performed by Manfred Figueroa [facility] at [facility] CATH LAB    CATARACT EXTRACTION W/  INTRAOCULAR LENS IMPLANT Right 12/06/2018    Dr. Escobar    CATARACT EXTRACTION W/  INTRAOCULAR LENS IMPLANT Left 12/20/2018    DR. Escobar    CERVICAL SPINE SURGERY      DILATION AND CURETTAGE OF UTERUS      x 2    EYE SURGERY      cataract Rt    FRACTURE SURGERY      Lt leg fracture with hardware    INSERTION, IOL PROSTHESIS Left 12/20/2018    Performed by Broderick Escobar MD at [facility] OR    INSERTION, IOL PROSTHESIS Right 12/6/2018    Performed by [facility]ick Escobar [facility] at [facility] OR    metatarsal repair      OPEN REDUCTION INTERNAL FIXATION-TIBIAL PLATEAU Left 6/27/2014    Performed by Isak Pereira MD at [facility] OR    PHACOEMULSIFICATION, CATARACT Left 12/20/2018    Performed by Broderick Escobar MD at [facility] OR    PHACOEMULSIFICATION, CATARACT Right 12/6/2018    Performed by Bro[facility]ick Escobar MD at [facility] OR    SHOULDER ARTHROSCOPY      Transesophageal echo (JOHNNA) intra-procedure log documentation N/A 4/4/2019    Performed by Manfred Figueroa MD at [facility] CATH LAB       Review of patient's allergies indicates:   Allergen Reactions    Meropenem Itching and Rash     Rash started to appear around Day 3 of therapy.     Pcn [penicillins] Other (See Comments)     Fever flushing skin swelling     Biaxin [clarithromycin] Rash    Codeine Rash     Doxycycline Rash    Levaquin [levofloxacin] Rash       No current facility-administered medications on file prior to encounter.      Current Outpatient Medications on File Prior to Encounter   Medication Sig    acetaminophen (TYLENOL EXTRA STRENGTH ORAL) Take 1 to 2 tablets by mouth daily as needed for pain    albuterol sulfate 2.5 mg/0.5 mL Nebu Take 2.5 mg by nebulization every 6 (six) hours while awake. One Box    alendronate (FOSAMAX) 70 MG tablet TAKE 1 TABLET BY MOUTH ONCE A WEEK EVERY 7 DAYS, AS DIRECTED (Patient taking differently: TAKE 1 TABLET BY MOUTH ONCE A WEEK EVERY 7 DAYS, AS DIRECTED on Sunday)    amiodarone (PACERONE) 400 MG tablet Take 0.5 tablets (200 mg total) by mouth 2 (two) times daily.    apixaban (ELIQUIS) 5 mg Tab Take 1 tablet (5 mg total) by mouth 2 (two) times daily.    aspirin (ECOTRIN) 81 MG EC tablet Take 81 mg by mouth once daily.    calcium carbonate (CALCIUM 500 ORAL) Take 1 tablet by mouth once daily.     cetirizine (ZYRTEC) 10 MG tablet Take 10 mg by mouth once daily.    diltiaZEM (CARDIZEM CD) 240 MG 24 hr capsule Take 1 capsule (240 mg total) by mouth once daily.    fluticasone-salmeterol 250-50 mcg/dose (ADVAIR) 250-50 mcg/dose diskus inhaler Inhale 1 puff into the lungs 2 (two) times daily. Controller    furosemide (LASIX) 40 MG tablet Take 1 tablet (40 mg total) by mouth daily as needed (Weigh yourself daily. Please take 1 tablet if you gain 3-5 lbs in one day. Call your doctor.).    latanoprost 0.005 % ophthalmic solution Place 1 drop into the left eye nightly.    losartan (COZAAR) 100 MG tablet Take 0.5 tablets (50 mg total) by mouth once daily. TAKE 1 TABLET ONE TIME DAILY    multivitamin (THERAGRAN) per tablet Take 1 tablet by mouth once daily.    pravastatin (PRAVACHOL) 20 MG tablet TAKE 1 TABLET EVERY EVENING    SPIRIVA WITH HANDIHALER 18 mcg inhalation capsule Inhale 18 mcg into the lungs once daily.     VENTOLIN HFA 90 mcg/actuation inhaler INHALE  TWO PUFFS BY MOUTH EVERY 6 HOURS AS NEEDED FOR WHEEZING     Family History     Problem Relation (Age of Onset)    Cancer Father    Cataracts Sister    Heart disease Mother, Maternal Grandfather    No Known Problems Brother, Maternal Aunt, Maternal Uncle, Paternal Aunt, Paternal Uncle, Maternal Grandmother, Paternal Grandmother, Paternal Grandfather        Tobacco Use    Smoking status: Former Smoker     Packs/day: 1.00     Years: 45.00     Pack years: 45.00     Types: Cigarettes     Last attempt to quit: 2002     Years since quittin.3    Smokeless tobacco: Never Used   Substance and Sexual Activity    Alcohol use: No    Drug use: No    Sexual activity: Yes     Partners: Male     Review of Systems   Constitutional: Positive for unexpected weight change. Negative for chills and fever.   HENT: Negative for sneezing and sore throat.    Eyes: Negative for pain and visual disturbance.   Respiratory: Positive for cough and shortness of breath. Negative for wheezing.    Cardiovascular: Positive for leg swelling. Negative for chest pain.   Gastrointestinal: Negative for abdominal pain, constipation, diarrhea and nausea.   Genitourinary: Negative for dysuria, frequency and urgency.   Allergic/Immunologic: Negative for environmental allergies.   Neurological: Negative for dizziness, light-headedness, numbness and headaches.   Psychiatric/Behavioral: Negative for agitation. The patient is nervous/anxious.      Objective:     Vital Signs (Most Recent):  Temp: 99 °F (37.2 °C) (19 2100)  Pulse: 88 (05/15/19 023)  Resp: (!) 22 (05/15/19 023)  BP: (!) 137/94 (05/15/19 023)  SpO2: (!) 94 % (05/15/19 0232) Vital Signs (24h Range):  Temp:  [99 °F (37.2 °C)] 99 °F (37.2 °C)  Pulse:  [80-88] 88  Resp:  [22-48] 22  SpO2:  [93 %-100 %] 94 %  BP: (128-160)/(59-94) 137/94     Weight: 40.8 kg (90 lb)  Body mass index is 14.98 kg/m².    Physical Exam   Constitutional: She is oriented to person, place, and time. She  appears well-developed. No distress.   Cachectic appearing   HENT:   Head: Normocephalic and atraumatic.   Nose: Nose normal.   Dry oral mucous membranes   Eyes: EOM are normal. No scleral icterus.   Neck: Normal range of motion. No tracheal deviation present.   Cardiovascular: Normal rate and regular rhythm. Exam reveals no gallop and no friction rub.   No murmur heard.  Pulmonary/Chest: Effort normal. No respiratory distress. She has no wheezes. She has no rales.   Diminished breath sounds at the R lung base, mild wheezes present throughout   Abdominal: Soft. Bowel sounds are normal. She exhibits no distension and no mass. There is no tenderness.   Musculoskeletal: She exhibits no edema.   LLE 2+ pitting edema.  No edema RLE.     Neurological: She is alert and oriented to person, place, and time.   Skin: Skin is warm and dry.   There is patchy bruising present on arms and legs   Psychiatric:   Anxious appearing         CRANIAL NERVES     CN III, IV, VI   Extraocular motions are normal.        Significant Labs:   CBC:   Recent Labs   Lab 05/14/19 2112   WBC 14.71*   HGB 9.1*   HCT 28.1*        CMP:   Recent Labs   Lab 05/14/19 2112      K 3.5      CO2 28   *   BUN 19   CREATININE 0.7   CALCIUM 8.9   PROT 6.4   ALBUMIN 2.3*   BILITOT 0.3   ALKPHOS 96   AST 28   ALT 37   ANIONGAP 11   EGFRNONAA >60.0       Significant Imaging: I have reviewed all pertinent imaging results/findings within the past 24 hours.    Assessment/Plan:     * Acute on chronic respiratory failure with hypoxia and hypercapnia  --hypercapnic respiratory failure seems chronic from her COPD, as evidenced by her near normal pH. Does not appear to be an acute COPD exacerbation.  --was weaned off bipap in the ER.  --pneumonia likely accounts for her hypoxic respiratory failure.  BNP is near baseline.  --CXR (5/14) consistent with COPD and opacity in R L apex and bibiasilar airspace disease concerning of PNA or  aspiration  --continue home COPD inhalers (or formulary alternatives)  --would cover for pseudomonas given history of pseudomonal pneumonia and she was hospitalized within the last 3 months  --vanc and zosyn.  She is amenable to trying zosyn after discussion of her penicillin allergy, she will be closely monitored, is on telemetry    Paroxysmal atrial fibrillation  --currently rate controlled, NSR on 5/14 EKG  --continue amiodarone, diltiazem, eliquis    Essential hypertension  --takes losartan and diltiazem    HLD (hyperlipidemia)  --continue pravastatin    Severe protein-calorie malnutrition  --emphysematous body habitus  --boost with meals      VTE Risk Mitigation (From admission, onward)        Ordered     apixaban tablet 5 mg  2 times daily      05/15/19 0228     IP VTE HIGH RISK PATIENT  Once      05/15/19 0222             Angy Mckinney MD  Department of Hospital Medicine   [facility]

## 2019-05-15 NOTE — PLAN OF CARE
Problem: Fall Injury Risk  Goal: Absence of Fall and Fall-Related Injury  Patient has remained free from falls and injuries this shift with safety maintained.

## 2019-05-15 NOTE — ED NOTES
Pt reports wanting to leave and set-up a follow up appointment on Friday. [facility] med team 2 made aware and are reportedly coming by to talk to patient.

## 2019-05-15 NOTE — ED NOTES
Patient identifiers for Latasha Perez 73 y.o. female checked and correct.  Chief Complaint   Patient presents with    Shortness of Breath     SOB onset tonight, initial o2 sat 70% on 3 L NC home oxygenplaced on CPAP per EMS o2 sat 94%, pt recently dx with pnuemonia.      Past Medical History:   Diagnosis Date    Arthritis     Carotid disease, bilateral     Cataract     Chronic hyponatremia     COPD (chronic obstructive pulmonary disease)     HLD (hyperlipidemia)     HTN (hypertension)      Allergies reported:   Review of patient's allergies indicates:   Allergen Reactions    Meropenem Itching and Rash     Rash started to appear around Day 3 of therapy.     Pcn [penicillins] Other (See Comments)     Fever flushing skin swelling     Biaxin [clarithromycin] Rash    Codeine Rash    Doxycycline Rash    Levaquin [levofloxacin] Rash         LOC: Patient is awake, alert, and aware of environment with an appropriate affect. Patient is oriented x 3 and speaking appropriately.  APPEARANCE: Patient resting comfortably and in no acute distress. Patient is clean and well groomed, patient's clothing is properly fastened.  HEENT: CPAP in place  SKIN: The skin is warm and dry. Patient has poor skin turgor. Skin is intact; bruising noted to upper extremities  MUSKULOSKELETAL: Patient is moving all extremities well, no obvious deformities noted. Pulses intact.   RESPIRATORY: Airway is open and patent. Respirations are spontaneous and non-labored with normal effort and rate, Clear BS on left, Wheezing noted to right lung fields.   CARDIAC: Patient has a normal rate and rhythm. SR on cardiac monitor, +3 pitting edema

## 2019-05-15 NOTE — ED PROVIDER NOTES
Encounter Date: 5/14/2019       History     Chief Complaint   Patient presents with    Shortness of Breath     SOB onset tonight, initial o2 sat 70% on 3 L NC home oxygenplaced on CPAP per EMS o2 sat 94%, pt recently dx with pnuemonia.      73-year-old female presents to the ER via EMS for evaluation of shortness of breath.  Patient currently residing at LTAC facility following admission here for pneumonia and COPD.  Patient chronically uses 2-3 L of oxygen when needed at home.  She presented to the ER a few days ago with similar complaint, there were no acute findings on her workup in her oxygenation improved bowel on 2-3 L nasal cannula, the patient was ultimately discharged back to nursing facility.  This evening the patient became acutely short of breath.  Upon EMS arrival the patient was tachypneic, hypoxic in the 60s on room air.     She arrives our facility with 1 in of nitropaste on CPAP, her sats have improved significantly on CPAP blood pressure 150s systolic.  She reports improvement in her the reading with CPAP.  Patient presents with papers from the nursing facility as a DNR DNI, the patient was is to change this to a full code.         Review of patient's allergies indicates:   Allergen Reactions    Meropenem Itching and Rash     Rash started to appear around Day 3 of therapy.     Pcn [penicillins] Other (See Comments)     Fever flushing skin swelling     Biaxin [clarithromycin] Rash    Codeine Rash    Doxycycline Rash    Levaquin [levofloxacin] Rash     Past Medical History:   Diagnosis Date    Arthritis     Carotid disease, bilateral     Cataract     Chronic hyponatremia     COPD (chronic obstructive pulmonary disease)     HLD (hyperlipidemia)     HTN (hypertension)      Past Surgical History:   Procedure Laterality Date    APPENDECTOMY      Cardioversion or Defibrillation  4/4/2019    Performed by [facility]fred Fig[facility]ero[facility] at [facility] CATH LAB    CATARACT EXTRACTION W/  INTRAOCULAR LENS  IMPLANT Right 2018    Dr. Escobar    CATARACT EXTRACTION W/  INTRAOCULAR LENS IMPLANT Left 2018    DR. Escobar    CERVICAL SPINE SURGERY      DILATION AND CURETTAGE OF UTERUS      x 2    EYE SURGERY      cataract Rt    FRACTURE SURGERY      Lt leg fracture with hardware    INSERTION, IOL PROSTHESIS Left 2018    Performed by [facility]ick [facility]obar [facility] at [facility]    INSERTION, IOL PROSTHESIS Right 2018    Performed by [facility]ick [facility]obar [facility] at [facility] OR    metatarsal repair      OPEN REDUCTION INTERNAL FIXATION-TIBIAL PLATEAU Left 2014    Performed by Isak Pereira MD at [facility]    PHACOEMULSIFICATION, CATARACT Left 2018    Performed by Broderick Escobar MD at [facility]    PHACOEMULSIFICATION, CATARACT Right 2018    Performed by [facility]ick [facility]obar MD at [facility]    SHOULDER ARTHROSCOPY      Transesophageal echo (JOHNNA) intra-procedure log documentation N/A 2019    Performed by Manfred Figueroa MD at [facility] CATH LAB     Family History   Problem Relation Age of Onset    Heart disease Mother     Cancer Father     Heart disease Maternal Grandfather     Cataracts Sister     No Known Problems Brother     No Known Problems Maternal Aunt     No Known Problems Maternal Uncle     No Known Problems Paternal Aunt     No Known Problems Paternal Uncle     No Known Problems Maternal Grandmother     No Known Problems Paternal Grandmother     No Known Problems Paternal Grandfather     Amblyopia Neg Hx     Blindness Neg Hx     Glaucoma Neg Hx     Macular degeneration Neg Hx     Retinal detachment Neg Hx     Strabismus Neg Hx     Diabetes Neg Hx     Hypertension Neg Hx     Stroke Neg Hx     Thyroid disease Neg Hx      Social History     Tobacco Use    Smoking status: Former Smoker     Packs/day: 1.00     Years: 45.00     Pack years: 45.00     Types: Cigarettes     Last attempt to quit: 2002     Years since quittin.3    Smokeless  tobacco: Never Used   Substance Use Topics    Alcohol use: No    Drug use: No     Review of Systems   Constitutional: Negative for fever.   HENT: Negative for sore throat.    Respiratory: Positive for chest tightness and shortness of breath.    Cardiovascular: Negative for chest pain.   Gastrointestinal: Negative for nausea.   Genitourinary: Negative for dysuria.   Musculoskeletal: Negative for back pain.   Skin: Negative for rash.   Neurological: Negative for weakness.   Hematological: Does not bruise/bleed easily.       Physical Exam     Initial Vitals [05/14/19 2100]   BP Pulse Resp Temp SpO2   (!) 160/70 84 (!) 40 99 °F (37.2 °C) 98 %      MAP       --         Physical Exam    Constitutional: Vital signs are normal. She appears well-developed and well-nourished. She appears distressed.   HENT:   Head: Normocephalic and atraumatic.   Right Ear: External ear normal.   Left Ear: External ear normal.   Eyes: Conjunctivae are normal.   Cardiovascular: Normal rate and regular rhythm.   No lower extremity edema  No JVD   Pulmonary/Chest:   Diffuse consolidation noted,   Tachypneic   Abdominal: Soft. Normal appearance and bowel sounds are normal.   Musculoskeletal: Normal range of motion.   Neurological: She is alert and oriented to person, place, and time.   Skin: Skin is warm and intact.   Psychiatric: She has a normal mood and affect. Her speech is normal and behavior is normal. Cognition and memory are normal.         ED Course   Critical Care  Date/Time: 5/15/2019 12:08 AM  Performed by: Rigo Aguilera PA-C  Authorized by: Viral Pino MD   Direct patient critical care time: 15 minutes  Additional history critical care time: 5 minutes  Ordering / reviewing critical care time: 5 minutes  Documentation critical care time: 5 minutes  Consulting other physicians critical care time: 5 minutes  Consult with family critical care time: 0 minutes  Total critical care time (exclusive of procedural time) : 35  minutes  Critical care was necessary to treat or prevent imminent or life-threatening deterioration of the following conditions: respiratory failure.  Critical care was time spent personally by me on the following activities: pulse oximetry, review of old charts, examination of patient, ordering and performing treatments and interventions, ordering and review of radiographic studies and re-evaluation of patient's condition.        Labs Reviewed   CBC W/ AUTO DIFFERENTIAL - Abnormal; Notable for the following components:       Result Value    WBC 14.71 (*)     RBC 2.93 (*)     Hemoglobin 9.1 (*)     Hematocrit 28.1 (*)     Mean Corpuscular Hemoglobin 31.1 (*)     RDW 15.4 (*)     Gran # (ANC) 11.1 (*)     Immature Grans (Abs) 0.06 (*)     Lymph # 0.8 (*)     Eos # 1.7 (*)     Gran% 75.6 (*)     Lymph% 5.6 (*)     Eosinophil% 11.8 (*)     All other components within normal limits   COMPREHENSIVE METABOLIC PANEL - Abnormal; Notable for the following components:    Glucose 127 (*)     Albumin 2.3 (*)     All other components within normal limits   B-TYPE NATRIURETIC PEPTIDE - Abnormal; Notable for the following components:     (*)     All other components within normal limits   ISTAT PROCEDURE - Abnormal; Notable for the following components:    POC PCO2 58.8 (*)     POC PO2 29 (*)     POC HCO3 32.7 (*)     POC SATURATED O2 49 (*)     POC TCO2 34 (*)     All other components within normal limits   ISTAT PROCEDURE - Abnormal; Notable for the following components:    POC PH 7.340 (*)     POC PCO2 60.1 (*)     POC PO2 29 (*)     POC HCO3 32.5 (*)     POC SATURATED O2 50 (*)     POC TCO2 34 (*)     All other components within normal limits   CULTURE, BLOOD   CULTURE, BLOOD   LACTIC ACID, PLASMA   TROPONIN I   URINALYSIS, REFLEX TO URINE CULTURE     EKG Readings: (Independently Interpreted)   Normal sinus rhythm rate 84 T-wave flattening throughout       Imaging Results          X-Ray Chest AP Portable (Final result)   "Result time 05/14/19 21:45:14    Final result by Aakash Lamb MD (05/14/19 21:45:14)                 Impression:      Worsening bibasilar airspace disease worrisome for pneumonia or aspiration in the setting of sepsis.    Worsening small bilateral pleural effusions.    Severe emphysema and stable irregular opacity in the right upper lobe.      Electronically signed by: Aakash Lamb MD  Date:    05/14/2019  Time:    21:45             Narrative:    EXAMINATION:  XR CHEST AP PORTABLE    CLINICAL HISTORY:  Provided history is "Sepsis;  ".    TECHNIQUE:  One view of the chest.    COMPARISON:  05/11/2019 and 05/02/2019.    FINDINGS:  Cardiac wires overlie the chest.  Cardiac silhouette is stable.  Atherosclerotic calcifications overlie the aortic arch.  Lungs are hyperinflated and demonstrate severe bilateral emphysematous changes.  Persistent irregular opacity again identified in the right lung apex, better characterized on prior CT.  There is worsening bibasilar airspace disease and bilateral pleural effusions when compared with the prior chest radiograph.  No pneumothorax.                              X-Rays:   Independently Interpreted Readings:   Other Readings:  Diffuse consolidation, bilateral pulmonary edema    Medical Decision Making:   History:   I obtained history from: EMS provider.  Old Records Summarized: records from clinic visits.  Initial Assessment:   73-year-old female in respiratory distress  Differential Diagnosis:   Pneumonia, PE, pulmonary edema, sepsis  Independently Interpreted Test(s):   I have ordered and independently interpreted X-rays - see prior notes.  I have ordered and independently interpreted EKG Reading(s) - see prior notes  Clinical Tests:   Lab Tests: Ordered and Reviewed  Radiological Study: Ordered and Reviewed  Medical Tests: Ordered and Reviewed  ED Management:  73-year-old female with shortness of breath  Labs revealed troponin and the within normal limits  Diffuse " consolidation and pulmonary edema noted on chest x-ray  I will treat for pneumonia with cefepime, patient has extensive drug allergies, Lasix for pulmonary edema  Pt started on Bipap, VBG after 30 minutes   PCO2 58, HCO3 32, Bipap setting adjusted, will repeat VBG  Repeat VBG still reveals elevated CO2 and HCO3, upon chart patient CO2 and HCO3 has been elevated in the past.   She was titrated off of BiPAP to nasal cannula, 5 L satting well, patient has remained this way for 1 hr without decompensation.     Plan:  Case discussed with Hospital Medicine.  Will admit to the floor  Other:   I have discussed this case with another health care provider.              Attending Attestation:     Physician Attestation Statement for NP/PA:   I have conducted a face to face encounter with this patient in addition to the NP/PA, due to Medical Complexity    Other NP/PA Attestation Additions:      Medical Decision Making: Respiratory failure, improved on CPAP placed by EMS.                    Clinical Impression:       ICD-10-CM ICD-9-CM   1. Acute pulmonary edema J81.0 518.4   2. SOB (shortness of breath) R06.02 786.05   3. Hypoxia R09.02 799.02   4. Pneumonia due to infectious organism, unspecified laterality, unspecified part of lung J18.9 136.9     484.8         Disposition:   Disposition: Admitted  Condition: Stable                        Rigo Aguilera PA-C  05/15/19 0009

## 2019-05-15 NOTE — NURSING
Med team 2 returned calls from prior note, laxative ordered per patients request and lab result given

## 2019-05-15 NOTE — MEDICAL/APP STUDENT
[facility] Hospital Medicine  History & Physical    Patient Name: Latasha Perez  MRN: [id number]  Admission Date: 5/14/2019  Attending Physician: Brittany Johnson MD   Primary Care Provider: Pollo Oneal MD    [facility] Medicine Team: [facility] 2 [facility]     Patient information was obtained from patient, past medical records and ER records.     Subjective:     Principal Problem:Acute on chronic respiratory failure with hypoxia and hypercapnia    Chief Complaint:   Chief Complaint   Patient presents with    Shortness of Breath     SOB onset tonight, initial o2 sat 70% on 3 L NC home oxygenplaced on CPAP per EMS o2 sat 94%, pt recently dx with pnuemonia.         HPI: Ms. Latasha Perez is a 73 year old woman with a history of COPD, pseudomonal pneumonia and bacteremia who presents to the hospital from a skilled nursing facility with SOB. Yesterday the patient became short of breath and hypoxic in the 60s on room air. At home she is normally on 3-4 L of O2. She reports having some trouble swallowing and occasionally having indigestion. She has a chronic cough. 5 days ago she presented to the ED with hypoxic respiratory failure due to failure of the oxygen delivery system at her facility. It resolved with supplemental O2 provided via nasal cannula. Since that time she reports feeling warm overnight on a number of occasions. In April 2019 she was admitted for COPD exacerbation and pseudomonal pneumonia with bacteremia. Her hospital course was complicated by atrial fibrillation with RVR. Other pertinent history includes hypertension, hyperlipidemia and tobacco abuse (quit since 7 months ago).    Interval history: Patient felt short of breath overnight, but no acute events. She feels at her baseline. She reports weakness of her right leg which also appears to be swollen. She was able to ambulate yesterday up to 70 steps on 3 different occasions. She reports a non-productive cough.  "No fever or headache. She describes some difficulty swallowing, stating that "things sometimes stick in her chest but eventually go down." She has some chest soreness on the right side which she attributes to her shortness of breath and coughing. She feels thirsty. She also reports feeling panicked and anxious especially during bouts of difficulty breathing. On numerous occasions she also expresses some possible signs of depressions, stating that she is "just tired of it all sometimes."    Hospital course:    5/14: Arrived hypoxic in the 60s on CPAP/BiPAP. VBG was 7.35/59. CXR concerning for pneumonia or aspiration. BNP at 122. She was given cefepime and lasix by the ER.      Past Medical History:   Diagnosis Date    Arthritis     Carotid disease, bilateral     Cataract     Chronic hyponatremia     COPD (chronic obstructive pulmonary disease)     HLD (hyperlipidemia)     HTN (hypertension)        Past Surgical History:   Procedure Laterality Date    APPENDECTOMY      Cardioversion or Defibrillation  4/4/2019    Performed by [facility]fred Figueroa [facility] at [facility] CATH LAB    CATARACT EXTRACTION W/  INTRAOCULAR LENS IMPLANT Right 12/06/2018    Dr. Escobar    CATARACT EXTRACTION W/  INTRAOCULAR LENS IMPLANT Left 12/20/2018    DR. Escobar    CERVICAL SPINE SURGERY      DILATION AND CURETTAGE OF UTERUS      x 2    EYE SURGERY      cataract Rt    FRACTURE SURGERY      Lt leg fracture with hardware    INSERTION, IOL PROSTHESIS Left 12/20/2018    Performed by [facility]ick [facility]obar [facility] at [facility] OR    INSERTION, IOL PROSTHESIS Right 12/6/2018    Performed by [facility]ick [facility]obar [facility] at [facility] OR    metatarsal repair      OPEN REDUCTION INTERNAL FIXATION-TIBIAL PLATEAU Left 6/27/2014    Performed by Isak Pereira MD at [facility] OR    PHACOEMULSIFICATION, CATARACT Left 12/20/2018    Performed by [facility]ick Escobar [facility] at [facility] OR    PHACOEMULSIFICATION, CATARACT Right 12/6/2018    Performed by Broderick REYES" MD Shawn at [facility] OR    SHOULDER ARTHROSCOPY      Transesophageal echo (JOHNNA) intra-procedure log documentation N/A 4/4/2019    Performed by Manfred Fig[facility]eroa MD at [facility] CATH LAB       Review of patient's allergies indicates:   Allergen Reactions    Meropenem Itching and Rash     Rash started to appear around Day 3 of therapy.     Pcn [penicillins] Other (See Comments)     Fever flushing skin swelling     Biaxin [clarithromycin] Rash    Codeine Rash    Doxycycline Rash    Levaquin [levofloxacin] Rash       No current facility-administered medications on file prior to encounter.      Current Outpatient Medications on File Prior to Encounter   Medication Sig    acetaminophen (TYLENOL EXTRA STRENGTH ORAL) Take 1 to 2 tablets by mouth daily as needed for pain    albuterol sulfate 2.5 mg/0.5 mL Nebu Take 2.5 mg by nebulization every 6 (six) hours while awake. One Box    alendronate (FOSAMAX) 70 MG tablet TAKE 1 TABLET BY MOUTH ONCE A WEEK EVERY 7 DAYS, AS DIRECTED (Patient taking differently: TAKE 1 TABLET BY MOUTH ONCE A WEEK EVERY 7 DAYS, AS DIRECTED on Sunday)    amiodarone (PACERONE) 400 MG tablet Take 0.5 tablets (200 mg total) by mouth 2 (two) times daily.    apixaban (ELIQUIS) 5 mg Tab Take 1 tablet (5 mg total) by mouth 2 (two) times daily.    aspirin (ECOTRIN) 81 MG EC tablet Take 81 mg by mouth once daily.    calcium carbonate (CALCIUM 500 ORAL) Take 1 tablet by mouth once daily.     cetirizine (ZYRTEC) 10 MG tablet Take 10 mg by mouth once daily.    diltiaZEM (CARDIZEM CD) 240 MG 24 hr capsule Take 1 capsule (240 mg total) by mouth once daily.    fluticasone-salmeterol 250-50 mcg/dose (ADVAIR) 250-50 mcg/dose diskus inhaler Inhale 1 puff into the lungs 2 (two) times daily. Controller    furosemide (LASIX) 40 MG tablet Take 1 tablet (40 mg total) by mouth daily as needed (Weigh yourself daily. Please take 1 tablet if you gain 3-5 lbs in one day. Call your doctor.).    latanoprost 0.005 %  ophthalmic solution Place 1 drop into the left eye nightly.    losartan (COZAAR) 100 MG tablet Take 0.5 tablets (50 mg total) by mouth once daily. TAKE 1 TABLET ONE TIME DAILY    multivitamin (THERAGRAN) per tablet Take 1 tablet by mouth once daily.    pravastatin (PRAVACHOL) 20 MG tablet TAKE 1 TABLET EVERY EVENING    SPIRIVA WITH HANDIHALER 18 mcg inhalation capsule Inhale 18 mcg into the lungs once daily.     VENTOLIN HFA 90 mcg/actuation inhaler INHALE TWO PUFFS BY MOUTH EVERY 6 HOURS AS NEEDED FOR WHEEZING     Family History     Problem Relation (Age of Onset)    Cancer Father    Cataracts Sister    Heart disease Mother, Maternal Grandfather    No Known Problems Brother, Maternal Aunt, Maternal Uncle, Paternal Aunt, Paternal Uncle, Maternal Grandmother, Paternal Grandmother, Paternal Grandfather        Tobacco Use    Smoking status: Former Smoker     Packs/day: 1.00     Years: 45.00     Pack years: 45.00     Types: Cigarettes     Last attempt to quit: 2002     Years since quittin.3    Smokeless tobacco: Never Used   Substance and Sexual Activity    Alcohol use: No    Drug use: No    Sexual activity: Yes     Partners: Male     Review of Systems   Constitutional: Positive for activity change, appetite change and fatigue. Negative for chills and fever.   HENT: Positive for trouble swallowing.    Respiratory: Positive for cough, shortness of breath and wheezing. Negative for chest tightness.    Cardiovascular: Positive for chest pain and leg swelling. Negative for palpitations.   Gastrointestinal: Negative for abdominal pain, constipation, diarrhea, nausea and vomiting.   Endocrine: Positive for cold intolerance.   Musculoskeletal: Positive for neck pain. Negative for neck stiffness.   Neurological: Negative for weakness, light-headedness and headaches.   Psychiatric/Behavioral: Positive for dysphoric mood. Negative for confusion. The patient is nervous/anxious.      Objective:     Vital Signs  (Most Recent):  Temp: 97.7 °F (36.5 °C) (05/15/19 0700)  Pulse: 82 (05/15/19 0700)  Resp: (!) 24 (05/15/19 0700)  BP: (!) 103/51 (05/15/19 0729)  SpO2: 97 % (05/15/19 0729) Vital Signs (24h Range):  Temp:  [97.7 °F (36.5 °C)-99 °F (37.2 °C)] 97.7 °F (36.5 °C)  Pulse:  [80-88] 82  Resp:  [22-48] 24  SpO2:  [90 %-100 %] 97 %  BP: (103-160)/(51-94) 103/51     Weight: 40.8 kg (90 lb)  Body mass index is 14.99 kg/m².    Physical Exam   Constitutional: She is oriented to person, place, and time. She appears cachectic. She is sleeping. Nasal cannula in place.   Pauses noticably in between sentences to take a breath.   HENT:   Head: Normocephalic and atraumatic.   Mouth/Throat: Uvula is midline. Mucous membranes are dry.   Oral thrush noted on the posterior tongue.   Eyes: Pupils are equal, round, and reactive to light. Conjunctivae and EOM are normal.   Neck: No JVD present.   Cardiovascular: Normal rate, regular rhythm, S1 normal, S2 normal and intact distal pulses. Exam reveals no gallop, no distant heart sounds and no friction rub.   No murmur heard.  Pulmonary/Chest: No accessory muscle usage. Tachypnea noted. No respiratory distress. She has no decreased breath sounds.   Abdominal: Soft. Normal appearance and bowel sounds are normal.   Musculoskeletal:        Left lower leg: She exhibits swelling. She exhibits no tenderness.   Neurological: She is alert and oriented to person, place, and time.   Skin: Skin is warm and dry. Capillary refill takes 2 to 3 seconds. No cyanosis. Nails show no clubbing.   Psychiatric: She has a normal mood and affect. Her speech is normal and behavior is normal. Thought content normal. Cognition and memory are normal.         CRANIAL NERVES     CN III, IV, VI   Pupils are equal, round, and reactive to light.  Extraocular motions are normal.       Significant Labs:   ABGs:   Recent Labs   Lab 05/15/19  0204   PH 7.455*   PCO2 49.1*   HCO3 34.5*   POCSATURATED 95   BE 11     Blood Culture:  "  Recent Labs   Lab 05/14/19 2112 05/14/19  2158   LABBLOO No Growth to date No Growth to date     CBC:   Recent Labs   Lab 05/14/19  2112 05/15/19  0346   WBC 14.71* 13.41*   HGB 9.1* 8.4*   HCT 28.1* 26.1*    282     CMP:   Recent Labs   Lab 05/14/19  2112 05/15/19  0346    143   K 3.5 3.0*    99   CO2 28 32*   * 114*   BUN 19 18   CREATININE 0.7 0.6   CALCIUM 8.9 8.5*   PROT 6.4  --    ALBUMIN 2.3*  --    BILITOT 0.3  --    ALKPHOS 96  --    AST 28  --    ALT 37  --    ANIONGAP 11 12   EGFRNONAA >60.0 >60.0     Cardiac Markers:   Recent Labs   Lab 05/14/19 2112   *     Magnesium:   Recent Labs   Lab 05/15/19  0346   MG 1.2*     Troponin:   Recent Labs   Lab 05/14/19 2112   TROPONINI <0.006     Urine Studies:   Recent Labs   Lab 05/14/19  2302   COLORU Luda   APPEARANCEUA Clear   PHUR 6.0   SPECGRAV 1.025   PROTEINUA 1+*   GLUCUA Negative   KETONESU Negative   BILIRUBINUA Negative   OCCULTUA Negative   NITRITE Negative   LEUKOCYTESUR Negative   RBCUA 1   WBCUA 2   BACTERIA Rare   SQUAMEPITHEL 0   HYALINECASTS 0       Significant Imaging: I have reviewed all pertinent imaging results/findings within the past 24 hours.     X-ray Chest Ap Portable    Result Date: 5/14/2019  EXAMINATION: XR CHEST AP PORTABLE CLINICAL HISTORY: Provided history is "Sepsis;  ". TECHNIQUE: One view of the chest. COMPARISON: 05/11/2019 and 05/02/2019. FINDINGS: Cardiac wires overlie the chest.  Cardiac silhouette is stable.  Atherosclerotic calcifications overlie the aortic arch.  Lungs are hyperinflated and demonstrate severe bilateral emphysematous changes.  Persistent irregular opacity again identified in the right lung apex, better characterized on prior CT.  There is worsening bibasilar airspace disease and bilateral pleural effusions when compared with the prior chest radiograph.  No pneumothorax.     Worsening bibasilar airspace disease worrisome for pneumonia or aspiration in the setting of " sepsis. Worsening small bilateral pleural effusions. Severe emphysema and stable irregular opacity in the right upper lobe.     Electronically signed by: Aakash Lamb MD Date: 05/14/2019 Time: 21:45      Assessment/Plan:     Ms. Latasha Perez is a 73 year old woman with a history of COPD and pseudomonal pneumonia with acute on chronic respiratory failure due to pneumonia.    Acute on chronic respiratory failure with hypoxia and hypercapnia  -Supplemental O2 via nasal cannula, goal of 89-91% O2  -Continue IV antibiotics for coverage of HAP and aspiration pneumonia, piperacillin/tazobactam and vancomycin  -PT/OT to evaluate and mobilize  -Duo-nebulizer breathing treatment    Acute pulmonary edema  -Same as above    Severe protein-calore malnutrition  -Consult dietician  -Encourage eating and proper nutrition  -Patient reports difficulty swallowing, consider esophageal function studies    Paroxysmal atrial fibrillation  -Continue home regimen    Essential hypertension  -Continue losartan and diltiazem    Hyperlipidemia  -Continue pravastatin      Active Diagnoses:    Diagnosis Date Noted POA    PRINCIPAL PROBLEM:  Acute on chronic respiratory failure with hypoxia and hypercapnia [J96.21, J96.22] 04/02/2019 Yes    Acute pulmonary edema [J81.0] 05/15/2019 Yes    Severe protein-calorie malnutrition [E43] 05/15/2019 Unknown    Paroxysmal atrial fibrillation [I48.0] 04/26/2019 Yes    Essential hypertension [I10] 04/13/2017 Yes    HLD (hyperlipidemia) [E78.5]  Yes      Problems Resolved During this Admission:     VTE Risk Mitigation (From admission, onward)        Ordered     apixaban tablet 5 mg  2 times daily      05/15/19 0228     IP VTE HIGH RISK PATIENT  Once      05/15/19 0222            Tai Breen  Department of Hospital Medicine   [facility]

## 2019-05-15 NOTE — ED TRIAGE NOTES
SOB onset tonight, initial o2 sat 70% on 3 L NC home oxygenplaced on CPAP per EMS o2 sat 94%, pt recently dx with pnuemonia.

## 2019-05-15 NOTE — PLAN OF CARE
On Discharge planning assessment patient is in bed, resting quietly,  Introduced myself and CM role in patients care plan, patient verbalized understanding.     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  Patient denies HD, and coumadin. Pt has Home Health with [facility]. She has O[facility] normally at 2L [facility], she has a wheelchair, BSC, tube bench, and rolling walker  that she uses while home. Verified Pts Address, Emergency Contact, Pharmacy and PCP.     Pt denies any concerns for this visit, CM will continue to follow. CM name and number placed on patients white board.        05/15/19 0938   Discharge Assessment   Assessment Type Discharge Planning Assessment   Confirmed/corrected address and phone number on facesheet? Yes   Assessment information obtained from? Patient   Expected Length of Stay (days) 3   Communicated expected length of stay with patient/caregiver yes   Prior to hospitilization cognitive status: Alert/Oriented   Prior to hospitalization functional status: Assistive Equipment   Current cognitive status: Alert/Oriented   Current Functional Status: Assistive Equipment   Lives With alone   Able to Return to Prior Arrangements yes   Is patient able to care for self after discharge? Yes   Who are your caregiver(s) and their phone number(s)? Juan [phone]   Patient's perception of discharge disposition home health   Readmission Within the Last 30 Days no previous admission in last 30 days   Patient currently being followed by outpatient case management? No   Patient currently receives any other outside agency services? Yes   Name and contact number of agency or person providing outside services [facility]    Is it the patient/care giver preference to resume care with the current outside agency? Yes   Equipment Currently Used at Home walker, rolling;wheelchair;bath bench;bedside commode   Do you  have any problems affording any of your prescribed medications? No   Is the patient taking medications as prescribed? yes   Does the patient have transportation home? Yes   Transportation Anticipated family or friend will provide   Does the patient receive services at the [facility]madin Clinic? No   Discharge Plan A Home Health   Discharge Plan B Skilled Nursing Facility   DME Needed Upon Discharge  none   Patient/Family in Agreement with Plan yes     Pollo Oneal MD  [address] / [place] [address] [phone]     Extended Emergency Contact Information  Primary Emergency Contact: Carlos Perez  Address: [address] [place] [address] [facility]  Home Phone: [phone]  Relation: Spouse  Secondary Emergency Contact: Juan Em  Mobile Phone: [phone]  Relation: Friend      [facility] - [place] - [address] [facility] [facility] [address]  [place] [address]  Phone: [phone] Fax: [phone]    [facility] - [facility] [address]  [address] [facility] [address]  Phone: [phone] Fax: [phone]    [facility] - [place] - [address] [facility]  [place] [address]  Phone: [phone] Fax: [phone]

## 2019-05-15 NOTE — ASSESSMENT & PLAN NOTE
--hypercapnic respiratory failure seems chronic from her COPD, as evidenced by her near normal pH. Does not appear to be an acute COPD exacerbation.  --was weaned off bipap in the ER.  --pneumonia likely accounts for her hypoxic respiratory failure.  BNP is near baseline.  --CXR (5/14) consistent with COPD and opacity in R L apex and bibiasilar airspace disease concerning of PNA or aspiration  --continue home COPD inhalers (or formulary alternatives)  --would cover for pseudomonas given history of pseudomonal pneumonia and she was hospitalized within the last 3 months  --vanc and zosyn.  She is amenable to trying zosyn after discussion of her penicillin allergy, she will be closely monitored, is on telemetry

## 2019-05-15 NOTE — PROGRESS NOTES
Pharmacokinetic Initial Assessment: IV Vancomycin    Assessment/Plan:    Initiate intravenous vancomycin with loading dose of 1250 mg once followed by a maintenance dose of vancomycin 1000mg IV every 24 hours  Desired empiric serum trough concentration is 10 to 20 mcg/mL.  Draw vancomycin trough level 30 min prior to third dose on 5/17 at approximately 0430  Pharmacy will continue to follow and monitor vancomycin.      Please contact pharmacy at extension [phone] with any questions regarding this assessment.     Thank you for the consult,   Sabra Teixeira     Patient brief summary:  Latasha Perez is a 73 y.o. female initiated on antimicrobial therapy with IV Vancomycin for treatment of suspected lower respiratory infection    Drug Allergies:   Review of patient's allergies indicates:   Allergen Reactions    Meropenem Itching and Rash     Rash started to appear around Day 3 of therapy.     Pcn [penicillins] Other (See Comments)     Fever flushing skin swelling     Biaxin [clarithromycin] Rash    Codeine Rash    Doxycycline Rash    Levaquin [levofloxacin] Rash       Actual Body Weight:   40.8kg    Renal Function:   CrCl cannot be calculated (Unknown ideal weight.).,     Dialysis Method (if applicable):  NA    CBC (last 72 hours):  Recent Labs   Lab Result Units 05/14/19 2112   WBC K/uL 14.71*   Hemoglobin g/dL 9.1*   Hematocrit % 28.1*   Platelets K/uL 293   Gran% % 75.6*   Lymph% % 5.6*   Mono% % 6.3   Eosinophil% % 11.8*   Basophil% % 0.3   Differential Method  Automated       Metabolic Panel (last 72 hours):  Recent Labs   Lab Result Units 05/14/19 2112 05/14/19  2302   Sodium mmol/L 140  --    Potassium mmol/L 3.5  --    Chloride mmol/L 101  --    CO2 mmol/L 28  --    Glucose mg/dL 127*  --    Glucose, UA   --  Negative   BUN, Bld mg/dL 19  --    Creatinine mg/dL 0.7  --    Albumin g/dL 2.3*  --    Total Bilirubin mg/dL 0.3  --    Alkaline Phosphatase U/L 96  --    AST U/L 28  --    ALT U/L 37  --         Drug levels (last 3 results):  No results for input(s): VANCOMYCINRA, VANCOMYCINPE, VANCOMYCINTR in the last 72 hours.    Microbiologic Results:  Microbiology Results (last 7 days)     Procedure Component Value Units Date/Time    Culture, Respiratory with Gram Stain [[id number]]     Order Status:  No result Specimen:  Respiratory     Respiratory Viral Panel by PCR [facility]; Nasal Swab [[id number]]     Order Status:  No result Specimen:  Respiratory     Blood culture x two cultures. Draw prior to antibiotics. [[id number]] Collected:  05/14/19 2158    Order Status:  Sent Specimen:  Blood from Peripheral, Forearm, Left Updated:  05/14/19 2204    Blood culture x two cultures. Draw prior to antibiotics. [[id number]] Collected:  05/14/19 2112    Order Status:  Sent Specimen:  Blood from Peripheral, Forearm, Right Updated:  05/14/19 2119

## 2019-05-15 NOTE — SUBJECTIVE & OBJECTIVE
Past Medical History:   Diagnosis Date    Arthritis     Carotid disease, bilateral     Cataract     Chronic hyponatremia     COPD (chronic obstructive pulmonary disease)     HLD (hyperlipidemia)     HTN (hypertension)        Past Surgical History:   Procedure Laterality Date    APPENDECTOMY      Cardioversion or Defibrillation  4/4/2019    Performed by Manfred Figueroa MD at [facility] CATH LAB    CATARACT EXTRACTION W/  INTRAOCULAR LENS IMPLANT Right 12/06/2018    Dr. Escobar    CATARACT EXTRACTION W/  INTRAOCULAR LENS IMPLANT Left 12/20/2018    DR. Escobar    CERVICAL SPINE SURGERY      DILATION AND CURETTAGE OF UTERUS      x 2    EYE SURGERY      cataract Rt    FRACTURE SURGERY      Lt leg fracture with hardware    INSERTION, IOL PROSTHESIS Left 12/20/2018    Performed by Broderick Escobar MD at [facility] OR    INSERTION, IOL PROSTHESIS Right 12/6/2018    Performed by Broderick Escobar MD at [facility] OR    metatarsal repair      OPEN REDUCTION INTERNAL FIXATION-TIBIAL PLATEAU Left 6/27/2014    Performed by Isak Pereira MD at [facility] OR    PHACOEMULSIFICATION, CATARACT Left 12/20/2018    Performed by Broderick Escobar MD at [facility] OR    PHACOEMULSIFICATION, CATARACT Right 12/6/2018    Performed by Broderick Escobar MD at [facility] OR    SHOULDER ARTHROSCOPY      Transesophageal echo (JOHNNA) intra-procedure log documentation N/A 4/4/2019    Performed by Manfred Figueroa MD at [facility] CATH LAB       Review of patient's allergies indicates:   Allergen Reactions    Meropenem Itching and Rash     Rash started to appear around Day 3 of therapy.     Pcn [penicillins] Other (See Comments)     Fever flushing skin swelling     Biaxin [clarithromycin] Rash    Codeine Rash    Doxycycline Rash    Levaquin [levofloxacin] Rash       No current facility-administered medications on file prior to encounter.      Current Outpatient Medications on File Prior to Encounter   Medication Sig     acetaminophen (TYLENOL EXTRA STRENGTH ORAL) Take 1 to 2 tablets by mouth daily as needed for pain    albuterol sulfate 2.5 mg/0.5 mL Nebu Take 2.5 mg by nebulization every 6 (six) hours while awake. One Box    alendronate (FOSAMAX) 70 MG tablet TAKE 1 TABLET BY MOUTH ONCE A WEEK EVERY 7 DAYS, AS DIRECTED (Patient taking differently: TAKE 1 TABLET BY MOUTH ONCE A WEEK EVERY 7 DAYS, AS DIRECTED on Sunday)    amiodarone (PACERONE) 400 MG tablet Take 0.5 tablets (200 mg total) by mouth 2 (two) times daily.    apixaban (ELIQUIS) 5 mg Tab Take 1 tablet (5 mg total) by mouth 2 (two) times daily.    aspirin (ECOTRIN) 81 MG EC tablet Take 81 mg by mouth once daily.    calcium carbonate (CALCIUM 500 ORAL) Take 1 tablet by mouth once daily.     cetirizine (ZYRTEC) 10 MG tablet Take 10 mg by mouth once daily.    diltiaZEM (CARDIZEM CD) 240 MG 24 hr capsule Take 1 capsule (240 mg total) by mouth once daily.    fluticasone-salmeterol 250-50 mcg/dose (ADVAIR) 250-50 mcg/dose diskus inhaler Inhale 1 puff into the lungs 2 (two) times daily. Controller    furosemide (LASIX) 40 MG tablet Take 1 tablet (40 mg total) by mouth daily as needed (Weigh yourself daily. Please take 1 tablet if you gain 3-5 lbs in one day. Call your doctor.).    latanoprost 0.005 % ophthalmic solution Place 1 drop into the left eye nightly.    losartan (COZAAR) 100 MG tablet Take 0.5 tablets (50 mg total) by mouth once daily. TAKE 1 TABLET ONE TIME DAILY    multivitamin (THERAGRAN) per tablet Take 1 tablet by mouth once daily.    pravastatin (PRAVACHOL) 20 MG tablet TAKE 1 TABLET EVERY EVENING    SPIRIVA WITH HANDIHALER 18 mcg inhalation capsule Inhale 18 mcg into the lungs once daily.     VENTOLIN HFA 90 mcg/actuation inhaler INHALE TWO PUFFS BY MOUTH EVERY 6 HOURS AS NEEDED FOR WHEEZING     Family History     Problem Relation (Age of Onset)    Cancer Father    Cataracts Sister    Heart disease Mother, Maternal Grandfather    No Known Problems  Brother, Maternal Aunt, Maternal Uncle, Paternal Aunt, Paternal Uncle, Maternal Grandmother, Paternal Grandmother, Paternal Grandfather        Tobacco Use    Smoking status: Former Smoker     Packs/day: 1.00     Years: 45.00     Pack years: 45.00     Types: Cigarettes     Last attempt to quit: 2002     Years since quittin.3    Smokeless tobacco: Never Used   Substance and Sexual Activity    Alcohol use: No    Drug use: No    Sexual activity: Yes     Partners: Male     Review of Systems   Constitutional: Positive for unexpected weight change. Negative for chills and fever.   HENT: Negative for sneezing and sore throat.    Eyes: Negative for pain and visual disturbance.   Respiratory: Positive for cough and shortness of breath. Negative for wheezing.    Cardiovascular: Positive for leg swelling. Negative for chest pain.   Gastrointestinal: Negative for abdominal pain, constipation, diarrhea and nausea.   Genitourinary: Negative for dysuria, frequency and urgency.   Allergic/Immunologic: Negative for environmental allergies.   Neurological: Negative for dizziness, light-headedness, numbness and headaches.   Psychiatric/Behavioral: Negative for agitation. The patient is nervous/anxious.      Objective:     Vital Signs (Most Recent):  Temp: 99 °F (37.2 °C) (19 2100)  Pulse: 88 (05/15/19 0232)  Resp: (!) 22 (05/15/19 023)  BP: (!) 137/94 (05/15/19 0232)  SpO2: (!) 94 % (05/15/19 023) Vital Signs (24h Range):  Temp:  [99 °F (37.2 °C)] 99 °F (37.2 °C)  Pulse:  [80-88] 88  Resp:  [22-48] 22  SpO2:  [93 %-100 %] 94 %  BP: (128-160)/(59-94) 137/94     Weight: 40.8 kg (90 lb)  Body mass index is 14.98 kg/m².    Physical Exam   Constitutional: She is oriented to person, place, and time. She appears well-developed. No distress.   Cachectic appearing   HENT:   Head: Normocephalic and atraumatic.   Nose: Nose normal.   Dry oral mucous membranes   Eyes: EOM are normal. No scleral icterus.   Neck: Normal range  of motion. No tracheal deviation present.   Cardiovascular: Normal rate and regular rhythm. Exam reveals no gallop and no friction rub.   No murmur heard.  Pulmonary/Chest: Effort normal. No respiratory distress. She has no wheezes. She has no rales.   Diminished breath sounds at the R lung base, mild wheezes present throughout   Abdominal: Soft. Bowel sounds are normal. She exhibits no distension and no mass. There is no tenderness.   Musculoskeletal: She exhibits no edema.   LLE 2+ pitting edema.  No edema RLE.     Neurological: She is alert and oriented to person, place, and time.   Skin: Skin is warm and dry.   There is patchy bruising present on arms and legs   Psychiatric:   Anxious appearing         CRANIAL NERVES     CN III, IV, VI   Extraocular motions are normal.        Significant Labs:   CBC:   Recent Labs   Lab 05/14/19  2112   WBC 14.71*   HGB 9.1*   HCT 28.1*        CMP:   Recent Labs   Lab 05/14/19  2112      K 3.5      CO2 28   *   BUN 19   CREATININE 0.7   CALCIUM 8.9   PROT 6.4   ALBUMIN 2.3*   BILITOT 0.3   ALKPHOS 96   AST 28   ALT 37   ANIONGAP 11   EGFRNONAA >60.0       Significant Imaging: I have reviewed all pertinent imaging results/findings within the past 24 hours.

## 2019-05-15 NOTE — HPI
74 yo F with PMH COPD (3-4 L home O2), tobacco abuse, HTN, HLD, h/o AFib RVR (s/p cardioversion 4/5 but returned to Afib, on Amio, Eliquis), h/o pseudomonal pneumonia and bacteremia who presents with SOB from SNF. She had presented to the ER 5 days prior with hypoxic respiratory failure which was attributed to oxygen delivery system probably not working properly at her facility because her respiratory failure resolved with supplemental oxygen via NC.  She had no consolidation on CXR at that time.    She returned to the ER on 5/14 because shortness of breath returned.   Upon EMS arrival to her facility, the patient was tachypneic, hypoxic in the 60s on room air. She arrived on CPAP/BiPAP.  VBG on arrival was 7.35/59.  CXR revealed opacity in R L apex and bibiasilar airspace disease concerning of PNA or aspiration.  BNP was 122.  She was given a dose of cefepime and lasix by the ER.      She was recently admitted April 2019 for acute on chronic respiratory failure 2/2 COPD exacerbation and pseudomonal pneumonia with bacteremia (1/4 blood cultures).  She was discharged on cefepime 2g q8 until 4/18/19.  Hospital course at that time was complicated by Afib RVR, s/p cardioversion, but returned to AFib, discharged also on amiodarone, diltiazem, and Eliquis.      She describes she got a one time dose of penicillin once during a dental procedure and had generalized swelling but no respiratory compromise.    She wishes to be full code

## 2019-05-15 NOTE — PHARMACY MED REC
"Admission Medication Reconciliation - Pharmacy Consult Note    The home medication history was taken by Jazzy Ponce, Pharmacy Tech.     Based on information gathered and subsequent review by the clinical pharmacist, the items below may need attention.     You may go to "Admission" then "Reconcile Home Medications" tabs to review and/or act upon these items.     Potentially problematic discrepancies with current MAR  o Patient IS taking the following which was not ordered upon admit  o Furosemide 40 mg PO daily prn (if 3-5 lbs weight gain)  o Aspirin 81 mg PO daily  o Alprazolam 0.25 mg PO TID prn anxiety (giving at [facility]    Please address this information as you see fit.  Feel free to contact us if you have any questions or require assistance.    Jp Garrett, Pharm.D., BCPS  [id number]                    .    .            "

## 2019-05-15 NOTE — NURSING
Patient transferred down for us doppler per stretcher. O2 at 4L NC flowing well with no distress noted with patient. IV sites x 3, see flowsheet intact.

## 2019-05-16 LAB
ANION GAP SERPL CALC-SCNC: 9 MMOL/L (ref 8–16)
BACTERIA BLD CULT: NORMAL
BACTERIA BLD CULT: NORMAL
BUN SERPL-MCNC: 20 MG/DL (ref 8–23)
CALCIUM SERPL-MCNC: 8 MG/DL (ref 8.7–10.5)
CHLORIDE SERPL-SCNC: 102 MMOL/L (ref 95–110)
CO2 SERPL-SCNC: 30 MMOL/L (ref 23–29)
CREAT SERPL-MCNC: 0.6 MG/DL (ref 0.5–1.4)
ERYTHROCYTE [DISTWIDTH] IN BLOOD BY AUTOMATED COUNT: 15.3 % (ref 11.5–14.5)
EST. GFR  (AFRICAN AMERICAN): >60 ML/MIN/1.73 M^2
EST. GFR  (NON AFRICAN AMERICAN): >60 ML/MIN/1.73 M^2
GLUCOSE SERPL-MCNC: 142 MG/DL (ref 70–110)
HCT VFR BLD AUTO: 23.1 % (ref 37–48.5)
HGB BLD-MCNC: 7.4 G/DL (ref 12–16)
MAGNESIUM SERPL-MCNC: 1.5 MG/DL (ref 1.6–2.6)
MCH RBC QN AUTO: 30.8 PG (ref 27–31)
MCHC RBC AUTO-ENTMCNC: 32 G/DL (ref 32–36)
MCV RBC AUTO: 96 FL (ref 82–98)
PHOSPHATE SERPL-MCNC: 2.7 MG/DL (ref 2.7–4.5)
PLATELET # BLD AUTO: 277 K/UL (ref 150–350)
PMV BLD AUTO: 9.5 FL (ref 9.2–12.9)
POTASSIUM SERPL-SCNC: 4 MMOL/L (ref 3.5–5.1)
RBC # BLD AUTO: 2.4 M/UL (ref 4–5.4)
SODIUM SERPL-SCNC: 141 MMOL/L (ref 136–145)
WBC # BLD AUTO: 12.17 K/UL (ref 3.9–12.7)

## 2019-05-16 PROCEDURE — 99232 PR SUBSEQUENT HOSPITAL CARE,LEVL II: ICD-10-PCS | Mod: HCNC,GC,, | Performed by: INTERNAL MEDICINE

## 2019-05-16 PROCEDURE — 25000003 PHARM REV CODE 250: Mod: HCNC | Performed by: INTERNAL MEDICINE

## 2019-05-16 PROCEDURE — 80048 BASIC METABOLIC PNL TOTAL CA: CPT | Mod: HCNC

## 2019-05-16 PROCEDURE — 63600175 PHARM REV CODE 636 W HCPCS: Mod: HCNC | Performed by: HOSPITALIST

## 2019-05-16 PROCEDURE — 20600001 HC STEP DOWN PRIVATE ROOM: Mod: HCNC

## 2019-05-16 PROCEDURE — 25000242 PHARM REV CODE 250 ALT 637 W/ HCPCS: Mod: HCNC | Performed by: HOSPITALIST

## 2019-05-16 PROCEDURE — 97161 PT EVAL LOW COMPLEX 20 MIN: CPT | Mod: HCNC

## 2019-05-16 PROCEDURE — 85027 COMPLETE CBC AUTOMATED: CPT | Mod: HCNC

## 2019-05-16 PROCEDURE — 99232 SBSQ HOSP IP/OBS MODERATE 35: CPT | Mod: HCNC,GC,, | Performed by: INTERNAL MEDICINE

## 2019-05-16 PROCEDURE — 84100 ASSAY OF PHOSPHORUS: CPT | Mod: HCNC

## 2019-05-16 PROCEDURE — 25000242 PHARM REV CODE 250 ALT 637 W/ HCPCS: Mod: HCNC | Performed by: STUDENT IN AN ORGANIZED HEALTH CARE EDUCATION/TRAINING PROGRAM

## 2019-05-16 PROCEDURE — 25000003 PHARM REV CODE 250: Mod: HCNC | Performed by: HOSPITALIST

## 2019-05-16 PROCEDURE — 83735 ASSAY OF MAGNESIUM: CPT | Mod: HCNC

## 2019-05-16 PROCEDURE — 94761 N-INVAS EAR/PLS OXIMETRY MLT: CPT | Mod: HCNC

## 2019-05-16 PROCEDURE — 94640 AIRWAY INHALATION TREATMENT: CPT | Mod: HCNC

## 2019-05-16 PROCEDURE — 63600175 PHARM REV CODE 636 W HCPCS: Mod: HCNC | Performed by: STUDENT IN AN ORGANIZED HEALTH CARE EDUCATION/TRAINING PROGRAM

## 2019-05-16 PROCEDURE — 25000003 PHARM REV CODE 250: Mod: HCNC | Performed by: STUDENT IN AN ORGANIZED HEALTH CARE EDUCATION/TRAINING PROGRAM

## 2019-05-16 PROCEDURE — 27000221 HC OXYGEN, UP TO 24 HOURS: Mod: HCNC

## 2019-05-16 PROCEDURE — 97165 OT EVAL LOW COMPLEX 30 MIN: CPT | Mod: HCNC

## 2019-05-16 PROCEDURE — 36415 COLL VENOUS BLD VENIPUNCTURE: CPT | Mod: HCNC

## 2019-05-16 RX ORDER — MAGNESIUM SULFATE HEPTAHYDRATE 40 MG/ML
2 INJECTION, SOLUTION INTRAVENOUS
Status: COMPLETED | OUTPATIENT
Start: 2019-05-16 | End: 2019-05-16

## 2019-05-16 RX ORDER — NYSTATIN 100000 [USP'U]/ML
500000 SUSPENSION ORAL
Status: DISCONTINUED | OUTPATIENT
Start: 2019-05-16 | End: 2019-05-19 | Stop reason: HOSPADM

## 2019-05-16 RX ORDER — GUAIFENESIN 600 MG/1
600 TABLET, EXTENDED RELEASE ORAL 2 TIMES DAILY
Status: DISCONTINUED | OUTPATIENT
Start: 2019-05-16 | End: 2019-05-19 | Stop reason: HOSPADM

## 2019-05-16 RX ORDER — PREDNISONE 20 MG/1
40 TABLET ORAL DAILY
Status: DISCONTINUED | OUTPATIENT
Start: 2019-05-16 | End: 2019-05-19 | Stop reason: HOSPADM

## 2019-05-16 RX ORDER — IPRATROPIUM BROMIDE AND ALBUTEROL SULFATE 2.5; .5 MG/3ML; MG/3ML
3 SOLUTION RESPIRATORY (INHALATION)
Status: DISCONTINUED | OUTPATIENT
Start: 2019-05-16 | End: 2019-05-19 | Stop reason: HOSPADM

## 2019-05-16 RX ADMIN — FLUTICASONE FUROATE AND VILANTEROL TRIFENATATE 1 PUFF: 100; 25 POWDER RESPIRATORY (INHALATION) at 09:05

## 2019-05-16 RX ADMIN — LACTULOSE 15 G: 20 SOLUTION ORAL at 08:05

## 2019-05-16 RX ADMIN — SALINE NASAL SPRAY 1 SPRAY: 1.5 SOLUTION NASAL at 08:05

## 2019-05-16 RX ADMIN — ALPRAZOLAM 0.12 MG: 0.25 TABLET ORAL at 09:05

## 2019-05-16 RX ADMIN — GUAIFENESIN 600 MG: 600 TABLET, EXTENDED RELEASE ORAL at 09:05

## 2019-05-16 RX ADMIN — LACTULOSE 15 G: 20 SOLUTION ORAL at 09:05

## 2019-05-16 RX ADMIN — PIPERACILLIN AND TAZOBACTAM 4.5 G: 4; .5 INJECTION, POWDER, LYOPHILIZED, FOR SOLUTION INTRAVENOUS; PARENTERAL at 01:05

## 2019-05-16 RX ADMIN — NYSTATIN 500000 UNITS: 500000 SUSPENSION ORAL at 08:05

## 2019-05-16 RX ADMIN — APIXABAN 5 MG: 5 TABLET, FILM COATED ORAL at 09:05

## 2019-05-16 RX ADMIN — AMIODARONE HYDROCHLORIDE 200 MG: 200 TABLET ORAL at 08:05

## 2019-05-16 RX ADMIN — IPRATROPIUM BROMIDE AND ALBUTEROL SULFATE 3 ML: .5; 3 SOLUTION RESPIRATORY (INHALATION) at 09:05

## 2019-05-16 RX ADMIN — IPRATROPIUM BROMIDE AND ALBUTEROL SULFATE 3 ML: .5; 3 SOLUTION RESPIRATORY (INHALATION) at 12:05

## 2019-05-16 RX ADMIN — POTASSIUM CHLORIDE 50 MEQ: 750 CAPSULE, EXTENDED RELEASE ORAL at 09:05

## 2019-05-16 RX ADMIN — PIPERACILLIN AND TAZOBACTAM 4.5 G: 4; .5 INJECTION, POWDER, LYOPHILIZED, FOR SOLUTION INTRAVENOUS; PARENTERAL at 06:05

## 2019-05-16 RX ADMIN — IPRATROPIUM BROMIDE AND ALBUTEROL SULFATE 3 ML: .5; 3 SOLUTION RESPIRATORY (INHALATION) at 04:05

## 2019-05-16 RX ADMIN — DILTIAZEM HYDROCHLORIDE 240 MG: 120 CAPSULE, COATED, EXTENDED RELEASE ORAL at 09:05

## 2019-05-16 RX ADMIN — PRAVASTATIN SODIUM 20 MG: 20 TABLET ORAL at 08:05

## 2019-05-16 RX ADMIN — SALINE NASAL SPRAY 1 SPRAY: 1.5 SOLUTION NASAL at 09:05

## 2019-05-16 RX ADMIN — PREDNISONE 40 MG: 20 TABLET ORAL at 09:05

## 2019-05-16 RX ADMIN — GUAIFENESIN 600 MG: 600 TABLET, EXTENDED RELEASE ORAL at 08:05

## 2019-05-16 RX ADMIN — LOSARTAN POTASSIUM 50 MG: 50 TABLET, FILM COATED ORAL at 09:05

## 2019-05-16 RX ADMIN — APIXABAN 5 MG: 5 TABLET, FILM COATED ORAL at 08:05

## 2019-05-16 RX ADMIN — NYSTATIN 500000 UNITS: 500000 SUSPENSION ORAL at 09:05

## 2019-05-16 RX ADMIN — AMIODARONE HYDROCHLORIDE 200 MG: 200 TABLET ORAL at 09:05

## 2019-05-16 RX ADMIN — PIPERACILLIN AND TAZOBACTAM 4.5 G: 4; .5 INJECTION, POWDER, LYOPHILIZED, FOR SOLUTION INTRAVENOUS; PARENTERAL at 08:05

## 2019-05-16 RX ADMIN — MAGNESIUM SULFATE IN WATER 2 G: 40 INJECTION, SOLUTION INTRAVENOUS at 08:05

## 2019-05-16 RX ADMIN — NYSTATIN 500000 UNITS: 500000 SUSPENSION ORAL at 04:05

## 2019-05-16 RX ADMIN — Medication 1 SPRAY: at 08:05

## 2019-05-16 NOTE — PLAN OF CARE
Problem: Adult Inpatient Plan of Care  Goal: Plan of Care Review  Patient is AAOx4 has exhibited signs of anxiety with medication ordered. Iv site intact with no signs of redness or swelling. O2 @ 5 L nc flowing well. Safety maintained, call light in reach, siderails up x 3.       Strong peripheral pulses

## 2019-05-16 NOTE — PT/OT/SLP EVAL
"Physical Therapy Evaluation    Patient Name:  Latasha Perez   MRN:  [id number]    Recommendations:     Discharge Recommendations:  home health PT   Discharge Equipment Recommendations: none   Barriers to discharge: Decreased caregiver support    Assessment:     Latasha Perez is a 73 y.o. female admitted with a medical diagnosis of Acute on chronic respiratory failure with hypoxia and hypercapnia.  She presents with the following impairments/functional limitations:  weakness, impaired endurance, decreased safety awareness, gait instability, impaired functional mobilty, impaired balance. Pt agreed to PT evaluation after moderate encouragement due to pt reports of difficulty breathing. Pt states she "wants to get up and walk around but not today" however is agreeable to transfer to chair. Currently on 4L O2.     Rehab Prognosis: Good; patient would benefit from acute skilled PT services to address these deficits and reach maximum level of function.    Recent Surgery: * No surgery found *      Plan:     During this hospitalization, patient to be seen 4 x/week to address the identified rehab impairments via gait training, therapeutic activities, therapeutic exercises and progress toward the following goals:    · Plan of Care Expires:  06/15/19    Subjective     Chief Complaint: difficulty breathing  Patient/Family Comments/goals: to return home with home health PT  Pain/Comfort:  · Pain Rating 1: 0/10    Patients cultural, spiritual, Mosque conflicts given the current situation:      Living Environment:  Pt lives alone in [facility] with 1 SERENA with (R)handrail.   Prior to admission, patients level of function was ambulating without an AD, driving, and performing ADL's independently. O2 used at home. Equipment owned but does NOT use: walker, rolling, wheelchair, bath bench, bedside commode. Upon discharge, patient will have limited assistance from neighbors/friends. Pt states neighbor can stay overnight with her " temporarily, if needed.     Objective:     Communicated with nursing prior to session.  Patient found supine with oxygen, telemetry, peripheral IV  upon PT entry to room.    General Precautions: Standard, fall   Orthopedic Precautions:N/A   Braces:       Exams:  · RLE ROM: WFL  · RLE Strength: WFL  · LLE ROM: WFL  · LLE Strength: WFL    Functional Mobility:  · Bed Mobility:     · Rolling Left:  modified independence  · Scooting: modified independence  · Supine to Sit: stand by assistance    · Transfers:     · Sit to Stand:  contact guard assistance with hand-held assist  · Bed to Chair: contact guard assistance with  hand-held assist  using  Stand Pivot    · Gait: Pt ambulated 4 steps to chair CGA with hand-held assist. Pt presents with decreased safety awareness of lines requiring PT management of lines and VCing for safety.     · Balance: fair sitting and standing balance      Therapeutic Activities and Exercises:   Discussed with pt POC, goals, benefits of sitting in chair as opposed to supine, and PT recommendations.     AM-PAC 6 CLICK MOBILITY  Total Score:19     Patient left up in chair with all lines intact, call button in reach and nursing notified.    GOALS:   Multidisciplinary Problems     Physical Therapy Goals        Problem: Physical Therapy Goal    Goal Priority Disciplines Outcome Goal Variances Interventions   Physical Therapy Goal     PT, PT/OT Ongoing (interventions implemented as appropriate)     Description:  Goals to be met by: 2019     Patient will increase functional independence with mobility by performin. Supine to sit with Modified [place]  2. Sit to supine with Modified [place]  3. Sit to stand transfer with Modified [place]  4. Bed to chair transfer with Supervision using LRAD.  5. Gait  x 150 feet with Stand-by Assistance using LRAD without significant LOB and management O2.    6. Ascend/descend 1 stair with right handrail Stand-by Assistance in order to enter  home.                       History:     Past Medical History:   Diagnosis Date    Arthritis     Carotid disease, bilateral     Cataract     Chronic hyponatremia     COPD (chronic obstructive pulmonary disease)     HLD (hyperlipidemia)     HTN (hypertension)        Past Surgical History:   Procedure Laterality Date    APPENDECTOMY      Cardioversion or Defibrillation  4/4/2019    Performed by Manfred [facility]eroa MD at [facility] CATH LAB    CATARACT EXTRACTION W/  INTRAOCULAR LENS IMPLANT Right 12/06/2018    Dr. Escobar    CATARACT EXTRACTION W/  INTRAOCULAR LENS IMPLANT Left 12/20/2018    DR. Escobar    CERVICAL SPINE SURGERY      DILATION AND CURETTAGE OF UTERUS      x 2    EYE SURGERY      cataract Rt    FRACTURE SURGERY      Lt leg fracture with hardware    INSERTION, IOL PROSTHESIS Left 12/20/2018    Performed by [facility]ick [facility]obar [facility] at [facility] OR    INSERTION, IOL PROSTHESIS Right 12/6/2018    Performed by [facility] at [facility] OR    metatarsal repair      OPEN REDUCTION INTERNAL FIXATION-TIBIAL PLATEAU Left 6/27/2014    Performed by Isak Pereira MD at [facility] OR    PHACOEMULSIFICATION, CATARACT Left 12/20/2018    Performed by [facility] at [facility] OR    PHACOEMULSIFICATION, CATARACT Right 12/6/2018    Performed by [facility]ick [facility] at [facility] OR    SHOULDER ARTHROSCOPY      Transesophageal echo (JOHNNA) intra-procedure log documentation N/A 4/4/2019    Performed by Manfred Figueroa MD at [facility] CATH LAB       Time Tracking:     PT Received On: 05/16/19  PT Start Time: 0955     PT Stop Time: 1017  PT Total Time (min): 22 min     Billable Minutes: Evaluation 22      Ksenia Plata [facility]  05/16/2019

## 2019-05-16 NOTE — ASSESSMENT & PLAN NOTE
--hypercapnic respiratory failure seems chronic from her COPD, as evidenced by her near normal pH. Initially, did not appear to be an acute COPD exacerbation, however will add prednisone 40 qd for 5 days in addition to the abx.   --was weaned off bipap in the ER.  --pneumonia likely accounts for her hypoxic respiratory failure.  BNP is near baseline.  --CXR (5/14) consistent with COPD and opacity in R L apex and bibiasilar airspace disease concerning of PNA or aspiration  --continue home COPD inhalers (or formulary alternatives)  --would cover for pseudomonas given history of pseudomonal pneumonia and she was hospitalized within the last 3 months  --vanc and zosyn.  She is amenable to trying zosyn after discussion of her penicillin allergy, she will be closely monitored, is on telemetry  --GPR on blood cx, will re culture     --affrin prn q6 for nasal airway patenance  --mucinex for congestion  --alprazolam 0.125 q12 for anxiety   Significant anxiety component to SOB  --sputum culture when sample producible

## 2019-05-16 NOTE — PLAN OF CARE
Problem: Skin Injury Risk Increased  Goal: Skin Health and Integrity    Intervention: Optimize Skin Protection     05/16/19 0639   Prevent Additional Skin Injury   Head of Bed (HOB) HOB elevated   Pressure Reduction Techniques frequent weight shift encouraged;weight shift assistance provided   Monitor and Manage Hypervolemia   Skin Protection tubing/devices free from skin contact         Problem: Functional Ability Impaired COPD (Chronic Obstructive Pulmonary Disease)  Goal: Optimal Level of Functional [place]    Intervention: Optimize Functional Ability     05/16/19 0639   Identify and Manage Contributors to Fall Injury Risk   Self-Care Promotion independence encouraged;BADL personal objects within reach   Promote Airway Secretion Clearance   Activity Management activity clustered for rest period;activity adjusted per tolerance   Monitor/Manage Chemotherapy Gastrointestinal Effects   Environmental Support calm environment promoted;rest periods encouraged         Comments: Pt AAO x 4. Pt rested in bed throughout night b/c pt SOB w/ minimal exertion. Pt on 5L o2 throughout night. Pt c/o SOB upon awakening. Pt appears to be a mouth breather when resting. Contacted respiratory about a simple face mask, but mask requires 6L o2 & pt is on 5. Abx administered per MD order. Pt denies any pain. Constipation relieved w/ one dose of Lactulose. Pt c/o SOB this am. Pt is coughing more & congested. Pt w/out any questions. Instructed to call w/ any needs.

## 2019-05-16 NOTE — SUBJECTIVE & OBJECTIVE
Interval History: anxious overnight. Endorsing SOB and states she cannot feel the additional O2. Moderately despondent. Will arrange for spiritual care. Likely a significant component of anxiety exacerbating the SOB bc sats are appropriate for COPD on the O2 she is on currently. Will temporarily increase O2 to 5L for pt peace of mind. Add affrin, mucinex. Treat for COPD exacerbation. Add prednisone 40 qd x5 days. GPR on blood culture. Re culture. Continue vanc zosyn while speciation/suceptibilities pending and while respiratory culture pending. SOB later improved with benzo and above mentioned therapies.     Review of Systems   Constitutional: Negative for chills, fatigue and fever.   Eyes: Negative for visual disturbance.   Respiratory: Positive for cough, chest tightness, shortness of breath and wheezing.    Cardiovascular: Negative for chest pain and leg swelling.   Genitourinary: Negative for dysuria.   Musculoskeletal: Negative for arthralgias and back pain.   Neurological: Negative for weakness and headaches.   Hematological: Negative for adenopathy.   Psychiatric/Behavioral: Positive for dysphoric mood and sleep disturbance. Negative for agitation.     Objective:     Vital Signs (Most Recent):  Temp: 96.6 °F (35.9 °C) (05/16/19 0732)  Pulse: 94 (05/16/19 0946)  Resp: 20 (05/16/19 0946)  BP: 127/60 (05/16/19 0732)  SpO2: (!) 92 % (05/16/19 0946) Vital Signs (24h Range):  Temp:  [96.6 °F (35.9 °C)-98 °F (36.7 °C)] 96.6 °F (35.9 °C)  Pulse:  [70-94] 94  Resp:  [14-20] 20  SpO2:  [92 %-96 %] 92 %  BP: (118-140)/(56-76) 127/60     Weight: 40.8 kg (90 lb)  Body mass index is 14.99 kg/m².    Intake/Output Summary (Last 24 hours) at 5/16/2019 1143  Last data filed at 5/16/2019 0600  Gross per 24 hour   Intake 1450 ml   Output --   Net 1450 ml      Physical Exam   Constitutional: She is oriented to person, place, and time. Vital signs are normal. She appears cachectic. She is active and cooperative. She is easily  aroused.  Non-toxic appearance. She appears distressed. Nasal cannula in place.   HENT:   Head: Normocephalic and atraumatic.   Eyes: EOM are normal.   Cardiovascular: Normal rate.   Pulmonary/Chest: She has wheezes in the right lower field.   Clear to auscultation on L, wheezes and crackles on R lower lung fields    Abdominal: Soft. Bowel sounds are normal. She exhibits no distension.   Musculoskeletal: She exhibits no edema or tenderness.   Neurological: She is alert, oriented to person, place, and time and easily aroused.   Skin: Skin is warm and dry. She is not diaphoretic.   Nursing note and vitals reviewed.      Significant Labs: All pertinent labs within the past 24 hours have been reviewed.    Significant Imaging: I have reviewed all pertinent imaging results/findings within the past 24 hours.

## 2019-05-16 NOTE — PT/OT/SLP EVAL
Occupational Therapy   Evaluation    Name: Latasha Perez  MRN: [id number]  Admitting Diagnosis:  Acute on chronic respiratory failure with hypoxia and hypercapnia      Recommendations:     Discharge Recommendations: home health OT  Discharge Equipment Recommendations:  none  Barriers to discharge:  None    Assessment:     Latasha Perez is a 73 y.o. female with a medical diagnosis of Acute on chronic respiratory failure with hypoxia and hypercapnia.  She presents with shortness of breath and decreased ability to perform ADLs. Patient had trouble speaking with SOB. Performance deficits affecting function: weakness, impaired self care skills, impaired balance, impaired endurance, impaired functional mobilty, gait instability.      Rehab Prognosis: Good; patient would benefit from acute skilled OT services to address these deficits and reach maximum level of function.       Plan:     Patient to be seen 3 x/week to address the above listed problems via self-care/home management, therapeutic activities, therapeutic exercises  · Plan of Care Expires:    · Plan of Care Reviewed with: patient    Subjective     Chief Complaint: SOB  Patient/Family Comments/goals: to go home    Occupational Profile:  Living Environment: Pt lives in a H that contains one step that has something available to hold on to on the right. She has a tub/shower combo.  Previous level of function: Prior to initial hospital admit in April, patient was (I) in all ADLs, driving, but did not have a job. Pt has been hospitalized and in SNF since April 11th.   Equipment Used at Home:  walker, rolling, wheelchair, bedside commode, bath bench  Assistance upon Discharge: Neighbor is available to help at home.    Pain/Comfort:  Pain Rating 1: 0/10    Patients cultural, spiritual, Sabianist conflicts given the current situation:      Objective:     Communicated with: RN prior to session.  Patient found supine with peripheral IV, oxygen upon OT entry to  room.    General Precautions: Standard, fall   Orthopedic Precautions:    Braces:       Occupational Performance:    Bed Mobility:    · Patient completed Rolling/Turning to Left with  supervision  · Patient completed Scooting/Bridging with supervision  · Patient completed Supine to Sit with supervision    Functional Mobility/Transfers:  · Patient completed Sit <> Stand Transfer with contact guard assistance  with  no assistive device and hand-held assist   · Patient completed Bed <> Chair Transfer using Step Transfer technique with contact guard assistance with no assistive device and hand-held assist  · Functional Mobility: Pt walked ~3 ft CGA with no AD.    Activities of Daily Living:  · Upper Body Dressing: supervision to don gown.  · Lower Body Dressing: supervision to don and doff socks.      Physical Exam:  Upper Extremity Range of Motion:  -       Right Upper Extremity: WFL  -       Left Upper Extremity: WFL  Upper Extremity Strength:    -       Right Upper Extremity: WFL  -       Left Upper Extremity: WFL    AMPAC 6 Click ADL:  AMPAC Total Score: 20    Treatment & Education:  UE ROM/MMT  addressed POC and D/C plans  educated on safety when doing transfers and ADLs  educated the importance of OOB activity to promote strength, endurance, and breathing.    Education:    Patient left up in chair with call button in reach    GOALS:   Multidisciplinary Problems     Occupational Therapy Goals        Problem: Occupational Therapy Goal    Goal Priority Disciplines Outcome Interventions   Occupational Therapy Goal     OT, PT/OT     Description:  Goals to be met by: 5/23/19     Patient will increase functional independence with ADLs by performing:    Grooming while standing with Supervision.  Toileting from toilet with Supervision for hygiene and clothing management.   Toilet transfer to toilet with Stand-by Assistance.  Upper extremity exercise program x15 reps per handout, with assistance as needed.                       History:     Past Medical History:   Diagnosis Date    Arthritis     Carotid disease, bilateral     Cataract     Chronic hyponatremia     COPD (chronic obstructive pulmonary disease)     HLD (hyperlipidemia)     HTN (hypertension)          Past Surgical History:   Procedure Laterality Date    APPENDECTOMY      Cardioversion or Defibrillation  4/4/2019    Performed by [facility]fred Fig[facility]eroa [facility] at [facility] CATH LAB    CATARACT EXTRACTION W/  INTRAOCULAR LENS IMPLANT Right 12/06/2018    Dr. Escobar    CATARACT EXTRACTION W/  INTRAOCULAR LENS IMPLANT Left 12/20/2018    DR. sEcobar    CERVICAL SPINE SURGERY      DILATION AND CURETTAGE OF UTERUS      x 2    EYE SURGERY      cataract Rt    FRACTURE SURGERY      Lt leg fracture with hardware    INSERTION, IOL PROSTHESIS Left 12/20/2018    Performed by Broderick [facility]obar [facility] at [facility] OR    INSERTION, IOL PROSTHESIS Right 12/6/2018    Performed by [facility]obar [facility] at [facility] OR    metatarsal repair      OPEN REDUCTION INTERNAL FIXATION-TIBIAL PLATEAU Left 6/27/2014    Performed by Isak Pereira MD at [facility] OR    PHACOEMULSIFICATION, CATARACT Left 12/20/2018    Performed by Bro[facility]ick Escobar [facility] at [facility] OR    PHACOEMULSIFICATION, CATARACT Right 12/6/2018    Performed by Bro[facility]ick Escobar [facility] at [facility] OR    SHOULDER ARTHROSCOPY      Transesophageal echo (JOHNNA) intra-procedure log documentation N/A 4/4/2019    Performed by Manfred Figueroa MD at [facility] CATH LAB       Time Tracking:     OT Date of Treatment: 05/16/19  OT Start Time: 0955  OT Stop Time: 1020  OT Total Time (min): 25 min    Billable Minutes:Evaluation 25    BLAYNE Fam  5/16/2019

## 2019-05-16 NOTE — CONSULTS
Pt says she is glad to be feeling a bit better but admits this has been a scary and hard time for her. Pt's bonnie is important to her and she appreciated prayer, as well as the offer of Rastafari communion. Spiritual care will continue to follow. Please contact again as needed though.

## 2019-05-16 NOTE — MEDICAL/APP STUDENT
[facility] Hospital Medicine  Progress Note    Patient Name: Latasha Perez  MRN: [id number]  Patient Class: IP- Inpatient   Admission Date: 5/14/2019  Length of Stay: 1 days  Attending Physician: Brittany Johnson MD  Primary Care Provider: Pollo Oneal MD    [facility] Medicine Team: [facility] MED 2 Tai Breen    Subjective:     Principal Problem:Acute on chronic respiratory failure with hypoxia and hypercapnia    HPI: Ms. Latasha Perez is a 73 year old woman with a history of COPD, pseudomonal pneumonia and bacteremia who presents to the hospital from a skilled nursing facility with SOB. Yesterday the patient became short of breath and hypoxic in the 60s on room air. At home she is normally on 3-4 L of O2. She reports having some trouble swallowing and occasionally having indigestion. She has a chronic cough. 5 days ago she presented to the ED with hypoxic respiratory failure due to failure of the oxygen delivery system at her facility. It resolved with supplemental O2 provided via nasal cannula. Since that time she reports feeling warm overnight on a number of occasions. In April 2019 she was admitted for COPD exacerbation and pseudomonal pneumonia with bacteremia. Her hospital course was complicated by atrial fibrillation with RVR. Other pertinent history includes hypertension, hyperlipidemia and tobacco abuse (quit since 7 months ago).    Hospital Course:     5/14. Arrived hypoxic in the 60s on CPAP/BiPAP. VBG was 7.35/59. CXR concerning for pneumonia or aspiration. BNP at 122. She was given cefepime and lasix by the ER.    5/15. Duo-nebulizer treatment started. Vancomycin and Piperacillin/Tazobactam therapy continued for HAP vs aspiration pneumonia. US of right left for DVT performed. No sign of DVT. Positive for popliteal cyst.    Interval History: Ms. Perez has been short of breath overnight, starting since 2 am. Her O2 was increased to 5 L on nasal cannula with O2  "saturation at 92-93%. She reports congestion and stated that phenylephrine HCL nasal drops helped alleviated her symptoms. The breathing treatment 1 day ago alleviated her SOB slightly. She reports some productive cough. Her appetite is poor and she was unable to eat her breakfast due to the SOB. She is very anxious, and states "I think this is it". No suicidal ideation. No fever, chills or chest pain.    Review of Systems  Objective:     Vital Signs (Most Recent):  Temp: 96.6 °F (35.9 °C) (05/16/19 0732)  Pulse: 71 (05/16/19 0732)  Resp: 20 (05/16/19 0732)  BP: 127/60 (05/16/19 0732)  SpO2: (!) 92 % (05/16/19 0732) Vital Signs (24h Range):  Temp:  [96.6 °F (35.9 °C)-98.2 °F (36.8 °C)] 96.6 °F (35.9 °C)  Pulse:  [70-82] 71  Resp:  [14-20] 20  SpO2:  [91 %-96 %] 92 %  BP: (118-140)/(56-76) 127/60     Weight: 40.8 kg (90 lb)  Body mass index is 14.99 kg/m².    Intake/Output Summary (Last 24 hours) at 5/16/2019 0835  Last data filed at 5/16/2019 0600  Gross per 24 hour   Intake 1450 ml   Output --   Net 1450 ml      Physical Exam   Constitutional: She is oriented to person, place, and time. She appears cachectic. She is cooperative. Nasal cannula and face mask in place.   HENT:   Head: Normocephalic and atraumatic.   Mouth/Throat: Mucous membranes are dry.   Cardiovascular: Normal rate, regular rhythm, S1 normal, S2 normal and normal pulses. Exam reveals no gallop, no distant heart sounds and no friction rub.   No murmur heard.  Pulmonary/Chest: She has wheezes in the right lower field and the left lower field.   Breathes through her mouth only. Noticeably increased pauses to breathe while speaking.   Neurological: She is alert and oriented to person, place, and time.   Skin: Capillary refill takes 2 to 3 seconds. She is not diaphoretic. There is pallor.   Psychiatric: Her speech is normal and behavior is normal. Her mood appears anxious. Cognition and memory are normal.       Significant Labs:   ABGs:   Recent Labs "   Lab 05/15/19  0204   PH 7.455*   PCO2 49.1*   HCO3 34.5*   POCSATURATED 95   BE 11     Blood Culture:   Recent Labs   Lab 05/14/19 2112 05/14/19  2158 05/15/19  1443   LABBLOO No Growth to date  No Growth to date Gram stain aer bottle: Gram positive rods   Results called to and read back by:Debbie Powell RN 05/15/2019  12:49 No Growth to date  No Growth to date     CBC:   Recent Labs   Lab 05/14/19  2112 05/15/19  0346 05/16/19  0524   WBC 14.71* 13.41* 12.17   HGB 9.1* 8.4* 7.4*   HCT 28.1* 26.1* 23.1*    282 277     CMP:   Recent Labs   Lab 05/14/19  2112 05/15/19  0346 05/16/19  0524    143 141   K 3.5 3.0* 4.0    99 102   CO2 28 32* 30*   * 114* 142*   BUN 19 18 20   CREATININE 0.7 0.6 0.6   CALCIUM 8.9 8.5* 8.0*   PROT 6.4  --   --    ALBUMIN 2.3*  --   --    BILITOT 0.3  --   --    ALKPHOS 96  --   --    AST 28  --   --    ALT 37  --   --    ANIONGAP 11 12 9   EGFRNONAA >60.0 >60.0 >60.0     Magnesium:   Recent Labs   Lab 05/15/19  0346 05/16/19  0524   MG 1.2* 1.5*       Significant Imaging: I have reviewed all pertinent imaging results/findings within the past 24 hours.        Us Lower Extremity Veins Right    Result Date: 5/15/2019  EXAMINATION: US LOWER EXTREMITY VEINS RIGHT CLINICAL HISTORY: RLE swelling; TECHNIQUE: Duplex and color flow Doppler evaluation and graded compression of the right lower extremity veins was performed. COMPARISON: None FINDINGS: Right thigh veins: The common femoral, femoral, popliteal, upper greater saphenous, and deep femoral veins are patent and free of thrombus. The veins are normally compressible and have normal phasic flow and augmentation response. Right calf veins: The visualized calf veins are patent. Contralateral CFV: The contralateral (left) common femoral vein is patent and free of thrombus. Miscellaneous: There is a complex right popliteal fossa cyst measuring 3.1 x 0.7 x 2.4 cm.     No evidence of deep venous thrombosis in the right  lower extremity. Complex right popliteal fossa cyst.     Electronically signed by resident: Shaheed Potter Date: 05/15/2019 Time: 10:35   Electronically signed by: Clark Loco MD Date: 05/15/2019 Time: 10:43      Assessment/Plan:     Ms. Latasha Perez is a 73 year old woman with a history of COPD and pseudomonal pneumonia with acute on chronic respiratory failure due to pneumonia.     Acute on chronic respiratory failure with hypoxia and hypercapnia  -Supplemental O2 via nasal cannula, goal of 89-91% O2. However, patient is feeling short of breath. Increased to 5 L flow with possible nonrebreather mask if NC are not sufficiently improving the patient's symptoms.  -Continue IV antibiotics for coverage of HAP and aspiration pneumonia, piperacillin/tazobactam and vancomycin, consider adding azithromycin for coverage of atypicals  -PT/OT unable to mobilize today due to SOB. Resume tomorrow.  -Duo-nebulizer breathing treatment q4 hours.  -Guaifenesin 600 mg and phenylephrine HCL nasal spray to relieve congestion and improve flow through NC  -Prednisone 40 mg daily for concurrent COPD exacerbation  -Patient's anxiety and SOB improved with the above plan by the afternoon     Acute pulmonary edema  -Same as above    Anxiety  -Alprazolam 0.125 mg PRN, increase dose if necessary  -Consult with spiritual care    Oral thrush  -Nystatin 4 times daily with meals and nightly    Severe protein-calore malnutrition  -Consult dietician  -Encourage eating and proper nutrition  -Patient reports difficulty swallowing, consider esophageal function studies     Paroxysmal atrial fibrillation  -Continue home regimen     Essential hypertension  -Continue losartan and diltiazem     Hyperlipidemia  -Continue pravastatin    Active Diagnoses:    Diagnosis Date Noted POA    PRINCIPAL PROBLEM:  Acute on chronic respiratory failure with hypoxia and hypercapnia [J96.21, J96.22] 04/02/2019 Yes    Acute pulmonary edema [J81.0] 05/15/2019 Yes     Severe protein-calorie malnutrition [E43] 05/15/2019 Unknown    Paroxysmal atrial fibrillation [I48.0] 04/26/2019 Yes    Essential hypertension [I10] 04/13/2017 Yes    HLD (hyperlipidemia) [E78.5]  Yes      Problems Resolved During this Admission:     VTE Risk Mitigation (From admission, onward)        Ordered     apixaban tablet 5 mg  2 times daily      05/15/19 0228     IP VTE HIGH RISK PATIENT  Once      05/15/19 0222             Tai Breen  Department of Hospital Medicine   [facility]

## 2019-05-16 NOTE — PLAN OF CARE
Problem: Occupational Therapy Goal  Goal: Occupational Therapy Goal  Goals to be met by: 5/23/19     Patient will increase functional independence with ADLs by performing:    Grooming while standing with Supervision.  Toileting from toilet with Supervision for hygiene and clothing management.   Toilet transfer to toilet with Stand-by Assistance.  Upper extremity exercise program x15 reps per handout, with assistance as needed.    Con't POC.    BLAYNE Fam

## 2019-05-17 LAB
ANION GAP SERPL CALC-SCNC: 6 MMOL/L (ref 8–16)
BACTERIA BLD CULT: NORMAL
BUN SERPL-MCNC: 23 MG/DL (ref 8–23)
CALCIUM SERPL-MCNC: 8.2 MG/DL (ref 8.7–10.5)
CHLORIDE SERPL-SCNC: 104 MMOL/L (ref 95–110)
CO2 SERPL-SCNC: 32 MMOL/L (ref 23–29)
CREAT SERPL-MCNC: 0.6 MG/DL (ref 0.5–1.4)
ERYTHROCYTE [DISTWIDTH] IN BLOOD BY AUTOMATED COUNT: 15.4 % (ref 11.5–14.5)
EST. GFR  (AFRICAN AMERICAN): >60 ML/MIN/1.73 M^2
EST. GFR  (NON AFRICAN AMERICAN): >60 ML/MIN/1.73 M^2
GLUCOSE SERPL-MCNC: 115 MG/DL (ref 70–110)
HCT VFR BLD AUTO: 23.6 % (ref 37–48.5)
HGB BLD-MCNC: 7.6 G/DL (ref 12–16)
MAGNESIUM SERPL-MCNC: 2.2 MG/DL (ref 1.6–2.6)
MCH RBC QN AUTO: 30.6 PG (ref 27–31)
MCHC RBC AUTO-ENTMCNC: 32.2 G/DL (ref 32–36)
MCV RBC AUTO: 95 FL (ref 82–98)
PHOSPHATE SERPL-MCNC: 2.5 MG/DL (ref 2.7–4.5)
PLATELET # BLD AUTO: 315 K/UL (ref 150–350)
PMV BLD AUTO: 9.5 FL (ref 9.2–12.9)
POTASSIUM SERPL-SCNC: 4.4 MMOL/L (ref 3.5–5.1)
RBC # BLD AUTO: 2.48 M/UL (ref 4–5.4)
SODIUM SERPL-SCNC: 142 MMOL/L (ref 136–145)
VANCOMYCIN TROUGH SERPL-MCNC: 7.2 UG/ML (ref 10–22)
WBC # BLD AUTO: 8.92 K/UL (ref 3.9–12.7)

## 2019-05-17 PROCEDURE — 25000242 PHARM REV CODE 250 ALT 637 W/ HCPCS: Mod: HCNC | Performed by: HOSPITALIST

## 2019-05-17 PROCEDURE — 80202 ASSAY OF VANCOMYCIN: CPT | Mod: HCNC

## 2019-05-17 PROCEDURE — 94667 MNPJ CHEST WALL 1ST: CPT | Mod: HCNC

## 2019-05-17 PROCEDURE — 99232 PR SUBSEQUENT HOSPITAL CARE,LEVL II: ICD-10-PCS | Mod: HCNC,GC,, | Performed by: INTERNAL MEDICINE

## 2019-05-17 PROCEDURE — 25000003 PHARM REV CODE 250: Mod: HCNC | Performed by: HOSPITALIST

## 2019-05-17 PROCEDURE — 84100 ASSAY OF PHOSPHORUS: CPT | Mod: HCNC

## 2019-05-17 PROCEDURE — 99223 PR INITIAL HOSPITAL CARE,LEVL III: ICD-10-PCS | Mod: HCNC,GC,, | Performed by: INTERNAL MEDICINE

## 2019-05-17 PROCEDURE — 93005 ELECTROCARDIOGRAM TRACING: CPT | Mod: HCNC

## 2019-05-17 PROCEDURE — 94761 N-INVAS EAR/PLS OXIMETRY MLT: CPT | Mod: HCNC

## 2019-05-17 PROCEDURE — 93010 ELECTROCARDIOGRAM REPORT: CPT | Mod: HCNC,,, | Performed by: INTERNAL MEDICINE

## 2019-05-17 PROCEDURE — 93010 EKG 12-LEAD: ICD-10-PCS | Mod: HCNC,,, | Performed by: INTERNAL MEDICINE

## 2019-05-17 PROCEDURE — 36415 COLL VENOUS BLD VENIPUNCTURE: CPT | Mod: HCNC

## 2019-05-17 PROCEDURE — 99232 SBSQ HOSP IP/OBS MODERATE 35: CPT | Mod: HCNC,GC,, | Performed by: INTERNAL MEDICINE

## 2019-05-17 PROCEDURE — 25000003 PHARM REV CODE 250: Mod: HCNC | Performed by: STUDENT IN AN ORGANIZED HEALTH CARE EDUCATION/TRAINING PROGRAM

## 2019-05-17 PROCEDURE — 99223 1ST HOSP IP/OBS HIGH 75: CPT | Mod: HCNC,GC,, | Performed by: INTERNAL MEDICINE

## 2019-05-17 PROCEDURE — 63600175 PHARM REV CODE 636 W HCPCS: Mod: HCNC | Performed by: HOSPITALIST

## 2019-05-17 PROCEDURE — 63600175 PHARM REV CODE 636 W HCPCS: Mod: HCNC | Performed by: STUDENT IN AN ORGANIZED HEALTH CARE EDUCATION/TRAINING PROGRAM

## 2019-05-17 PROCEDURE — 25000003 PHARM REV CODE 250: Mod: HCNC | Performed by: INTERNAL MEDICINE

## 2019-05-17 PROCEDURE — 20600001 HC STEP DOWN PRIVATE ROOM: Mod: HCNC

## 2019-05-17 PROCEDURE — 83735 ASSAY OF MAGNESIUM: CPT | Mod: HCNC

## 2019-05-17 PROCEDURE — 25000242 PHARM REV CODE 250 ALT 637 W/ HCPCS: Mod: HCNC | Performed by: STUDENT IN AN ORGANIZED HEALTH CARE EDUCATION/TRAINING PROGRAM

## 2019-05-17 PROCEDURE — 94799 UNLISTED PULMONARY SVC/PX: CPT | Mod: HCNC

## 2019-05-17 PROCEDURE — 85027 COMPLETE CBC AUTOMATED: CPT | Mod: HCNC

## 2019-05-17 PROCEDURE — 94668 MNPJ CHEST WALL SBSQ: CPT | Mod: HCNC

## 2019-05-17 PROCEDURE — 27000221 HC OXYGEN, UP TO 24 HOURS: Mod: HCNC

## 2019-05-17 PROCEDURE — 99900035 HC TECH TIME PER 15 MIN (STAT): Mod: HCNC

## 2019-05-17 PROCEDURE — 80048 BASIC METABOLIC PNL TOTAL CA: CPT | Mod: HCNC

## 2019-05-17 PROCEDURE — 94640 AIRWAY INHALATION TREATMENT: CPT | Mod: HCNC

## 2019-05-17 RX ORDER — ALPRAZOLAM 0.25 MG/1
0.25 TABLET ORAL 3 TIMES DAILY PRN
Status: DISCONTINUED | OUTPATIENT
Start: 2019-05-17 | End: 2019-05-18

## 2019-05-17 RX ORDER — FUROSEMIDE 10 MG/ML
40 INJECTION INTRAMUSCULAR; INTRAVENOUS 2 TIMES DAILY
Status: DISCONTINUED | OUTPATIENT
Start: 2019-05-17 | End: 2019-05-17

## 2019-05-17 RX ORDER — DIPHENHYDRAMINE HCL 25 MG
25 CAPSULE ORAL EVERY 6 HOURS PRN
Status: DISCONTINUED | OUTPATIENT
Start: 2019-05-17 | End: 2019-05-19 | Stop reason: HOSPADM

## 2019-05-17 RX ORDER — FUROSEMIDE 40 MG/1
40 TABLET ORAL DAILY
Status: DISCONTINUED | OUTPATIENT
Start: 2019-05-18 | End: 2019-05-19 | Stop reason: HOSPADM

## 2019-05-17 RX ORDER — LEVOFLOXACIN 750 MG/1
750 TABLET ORAL DAILY
Status: DISCONTINUED | OUTPATIENT
Start: 2019-05-17 | End: 2019-05-19 | Stop reason: HOSPADM

## 2019-05-17 RX ORDER — AMIODARONE HYDROCHLORIDE 200 MG/1
200 TABLET ORAL DAILY
Status: DISCONTINUED | OUTPATIENT
Start: 2019-05-18 | End: 2019-05-19 | Stop reason: HOSPADM

## 2019-05-17 RX ORDER — DIPHENHYDRAMINE HCL 25 MG
25 CAPSULE ORAL EVERY 24 HOURS
Status: DISCONTINUED | OUTPATIENT
Start: 2019-05-17 | End: 2019-05-17

## 2019-05-17 RX ADMIN — NYSTATIN 500000 UNITS: 500000 SUSPENSION ORAL at 08:05

## 2019-05-17 RX ADMIN — AMIODARONE HYDROCHLORIDE 200 MG: 200 TABLET ORAL at 09:05

## 2019-05-17 RX ADMIN — PREDNISONE 40 MG: 20 TABLET ORAL at 09:05

## 2019-05-17 RX ADMIN — GUAIFENESIN 600 MG: 600 TABLET, EXTENDED RELEASE ORAL at 09:05

## 2019-05-17 RX ADMIN — PIPERACILLIN AND TAZOBACTAM 4.5 G: 4; .5 INJECTION, POWDER, LYOPHILIZED, FOR SOLUTION INTRAVENOUS; PARENTERAL at 04:05

## 2019-05-17 RX ADMIN — SALINE NASAL SPRAY 1 SPRAY: 1.5 SOLUTION NASAL at 08:05

## 2019-05-17 RX ADMIN — LACTULOSE 15 G: 20 SOLUTION ORAL at 09:05

## 2019-05-17 RX ADMIN — APIXABAN 5 MG: 5 TABLET, FILM COATED ORAL at 08:05

## 2019-05-17 RX ADMIN — SODIUM CHLORIDE 500 ML: 0.9 INJECTION, SOLUTION INTRAVENOUS at 09:05

## 2019-05-17 RX ADMIN — IPRATROPIUM BROMIDE AND ALBUTEROL SULFATE 3 ML: .5; 3 SOLUTION RESPIRATORY (INHALATION) at 07:05

## 2019-05-17 RX ADMIN — SALINE NASAL SPRAY 1 SPRAY: 1.5 SOLUTION NASAL at 11:05

## 2019-05-17 RX ADMIN — ALPRAZOLAM 0.12 MG: 0.25 TABLET ORAL at 02:05

## 2019-05-17 RX ADMIN — GUAIFENESIN 600 MG: 600 TABLET, EXTENDED RELEASE ORAL at 08:05

## 2019-05-17 RX ADMIN — NYSTATIN 500000 UNITS: 500000 SUSPENSION ORAL at 12:05

## 2019-05-17 RX ADMIN — APIXABAN 5 MG: 5 TABLET, FILM COATED ORAL at 09:05

## 2019-05-17 RX ADMIN — LEVOFLOXACIN 750 MG: 750 TABLET, FILM COATED ORAL at 10:05

## 2019-05-17 RX ADMIN — FLUTICASONE FUROATE AND VILANTEROL TRIFENATATE 1 PUFF: 100; 25 POWDER RESPIRATORY (INHALATION) at 09:05

## 2019-05-17 RX ADMIN — PRAVASTATIN SODIUM 20 MG: 20 TABLET ORAL at 08:05

## 2019-05-17 RX ADMIN — IPRATROPIUM BROMIDE AND ALBUTEROL SULFATE 3 ML: .5; 3 SOLUTION RESPIRATORY (INHALATION) at 04:05

## 2019-05-17 RX ADMIN — IPRATROPIUM BROMIDE AND ALBUTEROL SULFATE 3 ML: .5; 3 SOLUTION RESPIRATORY (INHALATION) at 12:05

## 2019-05-17 RX ADMIN — ALPRAZOLAM 0.25 MG: 0.25 TABLET ORAL at 11:05

## 2019-05-17 RX ADMIN — TIOTROPIUM BROMIDE 18 MCG: 18 CAPSULE ORAL; RESPIRATORY (INHALATION) at 05:05

## 2019-05-17 RX ADMIN — NYSTATIN 500000 UNITS: 500000 SUSPENSION ORAL at 05:05

## 2019-05-17 NOTE — PLAN OF CARE
Problem: Fall Injury Risk  Goal: Absence of Fall and Fall-Related Injury  Outcome: Ongoing (interventions implemented as appropriate)  Patient remains free from falls.     Problem: Adult Inpatient Plan of Care  Goal: Plan of Care Review  Outcome: Ongoing (interventions implemented as appropriate)  Patient reports shortness of breath and increased O2 demand, lung sounds clear to auscultation and patient O2 >92%. MD made aware and rounded on patient, recommended PRN Xanax with significant results and resolution of reported shortness of breath. No other acute events or changes overnight. Continues to receive IV abx and vancomycin trough was drawn this AM.

## 2019-05-17 NOTE — HPI
Ms Perez is a 74 yo woman who is a 20 pack year smoker, with mod-severe COPD (3-4 L home O2), h/o AFib RVR (s/p cardioversion 4/5 but returned to Afib, on Amio, Eliquis), h/o pseudomonal pneumonia and bacteremia who presents with recurrent SOB.     She was most recently admitted for respiratory failure 1 week ago, thought to be due to broken oxygen delivery system at her facility. On this admission, patient was found to have new bilateral effusions, in addition to infiltrates seen on prior imaging.     She was recently admitted April 2019 for acute on chronic respiratory failure 2/2 COPD exacerbation and pseudomonal pneumonia with bacteremia (1/4 blood cultures). She was discharged on cefepime 2g q8 until 4/18/19.  Hospital course at that time was complicated by Afib RVR, s/p cardioversion, but returned to AFib, discharged also on amiodarone, diltiazem, and Eliquis. During this admission, CT did show left lower lobe and Right upper lobe infiltrates.     She presented to the ED on 5/14 with recurrent SOB. Found to be hypoxic respiratory failure without hypercapneia. CXR revealed opacity in R L apex and bibiasilar airspace disease concerning of PNA or aspiration.  BNP was 122.  She was given a dose of cefepime and lasix by the ER.        Last ECHO EF 55% with concentric remodeling, PAP 50

## 2019-05-17 NOTE — ASSESSMENT & PLAN NOTE
Ms Perez is a 72 yo woman with moderate severe COPD and pulmonary HTN (Type 3), who presents from SNF with recurrent hypoxic respiratory failure.     DDx:   Pneumonia, Pleural effusion, COPD exacerbation, CHF exacerbation    Assessment:  Imaging is consistent with improvement of left lower lobe and right upper lobe infiltrates when compared to prior CT imaging in April. However, she does have new bilateral pleural effusions.     Plan:  - COPD exacerbation seems less likely.  Likely overloaded with elevated BNP >100. Recommend diuresis for optimization of respiratory status.   - Recommend to avoid benzodiazepines and medications that can cause respiratory depression if possible.   - Continue pulmonary toilet, mucolytics, etc.   - Recommend palliative care consult  - Continue LABA/LAMA/ICS  - Agree with completion of antibiotic therapy, however suspect she needs more aggressive diuresis for volume overload.

## 2019-05-17 NOTE — PT/OT/SLP PROGRESS
"     Physical Therapy  Pt Not Seen    Patient Name:  Latasha Perez   MRN:  [id number]    Patient not seen today secondary to  Other (Comment)(Pt found sup in bed attempting to call niece. Pt states "I received some bad news today.  I think I'm dying"  PTA assisted pt with placing phone call and left her speaking with niece). Will follow-up on next scheduled visit.    Esha Beard, PTA  5/17/2019    "

## 2019-05-17 NOTE — NURSING
Pt c/o dizziness and being lightheaded. BP 90/52. MD at bedside, saline bolus ordered. O2 sat 95%.

## 2019-05-17 NOTE — MEDICAL/APP STUDENT
[facility] Hospital Medicine  Progress Note    Patient Name: Latasha Perez  MRN: [id number]  Patient Class: IP- Inpatient   Admission Date: 5/14/2019  Length of Stay: 2 days  Attending Physician: Brittany Johnson MD  Primary Care Provider: Pollo Oneal MD    [facility] Medicine Team: [facility] HOSP MED 2 Tai Breen    Subjective:     Principal Problem:Acute on chronic respiratory failure with hypoxia and hypercapnia    HPI: Ms. Latasha Perez is a 73 year old woman with a history of COPD, pseudomonal pneumonia and bacteremia who presents to the hospital from a skilled nursing facility with SOB. Yesterday the patient became short of breath and hypoxic in the 60s on room air. At home she is normally on 3-4 L of O2. She reports having some trouble swallowing and occasionally having indigestion. She has a chronic cough. 5 days ago she presented to the ED with hypoxic respiratory failure due to failure of the oxygen delivery system at her facility. It resolved with supplemental O2 provided via nasal cannula. Since that time she reports feeling warm overnight on a number of occasions. In April 2019 she was admitted for COPD exacerbation and pseudomonal pneumonia with bacteremia. Her hospital course was complicated by atrial fibrillation with RVR. Other pertinent history includes hypertension, hyperlipidemia and tobacco abuse (quit since 7 months ago).    Hospital Course: 5/14. Arrived hypoxic in the 60s on CPAP/BiPAP. VBG was 7.35/59. CXR concerning for pneumonia or aspiration. BNP at 122. She was given cefepime and lasix by the ER.     5/15. Duo-nebulizer treatment started. Vancomycin and Piperacillin/Tazobactam therapy continued for HAP vs aspiration pneumonia. US of right left for DVT performed. No sign of DVT. Positive for popliteal cyst.    5/16. Very anxious today. PRN alprazolam started with good relief of anxiety. No PT/OT today as patient was too dyspneic. Blood culture positive for  gram positive rods. Spiritual care consulted. Antibiotics and breathing treatments continued.    Interval History: No acute events overnight. Ms. Lovell slept very well after taking alprazolam. However, when she woke up she was very short of breath and anxious. She is unable to eat her breakfast due to the difficulty breathing and anxiety. Breathing treatment was started while in the room, with O2 saturation jumping from 89% to 92%. She has a productive cough that she is unable to clear adequately. She denies any congestion after taking her phenylephrine HCl this morning. No chest pain or fever. She does feel better overall than yesterday, especially with regards to the anxiety, however she still feels the same level of dyspnea.    Review of Systems   Constitutional: Positive for fatigue. Negative for chills and fever.   HENT: Negative for congestion.    Respiratory: Positive for cough and shortness of breath.    Cardiovascular: Negative for chest pain, palpitations and leg swelling.   Gastrointestinal: Negative for abdominal pain.   Endocrine: Positive for cold intolerance.   Psychiatric/Behavioral: Negative for confusion and sleep disturbance.     Objective:     Vital Signs (Most Recent):  Temp: 98.5 °F (36.9 °C) (05/17/19 0402)  Pulse: 66 (05/17/19 0756)  Resp: 20 (05/17/19 0756)  BP: 117/62 (05/17/19 0402)  SpO2: (!) 89 % (05/17/19 0402) Vital Signs (24h Range):  Temp:  [97.4 °F (36.3 °C)-98.5 °F (36.9 °C)] 98.5 °F (36.9 °C)  Pulse:  [66-94] 66  Resp:  [16-20] 20  SpO2:  [87 %-98 %] 89 %  BP: (102-136)/(54-62) 117/62     Weight: 40.8 kg (90 lb)  Body mass index is 14.99 kg/m².    Intake/Output Summary (Last 24 hours) at 5/17/2019 0809  Last data filed at 5/16/2019 1600  Gross per 24 hour   Intake 420 ml   Output 1 ml   Net 419 ml      Physical Exam   Constitutional: She appears cachectic. She is cooperative. Nasal cannula in place.   HENT:   Head: Normocephalic and atraumatic.   Mouth/Throat: Oropharynx is clear  and moist.   Neck: No JVD present.   Cardiovascular: Normal rate, regular rhythm, S1 normal, S2 normal and normal pulses. Exam reveals no gallop, no distant heart sounds and no friction rub.   No murmur heard.  Neurological: She is alert.       Significant Labs:     CBC:   Recent Labs   Lab 05/16/19 0524 05/17/19 0421   WBC 12.17 8.92   HGB 7.4* 7.6*   HCT 23.1* 23.6*    315     CMP:   Recent Labs   Lab 05/16/19 0524 05/17/19 0422    142   K 4.0 4.4    104   CO2 30* 32*   * 115*   BUN 20 23   CREATININE 0.6 0.6   CALCIUM 8.0* 8.2*   ANIONGAP 9 6*   EGFRNONAA >60.0 >60.0     Magnesium:   Recent Labs   Lab 05/16/19 0524 05/17/19 0422   MG 1.5* 2.2     Significant Imaging: I have reviewed all pertinent imaging results/findings within the past 24 hours.    Assessment/Plan:     Ms. Latasha Perez is a 73 year old woman with a history of COPD and pseudomonal pneumonia with acute on chronic respiratory failure due to pneumonia.     Acute on chronic respiratory failure with hypoxia and hypercapnia  -Supplemental O2 via nasal cannula, goal of 89-91% O2. However, patient is still feeling short of breath. O2 at 4.5 L on NC.  -Continue IV antibiotics for coverage of HAP and aspiration pneumonia, piperacillin/tazobactam and vancomycin. Vancomycin is subtherapeutic at 7.2 today.  -Resume PT/OT if patient can tolerate today.  -Duo-nebulizer breathing treatment q4 hours and as needed.  -Continue guaifenesin 600 mg and phenylephrine HCL nasal spray to relieve congestion and improve flow through NC  -Prednisone 40 mg daily for concurrent COPD exacerbation  -Start chest physiotherapy to clear lungs  -Continue incentive spirometry     Acute pulmonary edema  -Same as above     Anxiety  -Alprazolam 0.125 mg PRN  -Spiritual care following     Oral thrush  -Nystatin 4 times daily with meals and nightly     Severe protein-calore malnutrition  -Consult dietician  -Encourage eating and proper  nutrition     Paroxysmal atrial fibrillation  -Continue home regimen     Essential hypertension  -Continue losartan and diltiazem     Hyperlipidemia  -Continue pravastatin    Active Diagnoses:    Diagnosis Date Noted POA    PRINCIPAL PROBLEM:  Acute on chronic respiratory failure with hypoxia and hypercapnia [J96.21, J96.22] 04/02/2019 Yes    Acute pulmonary edema [J81.0] 05/15/2019 Yes    Severe protein-calorie malnutrition [E43] 05/15/2019 Unknown    Paroxysmal atrial fibrillation [I48.0] 04/26/2019 Yes    Essential hypertension [I10] 04/13/2017 Yes    HLD (hyperlipidemia) [E78.5]  Yes      Problems Resolved During this Admission:     VTE Risk Mitigation (From admission, onward)        Ordered     apixaban tablet 5 mg  2 times daily      05/15/19 0228     IP VTE HIGH RISK PATIENT  Once      05/15/19 0222             Tai Breen  Department of Hospital Medicine   [facility]

## 2019-05-17 NOTE — SUBJECTIVE & OBJECTIVE
Past Medical History:   Diagnosis Date    Arthritis     Carotid disease, bilateral     Cataract     Chronic hyponatremia     COPD (chronic obstructive pulmonary disease)     HLD (hyperlipidemia)     HTN (hypertension)        Past Surgical History:   Procedure Laterality Date    APPENDECTOMY      Cardioversion or Defibrillation  4/4/2019    Performed by [facility]fred Fig[facility]eroa [facility] at [facility] CATH LAB    CATARACT EXTRACTION W/  INTRAOCULAR LENS IMPLANT Right 12/06/2018    Dr. Escobar    CATARACT EXTRACTION W/  INTRAOCULAR LENS IMPLANT Left 12/20/2018    DR. Escobar    CERVICAL SPINE SURGERY      DILATION AND CURETTAGE OF UTERUS      x 2    EYE SURGERY      cataract Rt    FRACTURE SURGERY      Lt leg fracture with hardware    INSERTION, IOL PROSTHESIS Left 12/20/2018    Performed by [facility]derick Escobar [facility] at [facility]    INSERTION, IOL PROSTHESIS Right 12/6/2018    Performed by [facility]obar [facility] at [facility]    metatarsal repair      OPEN REDUCTION INTERNAL FIXATION-TIBIAL PLATEAU Left 6/27/2014    Performed by Isak Pereira MD at [facility] OR    PHACOEMULSIFICATION, CATARACT Left 12/20/2018    Performed by [facility] at [facility]    PHACOEMULSIFICATION, CATARACT Right 12/6/2018    Performed by [facility] at [facility]    SHOULDER ARTHROSCOPY      Transesophageal echo (JOHNNA) intra-procedure log documentation N/A 4/4/2019    Performed by Manfred Figueroa MD at [facility] CATH LAB       Review of patient's allergies indicates:   Allergen Reactions    Meropenem Itching and Rash     Rash started to appear around Day 3 of therapy.     TOLERATES PIP/TAZO. DOES NOT HAVE PCN ALLERGY    Biaxin [clarithromycin] Rash    Codeine Rash    Doxycycline Rash    Levaquin [levofloxacin] Rash       Family History     Problem Relation (Age of Onset)    Cancer Father    Cataracts Sister    Heart disease Mother, Maternal Grandfather    No Known Problems Brother, Maternal Aunt, Maternal  Uncle, Paternal Aunt, Paternal Uncle, Maternal Grandmother, Paternal Grandmother, Paternal Grandfather        Tobacco Use    Smoking status: Former Smoker     Packs/day: 1.00     Years: 45.00     Pack years: 45.00     Types: Cigarettes     Last attempt to quit: 2002     Years since quittin.3    Smokeless tobacco: Never Used   Substance and Sexual Activity    Alcohol use: No    Drug use: No    Sexual activity: Yes     Partners: Male         Review of Systems   Constitutional: Negative for chills, fever and unexpected weight change.   HENT: Negative for sneezing and sore throat.    Eyes: Negative for pain and visual disturbance.   Respiratory: Positive for cough and shortness of breath. Negative for wheezing.    Cardiovascular: Positive for leg swelling. Negative for chest pain.   Gastrointestinal: Negative for abdominal pain, constipation, diarrhea and nausea.   Genitourinary: Negative for dysuria, frequency and urgency.   Allergic/Immunologic: Negative for environmental allergies.   Neurological: Negative for dizziness, light-headedness, numbness and headaches.   Psychiatric/Behavioral: Negative for agitation. The patient is nervous/anxious.      Objective:     Vital Signs (Most Recent):  Temp: 98.5 °F (36.9 °C) (19 0402)  Pulse: 73 (19 1709)  Resp: 20 (19 1709)  BP: 135/69 (19 1202)  SpO2: 96 % (19 1630) Vital Signs (24h Range):  Temp:  [97.6 °F (36.4 °C)-98.5 °F (36.9 °C)] 98.5 °F (36.9 °C)  Pulse:  [66-86] 73  Resp:  [17-22] 20  SpO2:  [88 %-98 %] 96 %  BP: (111-135)/(54-69) 135/69     Weight: 40.8 kg (90 lb)  Body mass index is 14.99 kg/m².    No intake or output data in the 24 hours ending 19 1726    Physical Exam    Vents:  Oxygen Concentration (%): 36 (19 1630)    Lines/Drains/Airways     Peripheral Intravenous Line                 Peripheral IV - Single Lumen 19 2104 18 G Right Forearm 2 days         Peripheral IV - Single Lumen 19 0449 20  G Anterior;Right Upper Arm less than 1 day                Significant Labs:    CBC/Anemia Profile:  Recent Labs   Lab 05/16/19  0524 05/17/19  0421   WBC 12.17 8.92   HGB 7.4* 7.6*   HCT 23.1* 23.6*    315   MCV 96 95   RDW 15.3* 15.4*        Chemistries:  Recent Labs   Lab 05/16/19  0524 05/17/19  0422    142   K 4.0 4.4    104   CO2 30* 32*   BUN 20 23   CREATININE 0.6 0.6   CALCIUM 8.0* 8.2*   MG 1.5* 2.2   PHOS 2.7 2.5*       BMP:   Recent Labs   Lab 05/17/19  0422   *      K 4.4      CO2 32*   BUN 23   CREATININE 0.6   CALCIUM 8.2*   MG 2.2     All pertinent labs within the past 24 hours have been reviewed.    Significant Imaging:   I have reviewed and interpreted all pertinent imaging results/findings within the past 24 hours.

## 2019-05-17 NOTE — SUBJECTIVE & OBJECTIVE
Interval History: still endorses SOB and a subjective lack of O2. Requests O2 to be increased although her O2 stats ar perfectly in line with her COPD. Endorsed dizziness this am. BP 90s/40s. 500 LR bolus given. Improved to 120s/80s. Increased alprazolam to 0.25 q8. Pt states slight improvement with dyspneic sensation. Transitioned from V/Z to oral levoquin. Will add benadryl PRN for rash given reported h/o rash with levoquin. Amiodarone dose to decrease tomorrow. Has seemingly been on tapering dose since April. Will do daily ekg for amio/levoquin interaction.     Review of Systems   Constitutional: Negative for chills, diaphoresis and fever.   HENT: Positive for congestion and mouth sores.    Respiratory: Positive for apnea, cough, chest tightness, shortness of breath and wheezing.    Cardiovascular: Negative for chest pain and leg swelling.   Gastrointestinal: Negative for abdominal distention and abdominal pain.   Neurological: Positive for light-headedness.   Psychiatric/Behavioral: The patient is nervous/anxious.      Objective:     Vital Signs (Most Recent):  Temp: 98.5 °F (36.9 °C) (05/17/19 0402)  Pulse: 74 (05/17/19 0909)  Resp: (!) 22 (05/17/19 0909)  BP: (!) 129/59 (05/17/19 0812)  SpO2: (!) 94 %(rn at bedside) (05/17/19 1032) Vital Signs (24h Range):  Temp:  [97.4 °F (36.3 °C)-98.5 °F (36.9 °C)] 98.5 °F (36.9 °C)  Pulse:  [66-92] 74  Resp:  [16-22] 22  SpO2:  [87 %-98 %] 94 %  BP: (102-136)/(54-62) 129/59     Weight: 40.8 kg (90 lb)  Body mass index is 14.99 kg/m².    Intake/Output Summary (Last 24 hours) at 5/17/2019 1051  Last data filed at 5/16/2019 1600  Gross per 24 hour   Intake 420 ml   Output 1 ml   Net 419 ml      Physical Exam   Constitutional: She is oriented to person, place, and time. She appears cachectic. She is active and cooperative. She is easily aroused. Nasal cannula in place.   HENT:   Head: Normocephalic and atraumatic.   Eyes: EOM are normal.   Cardiovascular: Normal rate.    Pulmonary/Chest: She has wheezes.   Abdominal: Soft. Bowel sounds are normal. She exhibits no distension.   Musculoskeletal: She exhibits no edema or tenderness.   Neurological: She is alert, oriented to person, place, and time and easily aroused.   Skin: Skin is warm and dry. No erythema.   Psychiatric: Her mood appears anxious.       Significant Labs:   Recent Lab Results       05/17/19  0422   05/17/19  0421        Anion Gap 6       BUN, Bld 23       Calcium 8.2       Chloride 104       CO2 32       Creatinine 0.6       eGFR if  >60.0       eGFR if non  >60.0  Comment:  Calculation used to obtain the estimated glomerular filtration  rate (eGFR) is the CKD-EPI equation.          Glucose 115       Hematocrit   23.6     Hemoglobin   7.6     Magnesium 2.2       MCH   30.6     MCHC   32.2     MCV   95     MPV   9.5     Phosphorus 2.5       Platelets   315     Potassium 4.4       RBC   2.48     RDW   15.4     Sodium 142       Vancomycin-Trough   7.2     WBC   8.92           Significant Imaging: I have reviewed all pertinent imaging results/findings within the past 24 hours.

## 2019-05-17 NOTE — ASSESSMENT & PLAN NOTE
--currently rate controlled, NSR on 5/14 EKG  --continue diltiazem, eliquis  --will dose adjust amio to 200 qd po 5/18/19  --daily ekg for concurrent levoquin

## 2019-05-17 NOTE — CONSULTS
[facility]  Pulmonology  Consult Note    Patient Name: Latasha Perez  MRN: [id number]  Admission Date: 5/14/2019  Hospital Length of Stay: 2 days  Code Status: Full Code  Attending Physician: Brittany Johnson MD  Primary Care Provider: Pollo Oneal MD   Principal Problem: Acute on chronic respiratory failure with hypoxia and hypercapnia    Inpatient consult to Pulmonology  Consult performed by: Charles Mays MD  Consult ordered by: Janes Finn MD  Reason for consult: COPD management, optimize respiratory status  Assessment/Recommendations: Recommend diuresis for volume overload        Subjective:     HPI:  Ms Perez is a 72 yo woman who is a 20 pack year smoker, with mod-severe COPD (3-4 L home O2), h/o AFib RVR (s/p cardioversion 4/5 but returned to Afib, on Amio, Eliquis), h/o pseudomonal pneumonia and bacteremia who presents with recurrent SOB.     She was most recently admitted for respiratory failure 1 week ago, thought to be due to broken oxygen delivery system at her facility. On this admission, patient was found to have new bilateral effusions, in addition to infiltrates seen on prior imaging.     She was recently admitted April 2019 for acute on chronic respiratory failure 2/2 COPD exacerbation and pseudomonal pneumonia with bacteremia (1/4 blood cultures). She was discharged on cefepime 2g q8 until 4/18/19.  Hospital course at that time was complicated by Afib RVR, s/p cardioversion, but returned to AFib, discharged also on amiodarone, diltiazem, and Eliquis. During this admission, CT did show left lower lobe and Right upper lobe infiltrates.     She presented to the ED on 5/14 with recurrent SOB. Found to be hypoxic respiratory failure without hypercapneia. CXR revealed opacity in R L apex and bibiasilar airspace disease concerning of PNA or aspiration.  BNP was 122.  She was given a dose of cefepime and lasix by the ER.        Last ECHO EF 55% with concentric  remodeling, PAP 50      Past Medical History:   Diagnosis Date    Arthritis     Carotid disease, bilateral     Cataract     Chronic hyponatremia     COPD (chronic obstructive pulmonary disease)     HLD (hyperlipidemia)     HTN (hypertension)        Past Surgical History:   Procedure Laterality Date    APPENDECTOMY      Cardioversion or Defibrillation  4/4/2019    Performed by [facility]fred [facility]eroa [facility] at [facility] CATH LAB    CATARACT EXTRACTION W/  INTRAOCULAR LENS IMPLANT Right 12/06/2018    Dr. Escobar    CATARACT EXTRACTION W/  INTRAOCULAR LENS IMPLANT Left 12/20/2018    DR. Escobar    CERVICAL SPINE SURGERY      DILATION AND CURETTAGE OF UTERUS      x 2    EYE SURGERY      cataract Rt    FRACTURE SURGERY      Lt leg fracture with hardware    INSERTION, IOL PROSTHESIS Left 12/20/2018    Performed by [facility]obar [facility] at [facility]    INSERTION, IOL PROSTHESIS Right 12/6/2018    Performed by [facility]ick [facility]obar [facility] at [facility] OR    metatarsal repair      OPEN REDUCTION INTERNAL FIXATION-TIBIAL PLATEAU Left 6/27/2014    Performed by Isak Pereira MD at [facility] OR    PHACOEMULSIFICATION, CATARACT Left 12/20/2018    Performed by [facility]obar [facility] at [facility] OR    PHACOEMULSIFICATION, CATARACT Right 12/6/2018    Performed by [facility]ick [facility]obar [facility] at [facility] OR    SHOULDER ARTHROSCOPY      Transesophageal echo (JOHNNA) intra-procedure log documentation N/A 4/4/2019    Performed by Manfred Figueroa MD at [facility] CATH LAB       Review of patient's allergies indicates:   Allergen Reactions    Meropenem Itching and Rash     Rash started to appear around Day 3 of therapy.     TOLERATES PIP/TAZO. DOES NOT HAVE PCN ALLERGY    Biaxin [clarithromycin] Rash    Codeine Rash    Doxycycline Rash    Levaquin [levofloxacin] Rash       Family History     Problem Relation (Age of Onset)    Cancer Father    Cataracts Sister    Heart disease Mother, Maternal Grandfather    No Known Problems Brother,  Maternal Aunt, Maternal Uncle, Paternal Aunt, Paternal Uncle, Maternal Grandmother, Paternal Grandmother, Paternal Grandfather        Tobacco Use    Smoking status: Former Smoker     Packs/day: 1.00     Years: 45.00     Pack years: 45.00     Types: Cigarettes     Last attempt to quit: 2002     Years since quittin.3    Smokeless tobacco: Never Used   Substance and Sexual Activity    Alcohol use: No    Drug use: No    Sexual activity: Yes     Partners: Male         Review of Systems   Constitutional: Negative for chills, fever and unexpected weight change.   HENT: Negative for sneezing and sore throat.    Eyes: Negative for pain and visual disturbance.   Respiratory: Positive for cough and shortness of breath. Negative for wheezing.    Cardiovascular: Positive for leg swelling. Negative for chest pain.   Gastrointestinal: Negative for abdominal pain, constipation, diarrhea and nausea.   Genitourinary: Negative for dysuria, frequency and urgency.   Allergic/Immunologic: Negative for environmental allergies.   Neurological: Negative for dizziness, light-headedness, numbness and headaches.   Psychiatric/Behavioral: Negative for agitation. The patient is nervous/anxious.      Objective:     Vital Signs (Most Recent):  Temp: 98.5 °F (36.9 °C) (19 0402)  Pulse: 73 (19 1709)  Resp: 20 (19 1709)  BP: 135/69 (19 1202)  SpO2: 96 % (19 1630) Vital Signs (24h Range):  Temp:  [97.6 °F (36.4 °C)-98.5 °F (36.9 °C)] 98.5 °F (36.9 °C)  Pulse:  [66-86] 73  Resp:  [17-22] 20  SpO2:  [88 %-98 %] 96 %  BP: (111-135)/(54-69) 135/69     Weight: 40.8 kg (90 lb)  Body mass index is 14.99 kg/m².    No intake or output data in the 24 hours ending 19 1726    Physical Exam    Vents:  Oxygen Concentration (%): 36 (19 1630)    Lines/Drains/Airways     Peripheral Intravenous Line                 Peripheral IV - Single Lumen 19 2104 18 G Right Forearm 2 days         Peripheral IV - Single  Lumen 05/17/19 0449 20 G Anterior;Right Upper Arm less than 1 day                Significant Labs:    CBC/Anemia Profile:  Recent Labs   Lab 05/16/19  0524 05/17/19  0421   WBC 12.17 8.92   HGB 7.4* 7.6*   HCT 23.1* 23.6*    315   MCV 96 95   RDW 15.3* 15.4*        Chemistries:  Recent Labs   Lab 05/16/19  0524 05/17/19  0422    142   K 4.0 4.4    104   CO2 30* 32*   BUN 20 23   CREATININE 0.6 0.6   CALCIUM 8.0* 8.2*   MG 1.5* 2.2   PHOS 2.7 2.5*       BMP:   Recent Labs   Lab 05/17/19  0422   *      K 4.4      CO2 32*   BUN 23   CREATININE 0.6   CALCIUM 8.2*   MG 2.2     All pertinent labs within the past 24 hours have been reviewed.    Significant Imaging:   I have reviewed and interpreted all pertinent imaging results/findings within the past 24 hours.    Assessment/Plan:     * Acute on chronic respiratory failure with hypoxia and hypercapnia  Ms Perez is a 72 yo woman with moderate severe COPD and pulmonary HTN (Type 3), who presents from SNF with recurrent hypoxic respiratory failure.     DDx:   Pneumonia, Pleural effusion, COPD exacerbation, CHF exacerbation    Assessment:  Imaging is consistent with improvement of left lower lobe and right upper lobe infiltrates when compared to prior CT imaging in April. However, she does have new bilateral pleural effusions.     Plan:  - COPD exacerbation seems less likely.  Likely overloaded with elevated BNP >100. Recommend diuresis for optimization of respiratory status.   - Recommend to avoid benzodiazepines and medications that can cause respiratory depression if possible.   - Continue pulmonary toilet, mucolytics, etc.   - Recommend palliative care consult  - Continue LABA/LAMA/ICS  - Agree with completion of antibiotic therapy, however suspect she needs more aggressive diuresis for volume overload.             Thank you for your consult. I will follow-up with patient. Please contact us if you have any additional questions.      Charles Mays MD  Pulmonology  [facility]

## 2019-05-17 NOTE — PROGRESS NOTES
Pharmacokinetic Assessment Follow Up: IV Vancomycin    Vancomycin serum concentration assessment(s):  Trough = 7.2 mcg/mL (drawn appropriately)    Vancomycin Regimen Plan:    Vancomycin d/c      Thank you for the consult, pharmacy will sign off  CIELO SALTER      Drug Allergies:   Review of patient's allergies indicates:   Allergen Reactions    Meropenem Itching and Rash     Rash started to appear around Day 3 of therapy.     TOLERATES PIP/TAZO. DOES NOT HAVE PCN ALLERGY    Biaxin [clarithromycin] Rash    Codeine Rash    Doxycycline Rash    Levaquin [levofloxacin] Rash       Actual Body Weight:   40.8 kg    Renal Function:   Estimated Creatinine Clearance: 54.6 mL/min (based on SCr of 0.6 mg/dL).,     CBC (last 72 hours):  Recent Labs   Lab Result Units 05/14/19  2112 05/15/19  0346 05/16/19  0524 05/17/19  0421   WBC K/uL 14.71* 13.41* 12.17 8.92   Hemoglobin g/dL 9.1* 8.4* 7.4* 7.6*   Hematocrit % 28.1* 26.1* 23.1* 23.6*   Platelets K/uL 293 282 277 315   Gran% % 75.6*  --   --   --    Lymph% % 5.6*  --   --   --    Mono% % 6.3  --   --   --    Eosinophil% % 11.8*  --   --   --    Basophil% % 0.3  --   --   --    Differential Method  Automated  --   --   --        Metabolic Panel (last 72 hours):  Recent Labs   Lab Result Units 05/14/19  2112 05/14/19  2302 05/15/19  0346 05/16/19  0524 05/17/19  0422   Sodium mmol/L 140  --  143 141 142   Potassium mmol/L 3.5  --  3.0* 4.0 4.4   Chloride mmol/L 101  --  99 102 104   CO2 mmol/L 28  --  32* 30* 32*   Glucose mg/dL 127*  --  114* 142* 115*   Glucose, UA   --  Negative  --   --   --    BUN, Bld mg/dL 19  --  18 20 23   Creatinine mg/dL 0.7  --  0.6 0.6 0.6   Albumin g/dL 2.3*  --   --   --   --    Total Bilirubin mg/dL 0.3  --   --   --   --    Alkaline Phosphatase U/L 96  --   --   --   --    AST U/L 28  --   --   --   --    ALT U/L 37  --   --   --   --    Magnesium mg/dL  --   --  1.2* 1.5* 2.2   Phosphorus mg/dL  --   --  3.3 2.7 2.5*       Vancomycin  Administrations:  vancomycin given in the last 96 hours                   vancomycin (VANCOCIN) 1,000 mg in dextrose 5 % 250 mL IVPB (mg) 1,000 mg New Bag 05/17/19 0454     1,000 mg New Bag 05/16/19 0417                Drug levels (last 3 results):  Recent Labs   Lab Result Units 05/17/19  0421   Vancomycin-Trough ug/mL 7.2*       Microbiologic Results:  Microbiology Results (last 7 days)     Procedure Component Value Units Date/Time    Blood culture x two cultures. Draw prior to antibiotics. [[id number]] Collected:  05/14/19 2112    Order Status:  Completed Specimen:  Blood from Peripheral, Forearm, Right Updated:  05/16/19 2212     Blood Culture, Routine No Growth to date     Blood Culture, Routine No Growth to date     Blood Culture, Routine No Growth to date    Narrative:       Aerobic and anaerobic    Blood culture [[id number]] Collected:  05/15/19 1443    Order Status:  Completed Specimen:  Blood Updated:  05/16/19 1612     Blood Culture, Routine No Growth to date     Blood Culture, Routine No Growth to date    Blood culture [[id number]] Collected:  05/15/19 1443    Order Status:  Completed Specimen:  Blood Updated:  05/16/19 1612     Blood Culture, Routine No Growth to date     Blood Culture, Routine No Growth to date    Blood culture x two cultures. Draw prior to antibiotics. [[id number]] Collected:  05/14/19 2158    Order Status:  Completed Specimen:  Blood from Peripheral, Forearm, Left Updated:  05/16/19 1353     Blood Culture, Routine Gram stain aer bottle: Gram positive rods      Blood Culture, Routine Results called to and read back by:Debbie Powell RN 05/15/2019  12:49     Blood Culture, Routine --     BACILLUS SPECIES  Organism is a probable contaminant      Narrative:       Aerobic and anaerobic    Culture, Respiratory with Gram Stain [[id number]]     Order Status:  No result Specimen:  Respiratory     Respiratory Viral Panel by PCR [facility]; Nasal Swab [[id number]]     Order Status:  No result Specimen:   Respiratory

## 2019-05-17 NOTE — ASSESSMENT & PLAN NOTE
--hypercapnic respiratory failure seems chronic from her COPD, as evidenced by her near normal pH. Initially, did not appear to be an acute COPD exacerbation, however will add prednisone 40 qd for 5 days in addition to the abx.   --was weaned off bipap in the ER.  --pneumonia likely accounts for her hypoxic respiratory failure.  BNP is near baseline.  --CXR (5/14) consistent with COPD and opacity in R L apex and bibiasilar airspace disease concerning of PNA or aspiration  --continue home COPD inhalers (or formulary alternatives)  --would cover for pseudomonas given history of pseudomonal pneumonia and she was hospitalized within the last 3 months  --dc V/Z, start levoquin 750 qd x5 days. She is amenable to trying zosyn after discussion of her penicillin allergy, she will be closely monitored, is on telemetry  --reports 'rash' with Levaquin, will give PRN benadryl.   --GPR on blood cx, will re culture     --affrin prn q6 for nasal airway patenance  --mucinex for congestion  --alprazolam 0.25 q8 for anxiety   Significant anxiety component to SOB  --sputum culture when sample producible   --chest PT  --consult pulm for complex care assistance: per records, pt last seen at [facility] and had ID and pulm following. No DC summary as of yet so extent of follow up not known now

## 2019-05-18 LAB
ANION GAP SERPL CALC-SCNC: 6 MMOL/L (ref 8–16)
BUN SERPL-MCNC: 21 MG/DL (ref 8–23)
CALCIUM SERPL-MCNC: 8.3 MG/DL (ref 8.7–10.5)
CHLORIDE SERPL-SCNC: 103 MMOL/L (ref 95–110)
CO2 SERPL-SCNC: 32 MMOL/L (ref 23–29)
CREAT SERPL-MCNC: 0.5 MG/DL (ref 0.5–1.4)
ERYTHROCYTE [DISTWIDTH] IN BLOOD BY AUTOMATED COUNT: 15.4 % (ref 11.5–14.5)
EST. GFR  (AFRICAN AMERICAN): >60 ML/MIN/1.73 M^2
EST. GFR  (NON AFRICAN AMERICAN): >60 ML/MIN/1.73 M^2
GLUCOSE SERPL-MCNC: 102 MG/DL (ref 70–110)
HCT VFR BLD AUTO: 25.2 % (ref 37–48.5)
HGB BLD-MCNC: 7.9 G/DL (ref 12–16)
MAGNESIUM SERPL-MCNC: 1.7 MG/DL (ref 1.6–2.6)
MCH RBC QN AUTO: 30.7 PG (ref 27–31)
MCHC RBC AUTO-ENTMCNC: 31.3 G/DL (ref 32–36)
MCV RBC AUTO: 98 FL (ref 82–98)
PHOSPHATE SERPL-MCNC: 2.2 MG/DL (ref 2.7–4.5)
PLATELET # BLD AUTO: 365 K/UL (ref 150–350)
PMV BLD AUTO: 9.7 FL (ref 9.2–12.9)
POTASSIUM SERPL-SCNC: 4.4 MMOL/L (ref 3.5–5.1)
RBC # BLD AUTO: 2.57 M/UL (ref 4–5.4)
SODIUM SERPL-SCNC: 141 MMOL/L (ref 136–145)
WBC # BLD AUTO: 8.85 K/UL (ref 3.9–12.7)

## 2019-05-18 PROCEDURE — 25000003 PHARM REV CODE 250: Mod: HCNC | Performed by: HOSPITALIST

## 2019-05-18 PROCEDURE — 94664 DEMO&/EVAL PT USE INHALER: CPT | Mod: HCNC

## 2019-05-18 PROCEDURE — 20600001 HC STEP DOWN PRIVATE ROOM: Mod: HCNC

## 2019-05-18 PROCEDURE — 27000221 HC OXYGEN, UP TO 24 HOURS: Mod: HCNC

## 2019-05-18 PROCEDURE — 25000003 PHARM REV CODE 250: Mod: HCNC | Performed by: INTERNAL MEDICINE

## 2019-05-18 PROCEDURE — 94761 N-INVAS EAR/PLS OXIMETRY MLT: CPT | Mod: HCNC

## 2019-05-18 PROCEDURE — 25000242 PHARM REV CODE 250 ALT 637 W/ HCPCS: Mod: HCNC | Performed by: HOSPITALIST

## 2019-05-18 PROCEDURE — 63600175 PHARM REV CODE 636 W HCPCS: Mod: HCNC | Performed by: STUDENT IN AN ORGANIZED HEALTH CARE EDUCATION/TRAINING PROGRAM

## 2019-05-18 PROCEDURE — 36415 COLL VENOUS BLD VENIPUNCTURE: CPT | Mod: HCNC

## 2019-05-18 PROCEDURE — 84100 ASSAY OF PHOSPHORUS: CPT | Mod: HCNC

## 2019-05-18 PROCEDURE — 93005 ELECTROCARDIOGRAM TRACING: CPT | Mod: HCNC

## 2019-05-18 PROCEDURE — 80048 BASIC METABOLIC PNL TOTAL CA: CPT | Mod: HCNC

## 2019-05-18 PROCEDURE — 25000003 PHARM REV CODE 250: Mod: HCNC | Performed by: STUDENT IN AN ORGANIZED HEALTH CARE EDUCATION/TRAINING PROGRAM

## 2019-05-18 PROCEDURE — 93010 ELECTROCARDIOGRAM REPORT: CPT | Mod: HCNC,,, | Performed by: INTERNAL MEDICINE

## 2019-05-18 PROCEDURE — 93010 EKG 12-LEAD: ICD-10-PCS | Mod: HCNC,,, | Performed by: INTERNAL MEDICINE

## 2019-05-18 PROCEDURE — 25000242 PHARM REV CODE 250 ALT 637 W/ HCPCS: Mod: HCNC | Performed by: STUDENT IN AN ORGANIZED HEALTH CARE EDUCATION/TRAINING PROGRAM

## 2019-05-18 PROCEDURE — 85027 COMPLETE CBC AUTOMATED: CPT | Mod: HCNC

## 2019-05-18 PROCEDURE — 99233 PR SUBSEQUENT HOSPITAL CARE,LEVL III: ICD-10-PCS | Mod: HCNC,GC,, | Performed by: HOSPITALIST

## 2019-05-18 PROCEDURE — 99900035 HC TECH TIME PER 15 MIN (STAT): Mod: HCNC

## 2019-05-18 PROCEDURE — 99233 SBSQ HOSP IP/OBS HIGH 50: CPT | Mod: HCNC,GC,, | Performed by: HOSPITALIST

## 2019-05-18 PROCEDURE — 94640 AIRWAY INHALATION TREATMENT: CPT | Mod: HCNC

## 2019-05-18 PROCEDURE — 83735 ASSAY OF MAGNESIUM: CPT | Mod: HCNC

## 2019-05-18 PROCEDURE — 94668 MNPJ CHEST WALL SBSQ: CPT | Mod: HCNC

## 2019-05-18 RX ORDER — SODIUM,POTASSIUM PHOSPHATES 280-250MG
1 POWDER IN PACKET (EA) ORAL
Status: DISCONTINUED | OUTPATIENT
Start: 2019-05-18 | End: 2019-05-19 | Stop reason: HOSPADM

## 2019-05-18 RX ORDER — MORPHINE SULFATE ORAL SOLUTION 10 MG/5ML
2.5 SOLUTION ORAL EVERY 4 HOURS PRN
Status: DISCONTINUED | OUTPATIENT
Start: 2019-05-18 | End: 2019-05-19 | Stop reason: HOSPADM

## 2019-05-18 RX ORDER — LANOLIN ALCOHOL/MO/W.PET/CERES
400 CREAM (GRAM) TOPICAL 2 TIMES DAILY
Status: DISCONTINUED | OUTPATIENT
Start: 2019-05-18 | End: 2019-05-19 | Stop reason: HOSPADM

## 2019-05-18 RX ORDER — CETIRIZINE HYDROCHLORIDE 5 MG/1
5 TABLET ORAL DAILY
Status: DISCONTINUED | OUTPATIENT
Start: 2019-05-18 | End: 2019-05-19 | Stop reason: HOSPADM

## 2019-05-18 RX ADMIN — GUAIFENESIN 600 MG: 600 TABLET, EXTENDED RELEASE ORAL at 08:05

## 2019-05-18 RX ADMIN — PREDNISONE 40 MG: 20 TABLET ORAL at 09:05

## 2019-05-18 RX ADMIN — MAGNESIUM OXIDE TAB 400 MG (241.3 MG ELEMENTAL MG) 400 MG: 400 (241.3 MG) TAB at 09:05

## 2019-05-18 RX ADMIN — IPRATROPIUM BROMIDE AND ALBUTEROL SULFATE 3 ML: .5; 3 SOLUTION RESPIRATORY (INHALATION) at 07:05

## 2019-05-18 RX ADMIN — MAGNESIUM OXIDE TAB 400 MG (241.3 MG ELEMENTAL MG) 400 MG: 400 (241.3 MG) TAB at 08:05

## 2019-05-18 RX ADMIN — FUROSEMIDE 40 MG: 40 TABLET ORAL at 09:05

## 2019-05-18 RX ADMIN — SALINE NASAL SPRAY 1 SPRAY: 1.5 SOLUTION NASAL at 09:05

## 2019-05-18 RX ADMIN — IPRATROPIUM BROMIDE AND ALBUTEROL SULFATE 3 ML: .5; 3 SOLUTION RESPIRATORY (INHALATION) at 04:05

## 2019-05-18 RX ADMIN — PRAVASTATIN SODIUM 20 MG: 20 TABLET ORAL at 08:05

## 2019-05-18 RX ADMIN — SALINE NASAL SPRAY 1 SPRAY: 1.5 SOLUTION NASAL at 08:05

## 2019-05-18 RX ADMIN — CETIRIZINE HYDROCHLORIDE 5 MG: 5 TABLET ORAL at 09:05

## 2019-05-18 RX ADMIN — NYSTATIN 500000 UNITS: 500000 SUSPENSION ORAL at 05:05

## 2019-05-18 RX ADMIN — POTASSIUM & SODIUM PHOSPHATES POWDER PACK 280-160-250 MG 1 PACKET: 280-160-250 PACK at 08:05

## 2019-05-18 RX ADMIN — APIXABAN 5 MG: 5 TABLET, FILM COATED ORAL at 09:05

## 2019-05-18 RX ADMIN — APIXABAN 5 MG: 5 TABLET, FILM COATED ORAL at 08:05

## 2019-05-18 RX ADMIN — POTASSIUM & SODIUM PHOSPHATES POWDER PACK 280-160-250 MG 1 PACKET: 280-160-250 PACK at 12:05

## 2019-05-18 RX ADMIN — NYSTATIN 500000 UNITS: 500000 SUSPENSION ORAL at 08:05

## 2019-05-18 RX ADMIN — DILTIAZEM HYDROCHLORIDE 240 MG: 120 CAPSULE, COATED, EXTENDED RELEASE ORAL at 09:05

## 2019-05-18 RX ADMIN — NYSTATIN 500000 UNITS: 500000 SUSPENSION ORAL at 12:05

## 2019-05-18 RX ADMIN — NYSTATIN 500000 UNITS: 500000 SUSPENSION ORAL at 09:05

## 2019-05-18 RX ADMIN — GUAIFENESIN 600 MG: 600 TABLET, EXTENDED RELEASE ORAL at 09:05

## 2019-05-18 RX ADMIN — IPRATROPIUM BROMIDE AND ALBUTEROL SULFATE 3 ML: .5; 3 SOLUTION RESPIRATORY (INHALATION) at 08:05

## 2019-05-18 RX ADMIN — FLUTICASONE FUROATE AND VILANTEROL TRIFENATATE 1 PUFF: 100; 25 POWDER RESPIRATORY (INHALATION) at 09:05

## 2019-05-18 RX ADMIN — LOSARTAN POTASSIUM 50 MG: 50 TABLET, FILM COATED ORAL at 09:05

## 2019-05-18 RX ADMIN — LEVOFLOXACIN 750 MG: 750 TABLET, FILM COATED ORAL at 09:05

## 2019-05-18 RX ADMIN — POTASSIUM & SODIUM PHOSPHATES POWDER PACK 280-160-250 MG 1 PACKET: 280-160-250 PACK at 05:05

## 2019-05-18 RX ADMIN — AMIODARONE HYDROCHLORIDE 200 MG: 200 TABLET ORAL at 09:05

## 2019-05-18 RX ADMIN — TIOTROPIUM BROMIDE 18 MCG: 18 CAPSULE ORAL; RESPIRATORY (INHALATION) at 09:05

## 2019-05-18 RX ADMIN — IPRATROPIUM BROMIDE AND ALBUTEROL SULFATE 3 ML: .5; 3 SOLUTION RESPIRATORY (INHALATION) at 11:05

## 2019-05-18 NOTE — ASSESSMENT & PLAN NOTE
--hypercapnic respiratory failure seems chronic from her COPD, as evidenced by her near normal pH. Initially, did not appear to be an acute COPD exacerbation, however will add prednisone 40 qd for 5 days in addition to the abx.   --was weaned off bipap in the ER.  --pneumonia likely accounts for her hypoxic respiratory failure.  BNP is near baseline.  --CXR (5/14) consistent with COPD and opacity in R L apex and bibiasilar airspace disease concerning of PNA or aspiration  --continue home COPD inhalers (or formulary alternatives)  --would cover for pseudomonas given history of pseudomonal pneumonia and she was hospitalized within the last 3 months  --dc V/Z, start levoquin 750 qd x5 days. She is amenable to trying zosyn after discussion of her penicillin allergy, she will be closely monitored, is on telemetry  --reports 'rash' with Levaquin, will give PRN benadryl.   --GPR on blood cx, will re culture     --affrin prn q6 for nasal airway patenance  --mucinex for congestion  --alprazolam 0.25 q8 for anxiety   Significant anxiety component to SOB  --sputum culture when sample producible   --chest PT  --consult pulm for complex care assistance: per records, pt last seen at [facility] [place] and had ID and pulm following. No DC summary as of yet so extent of follow up not known now

## 2019-05-18 NOTE — HOSPITAL COURSE
Pt admitted for acute on chronic respiratory failure with hypoxia. She was previously in a SNF. Initially in ED, O2 sat recovered to baseline with home dose supplemental O2. CXR concerning for PNa. Started on vanc zosyn. Transitioned to PO levoquin. No adverse reaction was noted with the levoquin. During admission, pt frequently endorsed dyspnea and requested O2 be increased. Her O2 sat always remained in an appropriate range given her COPD. Pt found to take benzos prn at home for anxiety and this was continued here with some relief noted. pulm consulted given her extensive, advanced copd. Recommended reducing sedating meds and aggressive diuresis. Her home lasix dose [oral] was restarted as she had experienced some symptomatic hypotension a few days prior. Steroids also started for a 5 day course. Pt wanted to go home instead of SNF on dc. Home health arranged. Pt significantly improved over course of admission. Continued to require 3-5 L NC. Upon discharge, pt has 2 more days of her levoquin and 1 more day of her steroid. She has an upcoming pulm clinic visit in 3 weeks and will have a pcp appointment within a week.

## 2019-05-18 NOTE — PROGRESS NOTES
[facility] Pulmonology/Critical Care Progress Note    Subjective:      Reporting indigestion this AM, which is chronic and occurs intermittently. Work of breathing is reportedly improved.     Objective:   Last 24 Hour Vital Signs:  BP  Min: 124/60  Max: 140/94  Temp  Av.5 °F (36.4 °C)  Min: 97.3 °F (36.3 °C)  Max: 97.8 °F (36.6 °C)  Pulse  Av.9  Min: 71  Max: 89  Resp  Av.4  Min: 16  Max: 22  SpO2  Av.2 %  Min: 91 %  Max: 98 %  I/O last 3 completed shifts:  In: 1580 [P.O.:1080; I.V.:500]  Out: -     Physical Examination:  General: Cachectic and chronically ill appearing  HENT:  NCAT; muddy sclera with EOMi; OP clear with MMM  Cardio:  Regular rate and rhythm with normal S1 and S2; no murmurs  Resp:  Bibasilar crackles with scattered rhonchi and expiratory wheezes in upper lung zones.  Decreased BS throughout  Abdom: Soft, NTND with normoactive bowel sounds  Extrem: Warm and dry.  Trace bipedal edema  Skin:  Senile lentigo.  Scattered ecchymoses in various stages of healing  Neuro:  AAOx3; cooperative and pleasant. Often forgetful and lacks insight to disease      Laboratory:  Laboratory Data Reviewed: yes  Pertinent Findings:  Lab Results   Component Value Date    WBC 8.85 2019    HGB 7.9 (L) 2019    HCT 25.2 (L) 2019    MCV 98 2019     (H) 2019     Lab Results   Component Value Date    CREATININE 0.5 2019    BUN 21 2019     2019    K 4.4 2019     2019    CO2 32 (H) 2019     Lab Results   Component Value Date    ALT 37 2019    AST 28 2019    ALKPHOS 96 2019    BILITOT 0.3 2019       Microbiology Data Reviewed: yes  Pertinent Findings:   Blood Cx x 2 -- NG final   Blood Cx x 2 -- 1/4 Bacillus species  5/15 VRP -- never collected  5/15 Resp Cx -- never collected  5/15 Blood Cx -- NGTD      Radiology Data Reviewed: yes  Pertinent Findings:  - NO RECENT IMAGING      Assessment:     Latasha  GERRI Perez is a 73 y.o.female with hx Tobacco Dependence (20pkyr) Chronic Hypoxemic Respiratory Failure 2/2 Severe COPD on 4LPM home O2, PH (PAP 51, presumed WHO Group 3), Paroxysmal AFib on Eliquis found to have bilateral pleural effusions in the setting of elevated BNP and diastolic dysfunction.  Pt is elderly and frail, and she is dying from her pulmonary and cardiac disease.  Pt lacks insight into her disease and we are concerned about her ability to understand gravity of situation.  She does, however, have some therapeutic targets, including volume status and physical debility.  She is currently stable on home dose O2.     Plan:     - Recommend aggressive diuresis for optimization of respiratory status   - Maintain strict I/O. Aim for daily net negative fluid balance. Avoid excessive IVF  - Minimize sedating medications, as these will worsen confusion and decrease respiratory drive.  - Continue LABA/LAMA/ICS. Reasonable to continue course of prednisone and empiric Levaquin for 5d course  - Needs Duonebs Q4 PRN and Q4 scheduled. Aggressive pulmonary hygiene.  Elevate HOB with aspiration precautions  - Aggressive PT/OT.  OOBTC often as tolerated.  Will likely need post acute care placement  - Would benefit from Palliative Care consult and addressing GOC       Thank you for the consult. No further recommendations.  Signing off.  Please feel free to contact us with any questions or concerns regarding the care of this patient.  .  Alonso Muhammad MD  [facility] Pulmonology/Critical Care, -IV

## 2019-05-18 NOTE — ASSESSMENT & PLAN NOTE
--hypercapnic respiratory failure seems chronic from her COPD, as evidenced by her near normal pH. Initially, did not appear to be an acute COPD exacerbation, however will add prednisone 40 qd for 5 days in addition to the abx.   --was weaned off bipap in the ER.  --pneumonia likely accounts for her hypoxic respiratory failure.  BNP is near baseline.  --CXR (5/14) consistent with COPD and opacity in R L apex and bibiasilar airspace disease concerning of PNA or aspiration  --continue home COPD inhalers (or formulary alternatives)  --would cover for pseudomonas given history of pseudomonal pneumonia and she was hospitalized within the last 3 months  --dc V/Z, start levoquin 750 qd x5 days. She is amenable to trying zosyn after discussion of her penicillin allergy, she will be closely monitored, is on telemetry  --reports 'rash' with Levaquin, will give PRN benadryl.   --GPR on blood cx, will re culture     --affrin prn q6 for nasal airway patenance  --mucinex for congestion  --PO morphine for air hunger instead of benzos   Significant anxiety component to SOB  --sputum culture when sample producible   --chest PT  --consult pulm for complex care assistance: per records, pt last seen at [facility] and had ID and pulm following. No DC summary as of yet so extent of follow up not known now

## 2019-05-18 NOTE — PLAN OF CARE
Problem: Fall Injury Risk  Goal: Absence of Fall and Fall-Related Injury  Outcome: Ongoing (interventions implemented as appropriate)  Patient remains free from falls.     Problem: Adult Inpatient Plan of Care  Goal: Plan of Care Review  Outcome: Ongoing (interventions implemented as appropriate)  No acute events or changes overnight. No reported episodes of shortness of breath or anxiety exacerbation. No administration of any PRN medications. Patient verbalized a desire to work with physical therapy and increase her activity level throughout the night.

## 2019-05-18 NOTE — MEDICAL/APP STUDENT
[facility] Hospital Medicine  Progress Note    Patient Name: Latasha Perez  MRN: [id number]  Patient Class: IP- Inpatient   Admission Date: 5/14/2019  Length of Stay: 3 days  Attending Physician: Brittany Johnson MD  Primary Care Provider: Pollo Oneal MD    [facility] Medicine Team: [facility] MED 2 Tai Breen    Subjective:     Principal Problem:Acute on chronic respiratory failure with hypoxia and hypercapnia    HPI: Ms. Latasha Perez is a 73 year old woman with a history of COPD, pseudomonal pneumonia and bacteremia who presents to the hospital from a skilled nursing facility with SOB. Yesterday the patient became short of breath and hypoxic in the 60s on room air. At home she is normally on 3-4 L of O2. She reports having some trouble swallowing and occasionally having indigestion. She has a chronic cough. 5 days ago she presented to the ED with hypoxic respiratory failure due to failure of the oxygen delivery system at her facility. It resolved with supplemental O2 provided via nasal cannula. Since that time she reports feeling warm overnight on a number of occasions. In April 2019 she was admitted for COPD exacerbation and pseudomonal pneumonia with bacteremia. Her hospital course was complicated by atrial fibrillation with RVR. Other pertinent history includes hypertension, hyperlipidemia and tobacco abuse (quit since 7 months ago).    Hospital Course: 5/14. Arrived hypoxic in the 60s on CPAP/BiPAP. VBG was 7.35/59. CXR concerning for pneumonia or aspiration. BNP at 122. She was given cefepime and lasix by the ER.     5/15. Duo-nebulizer treatment started. Vancomycin and Piperacillin/Tazobactam therapy continued for HAP vs aspiration pneumonia. US of right left for DVT performed. No sign of DVT. Positive for popliteal cyst.     5/16. Very anxious today. PRN alprazolam started with good relief of anxiety. No PT/OT today as patient was too dyspneic. Blood culture positive for  "gram positive rods. Spiritual care consulted. Antibiotics and breathing treatments continued.    5/17. Anxious today, PRN alprazolam continued. No PT/OT today. Antibiotic coverage deescalated to levofloxacin. Pulmonary consulted today.    Interval History: No acute events overnight. Ms. Perez was able to sleep through the night without any symptoms of panic. This morning she felt some anxiety, however it has improved since yesterday. She feels weaker today. She has congestion and sinus pressure. Her nasal spray has not provided adequate relief this morning. She reports having seasonal allergies and that she typically has some congestion. She has a productive cough. The sputum has been difficult to clear with coughing. She reports feeling "cooped up" and expressed interest in resuming PT/OT today. No chest pain or palpitations. No fever.    Review of Systems   Constitutional: Positive for fatigue. Negative for chills, diaphoresis and fever.   HENT: Positive for congestion, sinus pressure and voice change. Negative for sore throat.    Respiratory: Positive for cough, chest tightness and shortness of breath. Negative for wheezing.    Cardiovascular: Negative for chest pain, palpitations and leg swelling.   Gastrointestinal: Negative for abdominal pain.   Neurological: Positive for weakness. Negative for dizziness and headaches.   Psychiatric/Behavioral: Negative for confusion and sleep disturbance. The patient is nervous/anxious.      Objective:     Vital Signs (Most Recent):  Temp: 97.3 °F (36.3 °C) (05/18/19 0752)  Pulse: 76 (05/18/19 0752)  Resp: 20 (05/18/19 0752)  BP: (!) 140/94 (05/18/19 0752)  SpO2: 97 % (05/18/19 0752) Vital Signs (24h Range):  Temp:  [97.3 °F (36.3 °C)-97.8 °F (36.6 °C)] 97.3 °F (36.3 °C)  Pulse:  [71-89] 76  Resp:  [16-22] 20  SpO2:  [92 %-98 %] 97 %  BP: (124-140)/(60-94) 140/94     Weight: 40.8 kg (90 lb)  Body mass index is 14.99 kg/m².    Intake/Output Summary (Last 24 hours) at " 5/18/2019 0815  Last data filed at 5/17/2019 1800  Gross per 24 hour   Intake 1340 ml   Output --   Net 1340 ml      Physical Exam   Constitutional: She is oriented to person, place, and time. She appears cachectic. She is cooperative. No distress. Nasal cannula in place.   Nasal cannula at 4.5 L of flow.   HENT:   Head: Normocephalic and atraumatic.   Eyes: Conjunctivae and EOM are normal.   Neck: No JVD present.   Cardiovascular: S1 normal, S2 normal and normal pulses. Exam reveals no gallop, no distant heart sounds and no friction rub.   No murmur heard.  Pulmonary/Chest: No accessory muscle usage. No respiratory distress. She has wheezes in the right lower field and the left lower field.   Pauses noticeable in between sentences, improved from yesterday.   Neurological: She is alert and oriented to person, place, and time.   Psychiatric: Her speech is normal and behavior is normal. Her mood appears anxious. Cognition and memory are normal.       Significant Labs:   CBC:   Recent Labs   Lab 05/17/19  0421 05/18/19  0556   WBC 8.92 8.85   HGB 7.6* 7.9*   HCT 23.6* 25.2*    365*     CMP:   Recent Labs   Lab 05/17/19  0422 05/18/19  0556    141   K 4.4 4.4    103   CO2 32* 32*   * 102   BUN 23 21   CREATININE 0.6 0.5   CALCIUM 8.2* 8.3*   ANIONGAP 6* 6*   EGFRNONAA >60.0 >60.0     Magnesium:   Recent Labs   Lab 05/17/19  0422 05/18/19  0556   MG 2.2 1.7     Significant Imaging: I have reviewed all pertinent imaging results/findings within the past 24 hours.    Assessment/Plan:      Ms. Latasha Perez is a 73 year old woman with a history of COPD and pseudomonal pneumonia with acute on chronic respiratory failure due to pneumonia.     Acute on chronic respiratory failure with hypoxia and hypercapnia  -Supplemental O2 via nasal cannula, goal of 89-91% O2. However, patient is still feeling short of breath. Pulmonary has been consulted. O2 at 4.5 L on NC.  -Deescalate antibiotics to IV  levofloxacin.  -Resume PT/OT if patient can tolerate today.  -Duo-nebulizer breathing treatment q4 hours and as needed.  -Prednisone 40 mg daily for concurrent COPD exacerbation, on day 3  -Continue chest physiotherapy to clear lungs  -Continue incentive spirometry  -Consider pulmonary recommendations      Acute pulmonary edema  -Same as above    Nasal congestion  -Continue guaifenesin 600 mg and phenylephrine HCL nasal spray to relieve congestion and improve flow through NC  -Start cetirizine for nasal congestion associated with seasonal allergies     Anxiety  -Alprazolam 0.125 mg PRN  -Spiritual care following     Oral thrush  -Nystatin 4 times daily with meals and nightly     Severe protein-calore malnutrition  -Consult dietician  -Encourage eating and proper nutrition     Paroxysmal atrial fibrillation  -Continue home regimen     Essential hypertension  -Continue losartan and diltiazem     Hyperlipidemia  -Continue pravastatin    Active Diagnoses:    Diagnosis Date Noted POA    PRINCIPAL PROBLEM:  Acute on chronic respiratory failure with hypoxia and hypercapnia [J96.21, J96.22] 04/02/2019 Yes    Acute pulmonary edema [J81.0] 05/15/2019 Yes    Severe protein-calorie malnutrition [E43] 05/15/2019 Unknown    Paroxysmal atrial fibrillation [I48.0] 04/26/2019 Yes    Essential hypertension [I10] 04/13/2017 Yes    HLD (hyperlipidemia) [E78.5]  Yes      Problems Resolved During this Admission:     VTE Risk Mitigation (From admission, onward)        Ordered     apixaban tablet 5 mg  2 times daily      05/15/19 0228     IP VTE HIGH RISK PATIENT  Once      05/15/19 0222             Tai Breen  Department of Hospital Medicine   [facility]

## 2019-05-18 NOTE — PROGRESS NOTES
[facility] Hospital Medicine  Progress Note    Patient Name: Latasha Perez  MRN: [id number]  Patient Class: IP- Inpatient   Admission Date: 5/14/2019  Length of Stay: 3 days  Attending Physician: Brittany Johnson MD  Primary Care Provider: Pollo Oneal MD    [facility] Medicine Team: [facility] 2 Janes Finn MD    Subjective:     Principal Problem:Acute on chronic respiratory failure with hypoxia and hypercapnia    HPI:  72 yo F with PMH COPD (3-4 L home O2), tobacco abuse, HTN, HLD, h/o AFib RVR (s/p cardioversion 4/5 but returned to Afib, on Amio, Eliquis), h/o pseudomonal pneumonia and bacteremia who presents with SOB from SNF. She had presented to the ER 5 days prior with hypoxic respiratory failure which was attributed to oxygen delivery system probably not working properly at her facility because her respiratory failure resolved with supplemental oxygen via NC.  She had no consolidation on CXR at that time.    She returned to the ER on 5/14 because shortness of breath returned.   Upon EMS arrival to her facility, the patient was tachypneic, hypoxic in the 60s on room air. She arrived on CPAP/BiPAP.  VBG on arrival was 7.35/59.  CXR revealed opacity in R L apex and bibiasilar airspace disease concerning of PNA or aspiration.  BNP was 122.  She was given a dose of cefepime and lasix by the ER.      She was recently admitted April 2019 for acute on chronic respiratory failure 2/2 COPD exacerbation and pseudomonal pneumonia with bacteremia (1/4 blood cultures).  She was discharged on cefepime 2g q8 until 4/18/19.  Hospital course at that time was complicated by Afib RVR, s/p cardioversion, but returned to AFib, discharged also on amiodarone, diltiazem, and Eliquis.      She describes she got a one time dose of penicillin once during a dental procedure and had generalized swelling but no respiratory compromise.    She wishes to be full code    Hospital Course:  Patient was  admitted to hospital for right lower lobe pneumonia. Due to her previous infectious disease history, infectious disease was consulted and she was started on meropenem for 5 days. Pulmonology consult was placed for further source control, however the opacity seen on ct was considered too small for intervention. Patient's respiratory status improved overtime with decreasing oxygen requirements. Patient was kept in hospital as she continued to have a leukocytosis. However, no further infectious cause was identified. Patient was seen as stable for discharge.      Interval History: NAEON. NAD. Able to sleep through the night without any symptoms of panic. Some anxiety this am, however it has improved since yesterday. Feels weaker today. + congestion and sinus pressure. Her nasal spray has not provided adequate relief this morning. She has a productive cough. The sputum has been difficult to clear with coughing.     Review of Systems   Constitutional: Positive for fatigue.   HENT: Positive for congestion and sinus pressure.    Respiratory: Positive for cough and shortness of breath.    Cardiovascular: Negative for chest pain.   Gastrointestinal: Negative for abdominal distention and abdominal pain.   Musculoskeletal: Negative for arthralgias.   Neurological: Negative for headaches.   Psychiatric/Behavioral: Negative for agitation and behavioral problems. The patient is nervous/anxious.      Objective:     Vital Signs (Most Recent):  Temp: 97.7 °F (36.5 °C) (05/18/19 1149)  Pulse: 78 (05/18/19 1149)  Resp: 18 (05/18/19 1149)  BP: (!) 155/68 (05/18/19 1149)  SpO2: (!) 94 % (05/18/19 1149) Vital Signs (24h Range):  Temp:  [97.3 °F (36.3 °C)-97.8 °F (36.6 °C)] 97.7 °F (36.5 °C)  Pulse:  [71-89] 78  Resp:  [16-22] 18  SpO2:  [91 %-98 %] 94 %  BP: (124-155)/(60-94) 155/68     Weight: 40.8 kg (90 lb)  Body mass index is 14.99 kg/m².    Intake/Output Summary (Last 24 hours) at 5/18/2019 9741  Last data filed at 5/17/2019  1800  Gross per 24 hour   Intake 840 ml   Output --   Net 840 ml      Physical Exam   Constitutional: Vital signs are normal. She appears cachectic. She is active and cooperative. She is easily aroused. No distress. Nasal cannula in place.   HENT:   Head: Normocephalic and atraumatic.   Cardiovascular: Normal rate.   Pulmonary/Chest: Effort normal. No respiratory distress. She has decreased breath sounds. She has wheezes. She exhibits no tenderness.   Abdominal: Soft. Bowel sounds are normal. She exhibits no distension. There is no tenderness.   Musculoskeletal: She exhibits no edema or tenderness.   Neurological: She is alert and easily aroused.   Skin: Skin is warm and dry. She is not diaphoretic. No erythema.   Psychiatric: Her behavior is normal. Her mood appears anxious. She is not actively hallucinating. She is attentive.   Nursing note and vitals reviewed.      Significant Labs: All pertinent labs within the past 24 hours have been reviewed.    Significant Imaging: I have reviewed all pertinent imaging results/findings within the past 24 hours.    Assessment/Plan:      * Acute on chronic respiratory failure with hypoxia and hypercapnia  --hypercapnic respiratory failure seems chronic from her COPD, as evidenced by her near normal pH. Initially, did not appear to be an acute COPD exacerbation, however will add prednisone 40 qd for 5 days in addition to the abx.   --was weaned off bipap in the ER.  --pneumonia likely accounts for her hypoxic respiratory failure.  BNP is near baseline.  --CXR (5/14) consistent with COPD and opacity in R L apex and bibiasilar airspace disease concerning of PNA or aspiration  --continue home COPD inhalers (or formulary alternatives)  --would cover for pseudomonas given history of pseudomonal pneumonia and she was hospitalized within the last 3 months  --dc V/Z, start levoquin 750 qd x5 days. She is amenable to trying zosyn after discussion of her penicillin allergy, she will be  closely monitored, is on telemetry  --reports 'rash' with Levaquin, will give PRN benadryl.   --GPR on blood cx, will re culture     --affrin prn q6 for nasal airway patenance  --mucinex for congestion  --PO morphine for air hunger instead of benzos   Significant anxiety component to SOB  --sputum culture when sample producible   --chest PT  --consult pulm for complex care assistance: per records, pt last seen at [facility] and had ID and pulm following. No DC summary as of yet so extent of follow up not known now   - start home po 40 lasix QD, be weary of hypotension     Severe protein-calorie malnutrition  --emphysematous body habitus  --boost with meals      Paroxysmal atrial fibrillation  --currently rate controlled, NSR on 5/14 EKG  --continue diltiazem, eliquis  --will dose adjust amio to 200 qd po 5/18/19  --daily ekg for concurrent levoquin    Essential hypertension  --takes losartan and diltiazem, continue     HLD (hyperlipidemia)  --continue pravastatin      VTE Risk Mitigation (From admission, onward)        Ordered     apixaban tablet 5 mg  2 times daily      05/15/19 0228     IP VTE HIGH RISK PATIENT  Once      05/15/19 0222              Janes Finn MD  Department of Hospital Medicine   [facility]

## 2019-05-18 NOTE — PROGRESS NOTES
After CPT, pt complained of not being able to breath. Pt Sat is 100 % after treatments, RR 20 and no other changes. Told pt to breath through her nose and waited til she felt relax. Bumped O2 to 5 LPM

## 2019-05-18 NOTE — DISCHARGE SUMMARY
This note has been moved to another encounter. If you have any questions, please contact HIM Chart Correction at [phone].

## 2019-05-18 NOTE — SUBJECTIVE & OBJECTIVE
Interval History: NAEON. NAD. Able to sleep through the night without any symptoms of panic. Some anxiety this am, however it has improved since yesterday. Feels weaker today. + congestion and sinus pressure. Her nasal spray has not provided adequate relief this morning. She has a productive cough. The sputum has been difficult to clear with coughing.     Review of Systems   Constitutional: Positive for fatigue.   HENT: Positive for congestion and sinus pressure.    Respiratory: Positive for cough and shortness of breath.    Cardiovascular: Negative for chest pain.   Gastrointestinal: Negative for abdominal distention and abdominal pain.   Musculoskeletal: Negative for arthralgias.   Neurological: Negative for headaches.   Psychiatric/Behavioral: Negative for agitation and behavioral problems. The patient is nervous/anxious.      Objective:     Vital Signs (Most Recent):  Temp: 97.7 °F (36.5 °C) (05/18/19 1149)  Pulse: 78 (05/18/19 1149)  Resp: 18 (05/18/19 1149)  BP: (!) 155/68 (05/18/19 1149)  SpO2: (!) 94 % (05/18/19 1149) Vital Signs (24h Range):  Temp:  [97.3 °F (36.3 °C)-97.8 °F (36.6 °C)] 97.7 °F (36.5 °C)  Pulse:  [71-89] 78  Resp:  [16-22] 18  SpO2:  [91 %-98 %] 94 %  BP: (124-155)/(60-94) 155/68     Weight: 40.8 kg (90 lb)  Body mass index is 14.99 kg/m².    Intake/Output Summary (Last 24 hours) at 5/18/2019 1153  Last data filed at 5/17/2019 1800  Gross per 24 hour   Intake 840 ml   Output --   Net 840 ml      Physical Exam   Constitutional: Vital signs are normal. She appears cachectic. She is active and cooperative. She is easily aroused. No distress. Nasal cannula in place.   HENT:   Head: Normocephalic and atraumatic.   Cardiovascular: Normal rate.   Pulmonary/Chest: Effort normal. No respiratory distress. She has decreased breath sounds. She has wheezes. She exhibits no tenderness.   Abdominal: Soft. Bowel sounds are normal. She exhibits no distension. There is no tenderness.   Musculoskeletal: She  exhibits no edema or tenderness.   Neurological: She is alert and easily aroused.   Skin: Skin is warm and dry. She is not diaphoretic. No erythema.   Psychiatric: Her behavior is normal. Her mood appears anxious. She is not actively hallucinating. She is attentive.   Nursing note and vitals reviewed.      Significant Labs: All pertinent labs within the past 24 hours have been reviewed.    Significant Imaging: I have reviewed all pertinent imaging results/findings within the past 24 hours.

## 2019-05-18 NOTE — DISCHARGE SUMMARY
[facility] Hospital Medicine  Discharge Summary      Patient Name: Latasha Perez  MRN: [id number]  Admission Date: 4/26/2019  Hospital Length of Stay: 7 days  Discharge Date and Time: 5/3/19  Attending Physician: Laura Billy   Discharging Provider: Rajiv Vargas MD  Primary Care Provider: Pollo Oneal MD  [facility] Medicine Team: [facility] 2 Rajiv Vargas MD    HPI:   Ms. Perez is a 73 year old lady with a past medical history significant for HTN, HLD, COPD (on 3-4L NC at home), A.fib with RVR (s/p failed ablation - on Amiodarone and diltiazem for rate control and eliquis for anticoagulation) who presents to [facility] ED from [facility] rehab with complaints of shortness of breath. She was recently discharged from [facility] on 4/11/19 for right upper lobe pneumonia and new onset A.fib with RVR. Blood cultures grew Pseudomonas sensitive to cefipime and she completed a course of outpatient cefipime IV q8h via a PICC line which has since been removed. The patient states her cough has remained since that hospitalization. She is unclear as to how long exactly the cough has persisted and will not clarify the color of sputum production however does state that initially it was productive of sputum however it no longer as. She denies fevers or chills. Upon EMS arrival patient was hypoxic, reported sat in the 50's on room air, she was given solumedrol and started on BiPAP. On presentation to the ED patient received duo nebs and oxygenation improved, she was weaned down off Bipap to home O2 requirement of 3L.     In regards to her atrial fib, the patient was diagnosed during hospitalization at the beginning of 4/1/19. Echo showed pulmonary hypertension, normal EF.JOHNNA was negative for thrombus so patient was cardioverted however went back into A.fib. She was controlled on Amiodarone and diltiazem and discharged on those medications. She was also started on eliquis for  anticoagulation. Patient denies recurrence of palpitations or any chest discomfort. In ED patient was normal sinus rhythm.     In the ED CXR was notable for new left lower lobe consolidation. Labs were notable for elevated WBC. She received a dose of vancomycin and rocephin. Blood cultures were not collected prior to initiation of antibiotics and was admitted to hospital medicine for further evaluation of HCAP and COPD exacerbation.      * No surgery found *      Hospital Course:   Patient was admitted to hospital for right lower lobe pneumonia. Due to her previous infectious disease history, infectious disease was consulted and she was started on meropenem for 5 days. Pulmonology consult was placed for further source control, however the opacity seen on ct was considered too small for intervention. Patient's respiratory status improved overtime with decreasing oxygen requirements. Patient was kept in hospital as she continued to have a leukocytosis. However, no further infectious cause was identified. Patient was seen as stable for discharge.       Consults:   Consults (From admission, onward)        Status Ordering Provider     Inpatient consult to Pulmonology  Once     Provider:  (Not yet assigned)    Completed ANTONIO CERNA     Inpatient consult to [facility]  Once     Provider:  (Not yet assigned)    Completed ANTONIO CERNA        * Left lower lobe pneumonia  Hx of severe COPD with recent hospitalization and recently treated for suspected PNA and pseudomonas infection.  Currently followed by ID.  Plan for CT chest today  - Continue Meropenem D/C vanc and plan to transition to cefepime pending CT  Recurrent, with new opacity found that developed at the end of March  -Pulm consulted, appreciate recs  -addition of gentle diuresis with lasix 20mg QD   -thora attempted not enough fluid  - Repeat BNP pending          Recurrent pneumonia   Appreciate ID recs-  1.   Continue meropenem for now  "to continue pseudomonal and anaerobic coverage (given concern with aspiration).   Currently on Day 5 of meropenem.   2. Sent sputum for respiratory culture to guide therapy  3.  Rec pulmonary consult for eval of RUL findings on CT - pulm feels fungal vs mucus plug and no indication for bronchoscopy at this time.  4.  Suspect patient has had adequate abx at 5d - rec dc tomorrow and monitor  5.  Fungal studies sent  6. Needs Pulm and ID fu as outpt - need fu on fungal studies.     Appreciate pulm recs-  Patient with <5mm pocket of fluid on inspiration on bedside ultrasound.  Relative to CT Chest performed, suspecting effusion has decreased in volume.  Did not feel sampling is warranted or safe given the clinical context.    Follow up with Pulmonary in 6 weeks with repeat CT Chest.  Follow up arranged my clinic.       Question of aspiration PNA- patient passed MBSS, appreciate SLP rec s         Acute on chronic respiratory failure with hypoxia  Acute resp failure with hypoxia  -complete infectious picture not fitting  -CT chest pending   - Treating with Vanc and meropenem with plans to de escalate today      RESOLVED        Oral thrush  -some oral thrush noted  -magic mouthwash PRN         Paroxysmal atrial fibrillation  Patient with a.fib diagnosed earlier this month during hospitalization for pneumonia and bacteremia. S/p failed cardioversion. Now controlled on diltiazem and amiodarone.  - Continuing amiodarone and diltiazem PO  - Anticoagulation with eliquis  -to be followed up in cardiology clinic to discuss amiodarone dosing      Severe malnutrition  - Boost with meals      Cachexia  - Nutrition consulted  - Boost supplementation with meals        COPD exacerbation  - 2/2 pneumonia, please see "left lower lobe pneumonia" above, clinically picture unclear         Essential hypertension  - Continuing home diltiazem, patient normotensive once weaned off Bipap. Suspect hypoxia and stress were leading to increase in " "blood pressure.  - Continue to monitor        Leukocytosis  - Please see "Pneumonia"        Chronic hyponatremia  Stable / now resolved.         Carotid disease, bilateral  Patient with 50-69% stenosis of left carotid artery and < 50% stenosis of right per ultrasound on 6/13/18.     - Continuing asa and statin while inpatient        HLD (hyperlipidemia)  Patient on pravastatin 20mg at home.     - Continuing home statin while inpatient      Final Active Diagnoses:    Diagnosis Date Noted POA    PRINCIPAL PROBLEM:  Acute on chronic respiratory failure with hypoxia and hypercapnia [J96.21, J96.22] 04/02/2019 Yes    Acute pulmonary edema [J81.0] 05/15/2019 Yes    Severe protein-calorie malnutrition [E43] 05/15/2019 Unknown    Paroxysmal atrial fibrillation [I48.0] 04/26/2019 Yes    Essential hypertension [I10] 04/13/2017 Yes    HLD (hyperlipidemia) [E78.5]  Yes      Problems Resolved During this Admission:       Discharged Condition: stable    Patient Instructions:   No discharge procedures on file.      Pending Diagnostic Studies:     None         Medications:  Reconciled Home Medications:      Medication List      ASK your doctor about these medications    acetaminophen 325 MG tablet  Commonly known as:  TYLENOL  Take 650 mg by mouth every 6 (six) hours as needed for Pain.     alendronate 70 MG tablet  Commonly known as:  FOSAMAX  TAKE 1 TABLET BY MOUTH ONCE A WEEK EVERY 7 DAYS, AS DIRECTED     ALPRAZolam 0.25 MG tablet  Commonly known as:  XANAX  Take 0.25 mg by mouth 3 (three) times daily as needed for Anxiety.     amiodarone 400 MG tablet  Commonly known as:  PACERONE  Take 0.5 tablets (200 mg total) by mouth 2 (two) times daily.     apixaban 5 mg Tab  Commonly known as:  ELIQUIS  Take 1 tablet (5 mg total) by mouth 2 (two) times daily.     aspirin 81 MG EC tablet  Commonly known as:  ECOTRIN  Take 81 mg by mouth once daily.     CALCIUM 500 ORAL  Take 1 tablet by mouth 2 (two) times daily.     cetirizine 10 " MG tablet  Commonly known as:  ZYRTEC  Take 10 mg by mouth once daily.     diltiaZEM 240 MG 24 hr capsule  Commonly known as:  CARDIZEM CD  Take 1 capsule (240 mg total) by mouth once daily.     fluticasone-salmeterol 250-50 mcg/dose 250-50 mcg/dose diskus inhaler  Commonly known as:  ADVAIR  Inhale 1 puff into the lungs 2 (two) times daily. Controller     furosemide 40 MG tablet  Commonly known as:  LASIX  Take 1 tablet (40 mg total) by mouth daily as needed (Weigh yourself daily. Please take 1 tablet if you gain 3-5 lbs in one day. Call your doctor.).     latanoprost 0.005 % ophthalmic solution  Place 1 drop into the left eye nightly.     levalbuterol 1.25 mg/3 mL nebulizer solution  Commonly known as:  XOPENEX  Take 1 ampule by nebulization every 8 (eight) hours. Rescue     losartan 100 MG tablet  Commonly known as:  COZAAR  Take 0.5 tablets (50 mg total) by mouth once daily. TAKE 1 TABLET ONE TIME DAILY     multivitamin per tablet  Commonly known as:  THERAGRAN  Take 1 tablet by mouth once daily.     pravastatin 20 MG tablet  Commonly known as:  PRAVACHOL  TAKE 1 TABLET EVERY EVENING     SPIRIVA WITH HANDIHALER 18 mcg inhalation capsule  Generic drug:  tiotropium  Inhale 18 mcg into the lungs once daily.     VENTOLIN HFA 90 mcg/actuation inhaler  Generic drug:  albuterol  INHALE TWO PUFFS BY MOUTH EVERY 6 HOURS AS NEEDED FOR WHEEZING            Indwelling Lines/Drains at time of discharge:   Lines/Drains/Airways          None          Time spent on the discharge of patient: 30 minutes  Patient was seen and examined on the date of discharge and determined to be suitable for discharge.         Rajiv Vargas MD  Department of Hospital Medicine  [facility]

## 2019-05-19 VITALS
BODY MASS INDEX: 14.99 KG/M2 | DIASTOLIC BLOOD PRESSURE: 65 MMHG | WEIGHT: 90 LBS | TEMPERATURE: 96 F | HEIGHT: 65 IN | HEART RATE: 76 BPM | OXYGEN SATURATION: 96 % | SYSTOLIC BLOOD PRESSURE: 140 MMHG | RESPIRATION RATE: 18 BRPM

## 2019-05-19 LAB
ANION GAP SERPL CALC-SCNC: 7 MMOL/L (ref 8–16)
BACTERIA BLD CULT: NORMAL
BUN SERPL-MCNC: 23 MG/DL (ref 8–23)
CALCIUM SERPL-MCNC: 8.3 MG/DL (ref 8.7–10.5)
CHLORIDE SERPL-SCNC: 106 MMOL/L (ref 95–110)
CO2 SERPL-SCNC: 31 MMOL/L (ref 23–29)
CREAT SERPL-MCNC: 0.6 MG/DL (ref 0.5–1.4)
ERYTHROCYTE [DISTWIDTH] IN BLOOD BY AUTOMATED COUNT: 15.6 % (ref 11.5–14.5)
EST. GFR  (AFRICAN AMERICAN): >60 ML/MIN/1.73 M^2
EST. GFR  (NON AFRICAN AMERICAN): >60 ML/MIN/1.73 M^2
GLUCOSE SERPL-MCNC: 118 MG/DL (ref 70–110)
HCT VFR BLD AUTO: 24.4 % (ref 37–48.5)
HGB BLD-MCNC: 7.7 G/DL (ref 12–16)
MAGNESIUM SERPL-MCNC: 1.6 MG/DL (ref 1.6–2.6)
MCH RBC QN AUTO: 30.7 PG (ref 27–31)
MCHC RBC AUTO-ENTMCNC: 31.6 G/DL (ref 32–36)
MCV RBC AUTO: 97 FL (ref 82–98)
PHOSPHATE SERPL-MCNC: 2.9 MG/DL (ref 2.7–4.5)
PLATELET # BLD AUTO: 371 K/UL (ref 150–350)
PMV BLD AUTO: 10 FL (ref 9.2–12.9)
POTASSIUM SERPL-SCNC: 4.9 MMOL/L (ref 3.5–5.1)
RBC # BLD AUTO: 2.51 M/UL (ref 4–5.4)
SODIUM SERPL-SCNC: 144 MMOL/L (ref 136–145)
WBC # BLD AUTO: 7.77 K/UL (ref 3.9–12.7)

## 2019-05-19 PROCEDURE — 99900035 HC TECH TIME PER 15 MIN (STAT): Mod: HCNC

## 2019-05-19 PROCEDURE — 80048 BASIC METABOLIC PNL TOTAL CA: CPT | Mod: HCNC

## 2019-05-19 PROCEDURE — 83735 ASSAY OF MAGNESIUM: CPT | Mod: HCNC

## 2019-05-19 PROCEDURE — 94761 N-INVAS EAR/PLS OXIMETRY MLT: CPT | Mod: HCNC

## 2019-05-19 PROCEDURE — 93010 EKG 12-LEAD: ICD-10-PCS | Mod: HCNC,,, | Performed by: INTERNAL MEDICINE

## 2019-05-19 PROCEDURE — 93010 ELECTROCARDIOGRAM REPORT: CPT | Mod: HCNC,,, | Performed by: INTERNAL MEDICINE

## 2019-05-19 PROCEDURE — 85027 COMPLETE CBC AUTOMATED: CPT | Mod: HCNC

## 2019-05-19 PROCEDURE — 94668 MNPJ CHEST WALL SBSQ: CPT | Mod: HCNC

## 2019-05-19 PROCEDURE — 25000003 PHARM REV CODE 250: Mod: HCNC | Performed by: STUDENT IN AN ORGANIZED HEALTH CARE EDUCATION/TRAINING PROGRAM

## 2019-05-19 PROCEDURE — 25000003 PHARM REV CODE 250: Mod: HCNC | Performed by: INTERNAL MEDICINE

## 2019-05-19 PROCEDURE — 94640 AIRWAY INHALATION TREATMENT: CPT | Mod: HCNC

## 2019-05-19 PROCEDURE — 25000003 PHARM REV CODE 250: Mod: HCNC | Performed by: HOSPITALIST

## 2019-05-19 PROCEDURE — 99239 HOSP IP/OBS DSCHRG MGMT >30: CPT | Mod: HCNC,GC,, | Performed by: INTERNAL MEDICINE

## 2019-05-19 PROCEDURE — 36415 COLL VENOUS BLD VENIPUNCTURE: CPT | Mod: HCNC

## 2019-05-19 PROCEDURE — 84100 ASSAY OF PHOSPHORUS: CPT | Mod: HCNC

## 2019-05-19 PROCEDURE — 27000221 HC OXYGEN, UP TO 24 HOURS: Mod: HCNC

## 2019-05-19 PROCEDURE — 63600175 PHARM REV CODE 636 W HCPCS: Mod: HCNC | Performed by: STUDENT IN AN ORGANIZED HEALTH CARE EDUCATION/TRAINING PROGRAM

## 2019-05-19 PROCEDURE — 25000242 PHARM REV CODE 250 ALT 637 W/ HCPCS: Mod: HCNC | Performed by: STUDENT IN AN ORGANIZED HEALTH CARE EDUCATION/TRAINING PROGRAM

## 2019-05-19 PROCEDURE — 93005 ELECTROCARDIOGRAM TRACING: CPT | Mod: HCNC

## 2019-05-19 PROCEDURE — 99239 PR HOSPITAL DISCHARGE DAY,>30 MIN: ICD-10-PCS | Mod: HCNC,GC,, | Performed by: INTERNAL MEDICINE

## 2019-05-19 PROCEDURE — 25000242 PHARM REV CODE 250 ALT 637 W/ HCPCS: Mod: HCNC | Performed by: HOSPITALIST

## 2019-05-19 RX ORDER — LEVOFLOXACIN 750 MG/1
750 TABLET ORAL DAILY
Qty: 2 TABLET | Refills: 0 | Status: SHIPPED | OUTPATIENT
Start: 2019-05-20 | End: 2019-05-22

## 2019-05-19 RX ORDER — ALBUTEROL SULFATE 90 UG/1
2 AEROSOL, METERED RESPIRATORY (INHALATION) EVERY 6 HOURS PRN
Status: DISCONTINUED | OUTPATIENT
Start: 2019-05-19 | End: 2019-05-19 | Stop reason: HOSPADM

## 2019-05-19 RX ORDER — GUAIFENESIN 600 MG/1
600 TABLET, EXTENDED RELEASE ORAL 2 TIMES DAILY
COMMUNITY
Start: 2019-05-19

## 2019-05-19 RX ORDER — ALBUTEROL SULFATE 90 UG/1
2 AEROSOL, METERED RESPIRATORY (INHALATION) EVERY 6 HOURS PRN
Qty: 1 INHALER | Refills: 2 | Status: SHIPPED | OUTPATIENT
Start: 2019-05-19 | End: 2019-05-19

## 2019-05-19 RX ORDER — PREDNISONE 20 MG/1
40 TABLET ORAL DAILY
Qty: 2 TABLET | Refills: 0 | Status: SHIPPED | OUTPATIENT
Start: 2019-05-20 | End: 2019-05-21

## 2019-05-19 RX ORDER — AMIODARONE HYDROCHLORIDE 200 MG/1
200 TABLET ORAL DAILY
Qty: 30 TABLET | Refills: 3 | Status: SHIPPED | OUTPATIENT
Start: 2019-05-19

## 2019-05-19 RX ORDER — ALBUTEROL SULFATE 90 UG/1
2 AEROSOL, METERED RESPIRATORY (INHALATION) EVERY 6 HOURS PRN
Qty: 1 INHALER | Refills: 3 | Status: SHIPPED | OUTPATIENT
Start: 2019-05-19

## 2019-05-19 RX ADMIN — LEVOFLOXACIN 750 MG: 750 TABLET, FILM COATED ORAL at 11:05

## 2019-05-19 RX ADMIN — FUROSEMIDE 40 MG: 40 TABLET ORAL at 11:05

## 2019-05-19 RX ADMIN — FLUTICASONE FUROATE AND VILANTEROL TRIFENATATE 1 PUFF: 100; 25 POWDER RESPIRATORY (INHALATION) at 11:05

## 2019-05-19 RX ADMIN — IPRATROPIUM BROMIDE AND ALBUTEROL SULFATE 3 ML: .5; 3 SOLUTION RESPIRATORY (INHALATION) at 12:05

## 2019-05-19 RX ADMIN — GUAIFENESIN 600 MG: 600 TABLET, EXTENDED RELEASE ORAL at 10:05

## 2019-05-19 RX ADMIN — TIOTROPIUM BROMIDE 18 MCG: 18 CAPSULE ORAL; RESPIRATORY (INHALATION) at 11:05

## 2019-05-19 RX ADMIN — DILTIAZEM HYDROCHLORIDE 240 MG: 120 CAPSULE, COATED, EXTENDED RELEASE ORAL at 10:05

## 2019-05-19 RX ADMIN — CETIRIZINE HYDROCHLORIDE 5 MG: 5 TABLET ORAL at 11:05

## 2019-05-19 RX ADMIN — PREDNISONE 40 MG: 20 TABLET ORAL at 11:05

## 2019-05-19 RX ADMIN — IPRATROPIUM BROMIDE AND ALBUTEROL SULFATE 3 ML: .5; 3 SOLUTION RESPIRATORY (INHALATION) at 09:05

## 2019-05-19 RX ADMIN — APIXABAN 5 MG: 5 TABLET, FILM COATED ORAL at 11:05

## 2019-05-19 RX ADMIN — MAGNESIUM OXIDE TAB 400 MG (241.3 MG ELEMENTAL MG) 400 MG: 400 (241.3 MG) TAB at 10:05

## 2019-05-19 RX ADMIN — NYSTATIN 500000 UNITS: 500000 SUSPENSION ORAL at 08:05

## 2019-05-19 RX ADMIN — AMIODARONE HYDROCHLORIDE 200 MG: 200 TABLET ORAL at 11:05

## 2019-05-19 RX ADMIN — POTASSIUM & SODIUM PHOSPHATES POWDER PACK 280-160-250 MG 1 PACKET: 280-160-250 PACK at 08:05

## 2019-05-19 RX ADMIN — LACTULOSE 15 G: 20 SOLUTION ORAL at 10:05

## 2019-05-19 RX ADMIN — LOSARTAN POTASSIUM 50 MG: 50 TABLET, FILM COATED ORAL at 11:05

## 2019-05-19 NOTE — NURSING
Discharged from unit via wheelchair and [facility]'s transport. Patient destined for home, with [facility]. All personal belongings taken with patient.

## 2019-05-19 NOTE — PLAN OF CARE
Problem: Fall Injury Risk  Goal: Absence of Fall and Fall-Related Injury  Outcome: Ongoing (interventions implemented as appropriate)  Patient remains free from falls.     Problem: Adult Inpatient Plan of Care  Goal: Plan of Care Review  Outcome: Ongoing (interventions implemented as appropriate)  No acute events or changes overnight. No episodes of increased anxiety or shortness of breath. Patient excited for possible discharge Monday.

## 2019-05-19 NOTE — DISCHARGE SUMMARY
[facility] Hospital Medicine  Discharge Summary      Patient Name: Latasha Perez  MRN: [id number]  Admission Date: 5/14/2019  Hospital Length of Stay: 4 days  Discharge Date and Time:  05/19/2019 3:03 PM  Attending Physician: Brittany Johnson MD   Discharging Provider: Janes Finn MD  Primary Care Provider: Pollo Oneal MD  [facility] Medicine Team: [facility] HOSP MED 2 Janes Finn MD    HPI:   72 yo F with PMH COPD (3-4 L home O2), tobacco abuse, HTN, HLD, h/o AFib RVR (s/p cardioversion 4/5 but returned to Afib, on Amio, Eliquis), h/o pseudomonal pneumonia and bacteremia who presents with SOB from SNF. She had presented to the ER 5 days prior with hypoxic respiratory failure which was attributed to oxygen delivery system probably not working properly at her facility because her respiratory failure resolved with supplemental oxygen via NC.  She had no consolidation on CXR at that time.    She returned to the ER on 5/14 because shortness of breath returned.   Upon EMS arrival to her facility, the patient was tachypneic, hypoxic in the 60s on room air. She arrived on CPAP/BiPAP.  VBG on arrival was 7.35/59.  CXR revealed opacity in R L apex and bibiasilar airspace disease concerning of PNA or aspiration.  BNP was 122.  She was given a dose of cefepime and lasix by the ER.      She was recently admitted April 2019 for acute on chronic respiratory failure 2/2 COPD exacerbation and pseudomonal pneumonia with bacteremia (1/4 blood cultures).  She was discharged on cefepime 2g q8 until 4/18/19.  Hospital course at that time was complicated by Afib RVR, s/p cardioversion, but returned to AFib, discharged also on amiodarone, diltiazem, and Eliquis.      She describes she got a one time dose of penicillin once during a dental procedure and had generalized swelling but no respiratory compromise.    She wishes to be full code    * No surgery found *      Hospital Course:   Pt  admitted for acute on chronic respiratory failure with hypoxia. She was previously in a SNF. Initially in ED, O2 sat recovered to baseline with home dose supplemental O2. CXR concerning for PNa. Started on vanc zosyn. Transitioned to PO levoquin. No adverse reaction was noted with the levoquin. During admission, pt frequently endorsed dyspnea and requested O2 be increased. Her O2 sat always remained in an appropriate range given her COPD. Pt found to take benzos prn at home for anxiety and this was continued here with some relief noted. pulm consulted given her extensive, advanced copd. Recommended reducing sedating meds and aggressive diuresis. Her home lasix dose [oral] was restarted as she had experienced some symptomatic hypotension a few days prior. Steroids also started for a 5 day course. Pt wanted to go home instead of SNF on dc. Home health arranged. Pt significantly improved over course of admission. Continued to require 3-5 L NC. Upon discharge, pt has 2 more days of her levoquin and 1 more day of her steroid. She has an upcoming pulm clinic visit in 3 weeks and will have a pcp appointment within a week.      Consults:   Consults (From admission, onward)        Status Ordering Provider     Inpatient consult to Pulmonology  Once     Provider:  (Not yet assigned)    Completed ANTONIO CERNA     Inpatient consult to Spiritual Care  Once     Provider:  (Not yet assigned)    Completed ANTONIO CERNA          No new Assessment & Plan notes have been filed under this hospital service since the last note was generated.  Service: Hospital Medicine    Final Active Diagnoses:    Diagnosis Date Noted POA    PRINCIPAL PROBLEM:  Acute on chronic respiratory failure with hypoxia and hypercapnia [J96.21, J96.22] 04/02/2019 Yes    Acute pulmonary edema [J81.0] 05/15/2019 Yes    Severe protein-calorie malnutrition [E43] 05/15/2019 Unknown    Paroxysmal atrial fibrillation [I48.0] 04/26/2019 Yes     Essential hypertension [I10] 04/13/2017 Yes    HLD (hyperlipidemia) [E78.5]  Yes      Problems Resolved During this Admission:       Discharged Condition: stable    Disposition: Home or Self Care    Follow Up:  Follow-up Information     Pollo Oneal MD.    Specialty:  Internal Medicine  Why:  please call to schedule appt, unable to make on Sunday  Contact information:  [address] [place] [address]  [phone]                 Patient Instructions:   No discharge procedures on file.    Significant Diagnostic Studies: Labs: All labs within the past 24 hours have been reviewed    Pending Diagnostic Studies:     None         Medications:  Reconciled Home Medications:      Medication List      START taking these medications    guaiFENesin 600 mg 12 hr tablet  Commonly known as:  MUCINEX  Take 1 tablet (600 mg total) by mouth 2 (two) times daily.     levoFLOXacin 750 MG tablet  Commonly known as:  LEVAQUIN  Take 1 tablet (750 mg total) by mouth once daily. Take 1 pill Monday 5/20/19 and take last pill Tuesday 5/21/19 for 2 days  Start taking on:  5/20/2019     predniSONE 20 MG tablet  Commonly known as:  DELTASONE  Take 2 tablets (40 mg total) by mouth once daily. Take 2 tablets [total of 40 mg] on Monday 5/20/19 for 1 day  Start taking on:  5/20/2019        CHANGE how you take these medications    albuterol 90 mcg/actuation inhaler  Commonly known as:  PROVENTIL/VENTOLIN HFA  Inhale 2 puffs into the lungs every 6 (six) hours as needed for Wheezing. Rescue  What changed:    · how to take this  · reasons to take this  · additional instructions     alendronate 70 MG tablet  Commonly known as:  FOSAMAX  TAKE 1 TABLET BY MOUTH ONCE A WEEK EVERY 7 DAYS, AS DIRECTED  What changed:  See the new instructions.     amiodarone 200 MG Tab  Commonly known as:  PACERONE  Take 1 tablet (200 mg total) by mouth once daily.  What changed:    · medication strength  · when to take this     losartan 100 MG tablet  Commonly  known as:  COZAAR  Take 0.5 tablets (50 mg total) by mouth once daily. TAKE 1 TABLET ONE TIME DAILY  What changed:  additional instructions        CONTINUE taking these medications    acetaminophen 325 MG tablet  Commonly known as:  TYLENOL  Take 650 mg by mouth every 6 (six) hours as needed for Pain.     apixaban 5 mg Tab  Commonly known as:  ELIQUIS  Take 1 tablet (5 mg total) by mouth 2 (two) times daily.     aspirin 81 MG EC tablet  Commonly known as:  ECOTRIN  Take 81 mg by mouth once daily.     CALCIUM 500 ORAL  Take 1 tablet by mouth 2 (two) times daily.     cetirizine 10 MG tablet  Commonly known as:  ZYRTEC  Take 10 mg by mouth once daily.     diltiaZEM 240 MG 24 hr capsule  Commonly known as:  CARDIZEM CD  Take 1 capsule (240 mg total) by mouth once daily.     fluticasone-salmeterol 250-50 mcg/dose 250-50 mcg/dose diskus inhaler  Commonly known as:  ADVAIR  Inhale 1 puff into the lungs 2 (two) times daily. Controller     furosemide 40 MG tablet  Commonly known as:  LASIX  Take 1 tablet (40 mg total) by mouth daily as needed (Weigh yourself daily. Please take 1 tablet if you gain 3-5 lbs in one day. Call your doctor.).     latanoprost 0.005 % ophthalmic solution  Place 1 drop into the left eye nightly.     levalbuterol 1.25 mg/3 mL nebulizer solution  Commonly known as:  XOPENEX  Take 1 ampule by nebulization every 8 (eight) hours. Rescue     multivitamin per tablet  Commonly known as:  THERAGRAN  Take 1 tablet by mouth once daily.     pravastatin 20 MG tablet  Commonly known as:  PRAVACHOL  TAKE 1 TABLET EVERY EVENING     SPIRIVA WITH HANDIHALER 18 mcg inhalation capsule  Generic drug:  tiotropium  Inhale 18 mcg into the lungs once daily.        STOP taking these medications    ALPRAZolam 0.25 MG tablet  Commonly known as:  XANAX            Indwelling Lines/Drains at time of discharge:   Lines/Drains/Airways          None          Time spent on the discharge of patient: 35 minutes  Patient was seen and  examined on the date of discharge and determined to be suitable for discharge.         Janes Finn MD  Department of Hospital Medicine  [facility]

## 2019-05-19 NOTE — PLAN OF CARE
[facility]    HOME HEALTH ORDERS  FACE TO FACE ENCOUNTER    Patient Name: Latasha Perez  YOB: 1945    PCP: Pollo Oneal MD   PCP Address: [address] / [place] [address]  PCP Phone Number: [phone]  PCP Fax: [phone]    Encounter Date: 05/19/2019    Admit to Home Health    Diagnoses:  Active Hospital Problems    Diagnosis  POA    *Acute on chronic respiratory failure with hypoxia and hypercapnia [J96.21, J96.22]  Yes    Acute pulmonary edema [J81.0]  Yes    Severe protein-calorie malnutrition [E43]  Unknown    Paroxysmal atrial fibrillation [I48.0]  Yes    Essential hypertension [I10]  Yes    HLD (hyperlipidemia) [E78.5]  Yes      Resolved Hospital Problems   No resolved problems to display.       Future Appointments   Date Time Provider Department Center   6/3/2019 11:30 AM Claudette Garcia MD [facility] ID [address]   6/14/2019 11:45 AM [facility] OIC-CT1 500 LB LIMIT [facility]   6/14/2019  1:00 PM Tom Villarreal MD [facility] PULMSVC [address]           I have seen and examined this patient face to face today. My clinical findings that support the need for the home health skilled services and home bound status are the following:  Medical restrictions requiring assistance of another human to leave home due to  Home oxygen requirement.    Allergies:  Review of patient's allergies indicates:   Allergen Reactions    Meropenem Itching and Rash     Rash started to appear around Day 3 of therapy.     TOLERATES PIP/TAZO. DOES NOT HAVE PCN ALLERGY    Biaxin [clarithromycin] Rash    Codeine Rash    Doxycycline Rash    Levaquin [levofloxacin] Rash       Diet: regular diet    Activities: activity as tolerated    Nursing:   SN to complete comprehensive assessment including routine vital signs. Instruct on disease process and s/s of complications to report to MD. Review/verify medication list sent home with the patient at time of discharge  and instruct  patient/caregiver as needed. Frequency may be adjusted depending on start of care date.    Notify MD if SBP > 160 or < 90; DBP > 90 or < 50; HR > 120 or < 50; Temp > 101; Other:         CONSULTS:    Physical Therapy to evaluate and treat. Evaluate for home safety and equipment needs; Establish/upgrade home exercise program. Perform / instruct on therapeutic exercises, gait training, transfer training, and Range of Motion.  Occupational Therapy to evaluate and treat. Evaluate home environment for safety and equipment needs. Perform/Instruct on transfers, ADL training, ROM, and therapeutic exercises.  Aide to provide assistance with personal care, ADLs, and vital signs.    MISCELLANEOUS CARE:  Home Oxygen:  No change    WOUND CARE ORDERS  n/a      Medications: Review discharge medications with patient and family and provide education.      Current Discharge Medication List      CONTINUE these medications which have CHANGED    Details   amiodarone (PACERONE) 200 MG Tab Take 1 tablet (200 mg total) by mouth once daily.  Qty: 30 tablet, Refills: 3         CONTINUE these medications which have NOT CHANGED    Details   acetaminophen (TYLENOL) 325 MG tablet Take 650 mg by mouth every 6 (six) hours as needed for Pain.      alendronate (FOSAMAX) 70 MG tablet TAKE 1 TABLET BY MOUTH ONCE A WEEK EVERY 7 DAYS, AS DIRECTED  Qty: 12 tablet, Refills: 0      apixaban (ELIQUIS) 5 mg Tab Take 1 tablet (5 mg total) by mouth 2 (two) times daily.      aspirin (ECOTRIN) 81 MG EC tablet Take 81 mg by mouth once daily.      calcium carbonate (CALCIUM 500 ORAL) Take 1 tablet by mouth 2 (two) times daily.       cetirizine (ZYRTEC) 10 MG tablet Take 10 mg by mouth once daily.      diltiaZEM (CARDIZEM CD) 240 MG 24 hr capsule Take 1 capsule (240 mg total) by mouth once daily.  Qty: 30 capsule, Refills: 11      fluticasone-salmeterol 250-50 mcg/dose (ADVAIR) 250-50 mcg/dose diskus inhaler Inhale 1 puff into the lungs 2 (two) times daily.  Controller  Qty: 180 each, Refills: 12      furosemide (LASIX) 40 MG tablet Take 1 tablet (40 mg total) by mouth daily as needed (Weigh yourself daily. Please take 1 tablet if you gain 3-5 lbs in one day. Call your doctor.).  Qty: 30 tablet, Refills: 11      latanoprost 0.005 % ophthalmic solution Place 1 drop into the left eye nightly.  Qty: 2.5 mL, Refills: 6      levalbuterol (XOPENEX) 1.25 mg/3 mL nebulizer solution Take 1 ampule by nebulization every 8 (eight) hours. Rescue      losartan (COZAAR) 100 MG tablet Take 0.5 tablets (50 mg total) by mouth once daily. TAKE 1 TABLET ONE TIME DAILY  Qty: 15 tablet, Refills: 0    Associated Diagnoses: Essential hypertension      multivitamin (THERAGRAN) per tablet Take 1 tablet by mouth once daily.      pravastatin (PRAVACHOL) 20 MG tablet TAKE 1 TABLET EVERY EVENING  Qty: 90 tablet, Refills: 12    Associated Diagnoses: Hyperlipidemia, unspecified hyperlipidemia type      SPIRIVA WITH HANDIHALER 18 mcg inhalation capsule Inhale 18 mcg into the lungs once daily.   Refills: 3      VENTOLIN HFA 90 mcg/actuation inhaler INHALE TWO PUFFS BY MOUTH EVERY 6 HOURS AS NEEDED FOR WHEEZING  Qty: 18 each, Refills: 6    Comments: Please consider 90 day supplies to promote better adherence         STOP taking these medications       ALPRAZolam (XANAX) 0.25 MG tablet Comments:   Reason for Stopping:               I certify that this patient is confined to her home and needs intermittent skilled nursing care, physical therapy and occupational therapy.

## 2019-05-19 NOTE — PLAN OF CARE
Pt being discharged home today, to resume [facility].  CM sent referral through [facility].  Pt's family is providing transportation home.  Pt ready to discharge.

## 2019-05-19 NOTE — PLAN OF CARE
CM put in for transportation with wheelchair van and O2, through [facility], [id number].  Pt will be ready to discharge when ride arrives.

## 2019-05-20 ENCOUNTER — TELEPHONE (OUTPATIENT)
Dept: FAMILY MEDICINE | Facility: CLINIC | Age: 74
End: 2019-05-20

## 2019-05-20 DIAGNOSIS — J44.1 COPD EXACERBATION: Primary | ICD-10-CM

## 2019-05-20 LAB
BACTERIA BLD CULT: NORMAL
BACTERIA BLD CULT: NORMAL

## 2019-05-20 NOTE — TELEPHONE ENCOUNTER
----- Message from Luisana Gilliland sent at 5/20/2019 11:46 AM CDT -----  Contact: [facility]  Type: Needs Medical Advice    Who Called:  [facility]  Symptoms (please be specific):  toni  How long has patient had these symptoms:  toni  Pharmacy name and phone #:  [facility]  Best Call Back Number: [phone]  Additional Information: Patient needs all of her medications refilled. Medications were misplaced once transferred by ems. Patient needs a nebulizer machine. Please call [facility] discuss in further detail. Thank you!

## 2019-05-20 NOTE — PHYSICIAN QUERY
"PT Name: Latasha Perez  MR #: [id number]    Physician Query Form - Heart  Condition Clarification     CDS/: Jacquie Villalpando RN CCDS              Contact information:[email or web address]  This form is a permanent document in the medical record.     Query Date: May 20, 2019    By submitting this query, we are merely seeking further clarification of documentation. Please utilize your independent clinical judgment when addressing the question(s) below.    The medical record contains the following   Indicators     Supporting Clinical Findings Location in Medical Record   x  Lab 5/11   x EF Last ECHO EF 55% with concentric remodeling Lab    x Radiology findings Worsening bibasilar airspace disease worrisome for pneumonia or aspiration in the setting of sepsis.    Worsening small bilateral pleural effusions.    Severe emphysema and stable irregular opacity in the right upper lobe. CXR 5/14    Echo Results Last ECHO EF 55% with concentric remodeling     "Ascites" documented      "SOB" or "LOPEZ" documented     x "Hypoxia" documented Acute respiratory failure with hypoxia DS   x Heart Failure documented bilateral pleural effusions in the setting of elevated BNP and diastolic dysfunction   PN 5/18   x "Edema" documented LE's +2 pitting edema  Acute pulmonary edema H&P  DS   x Diuretics/Meds Lasix 80 IV mg x1  Lasix 40 IV mg BID  MAR    Treatment:     x Other:  COPD exacerbation seems less likely.  Likely overloaded with elevated BNP >100. Recommend diuresis for optimization of respiratory status.   - Agree with completion of antibiotic therapy, however suspect she needs more aggressive diuresis for volume overload.  Pulmonary  CN   Heart failure (HF) can be acute, chronic or both. It is generally further specificed as systolic, diastolic, or combined. Lastly, it is important to identify an underlying etiology if known or suspected.     Common clues to acute exacerbation:  Rapidly progressive symptoms (w/in 2 " weeks of presentation), using IV diuretics to treat, using supplemental O2, pulmonary edema on Xray, MI w/in 4 weeks, and/or BNP >500    Systolic Heart Failure: is defined as chart documentation of a left ventricular ejection fraction (LVEF) less than 40%     Diastolic Heart Failure: is defined as a left ventricular ejection fraction (LVEF) greater than 40%   +      Evidence of diastolic dysfunction on echocardiography OR    Right heart catheterization wedge pressure above 12 mm Hg OR    Left heart catheterization left ventricular end diastolic pressure 18 mm Hg or above.    References: *American Heart Association    The clinical guidelines noted below are only system guidelines, and do not replace the providers clinical judgment.     Provider, please specify the diagnosis associated with above clinical findings    [ X  ] Acute on Chronic Diastolic Heart Failure -    Pre-existing diastoic HF diagnosis.  EF > 40%  and acute HF symptoms documented                                 [   ] Chronic Diastolic Heart Failure - Pre-existing diastolic HF diagnosis.  EF > 40%  without  acute HF symptoms documented  [   ] Other Type of Heart Failure (please specify type): _________________________  [   ] Heart Failure Ruled Out  [   ] Other (please specify): ___________________________________  [  ] Clinically Undetermined                          Please document in your progress notes daily for the duration of treatment until resolved and include in your discharge summary.

## 2019-05-22 ENCOUNTER — TELEPHONE (OUTPATIENT)
Dept: FAMILY MEDICINE | Facility: CLINIC | Age: 74
End: 2019-05-22

## 2019-05-22 DIAGNOSIS — J43.9 PULMONARY EMPHYSEMA, UNSPECIFIED EMPHYSEMA TYPE: ICD-10-CM

## 2019-05-22 DIAGNOSIS — R64 CACHEXIA: ICD-10-CM

## 2019-05-22 DIAGNOSIS — J81.0 ACUTE PULMONARY EDEMA: Primary | ICD-10-CM

## 2019-05-22 RX ORDER — FLUTICASONE PROPIONATE AND SALMETEROL 250; 50 UG/1; UG/1
1 POWDER RESPIRATORY (INHALATION) 2 TIMES DAILY
Qty: 180 EACH | Refills: 5 | Status: SHIPPED | OUTPATIENT
Start: 2019-05-22

## 2019-05-22 NOTE — TELEPHONE ENCOUNTER
----- Message from Alma Swain sent at 5/22/2019 11:56 AM CDT -----  Contact: Masoud portillo  Pt nurse calling to speak to a nurse regarding pt misplacing her inhaler. [phone]

## 2019-05-24 ENCOUNTER — HOSPITAL ENCOUNTER (OUTPATIENT)
Dept: RADIOLOGY | Facility: HOSPITAL | Age: 74
Discharge: HOME | End: 2019-05-24
Attending: NURSE PRACTITIONER
Payer: MEDICARE

## 2019-05-24 ENCOUNTER — OFFICE VISIT (OUTPATIENT)
Dept: FAMILY MEDICINE | Facility: CLINIC | Age: 74
End: 2019-05-24
Payer: MEDICARE

## 2019-05-24 VITALS
DIASTOLIC BLOOD PRESSURE: 75 MMHG | OXYGEN SATURATION: 88 % | HEART RATE: 70 BPM | TEMPERATURE: 98 F | SYSTOLIC BLOOD PRESSURE: 102 MMHG

## 2019-05-24 DIAGNOSIS — D64.9 NORMOCYTIC ANEMIA: ICD-10-CM

## 2019-05-24 DIAGNOSIS — E78.5 HYPERLIPIDEMIA, UNSPECIFIED HYPERLIPIDEMIA TYPE: ICD-10-CM

## 2019-05-24 DIAGNOSIS — J18.9 PNEUMONIA OF LEFT LOWER LOBE DUE TO INFECTIOUS ORGANISM: ICD-10-CM

## 2019-05-24 DIAGNOSIS — J81.0 ACUTE PULMONARY EDEMA: ICD-10-CM

## 2019-05-24 DIAGNOSIS — J96.22 ACUTE ON CHRONIC RESPIRATORY FAILURE WITH HYPOXIA AND HYPERCAPNIA: ICD-10-CM

## 2019-05-24 DIAGNOSIS — I48.0 PAROXYSMAL ATRIAL FIBRILLATION: ICD-10-CM

## 2019-05-24 DIAGNOSIS — J43.9 PULMONARY EMPHYSEMA, UNSPECIFIED EMPHYSEMA TYPE: ICD-10-CM

## 2019-05-24 DIAGNOSIS — J96.21 ACUTE ON CHRONIC RESPIRATORY FAILURE WITH HYPOXIA AND HYPERCAPNIA: ICD-10-CM

## 2019-05-24 DIAGNOSIS — Z09 HOSPITAL DISCHARGE FOLLOW-UP: ICD-10-CM

## 2019-05-24 DIAGNOSIS — R60.0 BILATERAL LOWER EXTREMITY EDEMA: ICD-10-CM

## 2019-05-24 DIAGNOSIS — I10 ESSENTIAL HYPERTENSION: ICD-10-CM

## 2019-05-24 DIAGNOSIS — Z09 HOSPITAL DISCHARGE FOLLOW-UP: Primary | ICD-10-CM

## 2019-05-24 DIAGNOSIS — B37.0 ORAL THRUSH: ICD-10-CM

## 2019-05-24 DIAGNOSIS — I77.9 BILATERAL CAROTID ARTERY DISEASE, UNSPECIFIED TYPE: ICD-10-CM

## 2019-05-24 PROBLEM — I48.91 ATRIAL FIBRILLATION WITH RVR: Status: RESOLVED | Noted: 2019-04-02 | Resolved: 2019-05-24

## 2019-05-24 PROCEDURE — 99499 UNLISTED E&M SERVICE: CPT | Mod: S$GLB,,, | Performed by: NURSE PRACTITIONER

## 2019-05-24 PROCEDURE — 71046 X-RAY EXAM CHEST 2 VIEWS: CPT | Mod: TC,HCNC,FY,PO

## 2019-05-24 PROCEDURE — 1101F PT FALLS ASSESS-DOCD LE1/YR: CPT | Mod: HCNC,CPTII,S$GLB, | Performed by: NURSE PRACTITIONER

## 2019-05-24 PROCEDURE — 3078F DIAST BP <80 MM HG: CPT | Mod: HCNC,CPTII,S$GLB, | Performed by: NURSE PRACTITIONER

## 2019-05-24 PROCEDURE — 3074F SYST BP LT 130 MM HG: CPT | Mod: HCNC,CPTII,S$GLB, | Performed by: NURSE PRACTITIONER

## 2019-05-24 PROCEDURE — 99214 OFFICE O/P EST MOD 30 MIN: CPT | Mod: HCNC,S$GLB,, | Performed by: NURSE PRACTITIONER

## 2019-05-24 PROCEDURE — 71046 XR CHEST PA AND LATERAL: ICD-10-PCS | Mod: 26,HCNC,, | Performed by: RADIOLOGY

## 2019-05-24 PROCEDURE — 99999 PR PBB SHADOW E&M-EST. PATIENT-LVL V: CPT | Mod: PBBFAC,HCNC,, | Performed by: NURSE PRACTITIONER

## 2019-05-24 PROCEDURE — 3078F PR MOST RECENT DIASTOLIC BLOOD PRESSURE < 80 MM HG: ICD-10-PCS | Mod: HCNC,CPTII,S$GLB, | Performed by: NURSE PRACTITIONER

## 2019-05-24 PROCEDURE — 1101F PR PT FALLS ASSESS DOC 0-1 FALLS W/OUT INJ PAST YR: ICD-10-PCS | Mod: HCNC,CPTII,S$GLB, | Performed by: NURSE PRACTITIONER

## 2019-05-24 PROCEDURE — 99999 PR PBB SHADOW E&M-EST. PATIENT-LVL V: ICD-10-PCS | Mod: PBBFAC,HCNC,, | Performed by: NURSE PRACTITIONER

## 2019-05-24 PROCEDURE — 3074F PR MOST RECENT SYSTOLIC BLOOD PRESSURE < 130 MM HG: ICD-10-PCS | Mod: HCNC,CPTII,S$GLB, | Performed by: NURSE PRACTITIONER

## 2019-05-24 PROCEDURE — 99499 RISK ADDL DX/OHS AUDIT: ICD-10-PCS | Mod: S$GLB,,, | Performed by: NURSE PRACTITIONER

## 2019-05-24 PROCEDURE — 71046 X-RAY EXAM CHEST 2 VIEWS: CPT | Mod: 26,HCNC,, | Performed by: RADIOLOGY

## 2019-05-24 PROCEDURE — 99214 PR OFFICE/OUTPT VISIT, EST, LEVL IV, 30-39 MIN: ICD-10-PCS | Mod: HCNC,S$GLB,, | Performed by: NURSE PRACTITIONER

## 2019-05-24 RX ORDER — CLOTRIMAZOLE 10 MG/1
10 LOZENGE ORAL; TOPICAL
Qty: 150 TABLET | Refills: 0 | Status: SHIPPED | OUTPATIENT
Start: 2019-05-24 | End: 2019-06-23

## 2019-05-24 RX ORDER — TIOTROPIUM BROMIDE 18 UG/1
18 CAPSULE ORAL; RESPIRATORY (INHALATION) DAILY
Qty: 100 CAPSULE | Refills: 3 | Status: SHIPPED | OUTPATIENT
Start: 2019-05-24

## 2019-05-24 NOTE — PROGRESS NOTES
History of Present Illness   Latasha Perez is a 73 y.o. woman with medical history as listed below who presents today with her niece for hospital follow-up, acute pulmonary edema and pneumonia. Please see HPI and hospital course per discharge summary below.    HPI:   72 yo F with PMH COPD (3-4 L home O2), tobacco abuse, HTN, HLD, h/o AFib RVR (s/p cardioversion 4/5 but returned to Afib, on Amio, Eliquis), h/o pseudomonal pneumonia and bacteremia who presents with SOB from SNF. She had presented to the ER 5 days prior with hypoxic respiratory failure which was attributed to oxygen delivery system probably not working properly at her facility because her respiratory failure resolved with supplemental oxygen via NC.  She had no consolidation on CXR at that time.     She returned to the ER on 5/14 because shortness of breath returned.   Upon EMS arrival to her facility, the patient was tachypneic, hypoxic in the 60s on room air. She arrived on CPAP/BiPAP.  VBG on arrival was 7.35/59.  CXR revealed opacity in R L apex and bibiasilar airspace disease concerning of PNA or aspiration.  BNP was 122.  She was given a dose of cefepime and lasix by the ER.       She was recently admitted April 2019 for acute on chronic respiratory failure 2/2 COPD exacerbation and pseudomonal pneumonia with bacteremia (1/4 blood cultures).  She was discharged on cefepime 2g q8 until 4/18/19.  Hospital course at that time was complicated by Afib RVR, s/p cardioversion, but returned to AFib, discharged also on amiodarone, diltiazem, and Eliquis.       She describes she got a one time dose of penicillin once during a dental procedure and had generalized swelling but no respiratory compromise.     She wishes to be full code     * No surgery found *       Hospital Course:   Pt admitted for acute on chronic respiratory failure with hypoxia. She was previously in a SNF. Initially in ED, O2 sat recovered to baseline with home dose supplemental O2.  CXR concerning for PNa. Started on vanc zosyn. Transitioned to PO levoquin. No adverse reaction was noted with the levoquin. During admission, pt frequently endorsed dyspnea and requested O2 be increased. Her O2 sat always remained in an appropriate range given her COPD. Pt found to take benzos prn at home for anxiety and this was continued here with some relief noted. pulm consulted given her extensive, advanced copd. Recommended reducing sedating meds and aggressive diuresis. Her home lasix dose [oral] was restarted as she had experienced some symptomatic hypotension a few days prior. Steroids also started for a 5 day course. Pt wanted to go home instead of SNF on dc. Home health arranged. Pt significantly improved over course of admission. Continued to require 3-5 L NC. Upon discharge, pt has 2 more days of her levoquin and 1 more day of her steroid. She has an upcoming pulm clinic visit in 3 weeks and will have a pcp appointment within a week.      Post Discharge:  She has actually had four admissions in 2 months related to her chronic respiratory disease and Afib with RVR. She has not seen her PCP in over one year. She does have home health established at time of discharge as she did not want to return to SNF. Today, she reports persistent shortness of breath with weakness and fatigue and productive cough. Her caretaker also reports poor intake and decreased appetite. She has swelling to BLE, but she has not taken her Lasix because she was instructed to take only as needed for weight gain of 3-5 pounds. She has no fevers, chest pain, or palpitations. She is taking all medications as prescribed. Home health has come for home visit with PT/OT/nursing. She is requesting portable oxygen tank. She is established with pulmonology and infectious disease appointment, but she has not established with cardiology since the diagnosis of atrial fibrillation. Of note, her H&H were decreasing during hospital admission, and  she reports this was not addressed during her stay. She denies black tarry stool, BRBPR, abnormal bruising, or other abnormal bleeding. She has no additional complaints on today's visit.    Transitional Care Note    Family and/or Caretaker present at visit?  Yes, niece is living with patient.  Diagnostic tests reviewed/disposition: I have reviewed all completed as well as pending diagnostic tests at the time of discharge.  Disease/illness education: We discussed chronic nature of her diease processes and necessary care to manage and prevent worsening/exacerbations.   Home health/community services discussion/referrals: Patient has home health established at [facility].   Establishment or re-establishment of referral orders for community resources: We will get her set up with cardiology.   Discussion with other health care providers: No discussion with other health care providers necessary.     Past Medical History:   Diagnosis Date    Arthritis     Carotid disease, bilateral     Cataract     Chronic hyponatremia     COPD (chronic obstructive pulmonary disease)     HLD (hyperlipidemia)     HTN (hypertension)        Past Surgical History:   Procedure Laterality Date    APPENDECTOMY      Cardioversion or Defibrillation  4/4/2019    [facility]    CATARACT EXTRACTION W/  INTRAOCULAR LENS IMPLANT Right 12/06/2018    Dr. Escobar    CATARACT EXTRACTION W/  INTRAOCULAR LENS IMPLANT Left 12/20/2018    DR. Escobar    CERVICAL SPINE SURGERY      DILATION AND CURETTAGE OF UTERUS      x 2    EYE SURGERY      cataract Rt    FRACTURE SURGERY      Lt leg fracture with hardware    INSERTION, IOL PROSTHESIS Left 12/20/2018    [facility]    INSERTION, IOL PROSTHESIS Right 12/6/2018    [facility]    metatarsal repair      OPEN REDUCTION INTERNAL FIXATION-TIBIAL PLATEAU Left 6/27/2014     [facility]    PHACOEMULSIFICATION, CATARACT Left 2018    [facility] OR    PHACOEMULSIFICATION, CATARACT Right 2018    [facility] OR    SHOULDER ARTHROSCOPY      Transesophageal echo (JOHNNA) intra-procedure log documentation N/A 2019    Performed by [facility]a [facility] at [facility] CATH LAB       Social History     Socioeconomic History    Marital status:      Spouse name: Not on file    Number of children: Not on file    Years of education: Not on file    Highest education level: Not on file   Occupational History    Occupation: teacher   Social Needs    Financial resource strain: Not on file    Food insecurity:     Worry: Not on file     Inability: Not on file    Transportation needs:     Medical: Not on file     Non-medical: Not on file   Tobacco Use    Smoking status: Former Smoker     Packs/day: 1.00     Years: 45.00     Pack years: 45.00     Types: Cigarettes     Last attempt to quit: 2002     Years since quittin.3    Smokeless tobacco: Never Used   Substance and Sexual Activity    Alcohol use: No    Drug use: No    Sexual activity: Yes     Partners: Male   Lifestyle    Physical activity:     Days per week: Not on file     Minutes per session: Not on file    Stress: Not on file   Relationships    Social connections:     Talks on phone: Not on file     Gets together: Not on file     Attends Alevism service: Not on file     Active member of club or organization: Not on file     Attends meetings of clubs or organizations: Not on file     Relationship status: Not on file   Other Topics Concern    Not on file   Social History Narrative    Not on file       Family History   Problem Relation Age of Onset    Heart disease Mother     Cancer Father     Heart disease Maternal Grandfather     Cataracts Sister     No Known Problems Brother     No Known Problems  Maternal Aunt     No Known Problems Maternal Uncle     No Known Problems Paternal Aunt     No Known Problems Paternal Uncle     No Known Problems Maternal Grandmother     No Known Problems Paternal Grandmother     No Known Problems Paternal Grandfather     Amblyopia Neg Hx     Blindness Neg Hx     Glaucoma Neg Hx     Macular degeneration Neg Hx     Retinal detachment Neg Hx     Strabismus Neg Hx     Diabetes Neg Hx     Hypertension Neg Hx     Stroke Neg Hx     Thyroid disease Neg Hx        Review of Systems  Review of Systems   Constitutional: Positive for malaise/fatigue. Negative for chills and fever.   Respiratory: Positive for cough, sputum production and shortness of breath. Negative for wheezing.    Cardiovascular: Positive for orthopnea and leg swelling. Negative for chest pain and palpitations.   Gastrointestinal: Negative for abdominal pain, blood in stool, constipation, diarrhea, melena, nausea and vomiting.   Genitourinary: Negative for flank pain and hematuria.   Musculoskeletal: Negative for falls.   Neurological: Negative for dizziness and loss of consciousness.   Endo/Heme/Allergies: Does not bruise/bleed easily.   Psychiatric/Behavioral: The patient has insomnia.      A complete review of systems was otherwise negative.    Physical Exam  /75   Pulse 70   Temp 97.9 °F (36.6 °C) (Oral)   LMP  (LMP Unknown)   SpO2 (!) 88%   General appearance: alert, appears stated age, cachectic, cooperative, mild distress and chronically ill and malnourished  Neck: no adenopathy, no JVD and supple, symmetrical, trachea midline  Back: symmetric, no curvature. ROM normal. No CVA tenderness.  Lungs: diminished breath sounds BLL with increased effort and rate, home O2 at 5L per nasal cannula  Heart: regular rate and rhythm, S1, S2 normal, no murmur, click, rub or gallop  Abdomen: soft, non-tender; bowel sounds normal; no masses,  no organomegaly  Extremities: edema 3+ pitting, bilaterally, Homans  sign is negative, no sign of DVT and no erythema, warmth, or tenderness  Pulses: 2+ and symmetric  Skin: pale with pale conjuctivae and dry mucus membranes  Lymph nodes: Cervical, supraclavicular, and axillary nodes normal.  Neurologic: Grossly normal    Assessment/Plan  Hospital discharge follow-up  See below.  -     OXYGEN FOR HOME USE  -     clotrimazole (MYCELEX) 10 mg pradeep; Take 1 tablet (10 mg total) by mouth 5 (five) times daily.  Dispense: 150 tablet; Refill: 0  -     CBC auto differential; Future; Expected date: 05/24/2019  -     Comprehensive metabolic panel; Future; Expected date: 05/24/2019  -     Brain natriuretic peptide; Future; Expected date: 05/24/2019  -     X-Ray Chest PA And Lateral; Future; Expected date: 05/24/2019  -     Ambulatory referral to Cardiology    Acute pulmonary edema  Repeat chest x-ray today to assess for worsening or improvement in pulmonary edema.  We will recheck labs as listed below.  Follow-up with pulmonology as scheduled with CT chest prior.  ER precautions discussed.  -     CBC auto differential; Future; Expected date: 05/24/2019  -     Comprehensive metabolic panel; Future; Expected date: 05/24/2019  -     Brain natriuretic peptide; Future; Expected date: 05/24/2019  -     X-Ray Chest PA And Lateral; Future; Expected date: 05/24/2019    Acute on chronic respiratory failure with hypoxia and hypercapnia  Oxygen with portable tank ordered.  Refill on Spiriva.  Labs and x-ray as listed below.  ER precautions discussed.  Follow-up with pulmonology as scheduled.  -     SPIRIVA WITH HANDIHALER 18 mcg inhalation capsule; Inhale 1 capsule (18 mcg total) into the lungs once daily.  Dispense: 100 capsule; Refill: 3  -     OXYGEN FOR HOME USE  -     CBC auto differential; Future; Expected date: 05/24/2019  -     Comprehensive metabolic panel; Future; Expected date: 05/24/2019  -     Brain natriuretic peptide; Future; Expected date: 05/24/2019  -     X-Ray Chest PA And Lateral;  Future; Expected date: 05/24/2019    Pneumonia of left lower lobe due to infectious organism  As above.  -     X-Ray Chest PA And Lateral; Future; Expected date: 05/24/2019    Normocytic anemia  Noted reduction in H&H during admission.  Repeat CBC today.   Further work-up and management as indicated pending results.  She is taking Eliquis, concern for bleeding related to this.  -     CBC auto differential; Future; Expected date: 05/24/2019    Bilateral lower extremity edema  Fluid overload likely.  Start Lasix daily for 5 days, and return to PRN basis.  Labs and x-ray as listed below.  -     CBC auto differential; Future; Expected date: 05/24/2019  -     Comprehensive metabolic panel; Future; Expected date: 05/24/2019  -     Brain natriuretic peptide; Future; Expected date: 05/24/2019  -     X-Ray Chest PA And Lateral; Future; Expected date: 05/24/2019    Pulmonary emphysema, unspecified emphysema type  As above.  -     SPIRIVA WITH HANDIHALER 18 mcg inhalation capsule; Inhale 1 capsule (18 mcg total) into the lungs once daily.  Dispense: 100 capsule; Refill: 3  -     OXYGEN FOR HOME USE  -     CBC auto differential; Future; Expected date: 05/24/2019  -     Comprehensive metabolic panel; Future; Expected date: 05/24/2019  -     Brain natriuretic peptide; Future; Expected date: 05/24/2019  -     X-Ray Chest PA And Lateral; Future; Expected date: 05/24/2019    Paroxysmal atrial fibrillation  The current medical regimen is effective;  continue present plan and medications.  We will get her set up with cardiology for management.  -     CBC auto differential; Future; Expected date: 05/24/2019  -     Comprehensive metabolic panel; Future; Expected date: 05/24/2019  -     Brain natriuretic peptide; Future; Expected date: 05/24/2019  -     Ambulatory referral to Cardiology    Essential hypertension  The current medical regimen is effective;  continue present plan and medications.  At goal tomorrow.    Bilateral carotid  artery disease, unspecified type  The current medical regimen is effective;  continue present plan and medications.    Hyperlipidemia, unspecified hyperlipidemia type  The current medical regimen is effective;  continue present plan and medications.    Oral thrush  Treatment as listed below.  Remember to rinse after use of the Spiriva.  -     clotrimazole (MYCELEX) 10 mg pradeep; Take 1 tablet (10 mg total) by mouth 5 (five) times daily.  Dispense: 150 tablet; Refill: 0    Patient and neice have verbalized understanding and are in agreement with plan of care.    Follow up in about 1 month (around 6/24/2019) for follow-up with cardiology.  Return as scheduled for follow-up with PCP.

## 2019-05-27 ENCOUNTER — TELEPHONE (OUTPATIENT)
Dept: FAMILY MEDICINE | Facility: CLINIC | Age: 74
End: 2019-05-27

## 2019-05-27 DIAGNOSIS — D64.9 SYMPTOMATIC ANEMIA: Primary | ICD-10-CM

## 2019-05-27 DIAGNOSIS — E87.6 HYPOKALEMIA: ICD-10-CM

## 2019-05-27 NOTE — TELEPHONE ENCOUNTER
----- Message from Shannon Groves sent at 5/27/2019  2:35 PM CDT -----  Contact: Moisés Lucas  Type:  Test Results    Who Called: Fahad    Name of Test (Lab/Mammo/Etc): chest x-ray    Date of Test: 5/24/2019    Ordering Provider: Dr. Oneal    Where the test was performed: [facility]      Would the patient rather a call back or a response via My [facility]?  Call back    Best Call Back Number: [phone]    Additional Information:

## 2019-05-28 NOTE — TELEPHONE ENCOUNTER
Please let the patient know her x-ray is unchanged from x-ray in the hospital, but has not worsened. I recommend she keep the follow-up with pulmonology with the CT scan prior as scheduled.    We also need to schedule her a follow-up with Dr. Oneal.    Her blood counts are continuing to drop, and her potassium was low when checked. We need to repeat the potassium.    Thanks!  DEE LindsayC

## 2019-05-28 NOTE — TELEPHONE ENCOUNTER
Spoke with pt rimma informed of results and advised of repeat blood work and needing a follow up appt with Dr. Oneal. Pt rimma states she will call back to schedule lab and follow up appts. appt reminders printed and put in mail

## 2019-05-29 ENCOUNTER — TELEPHONE (OUTPATIENT)
Dept: HEMATOLOGY/ONCOLOGY | Facility: CLINIC | Age: 74
End: 2019-05-29

## 2019-05-31 ENCOUNTER — TELEPHONE (OUTPATIENT)
Dept: FAMILY MEDICINE | Facility: CLINIC | Age: 74
End: 2019-05-31

## 2019-05-31 NOTE — TELEPHONE ENCOUNTER
----- Message from Kassandra Oro sent at 5/31/2019 10:22 AM CDT -----  Contact: pt'adrian ahmadi [phone]  Type: Patient Call Back    Who called:ptmia ahmadi [phone]    What is the request in detail:has questions in regards to aunt's health. Call pt    Can the clinic reply by [place]?    Would the patient rather a call back or a response via My [facility]? Call back    Best call back number:pt'adrian ahmadi [phone]    Additional Information:

## 2019-05-31 NOTE — TELEPHONE ENCOUNTER
Informed that she is not listed as a  and her aunt's care could not be discussed with her. Caller disconnected call.

## 2019-06-03 ENCOUNTER — TELEPHONE (OUTPATIENT)
Dept: FAMILY MEDICINE | Facility: CLINIC | Age: 74
End: 2019-06-03

## 2019-06-03 NOTE — TELEPHONE ENCOUNTER
Left messages and mailing letter for patient to schedule her Cardiology and Hem/Onc appointments. Thanks.

## 2019-06-25 NOTE — TELEPHONE ENCOUNTER
----- Message from Heather Iglesias sent at 8/21/2018  1:16 PM CDT -----  Contact: self  [phone]  Pt is requesting a refill on prednisone. Pls call [facility] [phone]. Thanks...............Joi   Detail Level: Simple

## 2019-07-12 DIAGNOSIS — Z12.39 BREAST CANCER SCREENING: ICD-10-CM

## 2019-07-25 NOTE — TELEPHONE ENCOUNTER
Referrals states there is no such thing as a referral for an ambulance ride. Left patient VM to call the office. No further questions or concerns at this time.    Shadi Andersen(Resident)

## 2019-08-06 ENCOUNTER — PES CALL (OUTPATIENT)
Dept: ADMINISTRATIVE | Facility: CLINIC | Age: 74
End: 2019-08-06

## 2019-08-19 PROBLEM — J81.0 ACUTE PULMONARY EDEMA: Status: RESOLVED | Noted: 2019-05-15 | Resolved: 2019-08-19

## 2019-10-09 NOTE — PROGRESS NOTES
[facility] Hospital Medicine  Progress Note    Patient Name: Latasha Perez  MRN: [id number]  Patient Class: IP- Inpatient   Admission Date: 5/14/2019  Length of Stay: 1 days  Attending Physician: Brittany Johnson MD  Primary Care Provider: Pollo Oneal MD    [facility] Medicine Team: [facility] HOSP MED 2 Janes Finn MD    Subjective:     Principal Problem:Acute on chronic respiratory failure with hypoxia and hypercapnia    HPI:  72 yo F with PMH COPD (3-4 L home O2), tobacco abuse, HTN, HLD, h/o AFib RVR (s/p cardioversion 4/5 but returned to Afib, on Amio, Eliquis), h/o pseudomonal pneumonia and bacteremia who presents with SOB from SNF. She had presented to the ER 5 days prior with hypoxic respiratory failure which was attributed to oxygen delivery system probably not working properly at her facility because her respiratory failure resolved with supplemental oxygen via NC.  She had no consolidation on CXR at that time.    She returned to the ER on 5/14 because shortness of breath returned.   Upon EMS arrival to her facility, the patient was tachypneic, hypoxic in the 60s on room air. She arrived on CPAP/BiPAP.  VBG on arrival was 7.35/59.  CXR revealed opacity in R L apex and bibiasilar airspace disease concerning of PNA or aspiration.  BNP was 122.  She was given a dose of cefepime and lasix by the ER.      She was recently admitted April 2019 for acute on chronic respiratory failure 2/2 COPD exacerbation and pseudomonal pneumonia with bacteremia (1/4 blood cultures).  She was discharged on cefepime 2g q8 until 4/18/19.  Hospital course at that time was complicated by Afib RVR, s/p cardioversion, but returned to AFib, discharged also on amiodarone, diltiazem, and Eliquis.      She describes she got a one time dose of penicillin once during a dental procedure and had generalized swelling but no respiratory compromise.    She wishes to be full code    Hospital Course:  No notes on  Pt should try wellbutrin SR instead of Chantix if not tolerating as zofran should not be taken regularly for 3 mo.   file    Interval History: anxious overnight. Endorsing SOB and states she cannot feel the additional O2. Moderately despondent. Will arrange for spiritual care. Likely a significant component of anxiety exacerbating the SOB bc sats are appropriate for COPD on the O2 she is on currently. Will temporarily increase O2 to 5L for pt peace of mind. Add affrin, mucinex. Treat for COPD exacerbation. Add prednisone 40 qd x5 days. GPR on blood culture. Re culture. Continue vanc zosyn while speciation/suceptibilities pending and while respiratory culture pending. SOB later improved with benzo and above mentioned therapies.     Review of Systems   Constitutional: Negative for chills, fatigue and fever.   Eyes: Negative for visual disturbance.   Respiratory: Positive for cough, chest tightness, shortness of breath and wheezing.    Cardiovascular: Negative for chest pain and leg swelling.   Genitourinary: Negative for dysuria.   Musculoskeletal: Negative for arthralgias and back pain.   Neurological: Negative for weakness and headaches.   Hematological: Negative for adenopathy.   Psychiatric/Behavioral: Positive for dysphoric mood and sleep disturbance. Negative for agitation.     Objective:     Vital Signs (Most Recent):  Temp: 96.6 °F (35.9 °C) (05/16/19 0732)  Pulse: 94 (05/16/19 0946)  Resp: 20 (05/16/19 0946)  BP: 127/60 (05/16/19 0732)  SpO2: (!) 92 % (05/16/19 0946) Vital Signs (24h Range):  Temp:  [96.6 °F (35.9 °C)-98 °F (36.7 °C)] 96.6 °F (35.9 °C)  Pulse:  [70-94] 94  Resp:  [14-20] 20  SpO2:  [92 %-96 %] 92 %  BP: (118-140)/(56-76) 127/60     Weight: 40.8 kg (90 lb)  Body mass index is 14.99 kg/m².    Intake/Output Summary (Last 24 hours) at 5/16/2019 1143  Last data filed at 5/16/2019 0600  Gross per 24 hour   Intake 1450 ml   Output --   Net 1450 ml      Physical Exam   Constitutional: She is oriented to person, place, and time. Vital signs are normal. She appears cachectic. She is active and cooperative. She is  easily aroused.  Non-toxic appearance. She appears distressed. Nasal cannula in place.   HENT:   Head: Normocephalic and atraumatic.   Eyes: EOM are normal.   Cardiovascular: Normal rate.   Pulmonary/Chest: She has wheezes in the right lower field.   Clear to auscultation on L, wheezes and crackles on R lower lung fields    Abdominal: Soft. Bowel sounds are normal. She exhibits no distension.   Musculoskeletal: She exhibits no edema or tenderness.   Neurological: She is alert, oriented to person, place, and time and easily aroused.   Skin: Skin is warm and dry. She is not diaphoretic.   Nursing note and vitals reviewed.      Significant Labs: All pertinent labs within the past 24 hours have been reviewed.    Significant Imaging: I have reviewed all pertinent imaging results/findings within the past 24 hours.    Assessment/Plan:      * Acute on chronic respiratory failure with hypoxia and hypercapnia  --hypercapnic respiratory failure seems chronic from her COPD, as evidenced by her near normal pH. Initially, did not appear to be an acute COPD exacerbation, however will add prednisone 40 qd for 5 days in addition to the abx.   --was weaned off bipap in the ER.  --pneumonia likely accounts for her hypoxic respiratory failure.  BNP is near baseline.  --CXR (5/14) consistent with COPD and opacity in R L apex and bibiasilar airspace disease concerning of PNA or aspiration  --continue home COPD inhalers (or formulary alternatives)  --would cover for pseudomonas given history of pseudomonal pneumonia and she was hospitalized within the last 3 months  --vanc and zosyn.  She is amenable to trying zosyn after discussion of her penicillin allergy, she will be closely monitored, is on telemetry  --GPR on blood cx, will re culture     --affrin prn q6 for nasal airway patenance  --mucinex for congestion  --alprazolam 0.125 q12 for anxiety   Significant anxiety component to SOB  --sputum culture when sample producible     Severe  protein-calorie malnutrition  --emphysematous body habitus  --boost with meals      Paroxysmal atrial fibrillation  --currently rate controlled, NSR on 5/14 EKG  --continue amiodarone, diltiazem, eliquis    Essential hypertension  --takes losartan and diltiazem, continue     HLD (hyperlipidemia)  --continue pravastatin      VTE Risk Mitigation (From admission, onward)        Ordered     apixaban tablet 5 mg  2 times daily      05/15/19 0228     IP VTE HIGH RISK PATIENT  Once      05/15/19 0222              Janes Finn MD  Department of Hospital Medicine   [facility]

## 2019-10-22 ENCOUNTER — PES CALL (OUTPATIENT)
Dept: ADMINISTRATIVE | Facility: CLINIC | Age: 74
End: 2019-10-22

## 2021-04-12 ENCOUNTER — PATIENT MESSAGE (OUTPATIENT)
Dept: ADMINISTRATIVE | Facility: HOSPITAL | Age: 76
End: 2021-04-12

## 2022-04-05 NOTE — HPI
73-year-old female with HTN, HLP, severe COPD and + tobacco abuse who presented with complaint of dizziness and weakness that is been progressive over the past 2 days.  She also reports increase in shortness of breath along with palpitations.  She was recently admitted to Observation from 3/28 to 3/29/19 for a COPD exacerbation and was discharged home on Azithromycin and prednisone 40 mg daily x 5 days.  She denies any fevers or chills.  In the ER, she was noted to be in Afib with RVR, rate initially in the 190s, status post IV diltiazem push followed by infusion with rate in the 140-150s. CXR with new right upper lobe opacity, leukocytosis of 38,000. Patient with reported wheezing on exam status post IV Solu-Medrol 125 mg IV push x1.  She was placed on BiPAP.   Primary Defect Length In Cm (Final Defect Size - Required For Flaps/Grafts): 1.6

## 2024-08-15 NOTE — H&P
6mm LLL pulm nodule seen on CT chest [facility] Medicine  History & Physical    Patient Name: Latasha Perez  MRN: [id number]  Admission Date: 4/26/2019  Attending Physician: Alvaro Jesus MD   Primary Care Provider: Pollo Oneal MD    [facility] Medicine Team: Networked reference to record PCT  Roque Vital MD     Patient information was obtained from patient, past medical records and ER records.     Subjective:     Principal Problem:Left lower lobe pneumonia    Chief Complaint:   Chief Complaint   Patient presents with    Shortness of Breath     pt reports waking up with sudden onset of shortness of breath, chest pressure. hx of COPD. EMS gave Albuterol/Atrovent and Solumedrol 125.  placed on c-pap.  O2 sat intially in low 60's per EMS        HPI: Ms. Perez is a 73 year old lady with a past medical history significant for HTN, HLD, COPD (on 3-4L NC at home), A.fib with RVR (s/p failed ablation - on Amiodarone and diltiazem for rate control and eliquis for anticoagulation) who presents to [facility] ED from [facility] rehab with complaints of shortness of breath. She was recently discharged from [facility] on 4/11/19 for right upper lobe pneumonia and new onset A.fib with RVR. Blood cultures grew Pseudomonas sensitive to cefipime and she completed a course of outpatient cefipime IV q8h via a PICC line which has since been removed. The patient states her cough has remained since that hospitalization. She is unclear as to how long exactly the cough has persisted and will not clarify the color of sputum production however does state that initially it was productive of sputum however it no longer as. She denies fevers or chills. Upon EMS arrival patient was hypoxic, reported sat in the 50's on room air, she was given solumedrol and started on BiPAP. On presentation to the ED patient received duo nebs and oxygenation improved, she was weaned down off Bipap to home O2 requirement of 3L.    In regards to her  atrial fib, the patient was diagnosed during hospitalization at the beginning of 4/1/19. Echo showed pulmonary hypertension, normal EF.JOHNNA was negative for thrombus so patient was cardioverted however went back into A.fib. She was controlled on Amiodarone and diltiazem and discharged on those medications. She was also started on eliquis for anticoagulation. Patient denies recurrence of palpitations or any chest discomfort. In ED patient was normal sinus rhythm.    In the ED CXR was notable for new left lower lobe consolidation. Labs were notable for elevated WBC. She received a dose of vancomycin and rocephin. Blood cultures were not collected prior to initiation of antibiotics and was admitted to hospital medicine for further evaluation of HCAP and COPD exacerbation.    Past Medical History:   Diagnosis Date    Arthritis     Carotid disease, bilateral     Cataract     Chronic hyponatremia     COPD (chronic obstructive pulmonary disease)     HLD (hyperlipidemia)     HTN (hypertension)        Past Surgical History:   Procedure Laterality Date    APPENDECTOMY      Cardioversion or Defibrillation  4/4/2019    Performed by Manfred Figueroa MD at [facility] CATH LAB    CATARACT EXTRACTION W/  INTRAOCULAR LENS IMPLANT Right 12/06/2018    Dr. Escobar    CATARACT EXTRACTION W/  INTRAOCULAR LENS IMPLANT Left 12/20/2018    DR. Escobar    CERVICAL SPINE SURGERY      DILATION AND CURETTAGE OF UTERUS      x 2    EYE SURGERY      cataract Rt    FRACTURE SURGERY      Lt leg fracture with hardware    INSERTION, IOL PROSTHESIS Left 12/20/2018    Performed by Broderick Escobar MD at [facility] OR    INSERTION, IOL PROSTHESIS Right 12/6/2018    Performed by [facility]ick Escobar MD at [facility] OR    metatarsal repair      OPEN REDUCTION INTERNAL FIXATION-TIBIAL PLATEAU Left 6/27/2014    Performed by Isak Pereira MD at [facility] OR    PHACOEMULSIFICATION, CATARACT Left 12/20/2018    Performed by Broderick REYES  MD Shawn at [facility]    PHACOEMULSIFICATION, CATARACT Right 12/6/2018    Performed by Broderick Escobar MD at [facility] OR    SHOULDER ARTHROSCOPY      Transesophageal echo (JOHNNA) intra-procedure log documentation N/A 4/4/2019    Performed by Manfred Figueroa MD at [facility] CATH LAB       Review of patient's allergies indicates:   Allergen Reactions    Pcn [penicillins] Other (See Comments)     Fever flushing skin swelling     Biaxin [clarithromycin] Rash    Codeine Rash    Doxycycline Rash    Levaquin [levofloxacin] Rash       Current Facility-Administered Medications on File Prior to Encounter   Medication    0.9%  NaCl infusion    0.9%  NaCl infusion    cyclopentolate 1% ophthalmic solution 1 drop    phenylephrine HCL 2.5% ophthalmic solution 1 drop    phenylephrine HCL 2.5% ophthalmic solution 1 drop    proparacaine 0.5 % ophthalmic solution 1 drop    proparacaine 0.5 % ophthalmic solution 1 drop    tropicamide 1% ophthalmic solution 1 drop    tropicamide 1% ophthalmic solution 1 drop     Current Outpatient Medications on File Prior to Encounter   Medication Sig    acetaminophen (TYLENOL EXTRA STRENGTH ORAL) Take by mouth as needed.    albuterol sulfate 2.5 mg/0.5 mL Nebu Take 2.5 mg by nebulization every 6 (six) hours while awake. One Box    alendronate (FOSAMAX) 70 MG tablet TAKE 1 TABLET BY MOUTH ONCE A WEEK EVERY 7 DAYS, AS DIRECTED    ALPRAZolam (XANAX) 0.25 MG tablet Take 1 tablet (0.25 mg total) by mouth 3 (three) times daily as needed for Anxiety.    amiodarone (PACERONE) 400 MG tablet Take 0.5 tablets (200 mg total) by mouth 2 (two) times daily.    apixaban (ELIQUIS) 5 mg Tab Take 1 tablet (5 mg total) by mouth 2 (two) times daily.    aspirin (ECOTRIN) 81 MG EC tablet Take 81 mg by mouth once daily.    diltiaZEM (CARDIZEM CD) 240 MG 24 hr capsule Take 1 capsule (240 mg total) by mouth once daily.    fluticasone-salmeterol 250-50 mcg/dose (ADVAIR) 250-50 mcg/dose diskus inhaler  Inhale 1 puff into the lungs 2 (two) times daily. Controller    latanoprost 0.005 % ophthalmic solution Place 1 drop into the left eye nightly.    losartan (COZAAR) 100 MG tablet TAKE 1 TABLET ONE TIME DAILY    pravastatin (PRAVACHOL) 20 MG tablet TAKE 1 TABLET EVERY EVENING    SPIRIVA WITH HANDIHALER 18 mcg inhalation capsule     VENTOLIN HFA 90 mcg/actuation inhaler INHALE TWO PUFFS BY MOUTH EVERY 6 HOURS AS NEEDED FOR WHEEZING    calcium carbonate (CALCIUM 500 ORAL) Take by mouth 2 (two) times daily.     cefepime in dextrose 5 % (MAXIPIME) 2 gram/50 mL PgBk Inject 50 mLs (2 g total) into the vein every 8 (eight) hours.     Family History     Problem Relation (Age of Onset)    Cancer Father    Cataracts Sister    Heart disease Mother, Maternal Grandfather    No Known Problems Brother, Maternal Aunt, Maternal Uncle, Paternal Aunt, Paternal Uncle, Maternal Grandmother, Paternal Grandmother, Paternal Grandfather        Tobacco Use    Smoking status: Former Smoker     Packs/day: 1.00     Years: 45.00     Pack years: 45.00     Types: Cigarettes     Last attempt to quit: 2002     Years since quittin.2    Smokeless tobacco: Never Used   Substance and Sexual Activity    Alcohol use: No    Drug use: No    Sexual activity: Yes     Partners: Male     Review of Systems   Constitutional: Negative for chills, fatigue, fever and unexpected weight change.   HENT: Positive for sore throat. Negative for mouth sores, sneezing and trouble swallowing.    Eyes: Negative for visual disturbance.   Respiratory: Positive for cough, shortness of breath and wheezing.    Cardiovascular: Negative for chest pain, palpitations and leg swelling.   Gastrointestinal: Negative for abdominal pain, constipation, diarrhea, nausea and vomiting.   Genitourinary: Negative for difficulty urinating, dysuria and frequency.   Musculoskeletal: Negative for arthralgias and myalgias.   Skin: Negative for pallor and rash.   Neurological:  Negative for weakness and headaches.   Hematological: Negative for adenopathy.     Objective:     Vital Signs (Most Recent):  Temp: 98.2 °F (36.8 °C) (04/26/19 0725)  Pulse: 90 (04/26/19 0725)  Resp: (!) 24 (04/26/19 0725)  BP: (!) 144/65 (04/26/19 0725)  SpO2: 98 %(3L NC) (04/26/19 0725) Vital Signs (24h Range):  Temp:  [97.9 °F (36.6 °C)-98.2 °F (36.8 °C)] 98.2 °F (36.8 °C)  Pulse:  [82-91] 90  Resp:  [19-49] 24  SpO2:  [88 %-100 %] 98 %  BP: (125-158)/(57-67) 144/65     Weight: 38.6 kg (85 lb)  Body mass index is 14.14 kg/m².    Physical Exam   Constitutional: She is oriented to person, place, and time. She appears well-developed and well-nourished. No distress.   HENT:   Head: Normocephalic and atraumatic.   Nose: Nose normal.   Tongue and posterior pharyngeal erythema. No exudate or white plaques noted   Eyes: Pupils are equal, round, and reactive to light. Conjunctivae and EOM are normal. No scleral icterus.   Neck: Normal range of motion. Neck supple.   Cardiovascular: Normal rate, regular rhythm, normal heart sounds and intact distal pulses. Exam reveals no friction rub.   No murmur heard.  Pulmonary/Chest: Effort normal. No respiratory distress. She has no wheezes. She has no rales.   On 3L NC, speaking full sentences   Abdominal: Soft. Bowel sounds are normal. She exhibits no distension and no mass. There is no tenderness. There is no guarding.   Musculoskeletal: Normal range of motion. She exhibits no edema, tenderness or deformity.   Lymphadenopathy:     She has no cervical adenopathy.   Neurological: She is alert and oriented to person, place, and time. No cranial nerve deficit. Coordination normal.   Skin: Skin is warm and dry. Capillary refill takes less than 2 seconds. No erythema. There is pallor.   Psychiatric: She has a normal mood and affect.   Nursing note and vitals reviewed.        CRANIAL NERVES     CN III, IV, VI   Pupils are equal, round, and reactive to light.  Extraocular motions are  normal.        Significant Labs:   CBC:   Recent Labs   Lab 04/26/19  0403   WBC 16.06*   HGB 9.6*   HCT 30.1*        CMP:   Recent Labs   Lab 04/26/19  0403      K 3.9   CL 99   CO2 29   *   BUN 17   CREATININE 0.6   CALCIUM 9.0   PROT 5.9*   ALBUMIN 2.0*   BILITOT 0.3   ALKPHOS 83   AST 19   ALT 16   ANIONGAP 11   EGFRNONAA >60.0     POCT Glucose:   Recent Labs   Lab 04/26/19  0648   POCTGLUCOSE 143*     All pertinent labs within the past 24 hours have been reviewed.    Significant Imaging: I have reviewed all pertinent imaging results/findings within the past 24 hours.    Narrative     EXAMINATION:  XR CHEST AP PORTABLE    CLINICAL HISTORY:  Asthma;    TECHNIQUE:  Single frontal view of the chest was performed.    COMPARISON:  April 7, 2019.    FINDINGS:  Interval development of opacification at the left lung base.    Right PICC line has been removed.    Heart and lungs otherwise appear unchanged when allowing for differences in technique and positioning.      Impression       Interval development of opacification at the left lung base.  Possible pneumonia with parapneumonic effusion.    No significant change from prior study.      Electronically signed by: Eder Clarke MD  Date: 04/26/2019  Time: 02:36         Assessment/Plan:     * Left lower lobe pneumonia  Patient with history of COPD and prior pneumonia with pseudomonas bacteremia presenting with cough, worsening hypoxia, and new Left LL infiltrate on CXR. Patient recently completed a course of IV Cefipime 1 week previous however subsequently developed this pneumonia in a separate lung zone. Recent hospitalization, residing in inpatient rehab facility, and antibiotic use concerning for resistance.    SIRS Criteria: elevated WBC 16, tachypnea  qSOFA criteria: 1 (RR>22)  Lactic acid pending    Patient has a significant penicillin allergy (listed as fevers, flushing, and swelling). At this time will elect to continue vancomycin and  "cefipime    Plan:  Antibiotics: Vancomycin and cefipime  Supplemental O2, goal > 88%  Treating for COPD exacerbation with xopinex and ipratropium nebs q4h  Solumedrol 125mg IV daily for 5 days  Lactic acid pending      COPD exacerbation  - 2/2 pneumonia, please see "left lower lobe pneumonia" above      Paroxysmal atrial fibrillation  Patient with a.fib diagnosed earlier this month during hospitalization for pneumonia and bacteremia. S/p failed cardioversion. Now controlled on diltiazem and amiodarone.    Patient currently rate controlled, in sinus rhythm per telemetry monitor. ECG on admit poor study    - Continuing amiodarone and diltiazem PO  - Anticoagulation with eliquis      Cachexia  - Nutrition consulted  - Boost supplementation with meals      Essential hypertension  - Continuing home diltiazem, patient normotensive once weaned off Bipap. Suspect hypoxia and stress were leading to increase in blood pressure.  - Continue to monitor      Leukocytosis  - Please see "Pneumonia"      Carotid disease, bilateral  Patient with 50-69% stenosis of left carotid artery and < 50% stenosis of right per ultrasound on 6/13/18.    - Continuing asa and statin while inpatient      HLD (hyperlipidemia)  Patient on pravastatin 20mg at home.    - Continuing home statin while inpatient        VTE Risk Mitigation (From admission, onward)        Ordered     apixaban tablet 5 mg  2 times daily      04/26/19 0559     Place TIAN hose  Until discontinued      04/26/19 0452     IP VTE HIGH RISK PATIENT  Once      04/26/19 0452             Roque Vital MD  Department of Hospital Medicine   [facility]  "

## 2025-05-07 NOTE — NURSING TRANSFER
Nursing Transfer Note      4/5/2019     Transfer From: ICU    Transfer via stretcher    Transfer with  to O2, cardiac monitoring    Transported by nurse    Medicines sent: yes    Chart send with patient: Yes    Notified: friend    Patient reassessed at: 04/05/19, 1325    Upon arrival to floor: cardiac monitor applied, patient oriented to room, call bell in reach and bed in lowest position  
  Nursing Transfer Note      4/5/2019     Transfer To: [address]B    Transfer via bed    Transfer with 4L to O2, cardiac monitoring, gold colored chain with 3 charms(in ziploc bag), white plastic rosary(in ziploc), clothing, two gold colored bracelets on arm    Transported by transport staff with RN    Medicines sent: cefepime, Magnesium 2g IVPB infusing    Chart send with patient: Yes    Notified: rimma Lucas    Patient reassessed at: 4/5/19 1200    Upon arrival to floor: cardiac monitor applied, patient oriented to room, call bell in reach and bed in lowest position  
English

## (undated) DEVICE — KIT CUSTOM BASIC EYE ST / MEA

## (undated) DEVICE — SOL IRR STRL WATER 500ML

## (undated) DEVICE — SHEILD & GARTERS FOX METAL EYE

## (undated) DEVICE — CARTRIDGE LENS D

## (undated) DEVICE — GLOVE BIOGEL ECLIPSE SZ 7.5

## (undated) DEVICE — KNIFE ANGLE 1MM

## (undated) DEVICE — KIT EYE PIC PACK WB

## (undated) DEVICE — NDL DISP. CYSTOTOME(BULK PACK)

## (undated) DEVICE — ELECTRODE EDGE SYSTEM RTS